# Patient Record
Sex: MALE | Race: ASIAN | NOT HISPANIC OR LATINO | Employment: PART TIME | URBAN - METROPOLITAN AREA
[De-identification: names, ages, dates, MRNs, and addresses within clinical notes are randomized per-mention and may not be internally consistent; named-entity substitution may affect disease eponyms.]

---

## 2017-02-08 ENCOUNTER — TRANSFERRED RECORDS (OUTPATIENT)
Dept: HEALTH INFORMATION MANAGEMENT | Facility: CLINIC | Age: 22
End: 2017-02-08

## 2017-08-16 ENCOUNTER — THERAPY VISIT (OUTPATIENT)
Dept: OCCUPATIONAL THERAPY | Facility: CLINIC | Age: 22
End: 2017-08-16
Payer: COMMERCIAL

## 2017-08-16 DIAGNOSIS — M79.645 BILATERAL THUMB PAIN: ICD-10-CM

## 2017-08-16 DIAGNOSIS — M79.644 BILATERAL THUMB PAIN: ICD-10-CM

## 2017-08-16 DIAGNOSIS — M25.531 PAIN IN BOTH WRISTS: Primary | ICD-10-CM

## 2017-08-16 DIAGNOSIS — M25.532 PAIN IN BOTH WRISTS: Primary | ICD-10-CM

## 2017-08-16 PROCEDURE — 97165 OT EVAL LOW COMPLEX 30 MIN: CPT | Mod: GO | Performed by: OCCUPATIONAL THERAPIST

## 2017-08-16 PROCEDURE — 97535 SELF CARE MNGMENT TRAINING: CPT | Mod: GO | Performed by: OCCUPATIONAL THERAPIST

## 2017-08-16 PROCEDURE — 97110 THERAPEUTIC EXERCISES: CPT | Mod: GO | Performed by: OCCUPATIONAL THERAPIST

## 2017-08-16 NOTE — PROGRESS NOTES
Hand Therapy Initial Evaluation    Current Date:  8/16/2017     Diagnosis:  Bilateral  Wrist and thumb pain, mainly right    Date of onset:  Spring 2017, more seriously in July 2017     Preferred Pronouns: they and them, their    Subjective:    Manoj Castellanos is L handed. Their main L hand tasks are cooking and writing, but otherwise ambidextrous.    Patient reports symptoms of pain, and loss of mobility mainly due to pain of the bilateral wrists, mainly the R (starting on the L with phone use) which occurred due to phone use, piano. They noted that pain came suddenly where it used to be just soreness. Since onset symptoms are Gradually getting worse.  Special tests:  none.  Previous treatment: wrist brace.    General health as reported by patient is good.  Pertinent medical history includes:asthma  Medical allergies:none.  Surgical history: none.  Medication history: inhaler (steroids).    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 Has a job lined up for September which will involve some computer use.  Currently working in normal job without restrictions  Job Tasks: prolonged sitting, computer work  Prior functional level:  no limitations  Barriers include:none  Mobility: No difficulty  Transportation: drives and public transportation  Leisure activities/hobbies: piano, reads a lot (voraciously) on e-reader, thinking about getting into sewing  Other: Pt would like to fix their handwriting. They do also have a computer with keyboard available. They have a dog, and a sister who will be leaving for college.    Functional Outcome Measure:   Please refer to flow sheet.      Objective:  Pain Level Report  VAS(0-10) 8/16/2017   At Rest: 0-3/10   With Use: 7/10     Report of Pain:  Location:  wrist and thumb  Pain Quality:  Aching, Dull and Sharp  Frequency: intermittent    Pain is worst:  daytime  Exacerbated by:  repetitive use, phone, didi use  Relieved by:  rest and brace  use  Progression:  Better somewhat lately  Edema:  NONE  Tenderness:   Pain Report:  - none    + mild    ++ moderate    +++ severe     Date 8/16/2017 8/16/2017   Side Right  Left   Ulna Styloid - -   TFCC - -   DRUJ - -   Radial Styloid - -   Distal Radius - -   Volar Lunate - -   Dorsal Lunate - -   Volar Scaphoid - -   Dorsal Scaphoid - -   Scaphoid in snuffbox - -   Dorsal wrist - -   ECU - -   ECRB - -   ECRL - -        Wrist and thumb ROM Special Test: + indicates mild pain, ++ indicates moderate pain, +++ indicates severe pain    DATE: 8/16/2017 Active Passive Resisted  MMT x/5 Comments:   Wrist ext with RD - - 5/5    Wrist ext with UD - - 5/5    Wrist flexion with RD - - 5/5    Wrist flexion with RD - - 5/5    Radial deviation - - 5/5    Ulnar deviation - - 5/5    Pronation - -- 5/5    Supination - - 5/5    Abductor pollicis longus  - - 5/5    Abductor Pollicis brevis  -  5/5    Extensor pollicis longus  - - 5/5    Flexor pollicis longus  - - 5/5    Flexor pollicis brevis  - - 5/5      Pt notes pain is localized to (points to): dorsal wrists and radial wrist/thumbs    Sensation: WNL throughout all nerve distributions; per patient report    Strength: WFL to B wrists    Assessment:  Patient presents with symptoms consistent with diagnosis of B wrist pain,  with conservative intervention. Pain appears related to repetitive strain with technology use. Pt very receptive to ergonomic and adaptive modifications. Pt requires 1 visit only at this time but would certainly be appropriate for further tx if symptoms worsen.    Patient's limitations or Problem List includes:  Pain of the bilateral wrist which interferes with the patient's ability to perform  Work Tasks and Recreational Activities as compared to previous level of function.    Rehab Potential:  Excellent - Return to full activity, no limitations    Patient will benefit from skilled Occupational Therapy to increase self management of symptoms and decrease  pain to return to previous activity level and resume normal daily tasks and to reach their rehab potential.    Barriers to Learning:  No barrier    Communication Issues:  Patient appears to be able to clearly communicate and understand verbal and written communication and follow directions correctly.    Chart Review: Detailed history review with patient    Identified Performance Deficits: home establishment and management, meal preparation and cleanup, work and leisure activities    Assessment of Occupational Performance:  3-5 Performance Deficits    Clinical Decision Making (Complexity): Low complexity    Treatment Explanation:  The following has been discussed with the patient:  RX ordered/plan of care  Anticipated outcomes  Possible risks and side effects    Plan:  Frequency:  One time visit for evaluation, diagnostic education. Teaching in adaptive aids and modifications for e-device use  Duration:  One time visit    Treatment Plan:   Self Care:  Self Care Tasks, Ergonomic Considerations and Work Tasks  Discharge Plan:  Achieve all LTG.  Independent in home treatment program.  Reach maximal therapeutic benefit.    Home Exercise Program:  Stretching exercises for UQ and FA  Avoid using thumbs for phone use (hold in hand and use IF)  Obtain and hand morales for didi or put on a copy stand  Use dictation on phone  Get a pencil  and practice writing, keep a open space between IF and thumb when writing  Use the whole UE when playing piano

## 2017-08-16 NOTE — MR AVS SNAPSHOT
"              After Visit Summary   8/16/2017    Manoj Castellanos    MRN: [id number]           Patient Information     Date Of Birth          1995        Visit Information        Provider Department      8/16/2017 4:00 PM Sandrine Mrai OT [facility]        Today's Diagnoses     Pain in both wrists    -  1    Bilateral thumb pain           Follow-ups after your visit        Your next 10 appointments already scheduled     Sep 06, 2017 11:00 AM CDT   [facility] ([facility] [place])    [address] [id number] [phone]              Who to contact     If you have questions or need follow up information about today's clinic visit or your schedule please contact [facility] directly at [phone].  Normal or non-critical lab and imaging results will be communicated to you by OfferSavvyhart, letter or phone within 4 business days after the clinic has received the results. If you do not hear from us within 7 days, please contact the clinic through OfferSavvyhart or phone. If you have a critical or abnormal lab result, we will notify you by phone as soon as possible.  Submit refill requests through Wikets or call your pharmacy and they will forward the refill request to us. Please allow 3 business days for your refill to be completed.          Additional Information About Your Visit        MyChart Information     Wikets lets you send messages to your doctor, view your test results, renew your prescriptions, schedule appointments and more. To sign up, go to [email or web address] . Click on \"Log in\" on the left side of the screen, which will take you to the Welcome page. Then click on \"Sign up Now\" on the right side of the page.     You will be asked to enter the access code listed below, as well as some personal information. Please follow the directions to create your username and password.     Your access code is: " [email or web address]  Expires: 10/31/2017  6:31 AM     Your access code will  in 90 days. If you need help or a new code, please call your [facility] clinic or [phone].        Care EveryWhere ID     This is your Care EveryWhere ID. This could be used by other organizations to access your [facility] medical records  [id number]         Blood Pressure from Last 3 Encounters:   No data found for BP    Weight from Last 3 Encounters:   12 52.6 kg (116 lb) (29 %)*     * Growth percentiles are based on [facility] 2-20 Years data.              We Performed the Following     HC OT EVAL, LOW COMPLEXITY     SELF CARE MNGMENT TRAINING     THERAPEUTIC EXERCISES        Primary Care Provider Fax #    [facility] [phone]       No address on file        Equal Access to Services     DAV ARIAS : Gwendolyn waddell Socornelius, waaxda luqadaha, qaybta kaalmada adedarryl, johana ramos . So [facility] [phone].    ATENCIÓN: Si habla español, tiene a carmona disposición servicios gratuitos de asistencia lingüística. Llame al [phone].    We comply with applicable federal civil rights laws and [place] laws. We do not discriminate on the basis of race, color, national origin, age, disability sex, sexual orientation or gender identity.            Thank you!     Thank you for choosing [facility]  for your care. Our goal is always to provide you with excellent care. Hearing back from our patients is one way we can continue to improve our services. Please take a few minutes to complete the written survey that you may receive in the mail after your visit with us. Thank you!             Your Updated Medication List - Protect others around you: Learn how to safely use, store and throw away your medicines at www.disposemymeds.org.          This list is accurate as of: 17 11:59 PM.  Always use your most recent med list.                   Brand Name Dispense Instructions for use Diagnosis     IBUPROFEN

## 2017-08-17 PROBLEM — M25.531 PAIN IN BOTH WRISTS: Status: ACTIVE | Noted: 2017-08-17

## 2017-08-17 PROBLEM — M25.532 PAIN IN BOTH WRISTS: Status: RESOLVED | Noted: 2017-08-17 | Resolved: 2017-08-17

## 2017-08-17 PROBLEM — M79.644 BILATERAL THUMB PAIN: Status: RESOLVED | Noted: 2017-08-17 | Resolved: 2017-08-17

## 2017-08-17 PROBLEM — M79.644 BILATERAL THUMB PAIN: Status: ACTIVE | Noted: 2017-08-17

## 2017-08-17 PROBLEM — M79.645 BILATERAL THUMB PAIN: Status: ACTIVE | Noted: 2017-08-17

## 2017-08-17 PROBLEM — M79.645 BILATERAL THUMB PAIN: Status: RESOLVED | Noted: 2017-08-17 | Resolved: 2017-08-17

## 2017-08-17 PROBLEM — M25.531 PAIN IN BOTH WRISTS: Status: RESOLVED | Noted: 2017-08-17 | Resolved: 2017-08-17

## 2017-08-17 PROBLEM — M25.532 PAIN IN BOTH WRISTS: Status: ACTIVE | Noted: 2017-08-17

## 2017-09-11 ENCOUNTER — OFFICE VISIT (OUTPATIENT)
Dept: FAMILY MEDICINE | Facility: CLINIC | Age: 22
End: 2017-09-11
Payer: COMMERCIAL

## 2017-09-11 VITALS
SYSTOLIC BLOOD PRESSURE: 132 MMHG | DIASTOLIC BLOOD PRESSURE: 86 MMHG | RESPIRATION RATE: 16 BRPM | HEIGHT: 69 IN | OXYGEN SATURATION: 98 % | HEART RATE: 98 BPM | TEMPERATURE: 98.6 F | BODY MASS INDEX: 17.77 KG/M2 | WEIGHT: 120 LBS

## 2017-09-11 DIAGNOSIS — Z23 NEED FOR VACCINATION: ICD-10-CM

## 2017-09-11 DIAGNOSIS — Z00.00 ROUTINE GENERAL MEDICAL EXAMINATION AT A HEALTH CARE FACILITY: Primary | ICD-10-CM

## 2017-09-11 DIAGNOSIS — L70.0 ACNE VULGARIS: ICD-10-CM

## 2017-09-11 DIAGNOSIS — L64.9 MALE PATTERN BALDNESS: ICD-10-CM

## 2017-09-11 PROCEDURE — 99213 OFFICE O/P EST LOW 20 MIN: CPT | Mod: 25 | Performed by: NURSE PRACTITIONER

## 2017-09-11 PROCEDURE — 99385 PREV VISIT NEW AGE 18-39: CPT | Mod: 25 | Performed by: NURSE PRACTITIONER

## 2017-09-11 PROCEDURE — 90471 IMMUNIZATION ADMIN: CPT | Performed by: NURSE PRACTITIONER

## 2017-09-11 PROCEDURE — 90686 IIV4 VACC NO PRSV 0.5 ML IM: CPT | Performed by: NURSE PRACTITIONER

## 2017-09-11 RX ORDER — ALBUTEROL SULFATE 90 UG/1
2 AEROSOL, METERED RESPIRATORY (INHALATION) EVERY 6 HOURS
COMMUNITY
End: 2018-03-06

## 2017-09-11 RX ORDER — CLINDAMYCIN PHOSPHATE 10 MG/G
GEL TOPICAL
Qty: 60 G | Refills: 11 | Status: SHIPPED | OUTPATIENT
Start: 2017-09-11 | End: 2018-01-10

## 2017-09-11 NOTE — MR AVS SNAPSHOT
After Visit Summary   9/11/2017    Manoj Castellanos    MRN: [id number]           Patient Information     Date Of Birth          1995        Visit Information        Provider Department      9/11/2017 2:20 PM Kina Rivera APRN CNP [facility]        Today's Diagnoses     Routine general medical examination at a health care facility    -  1    Acne vulgaris        Male pattern baldness          Care Instructions      Preventive Health Recommendations  Male Ages 18 - 25     Yearly exam:             See your health care provider every year in order to  o   Review health changes.   o   Discuss preventive care.    o   Review your medicines if your doctor has prescribed any.    You should be tested each year for STDs (sexually transmitted diseases).     Talk to your provider about cholesterol testing.      If you are at risk for diabetes, you should have a diabetes test (fasting glucose).    Shots: Get a flu shot each year. Get a tetanus shot every 10 years.     Nutrition:    Eat at least 5 servings of fruits and vegetables daily.     Eat whole-grain bread, whole-wheat pasta and brown rice instead of white grains and rice.     Talk to your provider about calcium and Vitamin D.     Lifestyle    Exercise for at least 150 minutes a week (30 minutes a day, 5 days a week). This will help you control your weight and prevent disease.     Limit alcohol to one drink per day.     No smoking.     Wear sunscreen to prevent skin cancer.     See your dentist every six months for an exam and cleaning.             Follow-ups after your visit        Who to contact     If you have questions or need follow up information about today's clinic visit or your schedule please contact [facility] directly at [phone].  Normal or non-critical lab and imaging results will be communicated to you by MyChart, letter or phone within 4 business days after the clinic has received the results.  "If you do not hear from us within 7 days, please contact the clinic through Barracuda Networks or phone. If you have a critical or abnormal lab result, we will notify you by phone as soon as possible.  Submit refill requests through [employer] or call your pharmacy and they will forward the refill request to us. Please allow 3 business days for your refill to be completed.          Additional Information About Your Visit        KastharInfer Information     [employer] Networks lets you send messages to your doctor, view your test results, renew your prescriptions, schedule appointments and more. To sign up, go to [email or web address] . Click on \"Log in\" on the left side of the screen, which will take you to the Welcome page. Then click on \"Sign up Now\" on the right side of the page.     You will be asked to enter the access code listed below, as well as some personal information. Please follow the directions to create your username and password.     Your access code is: [email or web address]  Expires: 10/31/2017  6:31 AM     Your access code will  in 90 days. If you need help or a new code, please call your [facility] clinic or [phone].        Care EveryWhere ID     This is your Care EveryWhere ID. This could be used by other organizations to access your [facility] medical records  [id number]        Your Vitals Were     Pulse Temperature Respirations Height Pulse Oximetry BMI (Body Mass Index)    98 98.6  F (37  C) (Oral) 16 5' 9.25\" (1.759 m) 98% 17.59 kg/m2       Blood Pressure from Last 3 Encounters:   17 132/86    Weight from Last 3 Encounters:   17 120 lb (54.4 kg)   12 116 lb (52.6 kg) (29 %)*     * Growth percentiles are based on CDC 2-20 Years data.              Today, you had the following     No orders found for display         Today's Medication Changes          These changes are accurate as of: 17  2:58 PM.  If you have any questions, ask your nurse or doctor.               Start taking these medicines.        " Dose/Directions    clindamycin 1 % topical gel   Commonly known as:  CLINDAGEL   Used for:  Acne vulgaris   Started by:  Kina Rivera APRN CNP        Apply a small amount to affected area daily.   Quantity:  60 g   Refills:  11            Where to get your medicines      These medications were sent to [facility] [address] [place] [address] & [address] [address]    Hours:  24-hours Phone:  [phone]     clindamycin 1 % topical gel                Primary Care Provider    None Specified       No primary provider on file.        Equal Access to Services     [facility]: Hadii jas kamara haddemetriso Socornelius, waaxda luqadaha, qaybta kaalmada adeheidyyada, johana ramos . So [facility] [phone].    ATENCIÓN: Si habla español, tiene a carmona disposición servicios gratuitos de asistencia lingüística. [facility] [phone].    We comply with applicable federal civil rights laws and [place] laws. We do not discriminate on the basis of race, color, national origin, age, disability sex, sexual orientation or gender identity.            Thank you!     Thank you for choosing [facility]  for your care. Our goal is always to provide you with excellent care. Hearing back from our patients is one way we can continue to improve our services. Please take a few minutes to complete the written survey that you may receive in the mail after your visit with us. Thank you!             Your Updated Medication List - Protect others around you: Learn how to safely use, store and throw away your medicines at www.disposemymeds.org.          This list is accurate as of: 9/11/17  2:58 PM.  Always use your most recent med list.                   Brand Name Dispense Instructions for use Diagnosis    AEROSPAN IN           albuterol 108 (90 BASE) MCG/ACT Inhaler    PROAIR HFA/PROVENTIL HFA/VENTOLIN HFA     Inhale 2 puffs  into the lungs every 6 hours        clindamycin 1 % topical gel    CLINDAGEL    60 g    Apply a small amount to affected area daily.    Acne vulgaris       IBUPROFEN           TRUVADA PO           ZYRTEC ALLERGY PO

## 2017-09-11 NOTE — PROGRESS NOTES
SUBJECTIVE:   CC: Manoj Castellanos is an 22 year old male who presents for preventative health visit.    HPI:  Patient is a pleasant 22 year old male who is currently transitioning to female presenting to the clinic for a preventative health visit and to establish care. Manoj prefers to go by addressed as she.  Her last preventative health visit was last year at an outside clinic and he is currently working on transferring his health records.  She reports that overall she is health and denies any major family history.  She states that she is currently not considering hormone therapy for her transition to female.  She is starting truvada and is followed by outside STD clinic.  The patient reports that she has 2 current partners and uses condoms for protection with each encounter.  She has no concerns for STDs at this visit and reports his recent testing was negative.  Her main concerns today are acne and male pattern baldness.  She reports that she has been seen in the past by dermatology who recommended a benzyl peroxide wash which was ineffective.  She admits that she did not use moisturizers which may have contributed to acne symptoms. She reports her acne is localized to his cheeks and neck and worsens with make-up use.  She reports that he cleanses his skin nightly and uses moisturizers with SPF daily.  She also reports concern for male pattern baldness and that he is beginning to have bilateral receeding hairlines.  She reports the males on his mothers side have a history of male pattern baldness.  She denies any other concerns at this visit.    Physical   Annual:     Getting at least 3 servings of Calcium per day::  NO    Bi-annual eye exam::  Yes    Dental care twice a year::  Yes    Sleep apnea or symptoms of sleep apnea::  None    Frequency of exercise::  2-3 days/week    Duration of exercise::  15-30 minutes    Taking medications regularly::  Yes    Medication side effects::  None    Additional concerns today::   "YES            Today's PHQ-2 Score:   PHQ-2 ( 1999 Pfizer) 9/11/2017   Q1: Little interest or pleasure in doing things 0   Q2: Feeling down, depressed or hopeless 0   PHQ-2 Score 0   Q1: Little interest or pleasure in doing things Not at all   Q2: Feeling down, depressed or hopeless Not at all   PHQ-2 Score 0       Abuse: Current or Past(Physical, Sexual or Emotional)- No  Do you feel safe in your environment - Yes    Social History   Substance Use Topics     Smoking status: Never Smoker     Smokeless tobacco: Never Used     Alcohol use Yes      Comment: occasional      The patient does not drink >3 drinks per day nor >7 drinks per week.    Last PSA: No results found for: PSA    Reviewed orders with patient. Reviewed health maintenance and updated orders accordingly - Yes      Reviewed and updated as needed this visit by clinical staff  Tobacco  Allergies  Med Hx  Surg Hx  Fam Hx  Soc Hx        Reviewed and updated as needed this visit by Provider        History reviewed. No pertinent past medical history.     ROS:  C: NEGATIVE for fever, chills, change in weight  I: NEGATIVE for worrisome rashes, moles or lesions.  Patient reports facial acne localized over his cheeks and neck.  Patient is also concerned about receeding hairline.  E: NEGATIVE for vision changes or irritation  ENT: NEGATIVE for ear, mouth and throat problems  R: NEGATIVE for significant cough or SOB  CV: NEGATIVE for chest pain, palpitations or peripheral edema  GI: NEGATIVE for nausea, abdominal pain, heartburn, or change in bowel habits   male: negative for dysuria, hematuria, decreased urinary stream, erectile dysfunction, urethral discharge  M: NEGATIVE for significant arthralgias or myalgia  N: NEGATIVE for weakness, dizziness or paresthesias  P: NEGATIVE for changes in mood or affect    OBJECTIVE:   /86  Pulse 98  Temp 98.6  F (37  C) (Oral)  Resp 16  Ht 5' 9.25\" (1.759 m)  Wt 120 lb (54.4 kg)  SpO2 98%  BMI 17.59 " "kg/m2    EXAM:  GENERAL: healthy, alert and no distress  NECK: no adenopathy, no asymmetry, masses, or scars and thyroid normal to palpation  RESP: lungs clear to auscultation - no rales, rhonchi or wheezes  CV: regular rate and rhythm, normal S1 S2, no S3 or S4, no murmur, click or rub, no peripheral edema and peripheral pulses strong  ABDOMEN: soft, nontender, no hepatosplenomegaly, no masses and bowel sounds normal  MS: no gross musculoskeletal defects noted, no edema  PSYCH: mentation appears normal, affect normal/bright    ASSESSMENT/PLAN:   1. Routine general medical examination at a health care facility  Patient is healthy and up-to-date on his vaccinations.  He will continue to follow with his outside clinic for Truvada.  No concerning findings on ROS or PE.    2. Acne vulgaris  Patient has mild acne and agreeable to starting clindagel in addition to benzyl peroxide.  Patient instructed to continue continue using moisturizer with SPF.  - clindamycin (CLINDAGEL) 1 % topical gel; Apply a small amount to affected area daily.  Dispense: 60 g; Refill: 11    3. Male pattern baldness  Recommended OTC Rogaine or generic equivalent.  If ineffective, will consider referral to specialist/dermatologist for other treatment options.    4. Need for vaccination  Flu shot administered at this visit.  - HC FLU VAC PRESRV FREE QUAD SPLIT VIR 3+YRS IM  - VACCINE ADMINISTRATION, INITIAL    COUNSELING:   Reviewed preventive health counseling, as reflected in patient instructions       Safe sex practices/STD prevention           reports that he has never smoked. He has never used smokeless tobacco.      Estimated body mass index is 17.59 kg/(m^2) as calculated from the following:    Height as of this encounter: 5' 9.25\" (1.759 m).    Weight as of this encounter: 120 lb (54.4 kg).     Follow-up in 1 year or sooner if needed.        Counseling Resources:  ATP IV Guidelines  Pooled Cohorts Equation Calculator  FRAX Risk " Assessment  ICSI Preventive Guidelines  Dietary Guidelines for Americans, 2010  USDA's MyPlate  ASA Prophylaxis  Lung CA Screening      Philly Ramirez RN, SNP    In supervising the nurse practitioner student, I have reviewed the ROS and PSFH documented by the student.  I performed the pertinent history, exam and assessment and plan components as documented above.   KEYANNA Nix CNP   [facility] for HPI/ROS submitted by the patient on 9/11/2017   PHQ-2 Score: 0

## 2017-09-11 NOTE — NURSING NOTE
"Chief Complaint   Patient presents with     Physical       Initial /86  Pulse 98  Temp 98.6  F (37  C) (Oral)  Resp 16  Ht 5' 9.25\" (1.759 m)  Wt 120 lb (54.4 kg)  SpO2 98%  BMI 17.59 kg/m2 Estimated body mass index is 17.59 kg/(m^2) as calculated from the following:    Height as of this encounter: 5' 9.25\" (1.759 m).    Weight as of this encounter: 120 lb (54.4 kg).  Medication Reconciliation: complete       Renard Hodges MA     Injectable Influenza Immunization Documentation      1.  Has the patient received the information for the injectable influenza vaccine? YES    2. Is the patient 6 months of age or older? YES    3. Does the patient have any of the following contraindications?          Severe allergy to eggs? No     Severe allergic reaction to previous influenza vaccines? No     Allergy to contact lens solution/thimerosol? No     History of Guillain-[place] syndrome? No     Undergoing chemotherapy or radiation therapy?       (vaccine should be given at least 2 weeks prior or 3 weeks after)  No     Currently have moderate or severe illness? No         4.  The vaccine has been administered and the patient was instructed to wait 15 minutes before leaving the building in the event of an allergic reaction: YES    Vaccination given by     Renard Hodges MA           "

## 2017-10-01 ENCOUNTER — HEALTH MAINTENANCE LETTER (OUTPATIENT)
Age: 22
End: 2017-10-01

## 2017-10-02 ENCOUNTER — OFFICE VISIT (OUTPATIENT)
Dept: FAMILY MEDICINE | Facility: CLINIC | Age: 22
End: 2017-10-02
Payer: COMMERCIAL

## 2017-10-02 VITALS
BODY MASS INDEX: 17.74 KG/M2 | HEART RATE: 97 BPM | WEIGHT: 121 LBS | TEMPERATURE: 97.4 F | RESPIRATION RATE: 24 BRPM | SYSTOLIC BLOOD PRESSURE: 127 MMHG | OXYGEN SATURATION: 100 % | DIASTOLIC BLOOD PRESSURE: 81 MMHG

## 2017-10-02 DIAGNOSIS — L64.9 MALE PATTERN BALDNESS: Primary | ICD-10-CM

## 2017-10-02 DIAGNOSIS — R20.9 DISTURBANCE OF SKIN SENSATION: ICD-10-CM

## 2017-10-02 DIAGNOSIS — R01.1 SYSTOLIC CLICK: ICD-10-CM

## 2017-10-02 DIAGNOSIS — R07.89 CHEST WALL PAIN: ICD-10-CM

## 2017-10-02 LAB
HGB BLD-MCNC: 16.4 G/DL (ref 13.3–17.7)
VIT B12 SERPL-MCNC: 447 PG/ML (ref 193–986)

## 2017-10-02 PROCEDURE — 84443 ASSAY THYROID STIM HORMONE: CPT | Performed by: NURSE PRACTITIONER

## 2017-10-02 PROCEDURE — 84207 ASSAY OF VITAMIN B-6: CPT | Mod: 90 | Performed by: NURSE PRACTITIONER

## 2017-10-02 PROCEDURE — 99000 SPECIMEN HANDLING OFFICE-LAB: CPT | Performed by: NURSE PRACTITIONER

## 2017-10-02 PROCEDURE — 85018 HEMOGLOBIN: CPT | Performed by: NURSE PRACTITIONER

## 2017-10-02 PROCEDURE — 36415 COLL VENOUS BLD VENIPUNCTURE: CPT | Performed by: NURSE PRACTITIONER

## 2017-10-02 PROCEDURE — 99214 OFFICE O/P EST MOD 30 MIN: CPT | Performed by: NURSE PRACTITIONER

## 2017-10-02 PROCEDURE — 82607 VITAMIN B-12: CPT | Performed by: NURSE PRACTITIONER

## 2017-10-02 PROCEDURE — 80048 BASIC METABOLIC PNL TOTAL CA: CPT | Performed by: NURSE PRACTITIONER

## 2017-10-02 NOTE — PROGRESS NOTES
"  SUBJECTIVE:   Manoj Castellanos is a 22 year old male who presents to clinic today for the following health issues:      Follow up on my male pattern baldness:   Specifically pertaining to the negative side effects   minoxidil treatment is having and discussing potential   alternatives such as finasteride.    ED/UC Followup:    Facility:  [facility]   Date of visit: 10/1/2017  Reason for visit: chest pain and clicking  Current Status: Better   Manoj had a sudden onset of \"muscle spasms\" in his left chest.  Manoj took a vicodin (left over from a wisdom tooth surgery) and the vicodin did not help.  Manoj went to [facility] and they told him it was \"chest wall pain.\"  Chest xray was normal.  Manoj still has some chest soreness.  At the end of a deep breath Ty will have some left sided lung pain.  The pain is in Manoj's left lower lung and radiates up to Ty's left shoulder.    Heart click:   Manoj woke up and Ty noticed a \"click\" coming with heart beat.  This morning for approximately 1 minute.  The clicking then was noticed intermittent clicking for 5 minutes after the minute of consistent clicking.  Manoj has not noticed any heart clicking since.     Facial Numbness:  Has had some numbness but on Friday entire lower half of face was like pins and needles and some lower lip paralysis. Previously happened on 9/12/2017.  Longstanding history of numbness on right jaw from mid anterior chin radiating to right mandible.  On September 29th Manoj had an episode of numbness that affected Manoj's face from bilateral cheek bones, under nose, to jaw line.  Uncertain if slurring, but the sensation that lower lip was puckering and not working properly.      [place]:  Managed by [facility] in [facility].    Problem list and histories reviewed & adjusted, as indicated.  Additional history: as documented    Patient Active Problem List   Diagnosis     Patellofemoral pain syndrome     Right hip pain     History reviewed. No pertinent surgical history.    Social History "   Substance Use Topics     Smoking status: Never Smoker     Smokeless tobacco: Never Used     Alcohol use Yes      Comment: occasional      History reviewed. No pertinent family history.      Current Outpatient Prescriptions   Medication Sig Dispense Refill     Flunisolide HFA (AEROSPAN IN)        Cetirizine HCl (ZYRTEC ALLERGY PO)        Emtricitabine-Tenofovir DF (TRUVADA PO)        albuterol (PROAIR HFA/PROVENTIL HFA/VENTOLIN HFA) 108 (90 BASE) MCG/ACT Inhaler Inhale 2 puffs into the lungs every 6 hours       clindamycin (CLINDAGEL) 1 % topical gel Apply a small amount to affected area daily. 60 g 11     IBUPROFEN        No Known Allergies  No lab results found.   BP Readings from Last 3 Encounters:   10/02/17 127/81   09/11/17 132/86    Wt Readings from Last 3 Encounters:   10/02/17 121 lb (54.9 kg)   09/11/17 120 lb (54.4 kg)   12/05/12 116 lb (52.6 kg) (29 %)*     * Growth percentiles are based on CDC 2-20 Years data.                  Labs reviewed in EPIC          Reviewed and updated as needed this visit by clinical staff       Reviewed and updated as needed this visit by Provider         ROS:  Constitutional, HEENT, cardiovascular, pulmonary, GI, , musculoskeletal, neuro, skin, endocrine and psych systems are negative, except as otherwise noted.      OBJECTIVE:   /81  Pulse 97  Temp 97.4  F (36.3  C) (Oral)  Resp 24  Wt 121 lb (54.9 kg)  SpO2 100%  BMI 17.74 kg/m2  Body mass index is 17.74 kg/(m^2).  GENERAL: healthy, alert and no distress  NECK: no adenopathy, no asymmetry, masses, or scars and thyroid normal to palpation  RESP: lungs clear to auscultation - no rales, rhonchi or wheezes  CV: regular rate and rhythm, normal S1 S2, no S3 or S4, no murmur, click or rub, no peripheral edema and peripheral pulses strong  SKIN: warm and dry. Early male patterned baldness noted to anterior scalp. He also has bi-temporal area dry skin patches/flaking just inside the scalp line.   NEURO: Normal  strength and tone, mentation intact and speech normal  PSYCH: mentation appears normal, affect normal/bright      ASSESSMENT/PLAN:     (L64.9) Male pattern baldness  (primary encounter diagnosis)  Comment: uncertain  Plan: see below    (R01.1) heart click, now resolved  Comment: uncertain  Plan: see below    (R20.9) Disturbance of skin sensation  Comment: uncertain  Plan: Vitamin B12, Vitamin B6, TSH with free T4         reflex, Hemoglobin, Basic metabolic panel,         NEUROLOGY ADULT REFERRAL        See below    (R07.89) Chest wall pain  Comment: improved  Plan: see below.    Patient Instructions   Baldness:  Stop the minoxodil.  You can use your over the counter product for dandruff.  Follow up with dermatology for other treatment options.    Heart click:  I did not hear a heart click today  I recommend that you continue your fluid intake, a healthy diet, etc.  If you notice this again, please mychart message me to let me know and I would order a cardiac echocardiogram.  If you develop the heart click and feel dizzy, lightheaded, don't feel well, etc, please go to the ER.    Chest wall pain:  Continue your ibuprofen 400mg every 6 hours for 2-3 days with good.  Follow up if your symptoms return or worsen in any way.    Facial numbness:  I have done some labs today to try to figure out an underlying etiology/reason.  Take care of yourself with a healthy diet, fluids, rest, etc.  If this returns, I would you to follow up with our clinic neurologist, Dr. Shankar.  You can call our clinic to schedule an appointment with him.                KEYANNA Nix [facility]

## 2017-10-02 NOTE — MR AVS SNAPSHOT
After Visit Summary   10/2/2017    Manoj Castellanos    MRN: [id number]           Patient Information     Date Of Birth          1995        Visit Information        Provider Department      10/2/2017 1:20 PM Kina Rivera APRN CNP [facility]        Today's Diagnoses     Male pattern baldness    -  1    heart click, now resolved        Disturbance of skin sensation        Chest wall pain          Care Instructions    Baldness:  Stop the minoxodil.  You can use your over the counter product for dandruff.  Follow up with dermatology for other treatment options.    Heart click:  I did not hear a heart click today  I recommend that you continue your fluid intake, a healthy diet, etc.  If you notice this again, please mychart message me to let me know and I would order a cardiac echocardiogram.  If you develop the heart click and feel dizzy, lightheaded, don't feel well, etc, please go to the ER.    Chest wall pain:  Continue your ibuprofen 400mg every 6 hours for 2-3 days with good.  Follow up if your symptoms return or worsen in any way.    Facial numbness:  I have done some labs today to try to figure out an underlying etiology/reason.  Take care of yourself with a healthy diet, fluids, rest, etc.  If this returns, I would you to follow up with our clinic neurologist, Dr. Shankar.  You can call our clinic to schedule an appointment with him.              Follow-ups after your visit        Additional Services     NEUROLOGY ADULT REFERRAL       Your provider has referred you to: FMG: [facility] [phone]   [email or web address]  FMG: [facility] [phone]   [email or web address]  UMP: Neurology [facility] [phone]   [email or web address]     Reason for Referral: Consult    Please be aware that coverage  of these services is subject to the terms and limitations of your health insurance plan.  Call member services at your health plan with any benefit or coverage questions.      Please bring the following with you to your appointment:    (1) Any X-Rays, CTs or MRIs which have been performed.  Contact the facility where they were done to arrange for  prior to your scheduled appointment.    (2) List of current medications  (3) This referral request   (4) Any documents/labs given to you for this referral                  Who to contact     If you have questions or need follow up information about today's clinic visit or your schedule please contact [facility] directly at [phone].  Normal or non-critical lab and imaging results will be communicated to you by MyChart, letter or phone within 4 business days after the clinic has received the results. If you do not hear from us within 7 days, please contact the clinic through [employer] or phone. If you have a critical or abnormal lab result, we will notify you by phone as soon as possible.  Submit refill requests through [employer] or call your pharmacy and they will forward the refill request to us. Please allow 3 business days for your refill to be completed.          Additional Information About Your Visit        [employer] Information     [employer] gives you secure access to your electronic health record. If you see a primary care provider, you can also send messages to your care team and make appointments. If you have questions, please call your primary care clinic.  If you do not have a primary care provider, please call [phone] and they will assist you.        Care EveryWhere ID     This is your Care EveryWhere ID. This could be used by other organizations to access your [place] medical records  [id number]        Your Vitals Were     Pulse Temperature Respirations Pulse Oximetry BMI (Body Mass Index)       97 97.4  F (36.3  C) (Oral) 24  100% 17.74 kg/m2        Blood Pressure from Last 3 Encounters:   10/02/17 127/81   09/11/17 132/86    Weight from Last 3 Encounters:   10/02/17 121 lb (54.9 kg)   09/11/17 120 lb (54.4 kg)   12/05/12 116 lb (52.6 kg) (29 %)*     * Growth percentiles are based on [facility] 2-20 Years data.              We Performed the Following     Basic metabolic panel     Hemoglobin     NEUROLOGY ADULT REFERRAL     TSH with free T4 reflex     Vitamin B12     Vitamin B6        Primary Care Provider    None Specified       No primary provider on file.        Equal Access to Services     [facility]: Hadii jas Winkler, wapreeti alvarado, elen kaalmarodríguez brown, johana ramos . So [facility] [phone].    ATENCIÓN: Si habla español, tiene a carmona disposición servicios gratuitos de asistencia lingüística. Ll[facility] [phone].    We comply with applicable federal civil rights laws and [place] laws. We do not discriminate on the basis of race, color, national origin, age, disability, sex, sexual orientation, or gender identity.            Thank you!     Thank you for choosing [facility]  for your care. Our goal is always to provide you with excellent care. Hearing back from our patients is one way we can continue to improve our services. Please take a few minutes to complete the written survey that you may receive in the mail after your visit with us. Thank you!             Your Updated Medication List - Protect others around you: Learn how to safely use, store and throw away your medicines at www.disposemymeds.org.          This list is accurate as of: 10/2/17  2:23 PM.  Always use your most recent med list.                   Brand Name Dispense Instructions for use Diagnosis    AEROSPAN IN           albuterol 108 (90 BASE) MCG/ACT Inhaler    PROAIR HFA/PROVENTIL HFA/VENTOLIN HFA     Inhale 2 puffs into the lungs every 6 hours        clindamycin 1 % topical gel    CLINDAGEL    60 g     Apply a small amount to affected area daily.    Acne vulgaris       IBUPROFEN           TRUVADA PO           ZYRTEC ALLERGY PO

## 2017-10-02 NOTE — NURSING NOTE
"Chief Complaint   Patient presents with     Hair/Scalp Problem       Initial /81  Pulse 97  Temp 97.4  F (36.3  C) (Oral)  Resp 24  Wt 121 lb (54.9 kg)  SpO2 100%  BMI 17.74 kg/m2 Estimated body mass index is 17.74 kg/(m^2) as calculated from the following:    Height as of 9/11/17: 5' 9.25\" (1.759 m).    Weight as of this encounter: 121 lb (54.9 kg).  Medication Reconciliation: complete  Jan Casanova CMA   "

## 2017-10-02 NOTE — PATIENT INSTRUCTIONS
Baldness:  Stop the minoxodil.  You can use your over the counter product for dandruff.  Follow up with dermatology for other treatment options.    Heart click:  I did not hear a heart click today  I recommend that you continue your fluid intake, a healthy diet, etc.  If you notice this again, please mychart message me to let me know and I would order a cardiac echocardiogram.  If you develop the heart click and feel dizzy, lightheaded, don't feel well, etc, please go to the ER.    Chest wall pain:  Continue your ibuprofen 400mg every 6 hours for 2-3 days with good.  Follow up if your symptoms return or worsen in any way.    Facial numbness:  I have done some labs today to try to figure out an underlying etiology/reason.  Take care of yourself with a healthy diet, fluids, rest, etc.  If this returns, I would you to follow up with our clinic neurologist, Dr. Shankar.  You can call our clinic to schedule an appointment with him.

## 2017-10-03 LAB
ANION GAP SERPL CALCULATED.3IONS-SCNC: 6 MMOL/L (ref 3–14)
BUN SERPL-MCNC: 11 MG/DL (ref 7–30)
CALCIUM SERPL-MCNC: 8.7 MG/DL (ref 8.5–10.1)
CHLORIDE SERPL-SCNC: 102 MMOL/L (ref 94–109)
CO2 SERPL-SCNC: 29 MMOL/L (ref 20–32)
CREAT SERPL-MCNC: 0.95 MG/DL (ref 0.66–1.25)
GFR SERPL CREATININE-BSD FRML MDRD: >90 ML/MIN/1.7M2
GLUCOSE SERPL-MCNC: 88 MG/DL (ref 70–99)
POTASSIUM SERPL-SCNC: 3.9 MMOL/L (ref 3.4–5.3)
SODIUM SERPL-SCNC: 137 MMOL/L (ref 133–144)
TSH SERPL DL<=0.005 MIU/L-ACNC: 2.98 MU/L (ref 0.4–4)

## 2017-10-05 LAB — VIT B6 SERPL-MCNC: 25.4 NMOL/L (ref 20–125)

## 2017-10-06 ENCOUNTER — RADIANT APPOINTMENT (OUTPATIENT)
Dept: CARDIOLOGY | Facility: CLINIC | Age: 22
End: 2017-10-06

## 2017-10-06 DIAGNOSIS — R01.1 UNDIAGNOSED CARDIAC MURMURS: ICD-10-CM

## 2017-10-12 ENCOUNTER — OFFICE VISIT (OUTPATIENT)
Dept: NEUROLOGY | Facility: CLINIC | Age: 22
End: 2017-10-12
Payer: COMMERCIAL

## 2017-10-12 VITALS
HEART RATE: 93 BPM | TEMPERATURE: 97.1 F | WEIGHT: 120 LBS | RESPIRATION RATE: 16 BRPM | BODY MASS INDEX: 17.59 KG/M2 | OXYGEN SATURATION: 96 % | DIASTOLIC BLOOD PRESSURE: 80 MMHG | SYSTOLIC BLOOD PRESSURE: 124 MMHG

## 2017-10-12 DIAGNOSIS — R20.0 RIGHT FACIAL NUMBNESS: Primary | ICD-10-CM

## 2017-10-12 PROCEDURE — 99205 OFFICE O/P NEW HI 60 MIN: CPT | Performed by: PSYCHIATRY & NEUROLOGY

## 2017-10-12 NOTE — MR AVS SNAPSHOT
After Visit Summary   10/12/2017    Manoj Castellanos    MRN: [id number]           Patient Information     Date Of Birth          1995        Visit Information        Provider Department      10/12/2017 3:00 PM Marcello Sandoval MD [facility]        Today's Diagnoses     Right facial numbness    -  1      Care Instructions    AFTER VISIT SUMMARY (AVS):    At today's visit we discussed various diagnostic possibilities for your symptoms and the reasons for work-up, which includes:  Orders Placed This Encounter   Procedures     MR Brain w/o Contrast     No new medications were ordered.    Additional recommendations after the work-up.    Next follow-up appointment is on as needed basis based on the results.    Please do not hesitate to call me with any questions or concerns.    Thanks.            Follow-ups after your visit        Your next 10 appointments already scheduled     Oct 17, 2017  7:45 PM CDT   (Arrive by 7:30 PM)   MR BRAIN W/O CONTRAST with [id number]   [facility] ([facility])    [address] [place] [id number] [phone]           Take your medicines as usual, unless your doctor tells you not to. Bring a list of your current medicines to your exam (including vitamins, minerals and over-the-counter drugs). Also bring the results of similar scans you may have had.  Please remove any body piercings and hair extensions before you arrive.  Follow your doctor s orders. If you do not, we may have to postpone your exam.  You will not have contrast for this exam. You do not need to do anything special to prepare.  The MRI machine uses a strong magnet. Please wear clothes without metal (snaps, zippers). A sweatsuit works well, or we may give you a hospital gown.   **IMPORTANT** THE INSTRUCTIONS BELOW ARE ONLY FOR THOSE PATIENTS WHO HAVE BEEN TOLD THEY WILL RECEIVE SEDATION OR GENERAL ANESTHESIA DURING  THEIR MRI PROCEDURE:  IF YOU WILL RECEIVE SEDATION (take medicine to help you relax during your exam):   You must get the medicine from your doctor before you arrive. Bring the medicine to the exam. Do not take it at home.   Arrive one hour early. Bring someone who can take you home after the test. Your medicine will make you sleepy. After the exam, you may not drive, take a bus or take a taxi by yourself.   No eating 8 hours before your exam. You may have clear liquids up until 4 hours before your exam. (Clear liquids include water, clear tea, black coffee and fruit juice without pulp.)  IF YOU WILL RECEIVE ANESTHESIA (be asleep for your exam):   Arrive 1 1/2 hours early. Bring someone who can take you home after the test. You may not drive, take a bus or take a taxi by yourself.   No eating 8 hours before your exam. You may have clear liquids up until 4 hours before your exam. (Clear liquids include water, clear tea, black coffee and fruit juice without pulp.)   You will spend four to five hours in the recovery room.  Please call the Imaging Department at your exam site with any questions.              Future tests that were ordered for you today     Open Future Orders        Priority Expected Expires Ordered    MR Brain w/o Contrast Routine  10/12/2018 10/12/2017            Who to contact     If you have questions or need follow up information about today's clinic visit or your schedule please contact [facility] directly at [phone].  Normal or non-critical lab and imaging results will be communicated to you by MyChart, letter or phone within 4 business days after the clinic has received the results. If you do not hear from us within 7 days, please contact the clinic through [email or web address] or phone. If you have a critical or abnormal lab result, we will notify you by phone as soon as possible.  Submit refill requests through [employer] or call your pharmacy and they will forward the refill request  to us. Please allow 3 business days for your refill to be completed.          Additional Information About Your Visit        MyChart Information     [employer] gives you secure access to your electronic health record. If you see a primary care provider, you can also send messages to your care team and make appointments. If you have questions, please call your primary care clinic.  If you do not have a primary care provider, please call [phone] and they will assist you.        Care EveryWhere ID     This is your Care EveryWhere ID. This could be used by other organizations to access your [place] medical records  [id number]        Your Vitals Were     Pulse Temperature Respirations Pulse Oximetry BMI (Body Mass Index)       93 97.1  F (36.2  C) (Oral) 16 96% 17.59 kg/m2        Blood Pressure from Last 3 Encounters:   10/12/17 124/80   10/02/17 127/81   09/11/17 132/86    Weight from Last 3 Encounters:   10/12/17 54.4 kg (120 lb)   10/02/17 54.9 kg (121 lb)   09/11/17 54.4 kg (120 lb)               Primary Care Provider    None Specified       No primary provider on file.        Equal Access to Services     [facility] : Hadii jas Winkler, cm alvarado, elen brown, johana ramos . So [facility] [phone].    ATENCIÓN: Si habla español, tiene a carmona disposición servicios gratuitos de asistencia lingüística. Ll[facility] Medical Center [phone].    We comply with applicable federal civil rights laws and [place] laws. We do not discriminate on the basis of race, color, national origin, age, disability, sex, sexual orientation, or gender identity.            Thank you!     Thank you for choosing [facility]  for your care. Our goal is always to provide you with excellent care. Hearing back from our patients is one way we can continue to improve our services. Please take a few minutes to complete the written survey that you may receive in the mail after your  visit with us. Thank you!             Your Updated Medication List - Protect others around you: Learn how to safely use, store and throw away your medicines at www.disposemymeds.org.          This list is accurate as of: 10/12/17  3:48 PM.  Always use your most recent med list.                   Brand Name Dispense Instructions for use Diagnosis    AEROSPAN IN           albuterol 108 (90 BASE) MCG/ACT Inhaler    PROAIR HFA/PROVENTIL HFA/VENTOLIN HFA     Inhale 2 puffs into the lungs every 6 hours        clindamycin 1 % topical gel    CLINDAGEL    60 g    Apply a small amount to affected area daily.    Acne vulgaris       IBUPROFEN           TRUVADA PO           ZYRTEC ALLERGY PO

## 2017-10-12 NOTE — PROGRESS NOTES
"INITIAL NEUROLOGY CONSULTATION    DATE OF VISIT: 10/12/2017  CLINIC LOCATION: [facility]  MRN: [id number]  PATIENT NAME: Manoj Castellanos  YOB: 1995    PRIMARY CARE PROVIDER: Kina Rivera APRN CNP.     REASON FOR VISIT:   Chief Complaint   Patient presents with     Consult     facial numbness      HISTORY OF PRESENT ILLNESS:                                                    Mr. 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    Per patient's report, Manoj developed permanent \"low-level\" numbness of the lower part of the right face over 3 years ago. The patient is not sure if it was related to any of the dental work that was performed, though thinks that major procedures were done later. Infrequently (6 times in the last year), Manoj experienced \"higher-level\" numbness and tingling episodes that involved the entire lower part of the face and felt similar to the sensation that the patient previously experienced with hyperventilation and anxiety. Then, on 09/12/2017 an episode of severe numbness and tingling located over the entire lower face occurred during sex and lasted for 15-20 minutes. Additional symptoms included inability to move the lower lip. It felt like it was \"held by magnets\" interfering with the patient's ability to speak, though language expression/reception and consciousness were preserved. The second attack of severe symptoms occurred on 09/29/2017 during the road trip when the patient was laughing hard with friends. The symptoms and duration were pretty similar to the episode on 09/12/2017. One additional attack of \"moderate\" numbness and tingling that spread this time to the right side of the neck occurred on 10/10/2017 and lasted for 20 minutes. There were " no other associated symptoms with any other recent episodes. So far, the patient did not notice any aggravating or alleviating factors. No treatments were tried.    Otherwise, the patient denies any previous transient episodes of painful unilateral vision loss, diplopia, facial weakness, focal limb numbness, tingling, or weakness, sensory level, and extreme dizziness. Ty also denies any other focal neurological symptoms.    Recent laboratory evaluation included normal hemoglobin, BMP, TSH, B6 and B12. Ty did not have prior brain imaging.    The patient denies a history of recent head injury. Prior neurological history: negative for migraine headaches, stroke, brain neoplasms, seizure disorders, multiple sclerosis, meningitis, encephalitis, and major head injuries. Reports a positive family history for stroke, migraine, headache, normal pressure hydrocephalus, dementia, Alzheimer's disease, Parkinson's disease, and multiple sclerosis.    Neurologic Review of Systems - no amaurosis, diplopia, abnormal speech, unilateral numbness or weakness. The patient endorses insomnia, fatigue, musculoskeletal chest pain, history of asthma, crossed eyes (evaluated by an ophthalmologist, felt congenital, wears glasses with prisms), anxiety, wrist issues, and shortness of breath. These complaints have been already discussed with other medical providers. No any other complaints on 14-point comprehensive review of systems were reported.    PAST MEDICAL/SURGICAL HISTORY:                                                    I personally reviewed patient's past medical and surgical history with the patient at today's visit.  Patient Active Problem List   Diagnosis     Patellofemoral pain syndrome     Right hip pain   Anxiety.    No history of prior surgeries, except dental procedures:  [place] tooth removal (2015)  Gum reduction repair (2014)  Various other dental procedures.  MEDICATIONS:                                                    I  personally reviewed patient's medications and allergies with the patient at today's visit.  Current Outpatient Prescriptions on File Prior to Visit:  Flunisolide HFA (AEROSPAN IN)    Cetirizine HCl (ZYRTEC ALLERGY PO)    Emtricitabine-Tenofovir DF (TRUVADA PO)    albuterol (PROAIR HFA/PROVENTIL HFA/VENTOLIN HFA) 108 (90 BASE) MCG/ACT Inhaler Inhale 2 puffs into the lungs every 6 hours   clindamycin (CLINDAGEL) 1 % topical gel Apply a small amount to affected area daily.   IBUPROFEN      ALLERGIES:                                                    No Known Allergies  FAMILY/SOCIAL HISTORY:                                                    Family and social history was reviewed with the patient at today's visit. Reports a positive family history for stroke, migraine, headache, normal pressure hydrocephalus, dementia, Alzheimer's disease, Parkinson's disease, and multiple sclerosis.    Single, lives with his parents. Never smoker. Consumes 1-2 alcoholic drinks per month. Denies use of recreational drugs. Currently unemployed.  Social History   Substance Use Topics     Smoking status: Never Smoker     Smokeless tobacco: Never Used     Alcohol use Yes      Comment: occasional      REVIEW OF SYSTEMS:                                                    Patient has completed a [facility] Patient Health History, including a 14-system review, which was personally reviewed, and pertinent positives are listed in HPI. He denies any additional problems on the further questioning.    EXAM:                                                    VITAL SIGNS:   /80 (BP Location: Right arm, Patient Position: Chair, Cuff Size: Adult Regular)  Pulse 93  Temp 97.1  F (36.2  C) (Oral)  Resp 16  Wt 54.4 kg (120 lb)  SpO2 96%  BMI 17.59 kg/m2    General: pt is in NAD, cooperative.  Skin: normal turgor, moist mucous membranes, no lesions/rashes noticed.  HEENT: ATNC, EOMI, PERRL, white sclera, normal conjunctiva, no  nystagmus or ptosis. No carotid bruits bilaterally.  Respiratory: lung sounds clear to auscultation bilaterally, no crackles, wheezes, rhonchi. Symmetric lung excursion, no accessory respiratory muscle use.  Cardiovascular: normal S1/S2, no murmurs/rubs/gallops.   Abdomen: Not distended.  : deferred.    Neurological:  Mental: alert, follows commands, mini-cog is 5/5 with 3/3 on memory recall, no aphasia or dysarthria. Fund of knowledge is appropriate for age.  Cranial Nerves:  CN II: visual acuity - able to accurately count fingers with each eye. Visual fields intact, fundi: discs sharp, no papilledema and normal vessels bilaterally.  CN III, IV, VI: EOM intact, pupils equal and reactive. Mild esotropia.   CN V: facial sensation to pinprick and light touch is reduced in the right V3 distribution  CN VII: face symmetric, no facial droop  CN VIII: hearing normal  CN IX: palate elevation symmetric, uvula at midline  CN XI SCM normal, shoulder shrug nl  CN XII: tongue midline  Motor: Strength: 5/5 in all major groups of all extremities. Normal tone. No abnormal movements. No pronator drift b/l.  Reflexes: Triceps, biceps, brachioradialis, patellar, and achilles reflexes normal and symmetric. No clonus noted. Toes are down-going b/l.   Sensory: temperature, light touch, pinprick, and vibration intact. Romberg: negative.  Coordination: FNF and heel-shin tests intact b/l. No dysdiadochokinesia with rapid alternating movements.  Gait:  Normal, able to tandem, toe, and heel walk.    DATA:     LABS: I personally reviewed the following labs:  Office Visit on 10/02/2017   Component Date Value Ref Range Status     Vitamin B12 10/02/2017 447  193 - 986 pg/mL Final     Vitamin B6 10/02/2017 25.4  20.0 - 125.0 nmol/L Final    Comment: (Note)  INTERPRETIVE INFORMATION: Vitamin B6 (Pyridoxal 5-Phosphate)  Pyridoxal 5'-phosphate measured in a specimen collected   following an 8-hour or overnight fast accurately indicates   vitamin  B6 nutritional status. Non-fasting specimen   concentration reflects recent vitamin intake.  Test developed and characteristics determined by [facility]. See Compliance Statement B: [employer]/CS  Performed by [facility],  [address] [place] [address] [phone]  [email or web address], Herman Robb MD, Lab. Director       TSH 10/02/2017 2.98  0.40 - 4.00 mU/L Final     Hemoglobin 10/02/2017 16.4  13.3 - 17.7 g/dL Final     Sodium 10/02/2017 137  133 - 144 mmol/L Final     Potassium 10/02/2017 3.9  3.4 - 5.3 mmol/L Final     Chloride 10/02/2017 102  94 - 109 mmol/L Final     Carbon Dioxide 10/02/2017 29  20 - 32 mmol/L Final     Anion Gap 10/02/2017 6  3 - 14 mmol/L Final     Glucose 10/02/2017 88  70 - 99 mg/dL Final     Urea Nitrogen 10/02/2017 11  7 - 30 mg/dL Final     Creatinine 10/02/2017 0.95  0.66 - 1.25 mg/dL Final     GFR Estimate 10/02/2017 >90  >60 mL/min/1.7m2 Final    Non  GFR Calc     GFR Estimate If Black 10/02/2017 >90  >60 mL/min/1.7m2 Final    African American GFR Calc     Calcium 10/02/2017 8.7  8.5 - 10.1 mg/dL Final     IMAGING/OTHER STUDIES: I reviewed prior medical records as detailed in the history of present illness.    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 Also reports 2 episodes of severe numbness and tingling of the entire lower part of the face associated with inability to move lower lip, that felt similar to the sensation that the patient experienced during hyperventilation and anxiety, but much more severe.    Regarding permanent right V3 numbness, it is possible that the patient has right mandibular nerve injury secondary to multiple dental procedures performed in the past. However, lesions to the right trigeminal nerve as well as brainstem lesions  might present in a similar fashion. I would like to evaluate it with brain MRI to exclude such possibility.    The other described episodes are likely consistent with episodes of anxiety, which needs to be optimally treated. If the spells persist despite treatment, sleep deprived EEG might be considered to evaluate for seizures. The plan is outlined below.    DIAGNOSES:    ICD-10-CM    1. Right facial numbness R20.0 MR Brain w/o Contrast     PLAN: At today's visit we thoroughly discussed various diagnostic possibilities for patient's symptoms and the reasons for work-up, which includes:  Orders Placed This Encounter   Procedures     MR Brain w/o Contrast     No new medications were ordered.    Additional recommendations after the work-up.    Next follow-up appointment is on as needed basis based on the results.    I advised the patient to call me with any questions or concerns.    Total Time:  60 minutes with > 50% spent counseling the patient on stated above assessment and recommendations, including nature of the diagnosis, needed w/u, proposed plan of treatment, and prognosis.    Marcello Sandoval MD  / Neurology  [place]  (Chart documentation was completed in part with Dragon voice-recognition software. Even though reviewed, some grammatical, spelling, and word errors may remain.)

## 2017-10-12 NOTE — PATIENT INSTRUCTIONS
AFTER VISIT SUMMARY (AVS):    At today's visit we discussed various diagnostic possibilities for your symptoms and the reasons for work-up, which includes:  Orders Placed This Encounter   Procedures     MR Brain w/o Contrast     No new medications were ordered.    Additional recommendations after the work-up.    Next follow-up appointment is on as needed basis based on the results.    Please do not hesitate to call me with any questions or concerns.    Thanks.

## 2017-10-12 NOTE — NURSING NOTE
COGNITIVE SCREEN  1) Repeat 3 items (Banana, Sunrise, Chair)    2) Clock draw: NORMAL  3) 3 item recall: Recalls 3 objects  Results: 3 items recalled: COGNITIVE IMPAIRMENT LESS LIKELY    Mini-CogTM Copyright S Keily. Licensed by the author for use in [facility]; reprinted with permission ([email or web address]). All rights reserved.          Teja Yeh MA

## 2017-10-12 NOTE — NURSING NOTE
"Chief Complaint   Patient presents with     Consult     facial numbness        Initial /80 (BP Location: Right arm, Patient Position: Chair, Cuff Size: Adult Regular)  Pulse 93  Temp 97.1  F (36.2  C) (Oral)  Resp 16  Wt 54.4 kg (120 lb)  SpO2 96%  BMI 17.59 kg/m2 Estimated body mass index is 17.59 kg/(m^2) as calculated from the following:    Height as of 9/11/17: 1.759 m (5' 9.25\").    Weight as of this encounter: 54.4 kg (120 lb).  Medication Reconciliation: complete     Teja Yeh MA      "

## 2017-10-19 PROBLEM — R93.0 ABNORMAL CT SCAN, SINUS: Status: ACTIVE | Noted: 2017-10-19

## 2017-11-09 ENCOUNTER — TRANSFERRED RECORDS (OUTPATIENT)
Dept: HEALTH INFORMATION MANAGEMENT | Facility: CLINIC | Age: 22
End: 2017-11-09

## 2017-12-05 ENCOUNTER — ALLIED HEALTH/NURSE VISIT (OUTPATIENT)
Dept: NURSING | Facility: CLINIC | Age: 22
End: 2017-12-05
Payer: COMMERCIAL

## 2017-12-05 DIAGNOSIS — Z11.1 SCREENING EXAMINATION FOR PULMONARY TUBERCULOSIS: Primary | ICD-10-CM

## 2017-12-05 PROCEDURE — 99207 ZZC NO CHARGE NURSE ONLY: CPT

## 2017-12-05 PROCEDURE — 86580 TB INTRADERMAL TEST: CPT

## 2017-12-05 NOTE — NURSING NOTE
The patient is asked the following questions today and these are his answers:    -Have you had a mantoux administered in the past 30 days?    No  -Have you had a previous positive Mantoux.  No  -Have you received BCG in the past.  No  -Have you had a live vaccine  (MMR, Varicella, OPV, Yellow Fever) in the last 6 weeks.  No  -Have you had and active  viral or bacterial infection in the past 6 weeks.  No  -Have you received corticosteroids or immunosuppressive agents in the past 6 weeks.  Yes-inhaler  -Have you been diagnosed with HIV?  No  -Do you have a maglinancy?  No     Teja Yeh MA

## 2017-12-05 NOTE — MR AVS SNAPSHOT
After Visit Summary   12/5/2017    Manoj Castellanos    MRN: [id number]           Patient Information     Date Of Birth          1995        Visit Information        Provider Department      12/5/2017 2:00 PM HP MEDICAL ASSISTANT [facility]        Today's Diagnoses     Screening examination for pulmonary tuberculosis    -  1       Follow-ups after your visit        Your next 10 appointments already scheduled     Dec 08, 2017 12:30 PM CST   Nurse Only with HP RN/TRIAGE NURSE ONLY   [facility]    [address] [place] [address] [id number] [phone]            Jan 26, 2018 10:00 AM CST   (Arrive by 9:45 AM)   [facility] with Kae Peña MD   [facility] ([facility] [place])    [address] [place] [id number] [phone]              Who to contact     If you have questions or need follow up information about today's clinic visit or your schedule please contact [facility] directly at [phone].  Normal or non-critical lab and imaging results will be communicated to you by [employer], letter or phone within 4 business days after the clinic has received the results. If you do not hear from us within 7 days, please contact the clinic through [employer] or phone. If you have a critical or abnormal lab result, we will notify you by phone as soon as possible.  Submit refill requests through [employer] or call your pharmacy and they will forward the refill request to us. Please allow 3 business days for your refill to be completed.          Additional Information About Your Visit        [employer] Information     [employer] gives you secure access to your electronic health record. If you see a primary care provider, you can also send messages to your care team and make appointments. If you have questions, please call your primary care clinic.  If you do not have a primary  care provider, please call [phone] and they will assist you.        Care EveryWhere ID     This is your Care EveryWhere ID. This could be used by other organizations to access your [facility] medical records  [id number]         Blood Pressure from Last 3 Encounters:   10/12/17 124/80   10/02/17 127/81   09/11/17 132/86    Weight from Last 3 Encounters:   10/12/17 120 lb (54.4 kg)   10/02/17 121 lb (54.9 kg)   09/11/17 120 lb (54.4 kg)              We Performed the Following     TB INTRADERMAL TEST        Primary Care Provider Office Phone # Fax #    Kina CURTIS ShayeangeliqueKEYANNA [phone] [address] [place] [address]        Equal Access to Services     DAV ARIAS : Hadii jas ku hadasho Soomaali, waaxda luqadaha, qaybta kaalmada adeegyada, johana ramos . So [facility] [phone].    ATENCIÓN: Si habla español, tiene a carmona disposición servicios gratuitos de asistencia lingüística. Suzette al [phone].    We comply with applicable federal civil rights laws and [place] laws. We do not discriminate on the basis of race, color, national origin, age, disability, sex, sexual orientation, or gender identity.            Thank you!     Thank you for choosing [facility]  for your care. Our goal is always to provide you with excellent care. Hearing back from our patients is one way we can continue to improve our services. Please take a few minutes to complete the written survey that you may receive in the mail after your visit with us. Thank you!             Your Updated Medication List - Protect others around you: Learn how to safely use, store and throw away your medicines at www.disposemymeds.org.          This list is accurate as of: 12/5/17  2:13 PM.  Always use your most recent med list.                   Brand Name Dispense Instructions for use Diagnosis    AEROSPAN IN           albuterol 108 (90 BASE) MCG/ACT Inhaler    PROAIR  HFA/PROVENTIL HFA/VENTOLIN HFA     Inhale 2 puffs into the lungs every 6 hours        clindamycin 1 % topical gel    CLINDAGEL    60 g    Apply a small amount to affected area daily.    Acne vulgaris       IBUPROFEN           TRUVADA PO           ZYRTEC ALLERGY PO

## 2017-12-08 ENCOUNTER — ALLIED HEALTH/NURSE VISIT (OUTPATIENT)
Dept: NURSING | Facility: CLINIC | Age: 22
End: 2017-12-08
Payer: COMMERCIAL

## 2017-12-08 DIAGNOSIS — Z11.1 SCREENING EXAMINATION FOR PULMONARY TUBERCULOSIS: Primary | ICD-10-CM

## 2017-12-08 LAB
PPDINDURATION: 0 MM (ref 0–5)
PPDREDNESS: 0 MM

## 2017-12-08 PROCEDURE — 99207 ZZC NO CHARGE NURSE ONLY: CPT

## 2017-12-08 NOTE — NURSING NOTE
Triage nurse read mantoux test to right forearm as negative.  iT WAS READ WITHIN THE 48-72 HOUR TIME FRAME.    Mantoux results: No induration.  No swelling.  No redness.  Ashley Narayanan RN

## 2017-12-08 NOTE — MR AVS SNAPSHOT
After Visit Summary   12/8/2017    Manoj Castellanos    MRN: [id number]           Patient Information     Date Of Birth          1995        Visit Information        Provider Department      12/8/2017 12:30 PM HP RN/TRIAGE NURSE ONLY [facility]        Today's Diagnoses     Screening examination for pulmonary tuberculosis    -  1       Follow-ups after your visit        Your next 10 appointments already scheduled     Jan 26, 2018 10:00 AM CST   (Arrive by 9:45 AM)   SANDRA MCCALLUM with Kae Peña MD   [facility] D[facility] ([facility] [place])    [address] [place] [address]  3rd [facility] [address] [phone]              Who to contact     If you have questions or need follow up information about today's clinic visit or your schedule please contact [facility] directly at [phone].  Normal or non-critical lab and imaging results will be communicated to you by MyChart, letter or phone within 4 business days after the clinic has received the results. If you do not hear from us within 7 days, please contact the clinic through MyChart or phone. If you have a critical or abnormal lab result, we will notify you by phone as soon as possible.  Submit refill requests through [employer] or call your pharmacy and they will forward the refill request to us. Please allow 3 business days for your refill to be completed.          Additional Information About Your Visit        MyChart Information     [employer] gives you secure access to your electronic health record. If you see a primary care provider, you can also send messages to your care team and make appointments. If you have questions, please call your primary care clinic.  If you do not have a primary care provider, please call [phone] and they will assist you.        Care EveryWhere ID     This is your Care EveryWhere ID. This could be used by other organizations to access your [facility]  records  [id number]         Blood Pressure from Last 3 Encounters:   10/12/17 124/80   10/02/17 127/81   09/11/17 132/86    Weight from Last 3 Encounters:   10/12/17 120 lb (54.4 kg)   10/02/17 121 lb (54.9 kg)   09/11/17 120 lb (54.4 kg)              Today, you had the following     No orders found for display       Primary Care Provider Office Phone # Fax #    Kina HickeysheanbangeliqueKEYANNA [phone] [address] [place] [address]        Equal Access to Services     [facility] HUGO : Hadii aad ku hadasho Socornelius, waaxda luqadaha, qaybta kaalmada adeegyada, johana ramos . So [facility] [phone].    ATENCIÓN: Si habla español, tiene a carmona disposición servicios gratuitos de asistencia lingüística. [facility] [phone].    We comply with applicable federal civil rights laws and [place] laws. We do not discriminate on the basis of race, color, national origin, age, disability, sex, sexual orientation, or gender identity.            Thank you!     Thank you for choosing [facility]  for your care. Our goal is always to provide you with excellent care. Hearing back from our patients is one way we can continue to improve our services. Please take a few minutes to complete the written survey that you may receive in the mail after your visit with us. Thank you!             Your Updated Medication List - Protect others around you: Learn how to safely use, store and throw away your medicines at www.disposemymeds.org.          This list is accurate as of: 12/8/17  1:25 PM.  Always use your most recent med list.                   Brand Name Dispense Instructions for use Diagnosis    AEROSPAN IN           albuterol 108 (90 BASE) MCG/ACT Inhaler    PROAIR HFA/PROVENTIL HFA/VENTOLIN HFA     Inhale 2 puffs into the lungs every 6 hours        clindamycin 1 % topical gel    CLINDAGEL    60 g    Apply a small amount to affected area daily.    Acne  vulgaris       IBUPROFEN           TRUVADA PO           ZYRTEC ALLERGY PO

## 2018-01-08 ENCOUNTER — MYC MEDICAL ADVICE (OUTPATIENT)
Dept: FAMILY MEDICINE | Facility: CLINIC | Age: 23
End: 2018-01-08

## 2018-01-10 ENCOUNTER — OFFICE VISIT (OUTPATIENT)
Dept: FAMILY MEDICINE | Facility: CLINIC | Age: 23
End: 2018-01-10
Payer: COMMERCIAL

## 2018-01-10 VITALS
WEIGHT: 125 LBS | SYSTOLIC BLOOD PRESSURE: 111 MMHG | OXYGEN SATURATION: 96 % | BODY MASS INDEX: 18.33 KG/M2 | TEMPERATURE: 97.1 F | DIASTOLIC BLOOD PRESSURE: 78 MMHG | RESPIRATION RATE: 16 BRPM | HEART RATE: 99 BPM

## 2018-01-10 DIAGNOSIS — H61.23 BILATERAL IMPACTED CERUMEN: Primary | ICD-10-CM

## 2018-01-10 PROCEDURE — 99213 OFFICE O/P EST LOW 20 MIN: CPT | Performed by: NURSE PRACTITIONER

## 2018-01-10 NOTE — MR AVS SNAPSHOT
After Visit Summary   1/10/2018    Manoj Castellanos    MRN: [id number]           Patient Information     Date Of Birth          1995        Visit Information        Provider Department      1/10/2018 11:00 AM Ana Rivera APRN CNP [facility]        Today's Diagnoses     Bilateral impacted cerumen    -  1       Follow-ups after your visit        Your next 10 appointments already scheduled     Jan 17, 2018  1:40 PM CST   SHORT with KEYANNA Lewis CNP   [facility] ([facility])    [address] [place] [id number]   [phone]            Jan 26, 2018 10:00 AM CST   (Arrive by 9:45 AM)   [place] with Kae Peña MD   [facility] ([facility])    [address] [place] [id number] [phone]              Who to contact     If you have questions or need follow up information about today's clinic visit or your schedule please contact [facility] directly at [phone].  Normal or non-critical lab and imaging results will be communicated to you by Sentienthart, letter or phone within 4 business days after the clinic has received the results. If you do not hear from us within 7 days, please contact the clinic through Sentienthart or phone. If you have a critical or abnormal lab result, we will notify you by phone as soon as possible.  Submit refill requests through [employer] or call your pharmacy and they will forward the refill request to us. Please allow 3 business days for your refill to be completed.          Additional Information About Your Visit        Sentienthart Information     [employer] gives you secure access to your electronic health record. If you see a primary care provider, you can also send messages to your care team and make appointments. If you have questions, please call your primary care clinic.  If you do not have a primary care provider,  please call [phone] and they will assist you.        Care EveryWhere ID     This is your Care EveryWhere ID. This could be used by other organizations to access your [facility] medical records  [id number]        Your Vitals Were     Pulse Temperature Respirations Pulse Oximetry BMI (Body Mass Index)       99 97.1  F (36.2  C) (Tympanic) 16 96% 18.33 kg/m2        Blood Pressure from Last 3 Encounters:   01/10/18 111/78   10/12/17 124/80   10/02/17 127/81    Weight from Last 3 Encounters:   01/10/18 125 lb (56.7 kg)   10/12/17 120 lb (54.4 kg)   10/02/17 121 lb (54.9 kg)              Today, you had the following     No orders found for display       Primary Care Provider Office Phone # Fax #    Kina CURTIS CharlinensKEYANNA [facility] [phone] [address] [place] [address]        Equal Access to Services     [facility] HUGO : Hadii aad ku hadasho Soomaali, waaxda luqadaha, qaybta kaalmada adeegyada, waxay idiin hayaan adeeg gardenia ramos . So [facility] [phone].    ATENCIÓN: Si habla español, tiene a carmona disposición servicios gratuitos de asistencia lingüística. Llame al [phone].    We comply with applicable federal civil rights laws and [place] laws. We do not discriminate on the basis of race, color, national origin, age, disability, sex, sexual orientation, or gender identity.            Thank you!     Thank you for choosing [facility]  for your care. Our goal is always to provide you with excellent care. Hearing back from our patients is one way we can continue to improve our services. Please take a few minutes to complete the written survey that you may receive in the mail after your visit with us. Thank you!             Your Updated Medication List - Protect others around you: Learn how to safely use, store and throw away your medicines at www.disposemymeds.org.          This list is accurate as of: 1/10/18 12:40 PM.  Always use your most recent med  list.                   Brand Name Dispense Instructions for use Diagnosis    AEROSPAN IN           albuterol 108 (90 BASE) MCG/ACT Inhaler    PROAIR HFA/PROVENTIL HFA/VENTOLIN HFA     Inhale 2 puffs into the lungs every 6 hours        IBUPROFEN           TRUVADA PO           ZYRTEC ALLERGY PO

## 2018-01-10 NOTE — PROGRESS NOTES
SUBJECTIVE:   Manoj Castellanos is a 22 year old male who presents to clinic today for the following health issues:    EAR PROBLEM      Duration: x 2 weeks     Description (location/character/radiation): left ear problem- diminished hearing, feels clogged    Intensity:  mild    Accompanying signs and symptoms: none    History (similar episodes/previous evaluation): None    Precipitating or alleviating factors: None    Therapies tried and outcome: Did use warm water in ear       Manoj Castellanos is a 22 year old male  who presents for evaluation of cerumen impaction symptoms.  Has a plugged sensation in on both sides ear(s) for 2 weeks Mild to moderate pressue without acute pain present and no discharge from canal.  Slight decrease in hearing present.  No recent symptoms of URI or other infection.   Past history of similar problems present.   Risk factors for cerumen impaction include: none.  Prevention and treatment options reviewed.      Past medical histories reviewed and updated in Epic.     O:  /78 (BP Location: Left arm, Patient Position: Chair, Cuff Size: Adult Regular)  Pulse 99  Temp 97.1  F (36.2  C) (Tympanic)  Resp 16  Wt 125 lb (56.7 kg)  SpO2 96%  BMI 18.33 kg/m2  No discharge from canals present.  Cerumen impaction present on both sides.  Wax removed with ear wash per nursing staff.  Bilateral canals patent, TM's pearly grey and intact.       ICD-10-CM    1. Bilateral impacted cerumen H61.23      Recommend debrox daily when he feels the ear pressure increasing.      Ana Rivera RN, CNP

## 2018-01-17 ENCOUNTER — OFFICE VISIT (OUTPATIENT)
Dept: FAMILY MEDICINE | Facility: CLINIC | Age: 23
End: 2018-01-17
Payer: COMMERCIAL

## 2018-01-17 VITALS
SYSTOLIC BLOOD PRESSURE: 117 MMHG | HEART RATE: 94 BPM | TEMPERATURE: 96.7 F | DIASTOLIC BLOOD PRESSURE: 74 MMHG | OXYGEN SATURATION: 99 % | WEIGHT: 125 LBS | RESPIRATION RATE: 16 BRPM | BODY MASS INDEX: 18.33 KG/M2

## 2018-01-17 DIAGNOSIS — H61.23 BILATERAL IMPACTED CERUMEN: Primary | ICD-10-CM

## 2018-01-17 PROCEDURE — 69209 REMOVE IMPACTED EAR WAX UNI: CPT | Mod: 50 | Performed by: NURSE PRACTITIONER

## 2018-01-17 NOTE — MR AVS SNAPSHOT
After Visit Summary   1/17/2018    Manoj Castellanos    MRN: [id number]           Patient Information     Date Of Birth          1995        Visit Information        Provider Department      1/17/2018 1:40 PM Ana Rivera APRN CNP [facility]        Today's Diagnoses     Bilateral impacted cerumen    -  1       Follow-ups after your visit        Your next 10 appointments already scheduled     Jan 26, 2018 10:00 AM CST   (Arrive by 9:45 AM)   SANDRA MCCALLUM with Kae Peña MD   [facility] ([facility])    [address] [place] [id number] [phone]              Who to contact     If you have questions or need follow up information about today's clinic visit or your schedule please contact [facility] directly at [phone].  Normal or non-critical lab and imaging results will be communicated to you by MyChart, letter or phone within 4 business days after the clinic has received the results. If you do not hear from us within 7 days, please contact the clinic through MyChart or phone. If you have a critical or abnormal lab result, we will notify you by phone as soon as possible.  Submit refill requests through [employer] or call your pharmacy and they will forward the refill request to us. Please allow 3 business days for your refill to be completed.          Additional Information About Your Visit        MyChart Information     [employer] gives you secure access to your electronic health record. If you see a primary care provider, you can also send messages to your care team and make appointments. If you have questions, please call your primary care clinic.  If you do not have a primary care provider, please call [phone] and they will assist you.        Care EveryWhere ID     This is your Care EveryWhere ID. This could be used by other organizations to access your [facility]  records  [id number]        Your Vitals Were     Pulse Temperature Respirations Pulse Oximetry BMI (Body Mass Index)       94 96.7  F (35.9  C) (Tympanic) 16 99% 18.33 kg/m2        Blood Pressure from Last 3 Encounters:   01/17/18 117/74   01/10/18 111/78   10/12/17 124/80    Weight from Last 3 Encounters:   01/17/18 125 lb (56.7 kg)   01/10/18 125 lb (56.7 kg)   10/12/17 120 lb (54.4 kg)              We Performed the Following     REMOVE IMPACTED CERUMEN        Primary Care Provider Office Phone # Fax #    Kina CURTIS Charlinens, KEYANNA [facility] [phone] [address] [place] [address]        Equal Access to Services     DAV ARIAS : Hadii aad ku hadasho Socornelius, waaxda luqadaha, qaybta kaalmada adeegyada, johana ramos . So [facility] [phone].    ATENCIÓN: Si habla español, tiene a carmona disposición servicios gratuitos de asistencia lingüística. Suzette al [phone].    We comply with applicable federal civil rights laws and [place] laws. We do not discriminate on the basis of race, color, national origin, age, disability, sex, sexual orientation, or gender identity.            Thank you!     Thank you for choosing [facility]  for your care. Our goal is always to provide you with excellent care. Hearing back from our patients is one way we can continue to improve our services. Please take a few minutes to complete the written survey that you may receive in the mail after your visit with us. Thank you!             Your Updated Medication List - Protect others around you: Learn how to safely use, store and throw away your medicines at www.disposemymeds.org.          This list is accurate as of: 1/17/18  3:09 PM.  Always use your most recent med list.                   Brand Name Dispense Instructions for use Diagnosis    AEROSPAN IN           albuterol 108 (90 BASE) MCG/ACT Inhaler    PROAIR HFA/PROVENTIL HFA/VENTOLIN HFA     Inhale 2  puffs into the lungs every 6 hours        IBUPROFEN           TRUVADA PO           ZYRTEC ALLERGY PO

## 2018-01-17 NOTE — PROGRESS NOTES
SUBJECTIVE:   Manoj Castellanos is a 22 year old male who presents to clinic today for the following health issues:    Patient is here today to follow up on ear irrigation.   Reviewed and updated as needed this visit by clinical staff  Tobacco  Allergies  Meds  Problems  Med Hx  Surg Hx  Fam Hx  Soc Hx        Reviewed and updated as needed this visit by Provider  Tobacco  Allergies  Meds  Problems  Med Hx  Surg Hx  Fam Hx  Soc Hx          Manoj Castellanos is a 22 year old male  who presents for evaluation of cerumen impaction symptoms.  Has a plugged sensation in on both sides ear(s) for several  weeks Mild to moderate pressue without acute pain present and no discharge from canal.  Slight decrease in hearing present.  No recent symptoms of URI or other infection.   Past history of similar problems present.   Risk factors for cerumen impaction include: qtip use  Prevention and treatment options reviewed.      Past medical histories reviewed and updated in Epic.     O:  /74 (BP Location: Right arm, Patient Position: Chair, Cuff Size: Adult Regular)  Pulse 94  Temp 96.7  F (35.9  C) (Tympanic)  Resp 16  Wt 125 lb (56.7 kg)  SpO2 99%  BMI 18.33 kg/m2  No discharge from canals present.  Cerumen impaction present on both sides.  Wax removed with ear wash per nursing staff.  Bilateral canals patent, TM's pearly grey and intact.       ICD-10-CM    1. Bilateral impacted cerumen H61.23      Recommend debrox daily. As needed.    Ana Rivera RN, CNP

## 2018-01-26 ENCOUNTER — OFFICE VISIT (OUTPATIENT)
Dept: DERMATOLOGY | Facility: CLINIC | Age: 23
End: 2018-01-26
Payer: COMMERCIAL

## 2018-01-26 DIAGNOSIS — L65.9 LOSS OF HAIR: Primary | ICD-10-CM

## 2018-01-26 RX ORDER — KETOCONAZOLE 20 MG/ML
SHAMPOO TOPICAL DAILY PRN
Qty: 120 ML | Refills: 10 | Status: SHIPPED | OUTPATIENT
Start: 2018-01-26 | End: 2019-07-22

## 2018-01-26 ASSESSMENT — PAIN SCALES - GENERAL: PAINLEVEL: NO PAIN (0)

## 2018-01-26 NOTE — MR AVS SNAPSHOT
After Visit Summary   1/26/2018    Manoj Castellanos    MRN: [id number]           Patient Information     Date Of Birth          1995        Visit Information        Provider Department      1/26/2018 10:00 AM Kae Peña MD [facility]        Today's Diagnoses     Loss of hair    -  1       Follow-ups after your visit        Follow-up notes from your care team     Return if symptoms worsen or fail to improve.      Who to contact     Please call your clinic at [phone] to:    Ask questions about your health    Make or cancel appointments    Discuss your medicines    Learn about your test results    Speak to your doctor   If you have compliments or concerns about an experience at your clinic, or if you wish to file a complaint, please contact [facility] Patient Relations at [phone] or email us at [email or web address]         Additional Information About Your Visit        MyChart Information     [employer] gives you secure access to your electronic health record. If you see a primary care provider, you can also send messages to your care team and make appointments. If you have questions, please call your primary care clinic.  If you do not have a primary care provider, please call [phone] and they will assist you.      [employer] is an electronic gateway that provides easy, online access to your medical records. With [employer], you can request a clinic appointment, read your test results, renew a prescription or communicate with your care team.     To access your existing account, please contact your [facility] or call [phone] for assistance.        Care EveryWhere ID     This is your Care EveryWhere ID. This could be used by other organizations to access your [facility] medical records  [id number]         Blood Pressure from Last 3 Encounters:   01/17/18 117/74   01/10/18 111/78   10/12/17 124/80    Weight from Last  3 Encounters:   01/17/18 56.7 kg (125 lb)   01/10/18 56.7 kg (125 lb)   10/12/17 54.4 kg (120 lb)              Today, you had the following     No orders found for display         Today's Medication Changes          These changes are accurate as of 1/26/18  1:02 PM.  If you have any questions, ask your nurse or doctor.               Start taking these medicines.        Dose/Directions    ketoconazole 2 % shampoo   Commonly known as:  NIZORAL   Used for:  Loss of hair   Started by:  Kae Peña MD        Apply topically daily as needed for itching or irritation Apply to scalp and leave on for 5-10 minutes. Use this 3 times per week.   Quantity:  120 mL   Refills:  10            Where to get your medicines      [facility] [address] [place] [address] & [address] [address]    Hours:  24-hours Phone:  [phone]     ketoconazole 2 % shampoo                Primary Care Provider Office Phone # Fax #    Kina KEYANNA Sesay [phone]       [address] [place] [address]        Equal Access to Services     DAV ARIAS AH: Hadii jas ku hadasho Soomaali, waaxda luqadaha, qaybta kaalmada adeegyada, waxay charlottein hayshahidn kaya millan. So [facility] [phone].    ATENCIÓN: Si habla español, tiene a carmona disposición servicios gratuitos de asistencia lingüística. Llame al [phone].    We comply with applicable federal civil rights laws and [place] laws. We do not discriminate on the basis of race, color, national origin, age, disability, sex, sexual orientation, or gender identity.            Thank you!     Thank you for choosing [facility] DERMAT[facility]  for your care. Our goal is always to provide you with excellent care. Hearing back from our patients is one way we can continue to improve our services. Please take a few minutes to complete the written survey that you  may receive in the mail after your visit with us. Thank you!             Your Updated Medication List - Protect others around you: Learn how to safely use, store and throw away your medicines at www.disposemymeds.org.          This list is accurate as of 1/26/18  1:02 PM.  Always use your most recent med list.                   Brand Name Dispense Instructions for use Diagnosis    AEROSPAN IN           albuterol 108 (90 BASE) MCG/ACT Inhaler    PROAIR HFA/PROVENTIL HFA/VENTOLIN HFA     Inhale 2 puffs into the lungs every 6 hours        IBUPROFEN           ketoconazole 2 % shampoo    NIZORAL    120 mL    Apply topically daily as needed for itching or irritation Apply to scalp and leave on for 5-10 minutes. Use this 3 times per week.    Loss of hair       minoxidil 5 % Soln      Externally apply topically 2 times daily        TRUVADA PO           ZYRTEC ALLERGY PO

## 2018-01-26 NOTE — NURSING NOTE
Dermatology Rooming Note    Manoj Castellanos's goals for this visit include:   Chief Complaint   Patient presents with     Derm Problem     Ty is here today for concerns of hairloss, states he noticed it about 6 months to a year ago.         Maia Barriga LPN

## 2018-01-26 NOTE — LETTER
1/26/2018       RE: Manoj Castellanos  [address]  [facility] [address]     Dear Colleague,    Thank you for referring your patient, Manoj Castellanos, to the [facility] DERMATOLOGY at [facility]. Please see a copy of my visit note below.    [facility] Dermatology Note      Dermatology Problem List:  1. Seborrheic dermatitis, scalp  2. Subjective hair loss    Encounter Date: Jan 26, 2018    CC:  Chief Complaint   Patient presents with     Derm Problem     Manoj is here today for concerns of hairloss, states he noticed it about 6 months to a year ago.         History of Present Illness:  Mr. Manoj Castellanos is a 22 year old male who presents in self referral for the evaluation of hair loss. He feels that starting 6 months ago, his hair started to recede and thin exclusively along the frontal hairline. Around this time, he began using Rogaine BID and T-sal shampoo; he cannot tolerate the odor of T-gel shampoo. He washes his hair 2-3x/week. He feels that ever since beginning this regimen, his hairloss has been stable, but he would like to know if there is anything further he can do. Other symptoms include scalp itching and flaking. He does endorse a family history of male-pattern baldness in his maternal uncle and maternal grandfather. He has no personal history of thyroid disease, connective tissue disease, or anemia. He denies s/s of thyroid dysfunction including temperature sensitivity, fatigue or abundance of energy, brittle hair, loss of skin color. He is not vegetarian. He denies any other skin or hair concerns today and has been overall healthy minus a recent trip to the ED for an abdominal wall spasm which was treated with ibuprofen.    Past Medical History:   Patient Active Problem List   Diagnosis     Patellofemoral pain syndrome     Right hip pain     Abnormal sinus finding on MRI   Asthma    No past medical history on file.  No past surgical history on file.    Social  History:  The patient works as a PCA. The patient denies use of tanning beds.    Family History:  There is no family history of connective tissue disease. Maternal grandma had thyroid cancer.    Medications:  Current Outpatient Prescriptions   Medication Sig Dispense Refill     minoxidil 5 % SOLN Externally apply topically 2 times daily       Flunisolide HFA (AEROSPAN IN)        Cetirizine HCl (ZYRTEC ALLERGY PO)        albuterol (PROAIR HFA/PROVENTIL HFA/VENTOLIN HFA) 108 (90 BASE) MCG/ACT Inhaler Inhale 2 puffs into the lungs every 6 hours       IBUPROFEN        Emtricitabine-Tenofovir DF (TRUVADA PO)        No Known Allergies      Review of Systems:  -As per HPI  -Constitutional: The patient feels well at her baseline level of health.  -Skin: As above in HPI. No additional skin concerns.    Physical exam:  Vitals: There were no vitals taken for this visit.  GEN: This is a well developed, well-nourished male in no acute distress, in a pleasant mood.    SKIN: Focused examination of the scalp was performed.  - Frontal hairline is WNL. Mild thinning across frontal and vertex scalp as compared to the occiput, but still WNL. Part width WNL. Scalp with erythema, greasy-yellow scale, and rare pustules consistent with folliculitis.  -No other lesions of concern on areas examined.     Impression/Plan:  1. Subjective hair loss: Hair could be thinner as compared to childhood, and pt could be in the early stages of male-pattern baldness (especially given family history) but exam is unremarkable in terms of hair thickness and frontal hairline. Pt is currently utilizing many of the tools for hair loss prevention (minoxidil, T-sal). Discussed the risks and benefits of the following options that would address hair loss: minoxidil, T-gel/sal shampoos, scalp health control with ketoconazole, low-level laser light therapy, finasteride.    Continue minoxidil 5% BID    Begin ketoconazole shampoo as below. Continue T-sal shampoo.    Pt  not interested in finasteride today, will consider in the future if he notices more prominent changes.  Given his disinterest we did not pursue extensive discussion of medication side-effects.    Handout on low-level laser light therapy provided.    2. Seborrheic dermatitis, scalp    Begin ketoconazole 2% shampoo 3x/week alternating with T-sal shampoo    Follow-up prn for new or changing lesions.    Staff Involved:  Scribed by Zenia River, MS3 for Dr. Debo Peña.      I saw and evaluated this patient with MS River.. The above note has been read and reviewed and where appropriate amended and corrected. It accurately reflects my clinical findings, observations, diagnoses, treatment recommendations and follow-up plans.    Kae Peña MD  Clinical Professor   Department of Dermatology  [facility]

## 2018-01-26 NOTE — PROGRESS NOTES
[facility] Dermatology Note      Dermatology Problem List:  1. Seborrheic dermatitis, scalp  2. Subjective hair loss    Encounter Date: Jan 26, 2018    CC:  Chief Complaint   Patient presents with     Derm Problem     Manoj is here today for concerns of hairloss, states he noticed it about 6 months to a year ago.         History of Present Illness:  Mr. Manoj Castellanos is a 22 year old male who presents in self referral for the evaluation of hair loss. He feels that starting 6 months ago, his hair started to recede and thin exclusively along the frontal hairline. Around this time, he began using Rogaine BID and T-sal shampoo; he cannot tolerate the odor of T-gel shampoo. He washes his hair 2-3x/week. He feels that ever since beginning this regimen, his hairloss has been stable, but he would like to know if there is anything further he can do. Other symptoms include scalp itching and flaking. He does endorse a family history of male-pattern baldness in his maternal uncle and maternal grandfather. He has no personal history of thyroid disease, connective tissue disease, or anemia. He denies s/s of thyroid dysfunction including temperature sensitivity, fatigue or abundance of energy, brittle hair, loss of skin color. He is not vegetarian. He denies any other skin or hair concerns today and has been overall healthy minus a recent trip to the ED for an abdominal wall spasm which was treated with ibuprofen.    Past Medical History:   Patient Active Problem List   Diagnosis     Patellofemoral pain syndrome     Right hip pain     Abnormal sinus finding on MRI   Asthma    No past medical history on file.  No past surgical history on file.    Social History:  The patient works as a PCA. The patient denies use of tanning beds.    Family History:  There is no family history of connective tissue disease. Maternal grandma had thyroid cancer.    Medications:  Current Outpatient Prescriptions   Medication Sig Dispense  Refill     minoxidil 5 % SOLN Externally apply topically 2 times daily       Flunisolide HFA (AEROSPAN IN)        Cetirizine HCl (ZYRTEC ALLERGY PO)        albuterol (PROAIR HFA/PROVENTIL HFA/VENTOLIN HFA) 108 (90 BASE) MCG/ACT Inhaler Inhale 2 puffs into the lungs every 6 hours       IBUPROFEN        Emtricitabine-Tenofovir DF (TRUVADA PO)        No Known Allergies      Review of Systems:  -As per HPI  -Constitutional: The patient feels well at her baseline level of health.  -Skin: As above in HPI. No additional skin concerns.    Physical exam:  Vitals: There were no vitals taken for this visit.  GEN: This is a well developed, well-nourished male in no acute distress, in a pleasant mood.    SKIN: Focused examination of the scalp was performed.  - Frontal hairline is WNL. Mild thinning across frontal and vertex scalp as compared to the occiput, but still WNL. Part width WNL. Scalp with erythema, greasy-yellow scale, and rare pustules consistent with folliculitis.  -No other lesions of concern on areas examined.     Impression/Plan:  1. Subjective hair loss: Hair could be thinner as compared to childhood, and pt could be in the early stages of male-pattern baldness (especially given family history) but exam is unremarkable in terms of hair thickness and frontal hairline. Pt is currently utilizing many of the tools for hair loss prevention (minoxidil, T-sal). Discussed the risks and benefits of the following options that would address hair loss: minoxidil, T-gel/sal shampoos, scalp health control with ketoconazole, low-level laser light therapy, finasteride.    Continue minoxidil 5% BID    Begin ketoconazole shampoo as below. Continue T-sal shampoo.    Pt not interested in finasteride today, will consider in the future if he notices more prominent changes.  Given his disinterest we did not pursue extensive discussion of medication side-effects.    Handout on low-level laser light therapy provided.    2. Seborrheic  dermatitis, scalp    Begin ketoconazole 2% shampoo 3x/week alternating with T-sal shampoo    Follow-up prn for new or changing lesions.    Staff Involved:  Scribed by Zenia River, MS3 for Dr. Debo Peña.      I saw and evaluated this patient with MS River.. The above note has been read and reviewed and where appropriate amended and corrected. It accurately reflects my clinical findings, observations, diagnoses, treatment recommendations and follow-up plans.    Kae Peña MD  Clinical Professor   Department of Dermatology  [facility]

## 2018-03-06 ENCOUNTER — OFFICE VISIT (OUTPATIENT)
Dept: ALLERGY | Facility: CLINIC | Age: 23
End: 2018-03-06
Payer: COMMERCIAL

## 2018-03-06 VITALS
RESPIRATION RATE: 16 BRPM | SYSTOLIC BLOOD PRESSURE: 142 MMHG | HEART RATE: 125 BPM | WEIGHT: 127 LBS | TEMPERATURE: 96.6 F | OXYGEN SATURATION: 98 % | HEIGHT: 69 IN | DIASTOLIC BLOOD PRESSURE: 83 MMHG | BODY MASS INDEX: 18.81 KG/M2

## 2018-03-06 DIAGNOSIS — J30.1 CHRONIC SEASONAL ALLERGIC RHINITIS DUE TO POLLEN: ICD-10-CM

## 2018-03-06 DIAGNOSIS — J45.30 MILD PERSISTENT ASTHMA WITHOUT COMPLICATION: Primary | ICD-10-CM

## 2018-03-06 DIAGNOSIS — J30.89 ALLERGIC RHINITIS DUE TO DUST MITE: ICD-10-CM

## 2018-03-06 DIAGNOSIS — J30.81 CHRONIC ALLERGIC RHINITIS DUE TO ANIMAL HAIR AND DANDER: ICD-10-CM

## 2018-03-06 DIAGNOSIS — H10.13 ALLERGIC CONJUNCTIVITIS OF BOTH EYES: ICD-10-CM

## 2018-03-06 LAB
FEF 25/75: NORMAL
FEV-1: NORMAL
FEV1/FVC: NORMAL
FVC: NORMAL

## 2018-03-06 PROCEDURE — 99204 OFFICE O/P NEW MOD 45 MIN: CPT | Mod: 25 | Performed by: ALLERGY & IMMUNOLOGY

## 2018-03-06 PROCEDURE — 95004 PERQ TESTS W/ALRGNC XTRCS: CPT | Performed by: ALLERGY & IMMUNOLOGY

## 2018-03-06 PROCEDURE — 94010 BREATHING CAPACITY TEST: CPT | Performed by: ALLERGY & IMMUNOLOGY

## 2018-03-06 RX ORDER — ALBUTEROL SULFATE 90 UG/1
2 AEROSOL, METERED RESPIRATORY (INHALATION) EVERY 4 HOURS PRN
Qty: 1 INHALER | Refills: 2 | Status: SHIPPED | OUTPATIENT
Start: 2018-03-06 | End: 2019-07-22

## 2018-03-06 RX ORDER — FLUTICASONE PROPIONATE 110 UG/1
2 AEROSOL, METERED RESPIRATORY (INHALATION) 2 TIMES DAILY
Qty: 1 INHALER | Refills: 1 | Status: SHIPPED | OUTPATIENT
Start: 2018-03-06 | End: 2018-04-03

## 2018-03-06 NOTE — PROGRESS NOTES
Manoj Castellanos was seen in the Allergy Clinic at [facility]. The following are my recommendations regarding his Mild Persistent Asthma, Allergic Rhinitis Due to Animals, Allergic Rhinitis Due to Pollen, Allergic Rhinitis Due to Dust Mites and Allergic Conjunctivitis    1. Begin flovent 110mcg, 2 puffs twice daily  2. Continue albuterol HFA, 2-4 puffs every 4 hours as needed  3. Optichamber given in clinic and appropriate inhaler and spacer technique reviewed  4. Asthma action plan reviewed and provided to patient  5. Will obtain in vitro IgE testing to seasonal and perennial aeroallergens  6. Continue cetirizine 10mg daily  7. Consider initiation of allergen immunotherapy treatment  8. Follow-up in 1 month      Manoj Castellanos is a 22 year old White male being seen today in consultation for asthma and allergies. He was diagnosed with asthma as a child and in the past has been on treatment with Qvar, Advair, and most recently Aerospan. He discontinued the Aerospan in the last few months and his asthma symptoms have returned. Manoj has not had any nocturnal asthma symptoms but reports having symptoms throughout the day and is using his albuterol 3 times per day. He is here today to discuss preventative treatment options. Manoj has never been hospitalized for asthma but has had several ED visits in the past but only one in the last 3 years and none in the past year. He has been treated with prednisone in the past but not in the last 12 months.    Manoj was previously followed by Dr. Calvillo at [facility] and was on allergy shots for about 3 years. He stopped 3 months ago as he wanted to transfer his care and see what other options he may have for managing his allergies. He is not sure if immunotherapy was particularly helpful but he has noticed decreased reactivity when around cats. Manoj continues to have chronic nasal congestion which worsens in the spring. He also has symptoms of itchy and watery eyes,  sneezing, and rhinorrhea. He has not had sinus surgery in the past. Manoj used to take zyrtec but wasn't consistent with this m edication. He occasionally uses OTC nasal sprays and nasal saline but has never used a sinus irrigation rinse or neti pot.    Manoj was prescribed amoxicillin a few years ago prior to having his wisdom teeth removed. He took the amoxicillin for 3 days and developed swelling of the upper lip on the left side. He called his doctor and was advised to stop the medication and his symptoms resolved overnight. Manoj denies having other associated symptoms including rash, difficulty swallowing, cough, shortness of breath, nausea, vomiting, dizziness, or lightheadedness.      PAST MEDICAL HISTORY:  Asthma    Family History   Problem Relation Age of Onset     Pancreatic Cancer Maternal Grandmother      History reviewed. No pertinent surgical history.    ENVIRONMENTAL HISTORY: [place] The home is heated with a radiant heat. They does not have central air conditioning. The patient's bedroom is furnished with carpeting in bedroom.  Pets inside the house include 3 dog(s). There is history of cockroach or mice infestation. There is/are 0 smokers in the house.  The house does not have a damp basement.     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 He lives with his mother, father and sister.     REVIEW OF SYSTEMS:  General: negative for weight gain. negative for weight loss. negative for changes in sleep.   Eyes: negative for itching. negative for redness. negative for tearing/watering.  Ears: positive  for fullness. negative for hearing loss. negative for dizziness.   Nose: negative for snoring.negative for changes in smell. negative for drainage.   Throat: negative for hoarseness. positive  for sore throat. negative for trouble swallowing.   Lungs: positive  for shortness of breath.positive  for wheezing. negative for sputum  "production.   Cardiovascular: negative for chest pain. negative for swelling of ankles. negative for fast or irregular heartbeat.   Gastrointestinal: positive  for nausea. positive  for heartburn. positive  for acid reflux.   Musculoskeletal: negative for joint pain. negative for joint stiffness. negative for joint swelling.   Neurologic: negative for seizures. negative for fainting. negative for weakness.   Psychiatric: negative for changes in mood. negative for anxiety.   Endocrine: negative for cold intolerance. negative for heat intolerance. negative for tremors.   Hematologic: negative for easy bruising. negative for easy bleeding.  Integumentary: negative for rash. negative for scaling. negative for nail changes.       Current Outpatient Prescriptions:      minoxidil 5 % SOLN, Externally apply topically 2 times daily, Disp: , Rfl:      ketoconazole (NIZORAL) 2 % shampoo, Apply topically daily as needed for itching or irritation Apply to scalp and leave on for 5-10 minutes. Use this 3 times per week., Disp: 120 mL, Rfl: 10     Flunisolide HFA (AEROSPAN IN), , Disp: , Rfl:      albuterol (PROAIR HFA/PROVENTIL HFA/VENTOLIN HFA) 108 (90 BASE) MCG/ACT Inhaler, Inhale 2 puffs into the lungs every 6 hours, Disp: , Rfl:      IBUPROFEN, , Disp: , Rfl:      Cetirizine HCl (ZYRTEC ALLERGY PO), , Disp: , Rfl:      Emtricitabine-Tenofovir DF (TRUVADA PO), , Disp: , Rfl:   Immunization History   Administered Date(s) Administered     Influenza Vaccine IM 3yrs+ 4 Valent IIV4 09/11/2017     Mantoux Tuberculin Skin Test 12/05/2017     No Known Allergies      EXAM:   /83  Pulse 125  Temp 96.6  F (35.9  C) (Oral)  Resp 16  Ht 1.76 m (5' 9.29\")  Wt 57.6 kg (127 lb)  SpO2 98%  BMI 18.6 kg/m2  GENERAL APPEARANCE: alert, cooperative and not in distress  SKIN: no rashes, no lesions  HEAD: atraumatic, normocephalic  EYES: lids and lashes normal, conjunctivae and sclerae clear, pupils equal, round, reactive to light, EOM " full and intact  ENT: no scars or lesions, nasal exam showed no discharge, swelling or lesions noted, otoscopy showed positive findings: cerumen bilaterally, tongue midline and normal, soft palate, uvula, and tonsils normal  NECK: no asymmetry, masses, or scars, supple without significant adenopathy  LUNGS: unlabored respirations, no intercostal retractions or accessory muscle use, clear to auscultation without rales or wheezes  HEART: regular rate and rhythm without murmurs and normal S1 and S2  MUSCULOSKELETAL: no musculoskeletal defects are noted  NEURO: no focal deficits noted  PSYCH: does not appear depressed or anxious    WORKUP: Skin testing, Spirometry  SPIROMETRY       FVC 4.40L (80% of predicted).     FEV1 3.80L (83% of predicted).     FEV1/FVC 86%     FEF 25%-75%  4.83L/s (98% of predicted).    These values are consistent with normal lung function without evidence of airflow obstruction.    Asthma Control Test (ACT) total score: 14     Skin prick testing was performed to our adult aeroallergen panel. He had positive reactions to cat, dust mite (dp and df), walnut tree, birch, oak, willow, demetris, cocklebur, sagebrush/mugwort, and sorrel. He had negative reactions to all other antigens tested as well as histamine control.    ASSESSMENT/PLAN:  Manoj Castellanos is a 22 year old male here for evaluation of asthma and allergies. He reports that his asthma has not been well controlled in the last few months since discontinuing aerospan. He has been using albuterol several times per day and would like to discuss restarting maintenance therapy. We discussed risks, benefits, and potential side effects of inhaled corticosteroids and Manoj would like to begin these medications today. He is also considering restarting allergen immunotherapy treatment. Skin prick testing revealed sensitization to several seasonal and perennial aeroallergens however testing could not be fully interpreted due to blunted histamine control.    1. Begin  flovent 110mcg, 2 puffs twice daily  2. Continue albuterol HFA, 2-4 puffs every 4 hours as needed  3. Optichamber given in clinic and appropriate inhaler and spacer technique reviewed  4. Asthma action plan reviewed and provided to patient  5. Will obtain in vitro IgE testing to seasonal and perennial aeroallergens  6. Continue cetirizine 10mg daily  7. Consider initiation of allergen immunotherapy treatment  8. Follow-up in 1 month      Adriana Fraser MD  Allergy/Immunology  [place], [place]      Chart documentation done in part with Dragon Voice Recognition Software. Although reviewed after completion, some word and grammatical errors may remain.

## 2018-03-06 NOTE — LETTER
3/6/2018         RE: Manoj Castellanos  [address]  [place] [id number]        Dear Colleague,    Thank you for referring your patient, Manoj Castellanos, to the [facility]. Please see a copy of my visit note below.    Manoj Castellanos was seen in the Allergy Clinic at [facility]. The following are my recommendations regarding his Mild Persistent Asthma, Allergic Rhinitis Due to Animals, Allergic Rhinitis Due to Pollen, Allergic Rhinitis Due to Dust Mites and Allergic Conjunctivitis    1. Begin flovent 110mcg, 2 puffs twice daily  2. Continue albuterol HFA, 2-4 puffs every 4 hours as needed  3. Optichamber given in clinic and appropriate inhaler and spacer technique reviewed  4. Asthma action plan reviewed and provided to patient  5. Will obtain in vitro IgE testing to seasonal and perennial aeroallergens  6. Continue cetirizine 10mg daily  7. Consider initiation of allergen immunotherapy treatment  8. Follow-up in 1 month      Manoj Castellanos is a 22 year old White male being seen today in consultation for asthma and allergies. He was diagnosed with asthma as a child and in the past has been on treatment with Qvar, Advair, and most recently Aerospan. He discontinued the Aerospan in the last few months and his asthma symptoms have returned. Manoj has not had any nocturnal asthma symptoms but reports having symptoms throughout the day and is using his albuterol 3 times per day. He is here today to discuss preventative treatment options. Manoj has never been hospitalized for asthma but has had several ED visits in the past but only one in the last 3 years and none in the past year. He has been treated with prednisone in the past but not in the last 12 months.    Manoj was previously followed by Dr. Calvillo at [facility] and was on allergy shots for about 3 years. He stopped 3 months ago as he wanted to transfer his care and see what other options he may have for managing his allergies. He is not sure if  immunotherapy was particularly helpful but he has noticed decreased reactivity when around cats. Manoj continues to have chronic nasal congestion which worsens in the spring. He also has symptoms of itchy and watery eyes, sneezing, and rhinorrhea. He has not had sinus surgery in the past. Manoj used to take zyrtec but wasn't consistent with this m edication. He occasionally uses OTC nasal sprays and nasal saline but has never used a sinus irrigation rinse or neti pot.    Manoj was prescribed amoxicillin a few years ago prior to having his wisdom teeth removed. He took the amoxicillin for 3 days and developed swelling of the upper lip on the left side. He called his doctor and was advised to stop the medication and his symptoms resolved overnight. Manoj denies having other associated symptoms including rash, difficulty swallowing, cough, shortness of breath, nausea, vomiting, dizziness, or lightheadedness.      PAST MEDICAL HISTORY:  Asthma    Family History   Problem Relation Age of Onset     Pancreatic Cancer Maternal Grandmother      History reviewed. No pertinent surgical history.    ENVIRONMENTAL HISTORY: [place] The home is heated with a radiant heat. They does not have central air conditioning. The patient's bedroom is furnished with carpeting in bedroom.  Pets inside the house include 3 dog(s). There is history of cockroach or mice infestation. There is/are 0 smokers in the house.  The house does not have a damp basement.     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 He lives with his mother, father and sister.     REVIEW OF SYSTEMS:  General: negative for weight gain. negative for weight loss. negative for changes in sleep.   Eyes: negative for itching. negative for redness. negative for tearing/watering.  Ears: positive  for fullness. negative for hearing loss. negative for dizziness.   Nose: negative for snoring.negative for  "changes in smell. negative for drainage.   Throat: negative for hoarseness. positive  for sore throat. negative for trouble swallowing.   Lungs: positive  for shortness of breath.positive  for wheezing. negative for sputum production.   Cardiovascular: negative for chest pain. negative for swelling of ankles. negative for fast or irregular heartbeat.   Gastrointestinal: positive  for nausea. positive  for heartburn. positive  for acid reflux.   Musculoskeletal: negative for joint pain. negative for joint stiffness. negative for joint swelling.   Neurologic: negative for seizures. negative for fainting. negative for weakness.   Psychiatric: negative for changes in mood. negative for anxiety.   Endocrine: negative for cold intolerance. negative for heat intolerance. negative for tremors.   Hematologic: negative for easy bruising. negative for easy bleeding.  Integumentary: negative for rash. negative for scaling. negative for nail changes.       Current Outpatient Prescriptions:      minoxidil 5 % SOLN, Externally apply topically 2 times daily, Disp: , Rfl:      ketoconazole (NIZORAL) 2 % shampoo, Apply topically daily as needed for itching or irritation Apply to scalp and leave on for 5-10 minutes. Use this 3 times per week., Disp: 120 mL, Rfl: 10     Flunisolide HFA (AEROSPAN IN), , Disp: , Rfl:      albuterol (PROAIR HFA/PROVENTIL HFA/VENTOLIN HFA) 108 (90 BASE) MCG/ACT Inhaler, Inhale 2 puffs into the lungs every 6 hours, Disp: , Rfl:      IBUPROFEN, , Disp: , Rfl:      Cetirizine HCl (ZYRTEC ALLERGY PO), , Disp: , Rfl:      Emtricitabine-Tenofovir DF (TRUVADA PO), , Disp: , Rfl:   Immunization History   Administered Date(s) Administered     Influenza Vaccine IM 3yrs+ 4 Valent IIV4 09/11/2017     Mantoux Tuberculin Skin Test 12/05/2017     No Known Allergies      EXAM:   /83  Pulse 125  Temp 96.6  F (35.9  C) (Oral)  Resp 16  Ht 1.76 m (5' 9.29\")  Wt 57.6 kg (127 lb)  SpO2 98%  BMI 18.6 kg/m2  GENERAL " APPEARANCE: alert, cooperative and not in distress  SKIN: no rashes, no lesions  HEAD: atraumatic, normocephalic  EYES: lids and lashes normal, conjunctivae and sclerae clear, pupils equal, round, reactive to light, EOM full and intact  ENT: no scars or lesions, nasal exam showed no discharge, swelling or lesions noted, otoscopy showed positive findings: cerumen bilaterally, tongue midline and normal, soft palate, uvula, and tonsils normal  NECK: no asymmetry, masses, or scars, supple without significant adenopathy  LUNGS: unlabored respirations, no intercostal retractions or accessory muscle use, clear to auscultation without rales or wheezes  HEART: regular rate and rhythm without murmurs and normal S1 and S2  MUSCULOSKELETAL: no musculoskeletal defects are noted  NEURO: no focal deficits noted  PSYCH: does not appear depressed or anxious    WORKUP: Skin testing, Spirometry  SPIROMETRY       FVC 4.40L (80% of predicted).     FEV1 3.80L (83% of predicted).     FEV1/FVC 86%     FEF 25%-75%  4.83L/s (98% of predicted).    These values are consistent with normal lung function without evidence of airflow obstruction.    Asthma Control Test (ACT) total score: 14     Skin prick testing was performed to our adult aeroallergen panel. He had positive reactions to cat, dust mite (dp and df), walnut tree, birch, oak, willow, demetris, cocklebur, sagebrush/mugwort, and sorrel. He had negative reactions to all other antigens tested as well as histamine control.    ASSESSMENT/PLAN:  Manoj Castellanos is a 22 year old male here for evaluation of asthma and allergies. He reports that his asthma has not been well controlled in the last few months since discontinuing aerospan. He has been using albuterol several times per day and would like to discuss restarting maintenance therapy. We discussed risks, benefits, and potential side effects of inhaled corticosteroids and Manoj would like to begin these medications today. He is also considering  restarting allergen immunotherapy treatment. Skin prick testing revealed sensitization to several seasonal and perennial aeroallergens however testing could not be fully interpreted due to blunted histamine control.    1. Begin flovent 110mcg, 2 puffs twice daily  2. Continue albuterol HFA, 2-4 puffs every 4 hours as needed  3. Optichamber given in clinic and appropriate inhaler and spacer technique reviewed  4. Asthma action plan reviewed and provided to patient  5. Will obtain in vitro IgE testing to seasonal and perennial aeroallergens  6. Continue cetirizine 10mg daily  7. Consider initiation of allergen immunotherapy treatment  8. Follow-up in 1 month      Adriana Fraser MD  Allergy/Immunology  [facility] and [place], [place]      Chart documentation done in part with Dragon Voice Recognition Software. Although reviewed after completion, some word and grammatical errors may remain.           Again, thank you for allowing me to participate in the care of your patient.        Sincerely,        Adriana Fraser MD

## 2018-03-06 NOTE — PATIENT INSTRUCTIONS
If you have any questions regarding your allergies, asthma, or what we discussed during your visit today please call the allergy clinic or contact us via [employer].    [facility]: [phone]      Follow-up in 1 month      Using an Inhaler with a Spacer  To control asthma, you need to use your medicines the right way. Some medicines are inhaled using a device called a metered-dose inhaler (MDI). Metered-dose inhalers deliver medicine with a fine spray. You may be asked to use a spacer (holding tube) with your inhaler. The spacer helps make sure all the medicine you need goes into your lungs.   Steps for using an inhaler with a spacer  Step 1:    Remove the caps from the inhaler and spacer.    Shake the inhaler well and attach the spacer. If the inhaler is being used for the first time or has not been used for a while, prime it as directed by the product maker.  Step 2:    Breathe out normally.    Put the spacer between your teeth. Close your lips tightly around it.    Keep your chin up.  Step 3:    Spray 1 puff into the spacer by pressing down on the inhaler.    Then breathe in through your mouth as slowly and deeply as you can. This should take about 5-10 seconds. If you breathe too quickly, you may hear a whistling sound in certain spacers.  Step 4:    Take the spacer out of your mouth.    Hold your breath for a count of 10.    Then hold your lips together and slowly breathe out through your mouth.          If you re prescribed more than 1 puff of medicine at a time, wait at least 30 seconds between puffs. This number may be different for different medicines. Shake the inhaler again. Then repeat steps 2 to 4.   Date Last Reviewed: 10/1/2016    [id number] [employer]. [address] [place] [address], [place] [address]. All rights reserved. This information is not intended as a substitute for professional medical care. Always follow your healthcare professional's instructions.

## 2018-03-06 NOTE — NURSING NOTE
Per provider verbal order, placed Adult Environmental Panel scratch test.  Consent was obtained prior to procedure.  Once panels were placed, patient was monitored for 15 minutes in clinic.  Provider read test after 15 minutes..  Pt tolerated procedure well.  All questions and concerns were addressed at office visit.     Roxanne Garibay RN

## 2018-03-06 NOTE — MR AVS SNAPSHOT
After Visit Summary   3/6/2018    Manoj Castellanos    MRN: [id number]           Patient Information     Date Of Birth          1995        Visit Information        Provider Department      3/6/2018 2:00 PM Adriana Fraser MD [facility]        Today's Diagnoses     Mild persistent asthma without complication    -  1      Care Instructions    If you have any questions regarding your allergies, asthma, or what we discussed during your visit today please call the allergy clinic or contact us via dentaZOOM.    [facility] Allergy: [phone]      Follow-up in 1 month      Using an Inhaler with a Spacer  To control asthma, you need to use your medicines the right way. Some medicines are inhaled using a device called a metered-dose inhaler (MDI). Metered-dose inhalers deliver medicine with a fine spray. You may be asked to use a spacer (holding tube) with your inhaler. The spacer helps make sure all the medicine you need goes into your lungs.   Steps for using an inhaler with a spacer  Step 1:    Remove the caps from the inhaler and spacer.    Shake the inhaler well and attach the spacer. If the inhaler is being used for the first time or has not been used for a while, prime it as directed by the product maker.  Step 2:    Breathe out normally.    Put the spacer between your teeth. Close your lips tightly around it.    Keep your chin up.  Step 3:    Spray 1 puff into the spacer by pressing down on the inhaler.    Then breathe in through your mouth as slowly and deeply as you can. This should take about 5-10 seconds. If you breathe too quickly, you may hear a whistling sound in certain spacers.  Step 4:    Take the spacer out of your mouth.    Hold your breath for a count of 10.    Then hold your lips together and slowly breathe out through your mouth.          If you re prescribed more than 1 puff of medicine at a time, wait at least 30 seconds between puffs. This number may be different for  "different medicines. Shake the inhaler again. Then repeat steps 2 to 4.   Date Last Reviewed: 10/1/2016    4341-9583 [employer]. [address] [place] [address], [place] [address]. All rights reserved. This information is not intended as a substitute for professional medical care. Always follow your healthcare professional's instructions.                Follow-ups after your visit        Follow-up notes from your care team     Return in about 4 weeks (around 4/3/2018).      Who to contact     If you have questions or need follow up information about today's clinic visit or your schedule please contact [facility] directly at [phone].  Normal or non-critical lab and imaging results will be communicated to you by QuantHousehart, letter or phone within 4 business days after the clinic has received the results. If you do not hear from us within 7 days, please contact the clinic through QuantHousehart or phone. If you have a critical or abnormal lab result, we will notify you by phone as soon as possible.  Submit refill requests through Odojo or call your pharmacy and they will forward the refill request to us. Please allow 3 business days for your refill to be completed.          Additional Information About Your Visit        MyChart Information     Odojo gives you secure access to your electronic health record. If you see a primary care provider, you can also send messages to your care team and make appointments. If you have questions, please call your primary care clinic.  If you do not have a primary care provider, please call [phone] and they will assist you.        Care EveryWhere ID     This is your Care EveryWhere ID. This could be used by other organizations to access your [facility] medical records  [id number]        Your Vitals Were     Pulse Temperature Respirations Height Pulse Oximetry BMI (Body Mass Index)    125 96.6  F (35.9  C) (Oral) 16 1.76 m (5' 9.29\") 98% 18.6 kg/m2       Blood " Pressure from Last 3 Encounters:   03/06/18 142/83   01/17/18 117/74   01/10/18 111/78    Weight from Last 3 Encounters:   03/06/18 57.6 kg (127 lb)   01/17/18 56.7 kg (125 lb)   01/10/18 56.7 kg (125 lb)              We Performed the Following     ALLERGY SKIN TESTS,ALLERGENS     OPTICHAMBER     Spirometry, Breathing Capacity          Today's Medication Changes          These changes are accurate as of 3/6/18  2:50 PM.  If you have any questions, ask your nurse or doctor.               Start taking these medicines.        Dose/Directions    fluticasone 110 MCG/ACT Inhaler   Commonly known as:  FLOVENT HFA   Used for:  Mild persistent asthma without complication   Started by:  Adriana Fraser MD        Dose:  2 puff   Inhale 2 puffs into the lungs 2 times daily   Quantity:  1 Inhaler   Refills:  1         These medicines have changed or have updated prescriptions.        Dose/Directions    albuterol 108 (90 BASE) MCG/ACT Inhaler   Commonly known as:  PROAIR HFA/PROVENTIL HFA/VENTOLIN HFA   This may have changed:    - when to take this  - reasons to take this   Used for:  Mild persistent asthma without complication   Changed by:  Adriana Fraser MD        Dose:  2 puff   Inhale 2 puffs into the lungs every 4 hours as needed   Quantity:  1 Inhaler   Refills:  2         Stop taking these medicines if you haven't already. Please contact your care team if you have questions.     AEROSPAN IN   Stopped by:  Adriana Fraser MD           TRUVADA PO   Stopped by:  Adriana Fraser MD                Where to get your medicines      [facility] [address] [place] [id number]    Hours:  24-hours Phone:  [phone]     albuterol 108 (90 BASE) MCG/ACT Inhaler    fluticasone 110 MCG/ACT Inhaler                Primary Care Provider Office Phone # Fax #    KEYANNA Jenkins [phone]  [phone]       [address] [place] [address]        Equal Access to Services     DAV ARIAS : Hadii aad ku haddemetriso Soshelbieali, waaxda luqadaha, qaybta kaalmada adedarryl, johana tia romainecarrie dicksonheidy nikkigricelda richard millan. So [facility] [phone].    ATENCIÓN: Si habla español, tiene a carmona disposición servicios gratuitos de asistencia lingüística. Llame al [phone].    We comply with applicable federal civil rights laws and [place] laws. We do not discriminate on the basis of race, color, national origin, age, disability, sex, sexual orientation, or gender identity.            Thank you!     [facility] you for [facility]  [facility]. Our goal is always to provide you with excellent care. Hearing back from our patients is one way we can continue to improve our services. Please take a few minutes to complete the written survey that you may receive in the mail after your visit with us. Thank you!             Your Updated Medication List - Protect others around you: Learn how to safely use, store and throw away your medicines at www.disposemymeds.org.          This list is accurate as of 3/6/18  2:50 PM.  Always use your most recent med list.                   Brand Name Dispense Instructions for use Diagnosis    albuterol 108 (90 BASE) MCG/ACT Inhaler    PROAIR HFA/PROVENTIL HFA/VENTOLIN HFA    1 Inhaler    Inhale 2 puffs into the lungs every 4 hours as needed    Mild persistent asthma without complication       fluticasone 110 MCG/ACT Inhaler    FLOVENT HFA    1 Inhaler    Inhale 2 puffs into the lungs 2 times daily    Mild persistent asthma without complication       IBUPROFEN           ketoconazole 2 % shampoo    NIZORAL    120 mL    Apply topically daily as needed for itching or irritation Apply to scalp and leave on for 5-10 minutes. Use this 3 times per week.    Loss of hair       minoxidil 5 % Soln      Externally apply topically 2 times daily        ZYRTEC ALLERGY PO

## 2018-03-06 NOTE — LETTER
My Asthma Action Plan  Name: Manoj Castellanos   YOB: 1995  Date: 3/6/2018   My doctor: Adriana Fraser MD   My clinic: [facility]        My Control Medicine: Fluticasone propionate (Flovent) -   mcg Take 2 puffs twice daily. Use with spacer. Rinse mouth and brusth teeth afterwards.  My Rescue Medicine: Albuterol (Proair/Ventolin/Proventil) inhaler Take 2 to 4 puffs every 4 hours as needed   My Asthma Severity: mild persistent  Avoid your asthma triggers: smoke, upper respiratory infections, animal dander, strong odors and fumes, exercise or sports and cold air               GREEN ZONE   Good Control    I feel good    No cough or wheeze    Can work, sleep and play without asthma symptoms       Take your asthma control medicine every day.     1. If exercise triggers your asthma, take your rescue medication    15 minutes before exercise or sports, and    During exercise if you have asthma symptoms  2. Spacer to use with inhaler: If you have a spacer, make sure to use it with your inhaler             YELLOW ZONE Getting Worse  I have ANY of these:    I do not feel good    Cough or wheeze    Chest feels tight    Wake up at night   1. Keep taking your Green Zone medications  2. Start taking your rescue medicine:    every 20 minutes for up to 1 hour. Then every 4 hours for 24-48 hours.  3. If you stay in the Yellow Zone for more than 12-24 hours, contact your doctor.           RED ZONE Medical Alert - Get Help  I have ANY of these:    I feel awful    Medicine is not helping    Breathing getting harder    Trouble walking or talking    Nose opens wide to breathe       1. Take your rescue medicine NOW  2. If your provider has prescribed an oral steroid medicine, start taking it NOW  3. Call your doctor NOW  4. If you are still in the Red Zone after 20 minutes and you have not reached your doctor:    Take your rescue medicine again and    Call 911 or go to the emergency room right away    See your  regular doctor within 2 weeks of an Emergency Room or Urgent Care visit for follow-up treatment.        Electronically signed by: Adriana Fraser, March 6, 2018    Annual Reminders:  Meet with Asthma Educator,  Flu Shot in the Fall, consider Pneumonia Vaccination for patients with asthma (aged 19 and older).    [facility] [address] [place] [address] AT [facility]                    Asthma Triggers  How To Control Things That Make Your Asthma Worse    Triggers are things that make your asthma worse.  Look at the list below to help you find your triggers and what you can do about them.  You can help prevent asthma flare-ups by staying away from your triggers.      Trigger                                                          What you can do   Cigarette Smoke  Tobacco smoke can make asthma worse. Do not allow smoking in your home, car or around you.  Be sure no one smokes at a child s day care or school.  If you smoke, ask your health care provider for ways to help you quit.  Ask family members to quit too.  Ask your health care provider for a referral to Quit Plan to help you quit smoking, or call 7-649-019-PLAN.     Colds, Flu, Bronchitis  These are common triggers of asthma. Wash your hands often.  Don t touch your eyes, nose or mouth.  Get a flu shot every year.     Dust Mites  These are tiny bugs that live in cloth or carpet. They are too small to see. Wash sheets and blankets in hot water every week.   Encase pillows and mattress in dust mite proof covers.  Avoid having carpet if you can. If you have carpet, vacuum weekly.   Use a dust mask and HEPA vacuum.   Pollen and Outdoor Mold  Some people are allergic to trees, grass, or weed pollen, or molds. Try to keep your windows closed.  Limit time out doors when pollen count is high.   Ask you health care provider about taking medicine during allergy season.     Animal Dander  Some people are allergic to skin  flakes, urine or saliva from pets with fur or feathers. Keep pets with fur or feathers out of your home.    If you can t keep the pet outdoors, then keep the pet out of your bedroom.  Keep the bedroom door closed.  Keep pets off cloth furniture and away from stuffed toys.     Mice, Rats, and Cockroaches  Some people are allergic to the waste from these pests.   Cover food and garbage.  Clean up spills and food crumbs.  Store grease in the refrigerator.   Keep food out of the bedroom.   Indoor Mold  This can be a trigger if your home has high moisture. Fix leaking faucets, pipes, or other sources of water.   Clean moldy surfaces.  Dehumidify basement if it is damp and smelly.   Smoke, Strong Odors, and Sprays  These can reduce air quality. Stay away from strong odors and sprays, such as perfume, powder, hair spray, paints, smoke incense, paint, cleaning products, candles and new carpet.   Exercise or Sports  Some people with asthma have this trigger. Be active!  Ask your doctor about taking medicine before sports or exercise to prevent symptoms.    Warm up for 5-10 minutes before and after sports or exercise.     Other Triggers of Asthma  Cold air:  Cover your nose and mouth with a scarf.  Sometimes laughing or crying can be a trigger.  Some medicines and food can trigger asthma.

## 2018-03-07 ASSESSMENT — ASTHMA QUESTIONNAIRES: ACT_TOTALSCORE: 14

## 2018-03-23 ENCOUNTER — MYC MEDICAL ADVICE (OUTPATIENT)
Dept: FAMILY MEDICINE | Facility: CLINIC | Age: 23
End: 2018-03-23

## 2018-03-26 PROBLEM — J30.81 CHRONIC ALLERGIC RHINITIS DUE TO ANIMAL HAIR AND DANDER: Status: ACTIVE | Noted: 2018-03-06

## 2018-03-26 PROBLEM — J30.1 CHRONIC SEASONAL ALLERGIC RHINITIS DUE TO POLLEN: Status: ACTIVE | Noted: 2018-03-06

## 2018-03-26 PROBLEM — H10.13 ALLERGIC CONJUNCTIVITIS OF BOTH EYES: Status: ACTIVE | Noted: 2018-03-06

## 2018-03-26 PROBLEM — J30.89 ALLERGIC RHINITIS DUE TO DUST MITE: Status: ACTIVE | Noted: 2018-03-06

## 2018-03-26 PROBLEM — J30.89 ALLERGIC RHINITIS DUE TO DUST MITE: Status: ACTIVE | Noted: 2018-03-26

## 2018-03-26 PROBLEM — J30.1 CHRONIC SEASONAL ALLERGIC RHINITIS DUE TO POLLEN: Status: ACTIVE | Noted: 2018-03-26

## 2018-03-26 PROBLEM — H10.13 ALLERGIC CONJUNCTIVITIS OF BOTH EYES: Status: ACTIVE | Noted: 2018-03-26

## 2018-03-26 PROBLEM — J45.30 MILD PERSISTENT ASTHMA WITHOUT COMPLICATION: Status: ACTIVE | Noted: 2018-03-06

## 2018-03-26 PROBLEM — J30.81 CHRONIC ALLERGIC RHINITIS DUE TO ANIMAL HAIR AND DANDER: Status: ACTIVE | Noted: 2018-03-26

## 2018-03-26 PROBLEM — J45.30 MILD PERSISTENT ASTHMA WITHOUT COMPLICATION: Status: ACTIVE | Noted: 2018-03-26

## 2018-04-03 ENCOUNTER — OFFICE VISIT (OUTPATIENT)
Dept: ALLERGY | Facility: CLINIC | Age: 23
End: 2018-04-03
Payer: COMMERCIAL

## 2018-04-03 VITALS
BODY MASS INDEX: 19.11 KG/M2 | WEIGHT: 129 LBS | RESPIRATION RATE: 16 BRPM | HEART RATE: 97 BPM | HEIGHT: 69 IN | OXYGEN SATURATION: 96 % | SYSTOLIC BLOOD PRESSURE: 144 MMHG | DIASTOLIC BLOOD PRESSURE: 72 MMHG | TEMPERATURE: 96.8 F

## 2018-04-03 DIAGNOSIS — J30.1 CHRONIC SEASONAL ALLERGIC RHINITIS DUE TO POLLEN: ICD-10-CM

## 2018-04-03 DIAGNOSIS — J30.89 ALLERGIC RHINITIS DUE TO DUST MITE: ICD-10-CM

## 2018-04-03 DIAGNOSIS — J30.81 CHRONIC ALLERGIC RHINITIS DUE TO ANIMAL HAIR AND DANDER: ICD-10-CM

## 2018-04-03 DIAGNOSIS — H10.13 ALLERGIC CONJUNCTIVITIS OF BOTH EYES: ICD-10-CM

## 2018-04-03 DIAGNOSIS — J45.30 MILD PERSISTENT ASTHMA WITHOUT COMPLICATION: Primary | ICD-10-CM

## 2018-04-03 LAB
FEF 25/75: NORMAL
FEV-1: NORMAL
FEV1/FVC: NORMAL
FVC: NORMAL

## 2018-04-03 PROCEDURE — 86003 ALLG SPEC IGE CRUDE XTRC EA: CPT | Performed by: ALLERGY & IMMUNOLOGY

## 2018-04-03 PROCEDURE — 36415 COLL VENOUS BLD VENIPUNCTURE: CPT | Performed by: ALLERGY & IMMUNOLOGY

## 2018-04-03 PROCEDURE — 99213 OFFICE O/P EST LOW 20 MIN: CPT | Mod: 25 | Performed by: ALLERGY & IMMUNOLOGY

## 2018-04-03 PROCEDURE — 94010 BREATHING CAPACITY TEST: CPT | Performed by: ALLERGY & IMMUNOLOGY

## 2018-04-03 RX ORDER — CETIRIZINE HYDROCHLORIDE 10 MG/1
10 TABLET ORAL DAILY
Qty: 30 TABLET | Refills: 11 | Status: SHIPPED | OUTPATIENT
Start: 2018-04-03 | End: 2018-08-20

## 2018-04-03 RX ORDER — FLUTICASONE PROPIONATE 50 MCG
2 SPRAY, SUSPENSION (ML) NASAL DAILY
Qty: 1 BOTTLE | Refills: 11 | Status: SHIPPED | OUTPATIENT
Start: 2018-04-03 | End: 2018-08-20

## 2018-04-03 RX ORDER — FLUTICASONE PROPIONATE 110 UG/1
2 AEROSOL, METERED RESPIRATORY (INHALATION) 2 TIMES DAILY
Qty: 1 INHALER | Refills: 3 | Status: SHIPPED | OUTPATIENT
Start: 2018-04-03 | End: 2018-06-07

## 2018-04-03 NOTE — PATIENT INSTRUCTIONS
If you have any questions regarding your allergies, asthma, or what we discussed during your visit today please call the allergy clinic or contact us via Orthera.    [facility]: [phone]      Start the flonase - 2 sprays in each nostril once daily    Start the zyrtec once daily    Continue the flovent inhaler - 2 puffs twice daily      These are the billing and diagnosis codes that your insurance company may need to help you determine if allergy shots are covered and what potential out of pocked costs you may have.    Regular Allergy Shots: 65473    Cluster or Rush Accelerated Build Codes: 54559 - rapid desensitization. This could be a single unit or multiple units billed per day depending on the length of your visit.    Allergy Shot Serum: 95448 - the amount of serum in total varies depending on how many allergy shots you will need (20mL for 1 injection or up to 80mL for 4 injections)    Diagnosis Codes:    Allergic Rhinitis Due to Dust Mite or Mold - J30.89  Chronic Allergic Rhinitis Due to Animals - J30.81  Chronic Seasonal Allergic Rhinitis Due to Pollens - J30.1  Allergic Conjunctivitis - H10.13    ACCELERATED IMMUNOTHERAPY PATIENT INFORMATION    Immunotherapy is a treatment that alters the patient s immune system so they have less allergy symptoms, use less medications to control symptoms, improved quality of life, and less health care utilization    Accelerated immunotherapy schedules are designed to allow patients to reach their maintenance immunotherapy dose in a shorter time frame than conventional immunotherapy.    Clinical benefit can be reached more rapidly using accelerated immunotherapy.     A lot of patients do not want to start allergen immunotherapy secondary to the upfront time commitment associated with conventional allergy shots. Rush immunotherapy would allow a patient to be on monthly allergy shots within 6-10 weeks. Cluster immunotherapy would allow the patient to  be on monthly injections around 8-9 weeks.     Rush immunotherapy has historically been associated with an increased risk of reactions, however the risk is substantially lowered by only advancing on RUSH immunotherapy day to the mid-yellow vial, using a pre-medication regimen consisting of steroids and antihistamines, and making sure asthma is well controlled the RUSH immunotherapy day. This puts the risk of a systemic reaction similar to conventional immunotherapy. Cluster immunotherapy is similar in the risk of systemic reaction to conventional immunotherapy. The patient would still need to take oral antihistamine on cluster immunotherapy days.    CLUSTER IMMUNOTHERAPY PATIENT INSTRUCTIONS    Asthma medications must be continued and asthma must be well controlled prior to receiving CLUSTER immunotherapy. Immunotherapy should not be given if you are feeling ill.    Other allergy medications may be continued    You should plan to spend approximately 2 hours at the clinic. Please feel free to bring things to occupy your time such as books, work, or computers. You may also wish to bring something to eat as you will not be able to leave the clinic once the procedure has begun.    You will be required to bring an epinephrine auto-injector with you to your CLUSTER immunotherapy appointments and all subsequent allergy shot appointments    You will be required to take the following pre-medication regimen prior  to your CLUSTER immunotherapy appointments  o Medications to be taken 1 day prior to CLUSTER:  - Zyrtec 10mg or Allegra 180mg twice daily  o Medications to be taken the morning of CLUSTER:  - Zyrtec 10mg or Allegra 180mg  RUSH IMMUNOTHERAPY PATIENT INSTRUCTIONS    Asthma medications must be continued and asthma must be well controlled prior to starting RUSH immunotherapy. Immunotherapy should not be given if you are feeling ill.     Other allergy medications may be continued.     You should plan to spend  approximately 7-8 hours at the clinic. Please feel free to bring things to occupy your time such as books, work, or computers. You may also wish to bring something to eat as you will not be able to leave the clinic once the procedure has begun.  You will be observed for 2 hours after receiving last injection on the RUSH immunotherapy day.    You will be required to bring an epinephrine auto-injector with you to your RUSH appointment and all subsequent allergy shot appointments.     You will be required to take the following pre-medication regimen prior to your RUSH immunotherapy appointment  o Medications to be taken 2 days prior to RUSH:  - Prednisone 20mg by mouth twice daily  - Zyrtec 10mg or Allegra 180mg twice daily.   - Singulair 10mg by mouth daily.   - Zantac 150mg by mouth twice daily.  o Medications to be taken 1 day prior to RUSH:  - Prednisone 20mg by mouth twice daily  - Zyrtec 10mg or Allegra 180mg twice daily.   - Singulair 10mg by mouth daily.   - Zantac 150mg by mouth twice daily.  o Medications to be taken the morning of RUSH:  - Prednisone 40mg by mouth once.   - Zyrtec 10mg or Allegra 180mg by mouth once.  - Singulair 10mg by mouth once.   - Zantac 300mg by mouth once.         Allergy Shots (Immunotherapy)  What You Need to Know  What are allergy shots?  Allergy shots are used to treat allergic reactions. They are given in the upper arm.  These shots will slowly build your tolerance to the things you are allergic to (such as pollen, mold and animal dander). They reduce the body's allergic response.  What are the benefits of getting allergy shots?  Allergy shots may:    Relieve symptoms long after treatment has ended    Allow you to take less allergy medicine, or stop taking it altogether    Prevent new allergies in the future    Keep children's allergies from getting worse and turning into asthma    Improve eczema caused by allergies.  The average patient sees a large improvement in his or her  symptoms (up to 80%).  Who should have allergy shots?  Allergy shots are helpful when allergies cause:    Asthma    Itchy, watery eyes (allergic conjunctivitis)    Runny nose, sneezing, sore throat and other symptoms (allergic rhinitis)    Severe reaction to insect stings.  For children, it is best to wait until age 5 before starting allergy shots.  How will I feel during and after treatment?  You will have shots every 3 to 14 days for 4 to 8 months. We will increase the dose each time until we reach a level that works for you. After that, you will have the shots less often.     It may take another year for symptoms to improve. Many people improve much sooner.    You will likely have shots for another 3 to 5 years.  Allergy shots can reduce your symptoms a little or lot. Some people find that their allergies go away entirely.   Most people have long-lasting relief after treatment ends. You should see your doctor every year to find out if you need more shots.  What are the risks?  With each allergy shot, there is the risk of allergic reaction. Some reactions are mild. Others can be life threatening and must be treated at once.   Common reactions  It is common to have a mild reaction, such as redness, swelling, pain and itching in the area of the shot. This can occur right away or up to several hours after the shot. Sometimes the reaction moves into the upper arm. Call your doctor if:    The reaction area is more than 2 inches wide.    You still have the reaction the day after the shot.  Severe reactions  The reactions below are rare, but serious. If not treated, they can lead to very low blood pressure and even death. If you have any of these, get help at once.     Hives include a rash, swelling and itching on more than one part of the body. They may occur within minutes to hours after a shot.    Swelling of any part of the body. For example, you may have swelling of the ears, tongue, lips, throat, intestines, hands  or feet. Swelling may affect more than one body part. These reactions could occur within minutes to hours after the shot.    Trouble breathing. You may develop sneezing, runny nose, stuffy nose, cough or asthma symptoms. These reactions can occur within minutes to hours after the shot.    Anaphylactic shock is sudden, severe and may include symptoms such as hives, trouble breathing, stomach pain, feeling faint or dizzy and low blood pressure. It may cause a loss of consciousness and could lead to death. This reaction usually occurs within minutes of the shot but can happen a few hours later. It is very rare.  You might have a severe reaction after the first shot, or it may occur after a series of shots. If you have a reaction, we will treat you right away. In the future, we will change the dose of your allergy shots.  What happens after each shot?  You should wait in the doctor's office for 30 minutes after each shot. If you have a reaction, we may ask you to stay longer. If you cannot wait the 30 minutes, you should not have your allergy shot.  If you have a severe reaction after leaving the doctor's office, use your epinephrine auto-injector (Epi-pen) and go to the nearest emergency room. If treated promptly, severe reactions are rarely life threatening. We will never give an allergy shot unless medical help is nearby in case of emergency.   What else do I need to know?  If you start taking any new medicines  It is not safe to have these shots while taking certain medicines. If any doctor prescribes new medicine for you, please let us know. This includes:    Beta blockers, often used for heart disease    Blood pressure medicine    Medicine for migraine headaches    Glaucoma medicine.  A note for women  If you get pregnant, tell the office staff at once.   Your doctor will decide how often you should receive shots during pregnancy. We will not increase your dose while you are pregnant.  If you will have shots at  another clinic  If you will have your allergy shots at another clinic, you must provide the name and address of the doctor who will give the shots. We will ask you to fill out a form.  If you have any questions or concerns, please speak to your doctor or a member of our staff.   For informational purposes only. Not to replace the advice of your health care provider.   Copyright   2009 [facility]. All rights reserved. [employer] 235387 - REV 07/17.

## 2018-04-03 NOTE — LETTER
4/3/2018         RE: Manoj Castellanos  [address]  [facility] [id number]        Dear Colleague,    Thank you for referring your patient, Manoj Castellanos, to the [facility]. Please see a copy of my visit note below.    Manoj Castellanos was seen in the Allergy Clinic at [facility]. The following are my recommendations regarding his Mild Persistent Asthma, Allergic Rhinitis Due to Animals, Allergic Rhinitis Due to Pollen, Allergic Rhinitis Due to Dust Mites and Allergic Conjunctivitis    1. Continue flovent 110mcg, 2 puffs twice daily  2. Continue albuterol HFA, 2-4 puffs every 4 hours as needed. May also take 2 puffs 15 minutes prior to exercise or activity.  3. Will obtain in vitro IgE testing to seasonal and perennial aeroallergens  4. Begin fluticasone nasal spray, 2 sprays in each nostril daily - appropriate nasal spray technique reviewed  5. Begin cetirizine 10mg daily  6. Plan to initiate allergen immunotherapy treatment pending lab results - patient to return to sign consent after contacting insurance company  7. Follow-up in 3 months      Manoj Castellanos is a 22 year old American male who is seen today for follow-up of asthma and allergic rhinitis. He reports significant improvement in his asthma symptoms since beginning treatment with Flovent. He is no longer having persistent shortness of breath and has been able to exercise and engage in activity without symptoms. Manoj has used his albuterol 4 times for acute symptoms in the past month. He also uses it to pre-medicate prior to exercise. He has not had nocturnal symptoms of cough, shortness of breath, chest tightness, or wheezing.    Manoj would also like to discuss treatment options for his allergies. He is interested in resuming allergen immunotherapy treatment but would also like to discuss medications for use in the interim period.       REVIEW OF SYSTEMS:  General: negative for weight gain. negative for weight loss. negative for changes in sleep.   Eyes:  negative for itching. negative for redness. negative for tearing/watering.  Ears: negative for fullness. negative for hearing loss. negative for dizziness.   Nose: negative for snoring.negative for changes in smell. positive  for drainage.   Throat: negative for hoarseness. positive  for sore throat. negative for trouble swallowing.   Lungs: negative for shortness of breath.negative for wheezing. negative for sputum production.   Cardiovascular: negative for chest pain. negative for swelling of ankles. negative for fast or irregular heartbeat.   Gastrointestinal: negative for nausea. negative for heartburn. negative for acid reflux.   Musculoskeletal: negative for joint pain. negative for joint stiffness. negative for joint swelling.   Neurologic: negative for seizures. negative for fainting. negative for weakness.   Psychiatric: negative for changes in mood. negative for anxiety.   Endocrine: negative for cold intolerance. negative for heat intolerance. negative for tremors.   Hematologic: negative for easy bruising. negative for easy bleeding.  Integumentary: negative for rash. negative for scaling. negative for nail changes.       Current Outpatient Prescriptions:      fluticasone (FLOVENT HFA) 110 MCG/ACT Inhaler, Inhale 2 puffs into the lungs 2 times daily, Disp: 1 Inhaler, Rfl: 1     albuterol (PROAIR HFA/PROVENTIL HFA/VENTOLIN HFA) 108 (90 BASE) MCG/ACT Inhaler, Inhale 2 puffs into the lungs every 4 hours as needed, Disp: 1 Inhaler, Rfl: 2     minoxidil 5 % SOLN, Externally apply topically 2 times daily, Disp: , Rfl:      ketoconazole (NIZORAL) 2 % shampoo, Apply topically daily as needed for itching or irritation Apply to scalp and leave on for 5-10 minutes. Use this 3 times per week., Disp: 120 mL, Rfl: 10     IBUPROFEN, , Disp: , Rfl:      Cetirizine HCl (ZYRTEC ALLERGY PO), , Disp: , Rfl:     EXAM:   /72 (BP Location: Left arm, Patient Position: Sitting, Cuff Size: Adult Regular)  Pulse 97  Temp  "96.8  F (36  C) (Oral)  Resp 16  Ht 1.753 m (5' 9\")  Wt 58.5 kg (129 lb)  SpO2 96%  BMI 19.05 kg/m2  GENERAL APPEARANCE: alert, cooperative and not in distress  SKIN: no rashes, no lesions  HEAD: atraumatic, normocephalic  ENT: no scars or lesions, nasal exam showed no discharge, swelling or lesions noted, tongue midline and normal, soft palate, uvula, and tonsils normal  NECK: no asymmetry, masses, or scars, supple without significant adenopathy  LUNGS: unlabored respirations, no intercostal retractions or accessory muscle use, clear to auscultation without rales or wheezes  HEART: regular rate and rhythm without murmurs and normal S1 and S2  MUSCULOSKELETAL: no musculoskeletal defects are noted  NEURO: no focal deficits noted  PSYCH: does not appear depressed or anxious      WORKUP:  Spirometry  SPIROMETRY       FVC 4.10L (75% of predicted).     FEV1 3.41L (75% of predicted).     FEV1/FVC 83%     FEF 25%-75%  4.00L/s (82% of predicted).    These values are consistent with mixed mild airflow obstruction with restrictive component.    Asthma Control Test (ACT) total score: 21     ASSESSMENT/PLAN:  Manoj Castellanos is a 22 year old male here for follow-up of asthma and allergic rhinoconjunctivitis. He reports significant improvement in his asthma symptoms since beginning treatment with flovent. He has been tolerating the medication well and is pleased with his improved symptom control. Manoj was counseled regarding management of allergy symptoms with avoidance measures, medications, and immunotherapy treatment. We discussed potential side effects of the medications and reviewed the risks, benefits, and anticipated duration of immunotherapy treatment.    1. Continue flovent 110mcg, 2 puffs twice daily  2. Continue albuterol HFA, 2-4 puffs every 4 hours as needed. May also take 2 puffs 15 minutes prior to exercise or activity.  3. Will obtain in vitro IgE testing to seasonal and perennial aeroallergens  4. Begin fluticasone " nasal spray, 2 sprays in each nostril daily - appropriate nasal spray technique reviewed  5. Begin cetirizine 10mg daily  6. Plan to initiate allergen immunotherapy treatment pending lab results - patient to return to sign consent after contacting insurance company  7. Follow-up in 3 months      Adriana Fraser MD  Allergy/Immunology  [place], [place]      Chart documentation done in part with Dragon Voice Recognition Software. Although reviewed after completion, some word and grammatical errors may remain.      Again, thank you for allowing me to participate in the care of your patient.        Sincerely,        Adriana Fraser MD

## 2018-04-03 NOTE — MR AVS SNAPSHOT
After Visit Summary   4/3/2018    Manoj Castellanos    MRN: [id number]           Patient Information     Date Of Birth          1995        Visit Information        Provider Department      4/3/2018 2:00 PM Adriana Fraser MD [facility]        Today's Diagnoses     Mild persistent asthma without complication    -  1    Chronic allergic rhinitis due to animal hair and dander        Allergic rhinitis due to dust mite        Chronic seasonal allergic rhinitis due to pollen        Allergic conjunctivitis of both eyes          Care Instructions    If you have any questions regarding your allergies, asthma, or what we discussed during your visit today please call the allergy clinic or contact us via [employer].    [facility]: [phone]      Start the flonase - 2 sprays in each nostril once daily    Start the zyrtec once daily    Continue the flovent inhaler - 2 puffs twice daily      These are the billing and diagnosis codes that your insurance company may need to help you determine if allergy shots are covered and what potential out of pocked costs you may have.    Regular Allergy Shots: 18622    Cluster or Rush Accelerated Build Codes: 95953 - rapid desensitization. This could be a single unit or multiple units billed per day depending on the length of your visit.    Allergy Shot Serum: 78628 - the amount of serum in total varies depending on how many allergy shots you will need (20mL for 1 injection or up to 80mL for 4 injections)    Diagnosis Codes:    Allergic Rhinitis Due to Dust Mite or Mold - J30.89  Chronic Allergic Rhinitis Due to Animals - J30.81  Chronic Seasonal Allergic Rhinitis Due to Pollens - J30.1  Allergic Conjunctivitis - H10.13    ACCELERATED IMMUNOTHERAPY PATIENT INFORMATION    Immunotherapy is a treatment that alters the patient s immune system so they have less allergy symptoms, use less medications to control symptoms, improved quality of life, and  less health care utilization    Accelerated immunotherapy schedules are designed to allow patients to reach their maintenance immunotherapy dose in a shorter time frame than conventional immunotherapy.    Clinical benefit can be reached more rapidly using accelerated immunotherapy.     A lot of patients do not want to start allergen immunotherapy secondary to the upfront time commitment associated with conventional allergy shots. Rush immunotherapy would allow a patient to be on monthly allergy shots within 6-10 weeks. Cluster immunotherapy would allow the patient to be on monthly injections around 8-9 weeks.     Rush immunotherapy has historically been associated with an increased risk of reactions, however the risk is substantially lowered by only advancing on RUSH immunotherapy day to the mid-yellow vial, using a pre-medication regimen consisting of steroids and antihistamines, and making sure asthma is well controlled the RUSH immunotherapy day. This puts the risk of a systemic reaction similar to conventional immunotherapy. Cluster immunotherapy is similar in the risk of systemic reaction to conventional immunotherapy. The patient would still need to take oral antihistamine on cluster immunotherapy days.    CLUSTER IMMUNOTHERAPY PATIENT INSTRUCTIONS    Asthma medications must be continued and asthma must be well controlled prior to receiving CLUSTER immunotherapy. Immunotherapy should not be given if you are feeling ill.    Other allergy medications may be continued    You should plan to spend approximately 2 hours at the clinic. Please feel free to bring things to occupy your time such as books, work, or computers. You may also wish to bring something to eat as you will not be able to leave the clinic once the procedure has begun.    You will be required to bring an epinephrine auto-injector with you to your CLUSTER immunotherapy appointments and all subsequent allergy shot appointments    You will be required  to take the following pre-medication regimen prior  to your CLUSTER immunotherapy appointments  o Medications to be taken 1 day prior to CLUSTER:  - Zyrtec 10mg or Allegra 180mg twice daily  o Medications to be taken the morning of CLUSTER:  - Zyrtec 10mg or Allegra 180mg  RUS IMMUNOTHERAPY PATIENT INSTRUCTIONS    Asthma medications must be continued and asthma must be well controlled prior to starting RUSH immunotherapy. Immunotherapy should not be given if you are feeling ill.     Other allergy medications may be continued.     You should plan to spend approximately 7-8 hours at the clinic. Please feel free to bring things to occupy your time such as books, work, or computers. You may also wish to bring something to eat as you will not be able to leave the clinic once the procedure has begun.  You will be observed for 2 hours after receiving last injection on the RUS immunotherapy day.    You will be required to bring an epinephrine auto-injector with you to your RUS appointment and all subsequent allergy shot appointments.     You will be required to take the following pre-medication regimen prior to your RUS immunotherapy appointment  o Medications to be taken 2 days prior to RUSH:  - Prednisone 20mg by mouth twice daily  - Zyrtec 10mg or Allegra 180mg twice daily.   - Singulair 10mg by mouth daily.   - Zantac 150mg by mouth twice daily.  o Medications to be taken 1 day prior to RUSH:  - Prednisone 20mg by mouth twice daily  - Zyrtec 10mg or Allegra 180mg twice daily.   - Singulair 10mg by mouth daily.   - Zantac 150mg by mouth twice daily.  o Medications to be taken the morning of RUSH:  - Prednisone 40mg by mouth once.   - Zyrtec 10mg or Allegra 180mg by mouth once.  - Singulair 10mg by mouth once.   - Zantac 300mg by mouth once.         Allergy Shots (Immunotherapy)  What You Need to Know  What are allergy shots?  Allergy shots are used to treat allergic reactions. They are given in the upper arm.  These  shots will slowly build your tolerance to the things you are allergic to (such as pollen, mold and animal dander). They reduce the body's allergic response.  What are the benefits of getting allergy shots?  Allergy shots may:    Relieve symptoms long after treatment has ended    Allow you to take less allergy medicine, or stop taking it altogether    Prevent new allergies in the future    Keep children's allergies from getting worse and turning into asthma    Improve eczema caused by allergies.  The average patient sees a large improvement in his or her symptoms (up to 80%).  Who should have allergy shots?  Allergy shots are helpful when allergies cause:    Asthma    Itchy, watery eyes (allergic conjunctivitis)    Runny nose, sneezing, sore throat and other symptoms (allergic rhinitis)    Severe reaction to insect stings.  For children, it is best to wait until age 5 before starting allergy shots.  How will I feel during and after treatment?  You will have shots every 3 to 14 days for 4 to 8 months. We will increase the dose each time until we reach a level that works for you. After that, you will have the shots less often.     It may take another year for symptoms to improve. Many people improve much sooner.    You will likely have shots for another 3 to 5 years.  Allergy shots can reduce your symptoms a little or lot. Some people find that their allergies go away entirely.   Most people have long-lasting relief after treatment ends. You should see your doctor every year to find out if you need more shots.  What are the risks?  With each allergy shot, there is the risk of allergic reaction. Some reactions are mild. Others can be life threatening and must be treated at once.   Common reactions  It is common to have a mild reaction, such as redness, swelling, pain and itching in the area of the shot. This can occur right away or up to several hours after the shot. Sometimes the reaction moves into the upper arm. Call  your doctor if:    The reaction area is more than 2 inches wide.    You still have the reaction the day after the shot.  Severe reactions  The reactions below are rare, but serious. If not treated, they can lead to very low blood pressure and even death. If you have any of these, get help at once.     Hives include a rash, swelling and itching on more than one part of the body. They may occur within minutes to hours after a shot.    Swelling of any part of the body. For example, you may have swelling of the ears, tongue, lips, throat, intestines, hands or feet. Swelling may affect more than one body part. These reactions could occur within minutes to hours after the shot.    Trouble breathing. You may develop sneezing, runny nose, stuffy nose, cough or asthma symptoms. These reactions can occur within minutes to hours after the shot.    Anaphylactic shock is sudden, severe and may include symptoms such as hives, trouble breathing, stomach pain, feeling faint or dizzy and low blood pressure. It may cause a loss of consciousness and could lead to death. This reaction usually occurs within minutes of the shot but can happen a few hours later. It is very rare.  You might have a severe reaction after the first shot, or it may occur after a series of shots. If you have a reaction, we will treat you right away. In the future, we will change the dose of your allergy shots.  What happens after each shot?  You should wait in the doctor's office for 30 minutes after each shot. If you have a reaction, we may ask you to stay longer. If you cannot wait the 30 minutes, you should not have your allergy shot.  If you have a severe reaction after leaving the doctor's office, use your epinephrine auto-injector (Epi-pen) and go to the nearest emergency room. If treated promptly, severe reactions are rarely life threatening. We will never give an allergy shot unless medical help is nearby in case of emergency.   What else do I need to  know?  If you start taking any new medicines  It is not safe to have these shots while taking certain medicines. If any doctor prescribes new medicine for you, please let us know. This includes:    Beta blockers, often used for heart disease    Blood pressure medicine    Medicine for migraine headaches    Glaucoma medicine.  A note for women  If you get pregnant, tell the office staff at once.   Your doctor will decide how often you should receive shots during pregnancy. We will not increase your dose while you are pregnant.  If you will have shots at another clinic  If you will have your allergy shots at another clinic, you must provide the name and address of the doctor who will give the shots. We will ask you to fill out a form.  If you have any questions or concerns, please speak to your doctor or a member of our staff.   For informational purposes only. Not to replace the advice of your health care provider.   Copyright   2009 [employer]. All rights reserved. [employer] [id number] - REV 07/17.            Follow-ups after your visit        Who to contact     If you have questions or need follow up information about today's clinic visit or your schedule please contact [facility] directly at [phone].  Normal or non-critical lab and imaging results will be communicated to you by MyChart, letter or phone within 4 business days after the clinic has received the results. If you do not hear from us within 7 days, please contact the clinic through MyChart or phone. If you have a critical or abnormal lab result, we will notify you by phone as soon as possible.  Submit refill requests through [employer] or call your pharmacy and they will forward the refill request to us. Please allow 3 business days for your refill to be completed.          Additional Information About Your Visit        [employer] Information     [employer] gives you secure access to your electronic health record. If you see a primary  "care provider, you can also send messages to your care team and make appointments. If you have questions, please call your primary care clinic.  If you do not have a primary care provider, please call [phone] and they will assist you.        Care EveryWhere ID     This is your Care EveryWhere ID. This could be used by other organizations to access your [place] medical records  [id number]        Your Vitals Were     Pulse Temperature Respirations Height Pulse Oximetry BMI (Body Mass Index)    97 96.8  F (36  C) (Oral) 16 1.753 m (5' 9\") 96% 19.05 kg/m2       Blood Pressure from Last 3 Encounters:   04/03/18 144/72   03/06/18 142/83   01/17/18 117/74    Weight from Last 3 Encounters:   04/03/18 58.5 kg (129 lb)   03/06/18 57.6 kg (127 lb)   01/17/18 56.7 kg (125 lb)              We Performed the Following     Allergen alternaria alternata IgE     Allergen aspergillus fumigatus IgE     Allergen Cedar IgE     Allergen cladosporium herbarum IgE     Allergen cottonwood IgE     Allergen dog epithelium IgE     Allergen elm IgE     Allergen English plantain IgE     Allergen giant ragweed IgE     Allergen Villa grass IgE     Allergen lamb's quarter IgE     Allergen maple box elder IgE     Allergen orchard grass IgE     Allergen ragweed short IgE     Allergen Red Goliad IgE     Allergen thistle Russian IgE     Allergen scott IgE     Allergen Tree White Goliad IgE     Allergen Weed Nettle IgE     Allergen white pine IgE     Allergen, Kochia/Firebush     Spirometry, Breathing Capacity          Today's Medication Changes          These changes are accurate as of 4/3/18  2:46 PM.  If you have any questions, ask your nurse or doctor.               Start taking these medicines.        Dose/Directions    fluticasone 50 MCG/ACT spray   Commonly known as:  FLONASE   Used for:  Mild persistent asthma without complication   Started by:  Adriana Fraser MD        Dose:  2 spray   Spray 2 sprays into both nostrils daily "   Quantity:  1 Bottle   Refills:  11         These medicines have changed or have updated prescriptions.        Dose/Directions    * ZYRTEC ALLERGY PO   This may have changed:  Another medication with the same name was added. Make sure you understand how and when to take each.   Changed by:  Adriana Fraser MD        Refills:  0       * cetirizine 10 MG tablet   Commonly known as:  zyrTEC   This may have changed:  You were already taking a medication with the same name, and this prescription was added. Make sure you understand how and when to take each.   Used for:  Chronic allergic rhinitis due to animal hair and dander, Allergic rhinitis due to dust mite, Chronic seasonal allergic rhinitis due to pollen, Allergic conjunctivitis of both eyes   Changed by:  Adriana Fraser MD        Dose:  10 mg   Take 1 tablet (10 mg) by mouth daily   Quantity:  30 tablet   Refills:  11       * Notice:  This list has 2 medication(s) that are the same as other medications prescribed for you. Read the directions carefully, and ask your doctor or other care provider to review them with you.         Where to get your medicines      These medications were sent to [facility] [address] [place] [id number]    Hours:  24-hours Phone:  [phone]     cetirizine 10 MG tablet    fluticasone 110 MCG/ACT Inhaler    fluticasone 50 MCG/ACT spray                Primary Care Provider Office Phone # Fax #    Kina CURTIS KEYANNA Rivera [facility] [phone]       [facility] [address] [place] [address]        Equal Access to Services     [facility] HUGO AH: Hadii jas ku hadasho Soomaali, waaxda luqadaha, qaybta kaalmada adeegyada, johana millan. So [facility] [phone].    ATENCIÓN: Si habla español, tiene a carmona disposición servicios gratuitos de asistencia lingüística. Llame al [phone].    We comply with  applicable federal civil rights laws and [place] laws. We do not discriminate on the basis of race, color, national origin, age, disability, sex, sexual orientation, or gender identity.            Thank you!     Thank you for choosing [facility]  for your care. Our goal is always to provide you with excellent care. Hearing back from our patients is one way we can continue to improve our services. Please take a few minutes to complete the written survey that you may receive in the mail after your visit with us. Thank you!             Your Updated Medication List - Protect others around you: Learn how to safely use, store and throw away your medicines at www.disposemymeds.org.          This list is accurate as of 4/3/18  2:46 PM.  Always use your most recent med list.                   Brand Name Dispense Instructions for use Diagnosis    albuterol 108 (90 BASE) MCG/ACT Inhaler    PROAIR HFA/PROVENTIL HFA/VENTOLIN HFA    1 Inhaler    Inhale 2 puffs into the lungs every 4 hours as needed    Mild persistent asthma without complication       fluticasone 110 MCG/ACT Inhaler    FLOVENT HFA    1 Inhaler    Inhale 2 puffs into the lungs 2 times daily    Mild persistent asthma without complication       fluticasone 50 MCG/ACT spray    FLONASE    1 Bottle    Spray 2 sprays into both nostrils daily    Mild persistent asthma without complication       IBUPROFEN           ketoconazole 2 % shampoo    NIZORAL    120 mL    Apply topically daily as needed for itching or irritation Apply to scalp and leave on for 5-10 minutes. Use this 3 times per week.    Loss of hair       minoxidil 5 % Soln      Externally apply topically 2 times daily        * ZYRTEC ALLERGY PO           * cetirizine 10 MG tablet    zyrTEC    30 tablet    Take 1 tablet (10 mg) by mouth daily    Chronic allergic rhinitis due to animal hair and dander, Allergic rhinitis due to dust mite, Chronic seasonal allergic rhinitis due to pollen, Allergic  conjunctivitis of both eyes       * Notice:  This list has 2 medication(s) that are the same as other medications prescribed for you. Read the directions carefully, and ask your doctor or other care provider to review them with you.

## 2018-04-03 NOTE — PROGRESS NOTES
Manoj Castellanos was seen in the Allergy Clinic at [facility]. The following are my recommendations regarding his Mild Persistent Asthma, Allergic Rhinitis Due to Animals, Allergic Rhinitis Due to Pollen, Allergic Rhinitis Due to Dust Mites and Allergic Conjunctivitis    1. Continue flovent 110mcg, 2 puffs twice daily  2. Continue albuterol HFA, 2-4 puffs every 4 hours as needed. May also take 2 puffs 15 minutes prior to exercise or activity.  3. Will obtain in vitro IgE testing to seasonal and perennial aeroallergens  4. Begin fluticasone nasal spray, 2 sprays in each nostril daily - appropriate nasal spray technique reviewed  5. Begin cetirizine 10mg daily  6. Plan to initiate allergen immunotherapy treatment pending lab results - patient to return to sign consent after contacting insurance company  7. Follow-up in 3 months      Manoj Castellanos is a 22 year old American male who is seen today for follow-up of asthma and allergic rhinitis. He reports significant improvement in his asthma symptoms since beginning treatment with Flovent. He is no longer having persistent shortness of breath and has been able to exercise and engage in activity without symptoms. Manoj has used his albuterol 4 times for acute symptoms in the past month. He also uses it to pre-medicate prior to exercise. He has not had nocturnal symptoms of cough, shortness of breath, chest tightness, or wheezing.    Manoj would also like to discuss treatment options for his allergies. He is interested in resuming allergen immunotherapy treatment but would also like to discuss medications for use in the interim period.       REVIEW OF SYSTEMS:  General: negative for weight gain. negative for weight loss. negative for changes in sleep.   Eyes: negative for itching. negative for redness. negative for tearing/watering.  Ears: negative for fullness. negative for hearing loss. negative for dizziness.   Nose: negative for snoring.negative for changes in smell. positive  " for drainage.   Throat: negative for hoarseness. positive  for sore throat. negative for trouble swallowing.   Lungs: negative for shortness of breath.negative for wheezing. negative for sputum production.   Cardiovascular: negative for chest pain. negative for swelling of ankles. negative for fast or irregular heartbeat.   Gastrointestinal: negative for nausea. negative for heartburn. negative for acid reflux.   Musculoskeletal: negative for joint pain. negative for joint stiffness. negative for joint swelling.   Neurologic: negative for seizures. negative for fainting. negative for weakness.   Psychiatric: negative for changes in mood. negative for anxiety.   Endocrine: negative for cold intolerance. negative for heat intolerance. negative for tremors.   Hematologic: negative for easy bruising. negative for easy bleeding.  Integumentary: negative for rash. negative for scaling. negative for nail changes.       Current Outpatient Prescriptions:      fluticasone (FLOVENT HFA) 110 MCG/ACT Inhaler, Inhale 2 puffs into the lungs 2 times daily, Disp: 1 Inhaler, Rfl: 1     albuterol (PROAIR HFA/PROVENTIL HFA/VENTOLIN HFA) 108 (90 BASE) MCG/ACT Inhaler, Inhale 2 puffs into the lungs every 4 hours as needed, Disp: 1 Inhaler, Rfl: 2     minoxidil 5 % SOLN, Externally apply topically 2 times daily, Disp: , Rfl:      ketoconazole (NIZORAL) 2 % shampoo, Apply topically daily as needed for itching or irritation Apply to scalp and leave on for 5-10 minutes. Use this 3 times per week., Disp: 120 mL, Rfl: 10     IBUPROFEN, , Disp: , Rfl:      Cetirizine HCl (ZYRTEC ALLERGY PO), , Disp: , Rfl:     EXAM:   /72 (BP Location: Left arm, Patient Position: Sitting, Cuff Size: Adult Regular)  Pulse 97  Temp 96.8  F (36  C) (Oral)  Resp 16  Ht 1.753 m (5' 9\")  Wt 58.5 kg (129 lb)  SpO2 96%  BMI 19.05 kg/m2  GENERAL APPEARANCE: alert, cooperative and not in distress  SKIN: no rashes, no lesions  HEAD: atraumatic, " normocephalic  ENT: no scars or lesions, nasal exam showed no discharge, swelling or lesions noted, tongue midline and normal, soft palate, uvula, and tonsils normal  NECK: no asymmetry, masses, or scars, supple without significant adenopathy  LUNGS: unlabored respirations, no intercostal retractions or accessory muscle use, clear to auscultation without rales or wheezes  HEART: regular rate and rhythm without murmurs and normal S1 and S2  MUSCULOSKELETAL: no musculoskeletal defects are noted  NEURO: no focal deficits noted  PSYCH: does not appear depressed or anxious      WORKUP:  Spirometry  SPIROMETRY       FVC 4.10L (75% of predicted).     FEV1 3.41L (75% of predicted).     FEV1/FVC 83%     FEF 25%-75%  4.00L/s (82% of predicted).    These values are consistent with mixed mild airflow obstruction with restrictive component.    Asthma Control Test (ACT) total score: 21     ASSESSMENT/PLAN:  Manoj Castellanos is a 22 year old male here for follow-up of asthma and allergic rhinoconjunctivitis. He reports significant improvement in his asthma symptoms since beginning treatment with flovent. He has been tolerating the medication well and is pleased with his improved symptom control. Manoj was counseled regarding management of allergy symptoms with avoidance measures, medications, and immunotherapy treatment. We discussed potential side effects of the medications and reviewed the risks, benefits, and anticipated duration of immunotherapy treatment.    1. Continue flovent 110mcg, 2 puffs twice daily  2. Continue albuterol HFA, 2-4 puffs every 4 hours as needed. May also take 2 puffs 15 minutes prior to exercise or activity.  3. Will obtain in vitro IgE testing to seasonal and perennial aeroallergens  4. Begin fluticasone nasal spray, 2 sprays in each nostril daily - appropriate nasal spray technique reviewed  5. Begin cetirizine 10mg daily  6. Plan to initiate allergen immunotherapy treatment pending lab results - patient to return  to sign consent after contacting insurance company  7. Follow-up in 3 months      Adriana Fraser MD  Allergy/Immunology  [facility] and [place], [place]      Chart documentation done in part with Dragon Voice Recognition Software. Although reviewed after completion, some word and grammatical errors may remain.

## 2018-04-04 ASSESSMENT — ASTHMA QUESTIONNAIRES: ACT_TOTALSCORE: 21

## 2018-04-05 LAB
A ALTERNATA IGE QN: <0.1 KU(A)/L
A FUMIGATUS IGE QN: <0.1 KU(A)/L
C HERBARUM IGE QN: <0.1 KU(A)/L
CEDAR IGE QN: <0.1 KU(A)/L
COCKSFOOT IGE QN: 0.14 KU(A)/L
COMMON RAGWEED IGE QN: <0.1 KU(A)/L
COTTONWOOD IGE QN: <0.1 KU(A)/L
DOG DANDER+EPITH IGE QN: 14.3 KU(A)/L
EAST WHITE PINE IGE QN: <0.1 KU(A)/L
ENGL PLANTAIN IGE QN: <0.1 KU(A)/L
FIREBUSH IGE QN: <0.1 KU(A)/L
GIANT RAGWEED IGE QN: <0.1 KU(A)/L
GOOSEFOOT IGE QN: <0.1 KU(A)/L
JOHNSON GRASS IGE QN: <0.1 KU(A)/L
MAPLE IGE QN: <0.1 KU(A)/L
NETTLE IGE QN: <0.1 KU(A)/L
RED MULBERRY IGE QN: <0.1 KU(A)/L
SALTWORT IGE QN: <0.1 KU(A)/L
TIMOTHY IGE QN: <0.1 KU(A)/L
WHITE ELM IGE QN: <0.1 KU(A)/L
WHITE MULBERRY IGE QN: <0.1

## 2018-05-11 ENCOUNTER — TRANSFERRED RECORDS (OUTPATIENT)
Dept: HEALTH INFORMATION MANAGEMENT | Facility: CLINIC | Age: 23
End: 2018-05-11

## 2018-05-17 ENCOUNTER — MYC MEDICAL ADVICE (OUTPATIENT)
Dept: FAMILY MEDICINE | Facility: CLINIC | Age: 23
End: 2018-05-17

## 2018-05-17 DIAGNOSIS — K60.2 ANAL FISSURE: Primary | ICD-10-CM

## 2018-05-31 ENCOUNTER — TELEPHONE (OUTPATIENT)
Dept: FAMILY MEDICINE | Facility: CLINIC | Age: 23
End: 2018-05-31

## 2018-05-31 NOTE — TELEPHONE ENCOUNTER
S-(situation): Dermatologist  put pt on cefuroxime axetil  for acne on May 11. Now part of lip is swelling up - this started just today - woke up with it. No speaking or swallowing problems.. No hives or rash. Pt thinks from this antibx.     B-(background): this is identical to the reaction pt had to amoxicillin a couple years ago - just lip swelled then as well.  Zyrtec daily, took last night     A-(assessment): would seem unlikely that this could be from the antibx as it has been 20 days on it    R-(recommendations): Take 2 benadryl (diphenhydramine) now and call the dermatologist. If symptoms worsen or change to /ER. If problems breathing, swallowing, throat closing up or all over hives or faint , weak or dizzy call 911.   If cannot get through to dermatologist may call back to clinic  Pt was cautioned the benadryl will cause sleepiness.   .  Mayra CaputoRN

## 2018-06-07 DIAGNOSIS — J45.30 MILD PERSISTENT ASTHMA WITHOUT COMPLICATION: ICD-10-CM

## 2018-06-07 RX ORDER — FLUTICASONE PROPIONATE 110 UG/1
2 AEROSOL, METERED RESPIRATORY (INHALATION) 2 TIMES DAILY
Qty: 1 INHALER | Refills: 3 | Status: SHIPPED | OUTPATIENT
Start: 2018-06-07 | End: 2018-10-23

## 2018-06-11 ENCOUNTER — MYC MEDICAL ADVICE (OUTPATIENT)
Dept: FAMILY MEDICINE | Facility: CLINIC | Age: 23
End: 2018-06-11

## 2018-06-11 NOTE — TELEPHONE ENCOUNTER
Pt made office visit for 6/13/18 to deal with GERD symptoms and if PCP can not help him he would want a referral to see a Gastrologist.    Natalee Euceda [place] Referral Rep

## 2018-06-13 ENCOUNTER — OFFICE VISIT (OUTPATIENT)
Dept: FAMILY MEDICINE | Facility: CLINIC | Age: 23
End: 2018-06-13
Payer: COMMERCIAL

## 2018-06-13 VITALS
OXYGEN SATURATION: 98 % | DIASTOLIC BLOOD PRESSURE: 79 MMHG | TEMPERATURE: 98.2 F | HEIGHT: 69 IN | WEIGHT: 132 LBS | BODY MASS INDEX: 19.55 KG/M2 | SYSTOLIC BLOOD PRESSURE: 128 MMHG | RESPIRATION RATE: 20 BRPM | HEART RATE: 103 BPM

## 2018-06-13 DIAGNOSIS — K21.9 GASTROESOPHAGEAL REFLUX DISEASE WITHOUT ESOPHAGITIS: Primary | ICD-10-CM

## 2018-06-13 PROCEDURE — 99213 OFFICE O/P EST LOW 20 MIN: CPT | Performed by: NURSE PRACTITIONER

## 2018-06-13 RX ORDER — TRETINOIN 0.25 MG/G
CREAM TOPICAL
Refills: 3 | COMMUNITY
Start: 2018-05-11 | End: 2019-07-22 | Stop reason: ALTCHOICE

## 2018-06-13 RX ORDER — TRIAMCINOLONE ACETONIDE 1 MG/G
CREAM TOPICAL
Refills: 2 | COMMUNITY
Start: 2018-05-11 | End: 2018-08-20

## 2018-06-13 NOTE — MR AVS SNAPSHOT
After Visit Summary   6/13/2018    Manoj Castellanos    MRN: [id number]           Patient Information     Date Of Birth          1995        Visit Information        Provider Department      6/13/2018 4:20 PM Kina Rivera APRN [facility]        Today's Diagnoses     Gastroesophageal reflux disease without esophagitis    -  1      Care Instructions      GERD (Adult)    The esophagus is a tube that carries food from the mouth to the stomach. A valve at the lower end of the esophagus prevents stomach acid from flowing upward. When this valve doesn't work properly, stomach contents may repeatedly flow back up (reflux) into the esophagus. This is called gastroesophageal reflux disease (GERD). GERD can irritate the esophagus. It can cause problems with swallowing or breathing. In severe cases, GERD can cause recurrent pneumonia or other serious problems.  Symptoms of reflux include burning, pressure or sharp pain in the upper abdomen or mid to lower chest. The pain can spread to the neck, back, or shoulder. There may be belching, an acid taste in the back of the throat, chronic cough, or sore throat or hoarseness. GERD symptoms often occur during the day after a big meal. They can also occur at night when lying down.   Home care  Lifestyle changes can help reduce symptoms. If needed, medicines may be prescribed. Symptoms often improve with treatment, but if treatment is stopped, the symptoms often return after a few months. So most persons with GERD will need to continue treatment.  Lifestyle changes    Limit or avoid fatty, fried, and spicy foods, as well as coffee, chocolate, mint, and foods with high acid content such as tomatoes and citrus fruit and juices (orange, grapefruit, lemon).    Don t eat large meals, especially at night. Frequent, smaller meals are best. Do not lie down right after eating. And don t eat anything 3 hours before going to bed.    Avoid drinking  "alcohol and smoking. As much as possible, stay away from second hand smoke.    If you are overweight, losing weight will reduce symptoms.     Avoid wearing tight clothing around your stomach area.    If your symptoms occur during sleep, use a foam wedge to elevate your upper body (not just your head.) Or, place 4\" blocks under the head of your bed.  Medicines  If needed, medicines can help relieve the symptoms of GERD and prevent damage to the esophagus. Discuss a medicine plan with your healthcare provider. This may include one or more of the following medicines:    Antacids to help neutralize the normal acids in your stomach.    Acid blockers (H2 blockers) to decrease acid production.    Acid inhibitors (PPIs) to decrease acid production in a different way than the blockers. They may work better, but can take a little longer to take effect.  Take an antacid 30-60 minutes after eating and at bedtime, but not at the same time as an acid blocker.  Try not to take medicines such as ibuprofen and aspirin. If you are taking aspirin for your heart or other medical reasons, talk to your healthcare provider about stopping it.  Follow-up care  Follow up with your healthcare provider or as advised by our staff.  When to seek medical advice  Call your healthcare provider if any of the following occur:    Stomach pain gets worse or moves to the lower right abdomen (appendix area)    Chest pain appears or gets worse, or spreads to the back, neck, shoulder, or arm    Frequent vomiting (can t keep down liquids)    Blood in the stool or vomit (red or black in color)    Feeling weak or dizzy    Fever of 100.4 F (38 C) or higher, or as directed by your healthcare provider  Date Last Reviewed: 6/23/2015 2000-2017 [employer]. [address] [place] [address], [place] [address]. All rights reserved. This information is not intended as a substitute for professional medical care. Always follow your healthcare professional's " "instructions.                Follow-ups after your visit        Who to contact     If you have questions or need follow up information about today's clinic visit or your schedule please contact [facility] directly at [phone].  Normal or non-critical lab and imaging results will be communicated to you by MyChart, letter or phone within 4 business days after the clinic has received the results. If you do not hear from us within 7 days, please contact the clinic through [employer] or phone. If you have a critical or abnormal lab result, we will notify you by phone as soon as possible.  Submit refill requests through [employer] or call your pharmacy and they will forward the refill request to us. Please allow 3 business days for your refill to be completed.          Additional Information About Your Visit        MyChart Information     [employer] gives you secure access to your electronic health record. If you see a primary care provider, you can also send messages to your care team and make appointments. If you have questions, please call your primary care clinic.  If you do not have a primary care provider, please call [phone] and they will assist you.        Care EveryWhere ID     This is your Care EveryWhere ID. This could be used by other organizations to access your [facility] medical records  [id number]        Your Vitals Were     Pulse Temperature Respirations Height Pulse Oximetry BMI (Body Mass Index)    103 98.2  F (36.8  C) (Oral) 20 5' 9\" (1.753 m) 98% 19.49 kg/m2       Blood Pressure from Last 3 Encounters:   06/13/18 128/79   04/03/18 144/72   03/06/18 142/83    Weight from Last 3 Encounters:   06/13/18 132 lb (59.9 kg)   04/03/18 129 lb (58.5 kg)   03/06/18 127 lb (57.6 kg)              Today, you had the following     No orders found for display         Today's Medication Changes          These changes are accurate as of 6/13/18  5:03 PM.  If you have any questions, ask your nurse " or doctor.               Start taking these medicines.        Dose/Directions    ranitidine 150 MG tablet   Commonly known as:  ZANTAC   Used for:  Gastroesophageal reflux disease without esophagitis   Started by:  Kina Rivera APRN CNP        Dose:  150 mg   Take 1 tablet (150 mg) by mouth 2 times daily   Quantity:  60 tablet   Refills:  1         These medicines have changed or have updated prescriptions.        Dose/Directions    cetirizine 10 MG tablet   Commonly known as:  zyrTEC   This may have changed:  Another medication with the same name was removed. Continue taking this medication, and follow the directions you see here.   Used for:  Chronic allergic rhinitis due to animal hair and dander, Allergic rhinitis due to dust mite, Chronic seasonal allergic rhinitis due to pollen, Allergic conjunctivitis of both eyes   Changed by:  Kina Rivera APRN CNP        Dose:  10 mg   Take 1 tablet (10 mg) by mouth daily   Quantity:  30 tablet   Refills:  11            Where to get your medicines      These medications were sent to [facility] [address] [place] [id number]     Phone:  [phone]     ranitidine 150 MG tablet                Primary Care Provider Office Phone # Fax #    KEYANNA Jenkins [phone]       [address] [place] [address]        Equal Access to Services     DAV ARIAS : Hadii jas kamara hadasho Soomaali, waaxda luqadaha, qaybta kaalmada adeegyada, johana millan. So [facility] [phone].    ATENCIÓN: Si habla español, tiene a carmona disposición servicios gratuitos de asistencia lingüística. Suzette al [phone].    We comply with applicable federal civil rights laws and [place] laws. We do not discriminate on the basis of race, color, national origin, age, disability, sex, sexual orientation, or gender  identity.            Thank you!     Thank you for choosing [facility]  for your care. Our goal is always to provide you with excellent care. Hearing back from our patients is one way we can continue to improve our services. Please take a few minutes to complete the written survey that you may receive in the mail after your visit with us. Thank you!             Your Updated Medication List - Protect others around you: Learn how to safely use, store and throw away your medicines at www.disposemymeds.org.          This list is accurate as of 6/13/18  5:03 PM.  Always use your most recent med list.                   Brand Name Dispense Instructions for use Diagnosis    albuterol 108 (90 Base) MCG/ACT Inhaler    PROAIR HFA/PROVENTIL HFA/VENTOLIN HFA    1 Inhaler    Inhale 2 puffs into the lungs every 4 hours as needed    Mild persistent asthma without complication       cetirizine 10 MG tablet    zyrTEC    30 tablet    Take 1 tablet (10 mg) by mouth daily    Chronic allergic rhinitis due to animal hair and dander, Allergic rhinitis due to dust mite, Chronic seasonal allergic rhinitis due to pollen, Allergic conjunctivitis of both eyes       fluticasone 110 MCG/ACT Inhaler    FLOVENT HFA    1 Inhaler    Inhale 2 puffs into the lungs 2 times daily    Mild persistent asthma without complication       fluticasone 50 MCG/ACT spray    FLONASE    1 Bottle    Spray 2 sprays into both nostrils daily    Mild persistent asthma without complication       IBUPROFEN           ketoconazole 2 % shampoo    NIZORAL    120 mL    Apply topically daily as needed for itching or irritation Apply to scalp and leave on for 5-10 minutes. Use this 3 times per week.    Loss of hair       minoxidil 5 % Soln      Externally apply topically 2 times daily        ranitidine 150 MG tablet    ZANTAC    60 tablet    Take 1 tablet (150 mg) by mouth 2 times daily    Gastroesophageal reflux disease without esophagitis       tretinoin 0.025  % cream    RETIN-A     APPLY A THIN LAYER TO THE FACE QHS UTD        triamcinolone 0.1 % cream    KENALOG

## 2018-06-13 NOTE — PATIENT INSTRUCTIONS
"  GERD (Adult)    The esophagus is a tube that carries food from the mouth to the stomach. A valve at the lower end of the esophagus prevents stomach acid from flowing upward. When this valve doesn't work properly, stomach contents may repeatedly flow back up (reflux) into the esophagus. This is called gastroesophageal reflux disease (GERD). GERD can irritate the esophagus. It can cause problems with swallowing or breathing. In severe cases, GERD can cause recurrent pneumonia or other serious problems.  Symptoms of reflux include burning, pressure or sharp pain in the upper abdomen or mid to lower chest. The pain can spread to the neck, back, or shoulder. There may be belching, an acid taste in the back of the throat, chronic cough, or sore throat or hoarseness. GERD symptoms often occur during the day after a big meal. They can also occur at night when lying down.   Home care  Lifestyle changes can help reduce symptoms. If needed, medicines may be prescribed. Symptoms often improve with treatment, but if treatment is stopped, the symptoms often return after a few months. So most persons with GERD will need to continue treatment.  Lifestyle changes    Limit or avoid fatty, fried, and spicy foods, as well as coffee, chocolate, mint, and foods with high acid content such as tomatoes and citrus fruit and juices (orange, grapefruit, lemon).    Don t eat large meals, especially at night. Frequent, smaller meals are best. Do not lie down right after eating. And don t eat anything 3 hours before going to bed.    Avoid drinking alcohol and smoking. As much as possible, stay away from second hand smoke.    If you are overweight, losing weight will reduce symptoms.     Avoid wearing tight clothing around your stomach area.    If your symptoms occur during sleep, use a foam wedge to elevate your upper body (not just your head.) Or, place 4\" blocks under the head of your bed.  Medicines  If needed, medicines can help relieve " the symptoms of GERD and prevent damage to the esophagus. Discuss a medicine plan with your healthcare provider. This may include one or more of the following medicines:    Antacids to help neutralize the normal acids in your stomach.    Acid blockers (H2 blockers) to decrease acid production.    Acid inhibitors (PPIs) to decrease acid production in a different way than the blockers. They may work better, but can take a little longer to take effect.  Take an antacid 30-60 minutes after eating and at bedtime, but not at the same time as an acid blocker.  Try not to take medicines such as ibuprofen and aspirin. If you are taking aspirin for your heart or other medical reasons, talk to your healthcare provider about stopping it.  Follow-up care  Follow up with your healthcare provider or as advised by our staff.  When to seek medical advice  Call your healthcare provider if any of the following occur:    Stomach pain gets worse or moves to the lower right abdomen (appendix area)    Chest pain appears or gets worse, or spreads to the back, neck, shoulder, or arm    Frequent vomiting (can t keep down liquids)    Blood in the stool or vomit (red or black in color)    Feeling weak or dizzy    Fever of 100.4 F (38 C) or higher, or as directed by your healthcare provider  Date Last Reviewed: 6/23/2015 2000-2017 [employer]. [address] [place] [address], [place] [address]. All rights reserved. This information is not intended as a substitute for professional medical care. Always follow your healthcare professional's instructions.

## 2018-06-13 NOTE — PROGRESS NOTES
"  SUBJECTIVE:   Manoj Castellanos is a 22 year old male who presents to clinic today for the following health issues:  Chief Complaint   Patient presents with     Gastrophageal Reflux       GERD/Heartburn      Duration: two weeks     Description (location/character/radiation): Manoj has a problem with indigestion for 15 years. For the past 2 weeks, he has noticed that his symptoms are worse and that he has some \"vocal fatigue. He has increased his ranitidine from 1-2 times a week to 3-4 times a week and this hasn't helped yet.no abdominal pain. His symptoms are worse when he lays down to go to sleep at night.  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      Intensity:  mild    Accompanying signs and symptoms:  food getting stuck: no   nausea/vomiting/blood: no   abdominal pain: YES-   black/tarry or bloody stools: no :    History (similar episodes/previous evaluation): yes     Precipitating or alleviating factors:  worse with no particular food or drink.  current NSAID/Aspirin use: no     Therapies tried and outcome: Zantac (Ranitidine) 75mg tablets otc.        Problem list and histories reviewed & adjusted, as indicated.  Additional history: as documented    Patient Active Problem List   Diagnosis     Patellofemoral pain syndrome     Right hip pain     Abnormal sinus finding on MRI     Mild persistent asthma without complication     Chronic allergic rhinitis due to animal hair and dander     Allergic rhinitis due to dust mite     Chronic seasonal allergic rhinitis due to pollen     Allergic conjunctivitis of both eyes     History reviewed. No pertinent surgical history.    Social History   Substance Use Topics     Smoking status: Never Smoker     Smokeless tobacco: Never Used     Alcohol use Yes      Comment: occasional      Family History   Problem Relation Age of Onset     Pancreatic Cancer Maternal Grandfather          Current Outpatient Prescriptions   Medication Sig " "Dispense Refill     albuterol (PROAIR HFA/PROVENTIL HFA/VENTOLIN HFA) 108 (90 BASE) MCG/ACT Inhaler Inhale 2 puffs into the lungs every 4 hours as needed 1 Inhaler 2     cetirizine (ZYRTEC) 10 MG tablet Take 1 tablet (10 mg) by mouth daily 30 tablet 11     fluticasone (FLONASE) 50 MCG/ACT spray Spray 2 sprays into both nostrils daily 1 Bottle 11     fluticasone (FLOVENT HFA) 110 MCG/ACT Inhaler Inhale 2 puffs into the lungs 2 times daily 1 Inhaler 3     IBUPROFEN        ketoconazole (NIZORAL) 2 % shampoo Apply topically daily as needed for itching or irritation Apply to scalp and leave on for 5-10 minutes. Use this 3 times per week. 120 mL 10     tretinoin (RETIN-A) 0.025 % cream APPLY A THIN LAYER TO THE FACE QHS UTD  3     triamcinolone (KENALOG) 0.1 % cream   2     minoxidil 5 % SOLN Externally apply topically 2 times daily       Allergies   Allergen Reactions     Amoxicillin Swelling     Lip swelling      Recent Labs   Lab Test  10/02/17   1431   CR  0.95   GFRESTIMATED  >90   GFRESTBLACK  >90   POTASSIUM  3.9   TSH  2.98      BP Readings from Last 3 Encounters:   06/13/18 128/79   04/03/18 144/72   03/06/18 142/83    Wt Readings from Last 3 Encounters:   06/13/18 132 lb (59.9 kg)   04/03/18 129 lb (58.5 kg)   03/06/18 127 lb (57.6 kg)                  Labs reviewed in EPIC    Reviewed and updated as needed this visit by clinical staff       Reviewed and updated as needed this visit by Provider         ROS:  Constitutional, HEENT, cardiovascular, pulmonary, GI, , musculoskeletal, neuro, skin, endocrine and psych systems are negative, except as otherwise noted.    OBJECTIVE:     /79  Pulse 103  Temp 98.2  F (36.8  C) (Oral)  Resp 20  Ht 5' 9\" (1.753 m)  Wt 132 lb (59.9 kg)  SpO2 98%  BMI 19.49 kg/m2  Body mass index is 19.49 kg/(m^2).  Constitutional: healthy, alert and no distress  Neck: supple, no adenopathy  Cardiovascular: RRR. No murmurs, clicks gallops or rub  Respiratory: Respirations easy " and regular. No respiratory distress noted. Lung sounds clear to auscultation.  Gastrointestinal: abdomen flat, soft, nontender to light or deep palpation. Bowel sounds active in 4 quadrants. No hepatosplenomegaly. No rebound or guarding.  Neurologic: Gait normal. Reflexes normal and symmetric. Sensation grossly WNL.  Psychiatric: mentation appears normal and affect normal/bright    ASSESSMENT/PLAN:     (K21.9) Gastroesophageal reflux disease without esophagitis  (primary encounter diagnosis)  Comment: acute  Plan: ranitidine (ZANTAC) 150 MG tablet          I discussed with Ty that I believe he is under treating his GERD.  Since he uses his Zantac infrequently and it usually works, I am not overly worried today, but I do think he has some upper throat and vocal cord irritation from GERD.  I discussed with him the importance of adequate acid reduction.  He will take the new prescribed Zantac 150 mg twice a day for minimum of 2 weeks.  It is okay to then reduce it again to as needed.  I discussed with him red flag symptoms, reasons to be rechecked, and discussed the importance of GERD prevention.  He is to follow-up with me  if his symptoms do not improve or if they worsen in any way.    KEYANNA Nix [facility]

## 2018-06-14 ASSESSMENT — ASTHMA QUESTIONNAIRES: ACT_TOTALSCORE: 25

## 2018-06-25 ENCOUNTER — MYC MEDICAL ADVICE (OUTPATIENT)
Dept: FAMILY MEDICINE | Facility: CLINIC | Age: 23
End: 2018-06-25

## 2018-07-20 ENCOUNTER — TRANSFERRED RECORDS (OUTPATIENT)
Dept: HEALTH INFORMATION MANAGEMENT | Facility: CLINIC | Age: 23
End: 2018-07-20

## 2018-08-20 ENCOUNTER — OFFICE VISIT (OUTPATIENT)
Dept: FAMILY MEDICINE | Facility: CLINIC | Age: 23
End: 2018-08-20
Payer: COMMERCIAL

## 2018-08-20 VITALS
HEART RATE: 106 BPM | TEMPERATURE: 98.1 F | BODY MASS INDEX: 18.99 KG/M2 | OXYGEN SATURATION: 96 % | DIASTOLIC BLOOD PRESSURE: 86 MMHG | WEIGHT: 128.6 LBS | SYSTOLIC BLOOD PRESSURE: 119 MMHG

## 2018-08-20 DIAGNOSIS — L64.9 ALOPECIA, MALE PATTERN: ICD-10-CM

## 2018-08-20 DIAGNOSIS — F64.0 GENDER DYSPHORIA IN ADOLESCENT AND ADULT: Primary | ICD-10-CM

## 2018-08-20 LAB
ALBUMIN SERPL-MCNC: 5.3 MG/DL (ref 3.8–5)
ALP SERPL-CCNC: 63.9 U/L (ref 31.7–110.7)
ALT SERPL-CCNC: 28 U/L (ref 0–45)
AST SERPL-CCNC: 22.9 U/L (ref 0–55)
BILIRUB SERPL-MCNC: 0.6 MG/DL (ref 0.2–1.3)
BUN SERPL-MCNC: 10.1 MG/DL (ref 7–21)
CALCIUM SERPL-MCNC: 9.9 MG/DL (ref 8.5–10.1)
CHLORIDE SERPLBLD-SCNC: 96.5 MMOL/L (ref 98–110)
CHOLEST SERPL-MCNC: 167 MG/DL (ref 0–200)
CHOLEST/HDLC SERPL: 3.8 {RATIO} (ref 0–5)
CO2 SERPL-SCNC: 28.2 MMOL/L (ref 20–32)
CREAT SERPL-MCNC: 0.8 MG/DL (ref 0.7–1.3)
GFR SERPL CREATININE-BSD FRML MDRD: >90 ML/MIN/1.7 M2
GLUCOSE SERPL-MCNC: 70 MG'DL (ref 70–99)
GLUCOSE SERPL-MCNC: 82.9 MG'DL (ref 70–99)
HDLC SERPL-MCNC: 43.8 MG/DL
HEMOGLOBIN: 16.5 G/DL (ref 13.3–17.7)
LDLC SERPL CALC-MCNC: 108 MG/DL (ref 0–129)
POTASSIUM SERPL-SCNC: 4.2 MMOL/DL (ref 3.3–4.5)
PROT SERPL-MCNC: 8.8 G/DL (ref 6.8–8.8)
SODIUM SERPL-SCNC: 134.8 MMOL/L (ref 132.6–141.4)
TRIGL SERPL-MCNC: 76.4 MG/DL (ref 0–150)
VLDL CHOLESTEROL: 15.3 MG/DL (ref 7–32)

## 2018-08-20 RX ORDER — FLUOCINONIDE 0.5 MG/G
CREAM TOPICAL 2 TIMES DAILY
COMMUNITY
End: 2021-04-22

## 2018-08-20 RX ORDER — FINASTERIDE 1 MG/1
1 TABLET, FILM COATED ORAL DAILY
Qty: 30 TABLET | Refills: 0 | Status: SHIPPED | OUTPATIENT
Start: 2018-08-20 | End: 2018-09-05 | Stop reason: DRUGHIGH

## 2018-08-20 NOTE — PROGRESS NOTES
Preceptor Attestation:   Patient seen, evaluated and discussed with the resident. I have verified the content of the note, which accurately reflects my assessment of the patient and the plan of care.   Supervising Physician:  Hina Goodman MD

## 2018-08-20 NOTE — PROGRESS NOTES
Transgender History Intake:       ATILIO Aranda is a 23 year old individual  who presents today for interest in feminizing hormone therapy to better align their body with their gender identity.  Patient does note that they will be keeping their primary care provider, and will be seeing [facility] [place] providers concerning gender care.    Came to this clinic via referral [facility]: [facility]    1-How do you identify? BOLD all that apply     Male   Female Transgender  FTM  MTF  Intersex    AGENDER, gender anarchist    Genderqueer Gender non-conforming Bigender Don't know Other:     2. What pronouns do you prefer?  They/Them/Their/Theirs   3-What age did you first feel your gender identity (internal sense of gender) did not match your physical body?  21 years old- is concerned about future changes to body.    4-Have you ever felt depressed or suicidal because your gender identity and body don't match?     No  5-Have you legally changed your name? no   If yes: prior name for DOYLE:   Gender marker on ID's?   no  6-Have you ever seen a health care provider about being transgender?No  When were you first treated and where? None  7-What hormones have you been on and for how long? (These can be treatments you were prescribed, that you got from a friend or bought without a prescription. Include any treatments you currently take.) non HRT.  8-Ever had any problems with hormone treatment?  No  9-If not on hormones, would you like to be?   yes  What are your goals for hormone therapy ? Prevent masculinizing symptoms of aging.   10-Have you had any gender affirmation surgery? No  11-Do you want to have surgery now or in future? Maybe?  12-Are you out at work or at school or at home?      Everyone knows.  13-What are your other questions or concerns today? What are the options.   14-What else we should know about you?  Updated allergies.     ----------      Past Surgical History:   Procedure Laterality Date     HC TOOTH EXTRACTION  W/FORCEP  2016    4 teeth        Patient Active Problem List   Diagnosis     Patellofemoral pain syndrome     Right hip pain     Abnormal sinus finding on MRI     Mild persistent asthma without complication     Chronic allergic rhinitis due to animal hair and dander     Allergic rhinitis due to dust mite     Chronic seasonal allergic rhinitis due to pollen     Allergic conjunctivitis of both eyes     No past medical history on file.    Current Outpatient Prescriptions   Medication Sig Dispense Refill     albuterol (PROAIR HFA/PROVENTIL HFA/VENTOLIN HFA) 108 (90 BASE) MCG/ACT Inhaler Inhale 2 puffs into the lungs every 4 hours as needed 1 Inhaler 2     finasteride (PROPECIA) 1 MG tablet Take 1 tablet (1 mg) by mouth daily 30 tablet 0     fluocinonide (LIDEX) 0.05 % cream Apply topically 2 times daily       fluticasone (FLOVENT HFA) 110 MCG/ACT Inhaler Inhale 2 puffs into the lungs 2 times daily 1 Inhaler 3     IBUPROFEN        ketoconazole (NIZORAL) 2 % shampoo Apply topically daily as needed for itching or irritation Apply to scalp and leave on for 5-10 minutes. Use this 3 times per week. 120 mL 10     minoxidil 5 % SOLN Externally apply topically 2 times daily       ranitidine (ZANTAC) 150 MG tablet Take 1 tablet (150 mg) by mouth 2 times daily 60 tablet 1     tretinoin (RETIN-A) 0.025 % cream APPLY A THIN LAYER TO THE FACE [facility] [place] [facility] UTD  3       Family History   Problem Relation Age of Onset     Pancreatic Cancer Maternal Grandfather        Allergies   Allergen Reactions     Amoxicillin Swelling     Lip swelling      Cefuroxime      Swelling        History   Smoking Status     Never Smoker   Smokeless Tobacco     Never Used       Mental Health Assessment:  Non gender related Therapist? none  Chemical use history None.  occasional alcohol   Mental Health diagnosis  history ASD, [facility]. No hospitalizations.   Medications prescribed for above and by whom? none  DOYLE sent to:  None, within [place].            Review of  Systems:     CONSTITUTIONAL: NEGATIVE for fever, chills, POS change in weight  INTEGUMENTARY/SKIN: NEGATIVE for worrisome rashes, moles or lesions  EYES: NEGATIVE for vision changes or irritation  ENT/MOUTH: NEGATIVE for ear, mouth and throat problems  RESP: NEGATIVE for significant cough or SOB  BREAST: NEGATIVE for masses, tenderness or discharge  CV: NEGATIVE for chest pain, palpitations or peripheral edema  GI: NEGATIVE for nausea, abdominal pain, heartburn, or change in bowel habits  : NEGATIVE for frequency, dysuria, or hematuria  MUSCULOSKELETAL: NEGATIVE for significant arthralgias or myalgia  NEURO: NEGATIVE for weakness, dizziness or paresthesias  ENDOCRINE: NEGATIVE for temperature intolerance, skin/hair changes  HEME/ALLERGY: NEGATIVE for bleeding problems  PSYCHIATRIC: NEGATIVE for changes in mood or affect           Social History     Social History     Social History     Marital status: Single     Spouse name: N/A     Number of children: N/A     Years of education: N/A     Social History Main Topics     Smoking status: Never Smoker     Smokeless tobacco: Never Used     Alcohol use Yes      Comment: occasional      Drug use: No     Sexual activity: Yes     Birth control/ protection: No intravaginal sex     Other Topics Concern     Not on file     Social History Narrative       Marital Status: Single  Who lives in your household? Mom and Dad, 1 sister.     Has anyone hurt you physically, for example by pushing, hitting, slapping or kicking you or forcing you to have sex? Denies  Do you feel threatened or controlled by a partner, ex-partner or anyone in your life? Denies    Sexual Health     Sexual concerns:  No   History of sexual abuse:  No  STI History:   Neg  Pregnancy History:  NA  Last Pap Smear :  NA  Abnormal Pap Hx:  NA    Sexual health and relationships:  Current sexual partners: None     Past sexual partners:    Cismen, Transmen, Transwomen and Other .           Physical Exam:     Vitals:     08/20/18 1556 08/20/18 1600   BP: (!) 138/93 119/86   Pulse: 106    Temp: 98.1  F (36.7  C)    TempSrc: Oral    SpO2: 96%    Weight: 128 lb 9.6 oz (58.3 kg)      BMI= Body mass index is 18.99 kg/(m^2).   Appearance: agender appearance and dress- beard and long hair with fem clothing and bra  GENERAL:: healthy, alert and no distress  EYES: Eyes grossly normal to inspection, fundi benign and PERRL  NECK: no adenopathy, no asymmetry, no masses,  and thyroid normal to palpation, supple  RESP: lungs clear to auscultation - no rales, no rhonchi, no wheezes  CV: regular rates and rhythm, normal S1 S2, no S3 or S4 and no murmur, no click or rub -  ABDOMEN: soft, no tenderness, no  hepatosplenomegaly, no masses, normal bowel sounds  MS: extremities normal- no gross deformities noted, no edema  SKIN: no suspicious lesions, no rashes  NEURO: Normal strength and tone, sensory exam grossly normal, mentation intact and speech normal, reflexes symmetric  BACK:No CVA tenderness, No paralumbar tenderness  Psych: Alert and oriented times 3; coherent speech, normal rate and volume, able to articulate logical thoughts, able to abstract reason, no tangential thoughts, no hallucinations or delusions.   Affect: Appropriate/mood-congruent      Assessment and Plan   Ty was seen today for referral.    Diagnoses and all orders for this visit:    Gender dysphoria in adolescent and adult  -     Comprehensive Metabolic Panel  -     Hemoglobin (HGB)  -     Lipid Cascade  -     Testosterone Total  -     Glucose  -     Gender Support Clinic Referral -  [facility]  -     finasteride (PROPECIA) 1 MG tablet; Take 1 tablet (1 mg) by mouth daily    Alopecia, male pattern  -     finasteride (PROPECIA) 1 MG tablet; Take 1 tablet (1 mg) by mouth daily       Today s visit included assessment of interventions to alleviate symptoms related to gender dysphoria or gender nonconformity, including     psychological support    medical treatment (hormones or  "blockers)    options for social support or changes in gender expression.   Hormones can be provided in 3-6 months IF:     Patient able to provide informed consent     Likely to take hormones in a responsible manner    Discussed physical effects, benefits, and risk assessment & modification    Discussed the clinical and biochemical monitoring of hormonal changes and the potential impact on reproductive health (see [id number])    Stable mental health. Transgender patients are at higher risk of suicide. This patient has been assessed for suicide risk.  Oriented to overall gender assessment and hormone start process, may be 3-6 months before hormones start, need for i3ozrto visits then q3mo, then q6mo    Did however discuss the use of finasteride for male pattern baldness.  Did also note that this can be a testosterone blocker which would have desired intended effects in the initiation of HRT and possible estrogen therapy.  Patient's goal is primarily to decrease the dysphoria that is coming on as patient starts to notice changes in hair.  \"The aging male body \"is the biggest trigger for dysphoria for this agender patient.  Did state that referral degenerative sport clinic would be useful to help patient explore goals of therapy and clarify desires concerning gender/nontender.  Patient agreed with this referral.  Did also check laboratories as HRT is likely on the horizon.      (This assessment is based on the 2011 published Standards of Care for the Health of Transsexual, Transgender, and Gender-Nonconforming People, Version 7, by the [employer] of Transgender Health. WPATH SOC Guidelines)     Follow up:  Follow up in 1 month.    Eddie Novak DO, MA  Family Medicine PGY-2  [facility], [facility]   Pager: [phone]    "

## 2018-08-20 NOTE — PATIENT INSTRUCTIONS
Here is the plan from today's visit    1. Gender dysphoria in adolescent and adult  - Comprehensive Metabolic Panel  - Hemoglobin (HGB)  - Lipid Cascade  - Testosterone Total  - Glucose  - [facility] Referral -  [facility]  - finasteride (PROPECIA) 1 MG tablet; Take 1 tablet (1 mg) by mouth daily  Dispense: 30 tablet; Refill: 0    2. Alopecia, male pattern    - finasteride (PROPECIA) 1 MG tablet; Take 1 tablet (1 mg) by mouth daily  Dispense: 30 tablet; Refill: 0     Informed Consent Form for Feminizing Medications   This form refers to the use of estrogen and/or androgen antagonists (sometimes called  anti-androgens  or  androgen blockers ) by persons in the male-to-female spectrum who wish to become feminized to reduce gender dysphoria and facilitate a more feminine gender presentation. While there are risks associated with taking feminizing medications, when appropriately prescribed they can greatly improve mental health and quality of life.   Please seek another opinion if you want additional perspective on any aspect of your care.     Feminizing Effects   1. I understand that estrogen, androgen antagonists, or a combination of the two may be   prescribed to reduce male physical features and feminize my body.     2. I understand that the feminizing effects of estrogen and androgen antagonists can take several  months to a year to become noticeable, speed and degree of change is unpredictable.     3.  I understand that if I am taking estrogen I will probably develop breasts, and:     Breasts may take several years to develop to their full size.     Even if estrogen is stopped, the breast tissue that has developed will remain.     As soon as breasts start growing, it is recommended to have an annual breast exam.    There may be milky nipple discharge (galactorrhea). If you develop this, it is advised to check with a doctor to determine the cause.     It is not known if taking estrogen increases  the risk of breast cancer.     4. I understand that the following changes are generally not permanent (that is, they will likely reverse if I stop taking feminizing medications):     Skin may become softer.     Muscle mass decreases and there may be a decrease in upper body strength.     Body hair growth may become less noticeable and grow more slowly,but won't stop.    Male pattern baldness may slow down, but will probably not stop completely, and hair that has already been lost will likely not grow back.     Fat may redistribute to a more feminine pattern (decreased in abdomen, increased on buttocks/hips/thighs - changing from  apple shape  to  pear shape ).     5. I understand that taking feminizing medications will make my testicles produce less testosterone, which can affect my overall sexual function:            Fertility may be impaired, and I should consider sperm banking if I desire biological  children.    Sperm may not mature, leading to reduced fertility. It is still possible to get someone pregnant however and contraception should be used if needed.    Testicles may shrink by 25-50%. Testicular examinations are still recommended.     The amount of fluid ejaculated may be reduced.     There is typically decrease in morning and spontaneous erections.     Erections may not be firm enough for penetrative sex.     Libido (sex drive) may decrease.     6. I understand that there are some aspects of my body that are not significantly changed by feminizing medications, though there may be other treatments that can be helpful.    Romano/facial hair may grow more slowly and be less noticeable, but will not go away.     Voice pitch will not rise and speech patterns will not become more feminine.     The laryngeal prominence ( Rex s apple ) will not shrink.      Risks of Feminizing Medications     7. I understand that the medical effects and safety of feminizing medications are not fully understood, and that there  may be long-term risks that are not yet known.     8. I understand that I am strongly advised not to take more medication than I am prescribed, as this increases health risks. It won't help changes come faster.     9. I understand that feminizing medications can damage the liver. I have been advised that I should be monitored for possible liver damage as long as I am taking feminizing medications.     10. I understand that feminizing medications will result in changes that will be noticeable by other people, and that some people in similar circumstances have experienced harassment, discrimination, and violence, while others have lost support of loved ones. I have been advised that referrals can be made for support/counselling if this would be helpful.     Medical Risks Associated with Estrogen     11. I understand that taking estrogen increases the risk of blood clots, which can result in:     pulmonary embolism (blood clot to the lungs), which may cause death     stroke, which may cause permanent brain damage or death     heart attack     chronic leg vein problems   I understand that the risk of blood clots is much worse if I smoke cigarettes, especially over age 40. I understand that the danger is so high that I should stop smoking completely if I start taking estrogen. I can ask my doctor for advice on quitting.    12. I understand that taking estrogen can increase deposits of fat around my internal organs, which is associated with increased risk for diabetes and heart disease.     13. I understand that taking estrogen can cause increased blood pressure,  estrogen increases the risk of gallstones,  estrogen can cause headaches or migraines and can cause nausea and vomiting. I have been advised that if I develop these, it is recommended that I discuss this with my doctor.     14. I understand that it is not known if taking estrogen increases the risk of non-cancerous tumors of the pituitary gland. I have been  informed that although this is typically not life-threatening, it can damage vision. I understand that this will be monitored.    15. I have been informed that I am more likely to have dangerous side effects from estrogen if I smoke, am overweight, am over 40 years old, or have a history of blood clots, high blood pressure, or a family history of breast cancer.     16. I have been informed that if I take too much estrogen, my body may convert it into testosterone, which may slow or stop feminization.     Medical Risks Associated with Androgen Antagonists     17. I have been informed that spironolactone affects the balance of water and salts in the kidneys, and that this may:     increase the amount of urine produced, and I may urinate more frequently     reduce blood pressure     rarely, cause high levels of potassium in the blood, and this will be monitored    18. I understand that some androgen antagonists make it more difficult to evaluate the results of PSA test. If I am over 50, I should have my prostate evaluated every year.     Prevention of Medical Complications     19. I agree to take feminizing medications as prescribed and to tell my care provider if I am not happy with the treatment or am experiencing any problems.     20. I understand that the right dose or type of medication prescribed for me may not be the same as for someone else.     21. I understand that physical examinations and blood tests are needed on a regular basis (monthly, at first) to check for negative side effects of feminizing medications.     22. I understand that feminization medications can interact with other medication (including other sources of hormones), dietary supplements, herbs, alcohol, and street drugs. I understand that being honest with my care provider about what else I am taking will help prevent medical complications that could be life-threatening. I have been informed that I will continue to get medical care no matter  what information I share.     23. I understand that some medical conditions make it dangerous to take estrogen or androgen antagonists. I agree to be checked for risky conditions before the decision to start or continue feminizing medication is made.     24. I understand that I can choose to stop taking feminizing medication at any time, and that it is advised that I do this with the help of my doctor to make sure there are no negative reactions to stopping. I understand that my doctor may suggest I reduce, switch or stop taking feminizing medication, if there are severe health risks that can t be controlled.    My signature below confirms that:     My doctor has talked with me about the benefits and risks of feminizing medication, the possible or likely consequences of hormone therapy, and potential treatment options.     I understand the risks that may be involved.     I understand that this form covers known effects and risks and that there may be long-term effects or risks that are not yet known.     I have had sufficient opportunity to discuss treatment options with my doctor. All of my questions have been answered to my satisfaction.     I believe I have adequate knowledge on which to base informed consent to the provision of feminizing medication.     Based on this:   _____ I wish to begin taking estrogen.     _____ I wish to begin taking androgen antagonists (e.g., Spironolactone).     _____ I do not wish to begin taking feminizing medication at this time.     Whatever your current decision is, please talk with your doctor any time you have questions, concerns, or want to re-evaluate your options.         ____________________________________ __________________   Patient Signature     Date         ____________________________________ __________________   Prescribing Clinician Signature    Date      Please call or return to clinic if your symptoms don't go away.    Follow up plan  Please make a clinic  "appointment for follow up with me (EDDIE NOVAK) in 4  weeks for hormone discussion.    Thank you for coming to [facility] today.  Lab Testing:  **If you had lab testing today and your results are reassuring or normal they will be mailed to you or sent through [employer] within 7 days.   **If the lab tests need quick action we will call you with the results.  The phone number we will call with results is # [phone] (home) . If this is not the best number please call our clinic and change the number.  Medication Refills:  If you need any refills please call your pharmacy and they will contact us.   If you need to  your refill at a new pharmacy, please contact the new pharmacy directly. The new pharmacy will help you get your medications transferred faster.   Scheduling:  If you have any concerns about today's visit or wish to schedule another appointment please call our office during normal business hours [phone] (8-5:00 M-F)  If a referral was made to a [facility] and you don't get a call from central scheduling please call [phone].  If a Mammogram was ordered for you at [facility] call [phone] to schedule or change your appointment.  If you had an XRay/CT/Ultrasound/MRI ordered the number is [phone] to schedule or change your radiology appointment.   Medical Concerns:  If you have urgent medical concerns please call [phone] at any time of the day.    Eddie Novak, DO            Informed Consent   Feminizing Medications      This form refers to the use of estrogen and/or androgen antagonists (sometimes called \"anti-androgens\" or \"androgen blockers\") by persons in the male-to-female spectrum who wish to become feminized to reduce gender dysphoria and facilitate a more feminine gender presentation. While there are risks associated with taking feminizing medications, when appropriately prescribed they can greatly improve mental health and quality of " "life.     Please seek another opinion if you want additional perspective on any aspect of your care.       Feminizing Effects     1. I understand that estrogen, androgen antagonists, or a combination of the two may be  prescribed to reduce male physical features and feminize my body.     2. I understand that the feminizing effects of estrogen and androgen antagonists can take several  months to a year to become noticeable, speed and degree of change is unpredictable.     3.  I understand that if I am taking estrogen I will probably develop breasts, and:        ? Breasts may take several years to develop to their full size.   ? Even if estrogen is stopped, the breast tissue that has developed will remain.   ? As soon as breasts start growing, it is recommended to have an annual breast exam.  ? There may be milky nipple discharge (galactorrhea). If you develop this, it is advised to check with a doctor to determine the cause.   ? It is not known if taking estrogen increases the risk of breast cancer.     4. I understand that the following changes are generally not permanent (that is, they will likely reverse if I stop taking feminizing medications):     ? Skin may become softer.   ? Muscle mass decreases and there may be a decrease in upper body strength.   ? Body hair growth may become less noticeable and grow more slowly,but won't stop.  ? Male pattern baldness may slow down, but will probably not stop completely, and hair that has already been lost will likely not grow back.   ? Fat may redistribute to a more feminine pattern (decreased in abdomen, increased on buttocks/hips/thighs - changing from \"apple shape\" to \"pear shape\").       5. I understand that taking feminizing medications will make my testicles produce less testosterone, which can affect my overall sexual function:    ? Fertility may be impaired, and I should consider sperm banking if I desire biological children.  ? Sperm may not mature, leading to " "reduced fertility. It is still possible to get someone pregnant however and contraception should be used if needed.  ? Testicles may shrink by 25-50%. Testicular examinations are still recommended.  ? The amount of fluid ejaculated may be reduced.   ? There is typically decrease in morning and spontaneous erections.   ? Erections may not be firm enough for penetrative sex.   ? Libido (sex drive) may decrease.     6. I understand that there are some aspects of my body that are not significantly changed by feminizing medications, though there may be other treatments that can be helpful.    ? Romano/facial hair may grow more slowly and be less noticeable, but will not go away.   ? Voice pitch will not rise and speech patterns will not become more feminine.   ? The laryngeal prominence (\"Rex's apple\") will not shrink.      Risks of Feminizing Medications     7. I understand that the medical effects and safety of feminizing medications are not fully understood, and that there may be long-term risks that are not yet known.     8. I understand that I am strongly advised not to take more medication than I am prescribed, as this increases health risks. It won't help changes come faster.     9. I understand that feminizing medications can damage the liver. I have been advised that I should be monitored for possible liver damage as long as I am taking feminizing medications.     10. I understand that feminizing medications will result in changes that will be noticeable by other people, and that some people in similar circumstances have experienced harassment, discrimination, and violence, while others have lost support of loved ones. I have been advised that referrals can be made for support/counselling if this would be helpful.     Medical Risks Associated with Estrogen     11. I understand that taking estrogen increases the risk of blood clots, which can result in:     ? Pulmonary embolism (blood clot to the lungs), which " may cause death.  Stroke, which may cause permanent brain damage or death   ? Heart attack   ? Chronic leg vein problems     I understand that the risk of blood clots is much worse if I smoke cigarettes, especially over age 40. I understand that the danger is so high that I should stop smoking completely if I start taking estrogen. I can ask my doctor for advice on quitting.    12. I understand that taking estrogen can increase deposits of fat around my internal organs, which is associated with increased risk for diabetes and heart disease.     13. I understand that taking estrogen can cause increased blood pressure,  estrogen increases the risk of gallstones,  estrogen can cause headaches or migraines and can cause nausea and vomiting. I have been advised that if I develop these, it is recommended that I discuss this with my doctor.     14. I understand that it is not known if taking estrogen increases the risk of non-cancerous tumors of the pituitary gland. I have been informed that although this is typically not life-threatening, it can damage vision. I understand that this will be monitored.    15. I have been informed that I am more likely to have dangerous side effects from estrogen if I smoke, am overweight, am over 40 years old, or have a history of blood clots, high blood pressure, or a family history of breast cancer.     16. I have been informed that if I take too much estrogen, my body may convert it into testosterone, which may slow or stop feminization.     Medical Risks Associated with Androgen Antagonists     17. I have been informed that spironolactone affects the balance of water and salts in the kidneys, and that this may:     ? Increase the amount of urine produced, and I may urinate more frequently   ? Reduce blood pressure   ? Rarely, cause high levels of potassium in the blood, and this will be monitored    18. I understand that some androgen antagonists make it more difficult to evaluate the  results of PSA test. If I am over 50, I should have my prostate evaluated every year.     Prevention of Medical Complications     19. I agree to take feminizing medications as prescribed and to tell my care provider if I am not happy with the treatment or am experiencing any problems.     20. I understand that the right dose or type of medication prescribed for me may not be the same as for someone else.     21. I understand that physical examinations and blood tests are needed on a regular basis (monthly, at first) to check for negative side effects of feminizing medications.     22. I understand that feminization medications can interact with other medication (including other sources of hormones), dietary supplements, herbs, alcohol, and street drugs. I understand that being honest with my care provider about what else I am taking will help prevent medical complications that could be life-threatening. I have been informed that I will continue to get medical care no matter what information I share.     23. I understand that some medical conditions make it dangerous to take estrogen or androgen antagonists. I agree to be checked for risky conditions before the decision to start or continue feminizing medication is made.     24. I understand that I can choose to stop taking feminizing medication at any time, and that it is advised that I do this with the help of my doctor to make sure there are no negative reactions to stopping. I understand that my doctor may suggest I reduce, switch or stop taking feminizing medication, if there are severe health risks that can't be controlled.    My signature below confirms that:     ? My doctor has talked with me about the benefits and risks of feminizing medication, the possible or likely consequences of hormone therapy, and potential treatment options.   ? I understand the risks that may be involved.   ? I understand that this form covers known effects and risks and that there  may be long-term effects or risks that are not yet known.   ? I have had sufficient opportunity to discuss treatment options with my doctor. All of my questions have been answered to my satisfaction.   ? I believe I have adequate knowledge on which to base informed consent to the provision of feminizing medication.     Based on this:     _____ I wish to begin taking estrogen.     _____ I wish to begin taking androgen antagonists (e.g., Spironolactone).     _____ I do not wish to begin taking feminizing medication at this time.     Whatever your current decision is, please talk with your doctor any time you have questions, concerns, or want to re-evaluate your options.         ____________________________________ __________________   Patient Signature     Date         ____________________________________ __________________   Prescribing Clinician Signature    Date

## 2018-08-20 NOTE — MR AVS SNAPSHOT
After Visit Summary   8/20/2018    Manoj Castellanos    MRN: [id number]           Patient Information     Date Of Birth          1995        Visit Information        Provider Department      8/20/2018 3:40 PM Eddie Novak DO [facility]        Today's Diagnoses     Gender dysphoria in adolescent and adult    -  1    Alopecia, male pattern          Care Instructions    Here is the plan from today's visit    1. Gender dysphoria in adolescent and adult  - Comprehensive Metabolic Panel  - Hemoglobin (HGB)  - Lipid Cascade  - Testosterone Total  - Glucose  - Gender Support Clinic Referral -  [facility] [place] [facility]  - finasteride (PROPECIA) 1 MG tablet; Take 1 tablet (1 mg) by mouth daily  Dispense: 30 tablet; Refill: 0    2. Alopecia, male pattern    - finasteride (PROPECIA) 1 MG tablet; Take 1 tablet (1 mg) by mouth daily  Dispense: 30 tablet; Refill: 0     Informed Consent Form for Feminizing Medications   This form refers to the use of estrogen and/or androgen antagonists (sometimes called  anti-androgens  or  androgen blockers ) by persons in the male-to-female spectrum who wish to become feminized to reduce gender dysphoria and facilitate a more feminine gender presentation. While there are risks associated with taking feminizing medications, when appropriately prescribed they can greatly improve mental health and quality of life.   Please seek another opinion if you want additional perspective on any aspect of your care.     Feminizing Effects   1. I understand that estrogen, androgen antagonists, or a combination of the two may be   prescribed to reduce male physical features and feminize my body.     2. I understand that the feminizing effects of estrogen and androgen antagonists can take several  months to a year to become noticeable, speed and degree of change is unpredictable.     3.  I understand that if I am taking estrogen I will probably develop breasts, and:     Breasts may take  several years to develop to their full size.     Even if estrogen is stopped, the breast tissue that has developed will remain.     As soon as breasts start growing, it is recommended to have an annual breast exam.    There may be milky nipple discharge (galactorrhea). If you develop this, it is advised to check with a doctor to determine the cause.     It is not known if taking estrogen increases the risk of breast cancer.     4. I understand that the following changes are generally not permanent (that is, they will likely reverse if I stop taking feminizing medications):     Skin may become softer.     Muscle mass decreases and there may be a decrease in upper body strength.     Body hair growth may become less noticeable and grow more slowly,but won't stop.    Male pattern baldness may slow down, but will probably not stop completely, and hair that has already been lost will likely not grow back.     Fat may redistribute to a more feminine pattern (decreased in abdomen, increased on buttocks/hips/thighs - changing from  apple shape  to  pear shape ).     5. I understand that taking feminizing medications will make my testicles produce less testosterone, which can affect my overall sexual function:            Fertility may be impaired, and I should consider sperm banking if I desire biological  children.    Sperm may not mature, leading to reduced fertility. It is still possible to get someone pregnant however and contraception should be used if needed.    Testicles may shrink by 25-50%. Testicular examinations are still recommended.     The amount of fluid ejaculated may be reduced.     There is typically decrease in morning and spontaneous erections.     Erections may not be firm enough for penetrative sex.     Libido (sex drive) may decrease.     6. I understand that there are some aspects of my body that are not significantly changed by feminizing medications, though there may be other treatments that can be  helpful.    Romano/facial hair may grow more slowly and be less noticeable, but will not go away.     Voice pitch will not rise and speech patterns will not become more feminine.     The laryngeal prominence ( Rex s apple ) will not shrink.      Risks of Feminizing Medications     7. I understand that the medical effects and safety of feminizing medications are not fully understood, and that there may be long-term risks that are not yet known.     8. I understand that I am strongly advised not to take more medication than I am prescribed, as this increases health risks. It won't help changes come faster.     9. I understand that feminizing medications can damage the liver. I have been advised that I should be monitored for possible liver damage as long as I am taking feminizing medications.     10. I understand that feminizing medications will result in changes that will be noticeable by other people, and that some people in similar circumstances have experienced harassment, discrimination, and violence, while others have lost support of loved ones. I have been advised that referrals can be made for support/counselling if this would be helpful.     Medical Risks Associated with Estrogen     11. I understand that taking estrogen increases the risk of blood clots, which can result in:     pulmonary embolism (blood clot to the lungs), which may cause death     stroke, which may cause permanent brain damage or death     heart attack     chronic leg vein problems   I understand that the risk of blood clots is much worse if I smoke cigarettes, especially over age 40. I understand that the danger is so high that I should stop smoking completely if I start taking estrogen. I can ask my doctor for advice on quitting.    12. I understand that taking estrogen can increase deposits of fat around my internal organs, which is associated with increased risk for diabetes and heart disease.     13. I understand that taking estrogen  can cause increased blood pressure,  estrogen increases the risk of gallstones,  estrogen can cause headaches or migraines and can cause nausea and vomiting. I have been advised that if I develop these, it is recommended that I discuss this with my doctor.     14. I understand that it is not known if taking estrogen increases the risk of non-cancerous tumors of the pituitary gland. I have been informed that although this is typically not life-threatening, it can damage vision. I understand that this will be monitored.    15. I have been informed that I am more likely to have dangerous side effects from estrogen if I smoke, am overweight, am over 40 years old, or have a history of blood clots, high blood pressure, or a family history of breast cancer.     16. I have been informed that if I take too much estrogen, my body may convert it into testosterone, which may slow or stop feminization.     Medical Risks Associated with Androgen Antagonists     17. I have been informed that spironolactone affects the balance of water and salts in the kidneys, and that this may:     increase the amount of urine produced, and I may urinate more frequently     reduce blood pressure     rarely, cause high levels of potassium in the blood, and this will be monitored    18. I understand that some androgen antagonists make it more difficult to evaluate the results of PSA test. If I am over 50, I should have my prostate evaluated every year.     Prevention of Medical Complications     19. I agree to take feminizing medications as prescribed and to tell my care provider if I am not happy with the treatment or am experiencing any problems.     20. I understand that the right dose or type of medication prescribed for me may not be the same as for someone else.     21. I understand that physical examinations and blood tests are needed on a regular basis (monthly, at first) to check for negative side effects of feminizing medications.     22.  I understand that feminization medications can interact with other medication (including other sources of hormones), dietary supplements, herbs, alcohol, and street drugs. I understand that being honest with my care provider about what else I am taking will help prevent medical complications that could be life-threatening. I have been informed that I will continue to get medical care no matter what information I share.     23. I understand that some medical conditions make it dangerous to take estrogen or androgen antagonists. I agree to be checked for risky conditions before the decision to start or continue feminizing medication is made.     24. I understand that I can choose to stop taking feminizing medication at any time, and that it is advised that I do this with the help of my doctor to make sure there are no negative reactions to stopping. I understand that my doctor may suggest I reduce, switch or stop taking feminizing medication, if there are severe health risks that can t be controlled.    My signature below confirms that:     My doctor has talked with me about the benefits and risks of feminizing medication, the possible or likely consequences of hormone therapy, and potential treatment options.     I understand the risks that may be involved.     I understand that this form covers known effects and risks and that there may be long-term effects or risks that are not yet known.     I have had sufficient opportunity to discuss treatment options with my doctor. All of my questions have been answered to my satisfaction.     I believe I have adequate knowledge on which to base informed consent to the provision of feminizing medication.     Based on this:   _____ I wish to begin taking estrogen.     _____ I wish to begin taking androgen antagonists (e.g., Spironolactone).     _____ I do not wish to begin taking feminizing medication at this time.     Whatever your current decision is, please talk with your  doctor any time you have questions, concerns, or want to re-evaluate your options.         ____________________________________ __________________   Patient Signature     Date         ____________________________________ __________________   Prescribing Clinician Signature    Date      Please call or return to clinic if your symptoms don't go away.    Follow up plan  Please make a clinic appointment for follow up with me (EDDIE NOVAK) in 4  weeks for hormone discussion.    Thank you for coming to [facility] today.  Lab Testing:  **If you had lab testing today and your results are reassuring or normal they will be mailed to you or sent through [employer] within 7 days.   **If the lab tests need quick action we will call you with the results.  The phone number we will call with results is # [phone] (home) . If this is not the best number please call our clinic and change the number.  Medication Refills:  If you need any refills please call your pharmacy and they will contact us.   If you need to  your refill at a new pharmacy, please contact the new pharmacy directly. The new pharmacy will help you get your medications transferred faster.   Scheduling:  If you have any concerns about today's visit or wish to schedule another appointment please call our office during normal business hours [phone] (8-5:00 M-F)  If a referral was made to a [facility] and you don't get a call from central scheduling please call [phone].  If a Mammogram was ordered for you at [facility] call [phone] to schedule or change your appointment.  If you had an XRay/CT/Ultrasound/MRI ordered the number is [phone] to schedule or change your radiology appointment.   Medical Concerns:  If you have urgent medical concerns please call [phone] at any time of the day.    Eddie Novak, DO            Informed Consent   Feminizing Medications      This form refers to the use of estrogen  "and/or androgen antagonists (sometimes called \"anti-androgens\" or \"androgen blockers\") by persons in the male-to-female spectrum who wish to become feminized to reduce gender dysphoria and facilitate a more feminine gender presentation. While there are risks associated with taking feminizing medications, when appropriately prescribed they can greatly improve mental health and quality of life.     Please seek another opinion if you want additional perspective on any aspect of your care.       Feminizing Effects     1. I understand that estrogen, androgen antagonists, or a combination of the two may be  prescribed to reduce male physical features and feminize my body.     2. I understand that the feminizing effects of estrogen and androgen antagonists can take several  months to a year to become noticeable, speed and degree of change is unpredictable.     3.  I understand that if I am taking estrogen I will probably develop breasts, and:        ? Breasts may take several years to develop to their full size.   ? Even if estrogen is stopped, the breast tissue that has developed will remain.   ? As soon as breasts start growing, it is recommended to have an annual breast exam.  ? There may be milky nipple discharge (galactorrhea). If you develop this, it is advised to check with a doctor to determine the cause.   ? It is not known if taking estrogen increases the risk of breast cancer.     4. I understand that the following changes are generally not permanent (that is, they will likely reverse if I stop taking feminizing medications):     ? Skin may become softer.   ? Muscle mass decreases and there may be a decrease in upper body strength.   ? Body hair growth may become less noticeable and grow more slowly,but won't stop.  ? Male pattern baldness may slow down, but will probably not stop completely, and hair that has already been lost will likely not grow back.   ? Fat may redistribute to a more feminine pattern " "(decreased in abdomen, increased on buttocks/hips/thighs - changing from \"apple shape\" to \"pear shape\").       5. I understand that taking feminizing medications will make my testicles produce less testosterone, which can affect my overall sexual function:    ? Fertility may be impaired, and I should consider sperm banking if I desire biological children.  ? Sperm may not mature, leading to reduced fertility. It is still possible to get someone pregnant however and contraception should be used if needed.  ? Testicles may shrink by 25-50%. Testicular examinations are still recommended.  ? The amount of fluid ejaculated may be reduced.   ? There is typically decrease in morning and spontaneous erections.   ? Erections may not be firm enough for penetrative sex.   ? Libido (sex drive) may decrease.     6. I understand that there are some aspects of my body that are not significantly changed by feminizing medications, though there may be other treatments that can be helpful.    ? Romano/facial hair may grow more slowly and be less noticeable, but will not go away.   ? Voice pitch will not rise and speech patterns will not become more feminine.   ? The laryngeal prominence (\"Rex's apple\") will not shrink.      Risks of Feminizing Medications     7. I understand that the medical effects and safety of feminizing medications are not fully understood, and that there may be long-term risks that are not yet known.     8. I understand that I am strongly advised not to take more medication than I am prescribed, as this increases health risks. It won't help changes come faster.     9. I understand that feminizing medications can damage the liver. I have been advised that I should be monitored for possible liver damage as long as I am taking feminizing medications.     10. I understand that feminizing medications will result in changes that will be noticeable by other people, and that some people in similar circumstances have " experienced harassment, discrimination, and violence, while others have lost support of loved ones. I have been advised that referrals can be made for support/counselling if this would be helpful.     Medical Risks Associated with Estrogen     11. I understand that taking estrogen increases the risk of blood clots, which can result in:     ? Pulmonary embolism (blood clot to the lungs), which may cause death.  Stroke, which may cause permanent brain damage or death   ? Heart attack   ? Chronic leg vein problems     I understand that the risk of blood clots is much worse if I smoke cigarettes, especially over age 40. I understand that the danger is so high that I should stop smoking completely if I start taking estrogen. I can ask my doctor for advice on quitting.    12. I understand that taking estrogen can increase deposits of fat around my internal organs, which is associated with increased risk for diabetes and heart disease.     13. I understand that taking estrogen can cause increased blood pressure,  estrogen increases the risk of gallstones,  estrogen can cause headaches or migraines and can cause nausea and vomiting. I have been advised that if I develop these, it is recommended that I discuss this with my doctor.     14. I understand that it is not known if taking estrogen increases the risk of non-cancerous tumors of the pituitary gland. I have been informed that although this is typically not life-threatening, it can damage vision. I understand that this will be monitored.    15. I have been informed that I am more likely to have dangerous side effects from estrogen if I smoke, am overweight, am over 40 years old, or have a history of blood clots, high blood pressure, or a family history of breast cancer.     16. I have been informed that if I take too much estrogen, my body may convert it into testosterone, which may slow or stop feminization.     Medical Risks Associated with Androgen Antagonists      17. I have been informed that spironolactone affects the balance of water and salts in the kidneys, and that this may:     ? Increase the amount of urine produced, and I may urinate more frequently   ? Reduce blood pressure   ? Rarely, cause high levels of potassium in the blood, and this will be monitored    18. I understand that some androgen antagonists make it more difficult to evaluate the results of PSA test. If I am over 50, I should have my prostate evaluated every year.     Prevention of Medical Complications     19. I agree to take feminizing medications as prescribed and to tell my care provider if I am not happy with the treatment or am experiencing any problems.     20. I understand that the right dose or type of medication prescribed for me may not be the same as for someone else.     21. I understand that physical examinations and blood tests are needed on a regular basis (monthly, at first) to check for negative side effects of feminizing medications.     22. I understand that feminization medications can interact with other medication (including other sources of hormones), dietary supplements, herbs, alcohol, and street drugs. I understand that being honest with my care provider about what else I am taking will help prevent medical complications that could be life-threatening. I have been informed that I will continue to get medical care no matter what information I share.     23. I understand that some medical conditions make it dangerous to take estrogen or androgen antagonists. I agree to be checked for risky conditions before the decision to start or continue feminizing medication is made.     24. I understand that I can choose to stop taking feminizing medication at any time, and that it is advised that I do this with the help of my doctor to make sure there are no negative reactions to stopping. I understand that my doctor may suggest I reduce, switch or stop taking feminizing medication,  if there are severe health risks that can't be controlled.    My signature below confirms that:     ? My doctor has talked with me about the benefits and risks of feminizing medication, the possible or likely consequences of hormone therapy, and potential treatment options.   ? I understand the risks that may be involved.   ? I understand that this form covers known effects and risks and that there may be long-term effects or risks that are not yet known.   ? I have had sufficient opportunity to discuss treatment options with my doctor. All of my questions have been answered to my satisfaction.   ? I believe I have adequate knowledge on which to base informed consent to the provision of feminizing medication.     Based on this:     _____ I wish to begin taking estrogen.     _____ I wish to begin taking androgen antagonists (e.g., Spironolactone).     _____ I do not wish to begin taking feminizing medication at this time.     Whatever your current decision is, please talk with your doctor any time you have questions, concerns, or want to re-evaluate your options.         ____________________________________ __________________   Patient Signature     Date         ____________________________________ __________________   Prescribing Clinician Signature    Date            Follow-ups after your visit        Additional Services     Gender Support Clinic Referral -  [facility]       Urgency of Appointment: Next Available    Is the patient < 18 years old? No    Is the patient on hormones already? No    What is the consult question? Unclear Goals of Treatment - Please explain: agender but not specifically wanting HRT. Mostly concerned about hairloss.     DOYLE sent? No-Pt in [employer] Qualified system.     Ordering provider is responsible for obtaining any DOYLE's and updating Epic with relevant history.                  Follow-up notes from your care team     Return in about 4 weeks (around 9/17/2018).      Who to contact      Please call your clinic at [phone] to:    Ask questions about your health    Make or cancel appointments    Discuss your medicines    Learn about your test results    Speak to your doctor            Additional Information About Your Visit        [employer] Information     [employer] gives you secure access to your electronic health record. If you see a primary care provider, you can also send messages to your care team and make appointments. If you have questions, please call your primary care clinic.  If you do not have a primary care provider, please call [phone] and they will assist you.      [employer] is an electronic gateway that provides easy, online access to your medical records. With [employer], you can request a clinic appointment, read your test results, renew a prescription or communicate with your care team.     To access your existing account, please contact your [facility] or call [phone] for assistance.        Care EveryWhere ID     This is your Care EveryWhere ID. This could be used by other organizations to access your [place] medical records  [id number]        Your Vitals Were     Pulse Temperature Pulse Oximetry BMI (Body Mass Index)          106 98.1  F (36.7  C) (Oral) 96% 18.99 kg/m2         Blood Pressure from Last 3 Encounters:   08/20/18 119/86   06/13/18 128/79   04/03/18 144/72    Weight from Last 3 Encounters:   08/20/18 128 lb 9.6 oz (58.3 kg)   06/13/18 132 lb (59.9 kg)   04/03/18 129 lb (58.5 kg)              We Performed the Following     Comprehensive Metabolic Panel     Gender Support Clinic Referral -  [facility]     Glucose     Hemoglobin (HGB)     Lipid Cascade     Testosterone Total          Today's Medication Changes          These changes are accurate as of 8/20/18  5:15 PM.  If you have any questions, ask your nurse or doctor.               Start taking these medicines.        Dose/Directions    finasteride 1 MG tablet   Commonly  known as:  PROPECIA   Used for:  Gender dysphoria in adolescent and adult, Alopecia, male pattern   Started by:  Eddie Novak DO        Dose:  1 mg   Take 1 tablet (1 mg) by mouth daily   Quantity:  30 tablet   Refills:  0            Where to get your medicines      [facility] [address] [place] [address] & [address] [address]     Phone:  [phone]     finasteride 1 MG tablet                Primary Care Provider Office Phone # Fax #    KEYANNA Jenkins [phone] [address] [place] [address]        Equal Access to Services     DAV ARIAS : Hadii aad ku hadasho Soshelbieali, waaxda luqadaha, qaybta kaalmada adeegyada, waxay tia millan. So [facility] [phone].    ATENCIÓN: Si habla español, tiene a carmona disposición servicios gratuitos de asistencia lingüística. [facility] [phone].    We comply with applicable federal civil rights laws and [place] laws. We do not discriminate on the basis of race, color, national origin, age, disability, sex, sexual orientation, or gender identity.            Thank you!     Thank you for choosing [facility]  for your care. Our goal is always to provide you with excellent care. Hearing back from our patients is one way we can continue to improve our services. Please take a few minutes to complete the written survey that you may receive in the mail after your visit with us. Thank you!             Your Updated Medication List - Protect others around you: Learn how to safely use, store and throw away your medicines at www.disposemymeds.org.          This list is accurate as of 8/20/18  5:15 PM.  Always use your most recent med list.                   Brand Name Dispense Instructions for use Diagnosis    albuterol 108 (90 Base) MCG/ACT inhaler    PROAIR HFA/PROVENTIL  HFA/VENTOLIN HFA    1 Inhaler    Inhale 2 puffs into the lungs every 4 hours as needed    Mild persistent asthma without complication       finasteride 1 MG tablet    PROPECIA    30 tablet    Take 1 tablet (1 mg) by mouth daily    Gender dysphoria in adolescent and adult, Alopecia, male pattern       fluocinonide 0.05 % cream    LIDEX     Apply topically 2 times daily        fluticasone 110 MCG/ACT Inhaler    FLOVENT HFA    1 Inhaler    Inhale 2 puffs into the lungs 2 times daily    Mild persistent asthma without complication       IBUPROFEN           ketoconazole 2 % shampoo    NIZORAL    120 mL    Apply topically daily as needed for itching or irritation Apply to scalp and leave on for 5-10 minutes. Use this 3 times per week.    Loss of hair       minoxidil 5 % Soln      Externally apply topically 2 times daily        ranitidine 150 MG tablet    ZANTAC    60 tablet    Take 1 tablet (150 mg) by mouth 2 times daily    Gastroesophageal reflux disease without esophagitis       tretinoin 0.025 % cream    RETIN-A     APPL[facility]

## 2018-08-21 DIAGNOSIS — F64.0 GENDER DYSPHORIA IN ADOLESCENT AND ADULT: ICD-10-CM

## 2018-08-21 DIAGNOSIS — L64.9 ALOPECIA, MALE PATTERN: ICD-10-CM

## 2018-08-21 RX ORDER — FINASTERIDE 1 MG/1
1 TABLET, FILM COATED ORAL DAILY
Qty: 30 TABLET | Refills: 0 | Status: CANCELLED | OUTPATIENT
Start: 2018-08-21

## 2018-08-21 NOTE — TELEPHONE ENCOUNTER
"Request for medication refill:    Pharmacy stated that plan does not cover medication prescribed. Per Rx benefit plan alternative medications include: .... Please fax back with approval along with strength, directions, quantity, and refills.    Providers if patient needs an appointment and you are willing to give a one month supply please refill for one month and  send a letter/MyChart using \".SMILLIMITEDREFILL\" .smillimited and route chart to \"P SMI \" (Giving one month refill in non controlled medications is strongly recommended before denial)    If refill has been denied, meaning absolutely no refills without visit, please complete the smart phrase \".smirxrefuse\" and route it to the \"P SMI MED REFILLS\"  pool to inform the patient and the pharmacy.    Ana Fabian CMA    '  "

## 2018-08-22 NOTE — TELEPHONE ENCOUNTER
I do not manage this prescription.    This request needs to be forwarded to the prescribing provider.

## 2018-08-23 LAB — TESTOST SERPL-MCNC: 776 NG/DL (ref 240–950)

## 2018-09-05 DIAGNOSIS — L64.9 ALOPECIA, MALE PATTERN: ICD-10-CM

## 2018-09-05 DIAGNOSIS — F64.0 GENDER DYSPHORIA IN ADOLESCENT AND ADULT: Primary | ICD-10-CM

## 2018-09-05 RX ORDER — FINASTERIDE 5 MG/1
2.5 TABLET, FILM COATED ORAL DAILY
Qty: 30 TABLET | Refills: 1 | Status: CANCELLED | OUTPATIENT
Start: 2018-09-05

## 2018-09-05 NOTE — PROGRESS NOTES
9/5/2018  8:32 AM    Called Pharmacy and placed 2.5mg finasteride PO every day. This quantity unable to be e-ordered.     Eddie Novak DO, MA  Family Medicine PGY-2  [facility]   Pager: [phone]

## 2018-09-18 ENCOUNTER — OFFICE VISIT (OUTPATIENT)
Dept: FAMILY MEDICINE | Facility: CLINIC | Age: 23
End: 2018-09-18
Payer: COMMERCIAL

## 2018-09-18 VITALS
WEIGHT: 131.8 LBS | SYSTOLIC BLOOD PRESSURE: 125 MMHG | RESPIRATION RATE: 16 BRPM | DIASTOLIC BLOOD PRESSURE: 89 MMHG | HEART RATE: 97 BPM | BODY MASS INDEX: 19.46 KG/M2 | TEMPERATURE: 98.3 F | OXYGEN SATURATION: 97 %

## 2018-09-18 DIAGNOSIS — F64.0 GENDER DYSPHORIA IN ADULT: ICD-10-CM

## 2018-09-18 DIAGNOSIS — L64.9 ANDROGENIC ALOPECIA: Primary | ICD-10-CM

## 2018-09-18 NOTE — MR AVS SNAPSHOT
After Visit Summary   9/18/2018    Manoj Castellanos    MRN: [id number]           Patient Information     Date Of Birth          1995        Visit Information        Provider Department      9/18/2018 10:40 AM Kasie Uriostegui DO [facility]        Care Instructions    Please follow up with me as needed-perhaps in the next 3-6 months  Jakehart as needed            Follow-ups after your visit        Who to contact     Please call your clinic at [phone] to:    Ask questions about your health    Make or cancel appointments    Discuss your medicines    Learn about your test results    Speak to your doctor            Additional Information About Your Visit        MyChart Information     [facility] gives you secure access to your electronic health record. If you see a primary care provider, you can also send messages to your care team and make appointments. If you have questions, please call your primary care clinic.  If you do not have a primary care provider, please call [phone] and they will assist you.      [facility] is an electronic gateway that provides easy, online access to your medical records. With [facility], you can request a clinic appointment, read your test results, renew a prescription or communicate with your care team.     To access your existing account, please contact your [facility] or call [phone] for assistance.        Care EveryWhere ID     This is your Care EveryWhere ID. This could be used by other organizations to access your [place] medical records  [id number]        Your Vitals Were     Pulse Temperature Respirations Pulse Oximetry BMI (Body Mass Index)       97 98.3  F (36.8  C) (Oral) 16 97% 19.46 kg/m2        Blood Pressure from Last 3 Encounters:   09/18/18 125/89   08/20/18 119/86   06/13/18 128/79    Weight from Last 3 Encounters:   09/18/18 131 lb 12.8 oz (59.8 kg)   08/20/18 128 lb 9.6 oz (58.3 kg)   06/13/18 132 lb  (59.9 kg)              Today, you had the following     No orders found for display       Primary Care Provider Office Phone # Fax #    KEYANNA Jenkins [facility] [phone] [address] [place] [address]        Equal Access to Services     DAV ARIAS : Hadii aad ku hadasho Soomaali, waaxda luqadaha, qaybta kaalmada adeegyada, waxay idiin hayaan adeheidy khkyleesh larene . So [facility] [phone].    ATENCIÓN: Si habla español, tiene a carmona disposición servicios gratuitos de asistencia lingüística. [facility] [phone].    We comply with applicable federal civil rights laws and [place] laws. We do not discriminate on the basis of race, color, national origin, age, disability, sex, sexual orientation, or gender identity.            Thank you!     Thank you for choosing [facility]  for your care. Our goal is always to provide you with excellent care. Hearing back from our patients is one way we can continue to improve our services. Please take a few minutes to complete the written survey that you may receive in the mail after your visit with us. Thank you!             Your Updated Medication List - Protect others around you: Learn how to safely use, store and throw away your medicines at www.disposemymeds.org.          This list is accurate as of 9/18/18 11:42 AM.  Always use your most recent med list.                   Brand Name Dispense Instructions for use Diagnosis    albuterol 108 (90 Base) MCG/ACT inhaler    PROAIR HFA/PROVENTIL HFA/VENTOLIN HFA    1 Inhaler    Inhale 2 puffs into the lungs every 4 hours as needed    Mild persistent asthma without complication       fluocinonide 0.05 % cream    LIDEX     Apply topically 2 times daily        fluticasone 110 MCG/ACT Inhaler    FLOVENT HFA    1 Inhaler    Inhale 2 puffs into the lungs 2 times daily    Mild persistent asthma without complication       IBUPROFEN           ketoconazole 2 % shampoo    NIZORAL     120 mL    Apply topically daily as needed for itching or irritation Apply to scalp and leave on for 5-10 minutes. Use this 3 times per week.    Loss of hair       minoxidil 5 % Soln      Externally apply topically 2 times daily        ranitidine 150 MG tablet    ZANTAC    60 tablet    Take 1 tablet (150 mg) by mouth 2 times daily    Gastroesophageal reflux disease without esophagitis       tretinoin 0.025 % cream    RETIN-A     APPLY A THIN LAYER TO THE FACE [facility]

## 2018-09-18 NOTE — PROGRESS NOTES
"    [facility] Consultation:        Referred by: Dr. Novak  Referral Question: Discuss medication for hair loss  History Provided by: Patient        Assessment:     1. Gender Dysphoria  2. Androgenic alopecia     Manoj is a 22 yo agender individual who has been using medications to help prevent scalp hair loss. They have no interest in using other medications, such as estrogen or spironolactone, and are happy with their androgenous apperance. I told Manoj it makes sense to continue the finasteride and minoxidil for now and monitor hair changes. They plan to keep their PCP at [facility] for now and follow up with me PRN.           Recommendations:      - Recommend continuing finasteride/minoxidil as already prescribed  - Encouraged Ty to monitor their hair every couple of months for changes  - Return to [facility] in 3-6 months; ongoing f/u with PCP as already planned    Kasie Uriostegui DO  [facility]         HPI:       Manoj Castellanos s a 23 year old individual who uses pronouns they/them/theirs and is consulting to discuss medication for hair loss. Manoj has previously seen Dr. Novak and this note was reviewed in detail prior to our visit.    -Manoj has felt trans/agender since teenage years. They are using finasteride-now on 1.25mg daily (splits a 5 mg tab in quarters). Only been using for 8 days, so too early to see benefit.  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 Mom is non-binary      Outside records reviewed.  ----  Past Surgical History:   Procedure Laterality Date     HC TOOTH EXTRACTION W/FORCEP  2016    4 teeth        Patient Active Problem List   Diagnosis     Patellofemoral pain syndrome     Right hip pain     Abnormal sinus finding on MRI     Mild persistent asthma without complication     Chronic allergic rhinitis due to animal hair and dander     Allergic rhinitis due to dust mite     Chronic seasonal allergic rhinitis due to pollen     Allergic conjunctivitis of both eyes     History reviewed. No pertinent past medical history.    Current Outpatient Prescriptions   Medication Sig Dispense Refill     albuterol (PROAIR HFA/PROVENTIL HFA/VENTOLIN HFA) 108 (90 BASE) MCG/ACT Inhaler Inhale 2 puffs into the lungs every 4 hours as needed 1 Inhaler 2     fluocinonide (LIDEX) 0.05 % cream Apply topically 2 times daily       fluticasone (FLOVENT HFA) 110 MCG/ACT Inhaler Inhale 2 puffs into the lungs 2 times daily 1 Inhaler 3     IBUPROFEN        ketoconazole (NIZORAL) 2 % shampoo Apply topically daily as needed for itching or irritation Apply to scalp and leave on for 5-10 minutes. Use this 3 times per week. 120 mL 10     minoxidil 5 % SOLN Externally apply topically 2 times daily       ranitidine (ZANTAC) 150 MG tablet Take 1 tablet (150 mg) by mouth 2 times daily 60 tablet 1     tretinoin (RETIN-A) 0.025 % cream APPLY A THIN LAYER TO THE FACE [facility] UTD  3       Family History   Problem Relation Age of Onset     Pancreatic Cancer Maternal Grandfather        Allergies   Allergen Reactions     Amoxicillin Swelling     Lip swelling      Cefuroxime      Swelling        Social History     Social History     Marital status: Single     Spouse name: N/A     Number of children: N/A     Years of education: N/A     Social History Main Topics     Smoking status: Never Smoker     Smokeless tobacco: Never Used     Alcohol use Yes      Comment:  occasional      Drug use: No     Sexual activity: Yes     Birth control/ protection: Condom     Other Topics Concern     None     Social History Narrative              Review of Systems:      10 point ROS of systems including Constitutional, Eyes, Respiratory, Cardiovascular, Gastroenterology, Genitourinary, Integumentary, Muscularskeletal, Psychiatric were all negative except for pertinent positives noted in my HPI.          Physical Exam:     Vitals:    09/18/18 1105   BP: 125/89   Pulse: 97   Resp: 16   Temp: 98.3  F (36.8  C)   TempSrc: Oral   SpO2: 97%   Weight: 131 lb 12.8 oz (59.8 kg)     BMI= Body mass index is 19.46 kg/(m^2).   Wt Readings from Last 10 Encounters:   09/18/18 131 lb 12.8 oz (59.8 kg)   08/20/18 128 lb 9.6 oz (58.3 kg)   06/13/18 132 lb (59.9 kg)   04/03/18 129 lb (58.5 kg)   03/06/18 127 lb (57.6 kg)   01/17/18 125 lb (56.7 kg)   01/10/18 125 lb (56.7 kg)   10/12/17 120 lb (54.4 kg)   10/02/17 121 lb (54.9 kg)   09/11/17 120 lb (54.4 kg)       GENERAL:healthy, alert and no distress, very pleasant  RESP: calm respirations on room air  CV: warm and well perfused  ABDOMEN: soft, no tenderness, no  hepatosplenomegaly, no masses, normal bowel sounds  SKIN: +slight temporal hair thinning (~Salvador 2), +mild scattered facial papular acne  Psych: Alert and oriented times 3; affect normal.         Labs:     Office Visit on 08/20/2018   Component Date Value Ref Range Status     Albumin 08/20/2018 5.3* 3.8 - 5.0 mg/dL Final     Alkaline Phosphatase 08/20/2018 63.9  31.7 - 110.7 U/L Final     ALT 08/20/2018 28.0  0.0 - 45.0 U/L Final     AST 08/20/2018 22.9  0.0 - 55.0 U/L Final     Bilirubin Total 08/20/2018 0.6  0.2 - 1.3 mg/dL Final     Urea Nitrogen 08/20/2018 10.1  7.0 - 21.0 mg/dL Final     Calcium 08/20/2018 9.9  8.5 - 10.1 mg/dL Final     Chloride 08/20/2018 96.5* 98.0 - 110.0 mmol/L Final     Carbon Dioxide 08/20/2018 28.2  20.0 - 32.0 mmol/L Final     Creatinine 08/20/2018 0.8  0.7 - 1.3 mg/dL  Final     Glucose 08/20/2018 82.9  70.0 - 99.0 mg'dL Final     Potassium 08/20/2018 4.2  3.3 - 4.5 mmol/dL Final     Sodium 08/20/2018 134.8  132.6 - 141.4 mmol/L Final     Protein Total 08/20/2018 8.8  6.8 - 8.8 g/dL Final     GFR Estimate 08/20/2018 >90  >60.0 mL/min/1.7 m2 Final     GFR Estimate If Black 08/20/2018 >90  >60.0 mL/min/1.7 m2 Final     Hemoglobin 08/20/2018 16.5  13.3 - 17.7 g/dL Final     Cholesterol 08/20/2018 167.0  0.0 - 200.0 mg/dL Final     Cholesterol/HDL Ratio 08/20/2018 3.8  0.0 - 5.0 Final     HDL Cholesterol 08/20/2018 43.8  >40.0 mg/dL Final     LDL Cholesterol Calculated 08/20/2018 108  0 - 129 mg/dL Final     Triglycerides 08/20/2018 76.4  0.0 - 150.0 mg/dL Final     VLDL Cholesterol 08/20/2018 15.3  7.0 - 32.0 mg/dL Final     Testosterone Total 08/20/2018 776  240 - 950 ng/dL Final    Comment: This test was developed and its performance characteristics determined by the   [facility],  Special [facility] Laboratory. It has   not been cleared or approved by the FDA. The laboratory is regulated under   CLIA as qualified to perform high-complexity testing. This test is used for   clinical purposes. It should not be regarded as investigational or for   research.       Glucose 08/20/2018 70.0  70.0 - 99.0 mg'dL Final          I spent 35 min face to face with the patient and >50% was spent counselling the patient about the above medical conditions, educating patient and discussing the recommendations and followup.

## 2018-10-20 ASSESSMENT — ENCOUNTER SYMPTOMS
FREQUENCY: 0
HEMATURIA: 0
JOINT SWELLING: 0
FEVER: 0
DIZZINESS: 0
SHORTNESS OF BREATH: 1
PARESTHESIAS: 0
PALPITATIONS: 0
DYSURIA: 0
DIARRHEA: 0
CONSTIPATION: 0
ABDOMINAL PAIN: 0
NAUSEA: 0
NERVOUS/ANXIOUS: 0
SORE THROAT: 1
EYE PAIN: 0
WEAKNESS: 0
CHILLS: 0
HEADACHES: 0
ARTHRALGIAS: 0
HEMATOCHEZIA: 0
COUGH: 0
HEARTBURN: 0
MYALGIAS: 0

## 2018-10-22 ENCOUNTER — TRANSFERRED RECORDS (OUTPATIENT)
Dept: HEALTH INFORMATION MANAGEMENT | Facility: CLINIC | Age: 23
End: 2018-10-22

## 2018-10-23 ENCOUNTER — OFFICE VISIT (OUTPATIENT)
Dept: FAMILY MEDICINE | Facility: CLINIC | Age: 23
End: 2018-10-23
Payer: COMMERCIAL

## 2018-10-23 VITALS
RESPIRATION RATE: 18 BRPM | OXYGEN SATURATION: 100 % | SYSTOLIC BLOOD PRESSURE: 123 MMHG | HEART RATE: 92 BPM | BODY MASS INDEX: 19.05 KG/M2 | WEIGHT: 129 LBS | DIASTOLIC BLOOD PRESSURE: 77 MMHG | TEMPERATURE: 98 F

## 2018-10-23 DIAGNOSIS — J45.30 MILD PERSISTENT ASTHMA WITHOUT COMPLICATION: ICD-10-CM

## 2018-10-23 DIAGNOSIS — Z23 NEED FOR INFLUENZA VACCINATION: ICD-10-CM

## 2018-10-23 DIAGNOSIS — Z00.00 ROUTINE GENERAL MEDICAL EXAMINATION AT A HEALTH CARE FACILITY: Primary | ICD-10-CM

## 2018-10-23 DIAGNOSIS — Z23 NEED FOR VACCINATION: ICD-10-CM

## 2018-10-23 DIAGNOSIS — J45.20 MILD INTERMITTENT ASTHMA IN ADULT WITHOUT COMPLICATION: ICD-10-CM

## 2018-10-23 PROCEDURE — 90471 IMMUNIZATION ADMIN: CPT | Performed by: NURSE PRACTITIONER

## 2018-10-23 PROCEDURE — 90686 IIV4 VACC NO PRSV 0.5 ML IM: CPT | Performed by: NURSE PRACTITIONER

## 2018-10-23 PROCEDURE — 99395 PREV VISIT EST AGE 18-39: CPT | Mod: 25 | Performed by: NURSE PRACTITIONER

## 2018-10-23 RX ORDER — FLUTICASONE PROPIONATE 110 UG/1
2 AEROSOL, METERED RESPIRATORY (INHALATION) 2 TIMES DAILY
Qty: 1 INHALER | Refills: 0 | Status: SHIPPED | OUTPATIENT
Start: 2018-10-23 | End: 2018-10-29

## 2018-10-23 RX ORDER — CLINDAMYCIN PHOSPHATE 11.9 MG/ML
SOLUTION TOPICAL
COMMUNITY
Start: 2018-08-21 | End: 2019-07-22

## 2018-10-23 ASSESSMENT — ENCOUNTER SYMPTOMS
ABDOMINAL PAIN: 0
MYALGIAS: 0
CHILLS: 0
HEARTBURN: 0
DIZZINESS: 0
ARTHRALGIAS: 0
FREQUENCY: 0
PARESTHESIAS: 0
NERVOUS/ANXIOUS: 0
FEVER: 0
DIARRHEA: 0
SHORTNESS OF BREATH: 0
DYSURIA: 0
JOINT SWELLING: 0
WEAKNESS: 0
HEMATURIA: 0
HEADACHES: 0
CONSTIPATION: 0
COUGH: 0
HEMATOCHEZIA: 0
EYE PAIN: 0
PALPITATIONS: 0
SORE THROAT: 0
NAUSEA: 0

## 2018-10-23 NOTE — NURSING NOTE
Injectable Influenza Immunization Documentation    1.  Has the patient received the information for the injectable influenza vaccine? YES     2. Is the patient 6 months of age or older? YES     3. Does the patient have any of the following contraindications?         Severe allergy to eggs? No     Severe allergic reaction to previous influenza vaccines? No   Severe allergy to latex? No       History of Guillain-Rosedale syndrome? No     Currently have a temperature greater than 100.4F? No       Vaccination given by Renard Hodges MA

## 2018-10-23 NOTE — TELEPHONE ENCOUNTER
Received call from patient stating that he needed refills on his Flovent inhaler. Informed patient that he is due for a follow-up which is why he ran out of refills. Patient stated that he will schedule a follow-up visit via [facility]. Refill sent for 1 inhaler per verbal order from Dr. Fraser. No further refills until patient is seen in clinic.    Roxanne Garibay RN

## 2018-10-23 NOTE — MR AVS SNAPSHOT
After Visit Summary   10/23/2018    Manoj Castellanos    MRN: [id number]           Patient Information     Date Of Birth          1995        Visit Information        Provider Department      10/23/2018 2:40 PM Kina Rivera APRN CNP [facility]        Today's Diagnoses     Routine general medical examination at a health care facility    -  1    Mild intermittent asthma in adult without complication        Need for influenza vaccination        Need for vaccination          Care Instructions      Preventive Health Recommendations  Male Ages 21 - 25     Yearly exam:             See your health care provider every year in order to  o   Review health changes.   o   Discuss preventive care.    o   Review your medicines if your doctor has prescribed any.    You should be tested each year for STDs (sexually transmitted diseases).     Talk to your provider about cholesterol testing.      If you are at risk for diabetes, you should have a diabetes test (fasting glucose).    Shots: Get a flu shot each year. Get a tetanus shot every 10 years.     Nutrition:    Eat at least 5 servings of fruits and vegetables daily.     Eat whole-grain bread, whole-wheat pasta and brown rice instead of white grains and rice.     Get adequate calcium and Vitamin D.     Lifestyle    Exercise for at least 150 minutes a week (30 minutes a day, 5 days a week). This will help you control your weight and prevent disease.     Limit alcohol to one drink per day.     No smoking.     Wear sunscreen to prevent skin cancer.     See your dentist every six months for an exam and cleaning.             Follow-ups after your visit        Follow-up notes from your care team     Return in about 1 year (around 10/23/2019) for Physical Exam.      Who to contact     If you have questions or need follow up information about today's clinic visit or your schedule please contact [facility] hospital directly at  [phone].  Normal or non-critical lab and imaging results will be communicated to you by [employer], letter or phone within 4 business days after the clinic has received the results. If you do not hear from us within 7 days, please contact the clinic through [employer] or phone. If you have a critical or abnormal lab result, we will notify you by phone as soon as possible.  Submit refill requests through Coin or call your pharmacy and they will forward the refill request to us. Please allow 3 business days for your refill to be completed.          Additional Information About Your Visit        [employer] Information     [facility] gives you secure access to your electronic health record. If you see a primary care provider, you can also send messages to your care team and make appointments. If you have questions, please call your primary care clinic.  If you do not have a primary care provider, please call [phone] and they will assist you.        Care EveryWhere ID     This is your Care EveryWhere ID. This could be used by other organizations to access your [place] medical records  [id number]        Your Vitals Were     Pulse Temperature Respirations Pulse Oximetry BMI (Body Mass Index)       92 98  F (36.7  C) (Oral) 18 100% 19.05 kg/m2        Blood Pressure from Last 3 Encounters:   10/23/18 123/77   09/18/18 125/89   08/20/18 119/86    Weight from Last 3 Encounters:   10/23/18 129 lb (58.5 kg)   09/18/18 131 lb 12.8 oz (59.8 kg)   08/20/18 128 lb 9.6 oz (58.3 kg)              We Performed the Following     ADMIN 1st VACCINE     HC FLU VAC PRESRV FREE QUAD SPLIT VIR 3+YRS IM          Today's Medication Changes          These changes are accurate as of 10/23/18  8:24 PM.  If you have any questions, ask your nurse or doctor.               These medicines have changed or have updated prescriptions.        Dose/Directions    ranitidine 150 MG tablet   Commonly known as:  ZANTAC   This may have changed:    - when to  take this  - reasons to take this   Used for:  Gastroesophageal reflux disease without esophagitis        Dose:  150 mg   Take 1 tablet (150 mg) by mouth 2 times daily   Quantity:  60 tablet   Refills:  1            Where to get your medicines      [id number] [address] [place] [address] AT [facility]  [address] [place] [id number]     Phone:  [phone]     fluticasone 110 MCG/ACT Inhaler                Primary Care Provider Office Phone # Fax #    Kina Hickeysheanbangelique, KEYANNA [phone] [address] [place] [address]        Equal Access to Services     DAV ARIAS : Hadii jas girono Socornelius, waaxda luqadaha, qaybta kaalmada adeegyada, johana millan. So [facility] [phone].    ATENCIÓN: Si habla español, tiene a carmona disposición servicios gratuitos de asistencia lingüística. [facility] [phone].    We comply with applicable federal civil rights laws and [place] laws. We do not discriminate on the basis of race, color, national origin, age, disability, sex, sexual orientation, or gender identity.            Thank you!     Thank you for choosing [facility]  for your care. Our goal is always to provide you with excellent care. Hearing back from our patients is one way we can continue to improve our services. Please take a few minutes to complete the written survey that you may receive in the mail after your visit with us. Thank you!             Your Updated Medication List - Protect others around you: Learn how to safely use, store and throw away your medicines at www.disposemymeds.org.          This list is accurate as of 10/23/18  8:24 PM.  Always use your most recent med list.                   Brand Name Dispense Instructions for use Diagnosis    albuterol 108 (90 Base) MCG/ACT inhaler    PROAIR HFA/PROVENTIL HFA/VENTOLIN HFA    1  Inhaler    Inhale 2 puffs into the lungs every 4 hours as needed    Mild persistent asthma without complication       clindamycin 1 % solution    CLEOCIN T          FINASTERIDE PO      Take 1.25 mg by mouth daily        fluocinonide 0.05 % cream    LIDEX     Apply topically 2 times daily        fluticasone 110 MCG/ACT Inhaler    FLOVENT HFA    1 Inhaler    Inhale 2 puffs into the lungs 2 times daily    Mild persistent asthma without complication       IBUPROFEN           ketoconazole 2 % shampoo    NIZORAL    120 mL    Apply topically daily as needed for itching or irritation Apply to scalp and leave on for 5-10 minutes. Use this 3 times per week.    Loss of hair       minoxidil 5 % Soln      Externally apply topically 2 times daily        ranitidine 150 MG tablet    ZANTAC    60 tablet    Take 1 tablet (150 mg) by mouth 2 times daily    Gastroesophageal reflux disease without esophagitis       tretinoin 0.025 % cream    RETIN-A     APPL[facility] A [facility]

## 2018-10-23 NOTE — PROGRESS NOTES
SUBJECTIVE:   CC: Manoj Castellanos is an 23 year old male who presents for preventative health visit.     Physical   Annual:     Getting at least 3 servings of Calcium per day:  NO    Bi-annual eye exam:  Yes    Dental care twice a year:  Yes    Sleep apnea or symptoms of sleep apnea:  None    Diet:  Regular (no restrictions)    Frequency of exercise:  2-3 days/week    Duration of exercise:  Less than 15 minutes    Taking medications regularly:  Yes    Medication side effects:  None    Additional concerns today:  No          Today's PHQ-2 Score:   PHQ-2 ( 1999 [employer]) 10/20/2018   Q1: Little interest or pleasure in doing things 0   Q2: Feeling down, depressed or hopeless 0   PHQ-2 Score 0   Q1: Little interest or pleasure in doing things Not at all   Q2: Feeling down, depressed or hopeless Not at all   PHQ-2 Score 0       Abuse: Current or Past(Physical, Sexual or Emotional)- No  Do you feel safe in your environment - Yes    Social History   Substance Use Topics     Smoking status: Never Smoker     Smokeless tobacco: Never Used     Alcohol use Yes      Comment: infrequent at worst     Alcohol Use 10/20/2018   If you drink alcohol do you typically have greater than 3 drinks per day OR greater than 7 drinks per week? No   No flowsheet data found.    Last PSA: No results found for: PSA    Reviewed orders with patient. Reviewed health maintenance and updated orders accordingly - Yes  Labs reviewed in EPIC  BP Readings from Last 3 Encounters:   10/23/18 123/77   09/18/18 125/89   08/20/18 119/86    Wt Readings from Last 3 Encounters:   10/23/18 129 lb (58.5 kg)   09/18/18 131 lb 12.8 oz (59.8 kg)   08/20/18 128 lb 9.6 oz (58.3 kg)                  Patient Active Problem List   Diagnosis     Patellofemoral pain syndrome     Right hip pain     Abnormal sinus finding on MRI     Mild persistent asthma without complication     Chronic allergic rhinitis due to animal hair and dander     Allergic rhinitis due to dust mite     Chronic  seasonal allergic rhinitis due to pollen     Allergic conjunctivitis of both eyes     Past Surgical History:   Procedure Laterality Date     HC TOOTH EXTRACTION W/FORCEP  2016    4 teeth        Social History   Substance Use Topics     Smoking status: Never Smoker     Smokeless tobacco: Never Used     Alcohol use Yes      Comment: infrequent at worst     Family History   Problem Relation Age of Onset     Substance Abuse Father      Asthma Father      Pancreatic Cancer Maternal Grandfather      Other Cancer Maternal Grandfather      Prostate Cancer Paternal Grandfather      Depression Other      Anxiety Disorder Other      Mental Illness Other          Current Outpatient Prescriptions   Medication Sig Dispense Refill     FINASTERIDE PO Take 1.25 mg by mouth daily       albuterol (PROAIR HFA/PROVENTIL HFA/VENTOLIN HFA) 108 (90 BASE) MCG/ACT Inhaler Inhale 2 puffs into the lungs every 4 hours as needed 1 Inhaler 2     clindamycin (CLEOCIN T) 1 % solution        fluocinonide (LIDEX) 0.05 % cream Apply topically 2 times daily       fluticasone (FLOVENT HFA) 110 MCG/ACT Inhaler Inhale 2 puffs into the lungs 2 times daily 1 Inhaler 0     IBUPROFEN        ketoconazole (NIZORAL) 2 % shampoo Apply topically daily as needed for itching or irritation Apply to scalp and leave on for 5-10 minutes. Use this 3 times per week. 120 mL 10     minoxidil 5 % SOLN Externally apply topically 2 times daily       ranitidine (ZANTAC) 150 MG tablet Take 1 tablet (150 mg) by mouth 2 times daily (Patient taking differently: Take 150 mg by mouth as needed ) 60 tablet 1     tretinoin (RETIN-A) 0.025 % cream APPLY A THIN LAYER TO THE FACE QHS UTD  3     [DISCONTINUED] fluticasone (FLOVENT HFA) 110 MCG/ACT Inhaler Inhale 2 puffs into the lungs 2 times daily 1 Inhaler 3     Allergies   Allergen Reactions     Amoxicillin Swelling     Lip swelling      Cefuroxime      Swelling      Recent Labs   Lab Test  08/20/18   1634  10/02/17   1431   LDL  108    --    HDL  43.8   --    TRIG  76.4   --    ALT  28.0   --    CR  0.8  0.95   GFRESTIMATED  >90  >90   GFRESTBLACK  >90  >90   POTASSIUM  4.2  3.9   TSH   --   2.98        Reviewed and updated as needed this visit by clinical staff  Tobacco  Allergies  Meds  Med Hx  Surg Hx  Fam Hx  Soc Hx        Reviewed and updated as needed this visit by Provider            Review of Systems   Constitutional: Negative for chills and fever.   HENT: Negative for congestion, ear pain, hearing loss and sore throat.    Eyes: Negative for pain and visual disturbance.   Respiratory: Negative for cough and shortness of breath.         History of asthma well controlled.     Cardiovascular: Negative for chest pain, palpitations and peripheral edema.   Gastrointestinal: Negative for abdominal pain, constipation, diarrhea, heartburn, hematochezia and nausea.   Genitourinary: Negative for discharge, dysuria, frequency, genital sores, hematuria, impotence and urgency.   Musculoskeletal: Negative for arthralgias, joint swelling and myalgias.   Skin: Negative for rash.   Neurological: Negative for dizziness, weakness, headaches and paresthesias.   Psychiatric/Behavioral: Negative for mood changes. The patient is not nervous/anxious.      OBJECTIVE:   /77  Pulse 92  Temp 98  F (36.7  C) (Oral)  Resp 18  Wt 129 lb (58.5 kg)  SpO2 100%  BMI 19.05 kg/m2    Physical Exam  GENERAL: healthy, alert and no distress  EYES: Eyes grossly normal to inspection, PERRL and conjunctivae and sclerae normal  HENT: ear canals and TM's normal, nose and mouth without ulcers or lesions  NECK: no adenopathy, no asymmetry, masses, or scars and thyroid normal to palpation  RESP: lungs clear to auscultation - no rales, rhonchi or wheezes  CV: regular rate and rhythm, normal S1 S2, no S3 or S4, no murmur, click or rub, no peripheral edema and peripheral pulses strong  ABDOMEN: soft, nontender, no hepatosplenomegaly, no masses and bowel sounds normal    "(male): normal male genitalia without lesions or urethral discharge, no hernia  MS: no gross musculoskeletal defects noted, no edema  SKIN: no suspicious lesions or rashes  NEURO: Normal strength and tone, mentation intact and speech normal  PSYCH: mentation appears normal, affect normal/bright    Diagnostic Test Results:  none     ASSESSMENT/PLAN:     (Z00.00) Routine general medical examination at a health care facility  (primary encounter diagnosis)  Comment:   Plan:     (J45.20) Mild intermittent asthma in adult without complication  Comment: controlled  Plan:     (Z23) Need for influenza vaccination  Comment:   Plan: HC FLU VAC PRESRV FREE QUAD SPLIT VIR 3+YRS IM,        ADMIN 1st VACCINE        given    (Z23) Need for vaccination  Comment:   Plan:             COUNSELING:   Reviewed preventive health counseling, as reflected in patient instructions    BP Readings from Last 1 Encounters:   10/23/18 123/77     Estimated body mass index is 19.05 kg/(m^2) as calculated from the following:    Height as of 6/13/18: 5' 9\" (1.753 m).    Weight as of this encounter: 129 lb (58.5 kg).           reports that he has never smoked. He has never used smokeless tobacco.      Counseling Resources:  ATP IV Guidelines  Pooled Cohorts Equation Calculator  FRAX Risk Assessment  ICSI Preventive Guidelines  Dietary Guidelines for Americans, 2010  USDA's MyPlate  ASA Prophylaxis  Lung CA Screening    KEYANNA Nix [facility]  Answers for HPI/ROS submitted by the patient on 10/20/2018   PHQ-2 Score: 0    "

## 2018-10-23 NOTE — TELEPHONE ENCOUNTER
Patient called requesting a refill of his Flovent inhaler. Informed patient that he is due for a follow-up visit which is why he no longer had refills. Patient stated that he will schedule a follow-up visit via [facility]. Sent refill for 1 inhaler per verbal order from Dr. Fraser. No further refills until patient has been in for an office visit.    Roxanne Garibay RN

## 2018-10-24 ASSESSMENT — ASTHMA QUESTIONNAIRES: ACT_TOTALSCORE: 23

## 2018-10-29 ENCOUNTER — OFFICE VISIT (OUTPATIENT)
Dept: ALLERGY | Facility: CLINIC | Age: 23
End: 2018-10-29
Payer: COMMERCIAL

## 2018-10-29 VITALS
OXYGEN SATURATION: 98 % | SYSTOLIC BLOOD PRESSURE: 142 MMHG | BODY MASS INDEX: 19.36 KG/M2 | TEMPERATURE: 97 F | RESPIRATION RATE: 18 BRPM | WEIGHT: 131.13 LBS | DIASTOLIC BLOOD PRESSURE: 75 MMHG | HEART RATE: 102 BPM

## 2018-10-29 DIAGNOSIS — J45.30 MILD PERSISTENT ASTHMA IN ADULT WITHOUT COMPLICATION: Primary | ICD-10-CM

## 2018-10-29 LAB
FEF 25/75: NORMAL
FEV-1: NORMAL
FEV1/FVC: NORMAL
FVC: NORMAL

## 2018-10-29 PROCEDURE — 94010 BREATHING CAPACITY TEST: CPT | Performed by: ALLERGY & IMMUNOLOGY

## 2018-10-29 PROCEDURE — 99213 OFFICE O/P EST LOW 20 MIN: CPT | Mod: 25 | Performed by: ALLERGY & IMMUNOLOGY

## 2018-10-29 RX ORDER — FLUTICASONE PROPIONATE 110 UG/1
2 AEROSOL, METERED RESPIRATORY (INHALATION) 2 TIMES DAILY
Qty: 1 INHALER | Refills: 5 | Status: SHIPPED | OUTPATIENT
Start: 2018-10-29 | End: 2019-06-24

## 2018-10-29 NOTE — PROGRESS NOTES
Manoj Castellanos was seen in the Allergy Clinic at [facility]. The following are my recommendations regarding his Mild Persistent Asthma    1. Continue flovent 110mcg, 2 puffs twice daily  2. Use albuterol HFA, 2-4 puffs every 4 hours as needed  3. Follow-up in 6 months, sooner if needed      Manoj Castellanos is a 23 year old American male who is seen today for follow-up of asthma. He states that he has done well since his last visit. He continues to take flovent twice daily and has used albuterol only once in the last several months. Manoj ran out of the flovent a couple of weeks ago and noticed increased asthma symptoms, primarily shortness of breath. He did fill the flovent yesterday but had not gotten to the point where he felt the need to use albuterol. He is tolerating the flovent well and denies having any side effects. Manoj has not had any limitations in his activity or nocturnal asthma symptoms.      Past Medical History:   Diagnosis Date     Depressive disorder 2014    it s chill now tho I m doing fine     Uncomplicated asthma early 2000s       REVIEW OF SYSTEMS:  General: negative for weight gain. negative for weight loss. negative for changes in sleep.   Eyes: negative for itching. negative for redness. negative for tearing/watering. negative for vision changes  Ears: negative for fullness. negative for hearing loss. negative for dizziness.   Nose: negative for snoring.negative for changes in smell. negative for drainage.   Throat: negative for hoarseness. negative for sore throat. negative for trouble swallowing.   Lungs: negative for cough. negative for shortness of breath.negative for wheezing. negative for sputum production.   Cardiovascular: negative for chest pain. negative for swelling of ankles. negative for fast or irregular heartbeat.   Gastrointestinal: negative for nausea. negative for heartburn. negative for acid reflux.   Musculoskeletal: negative for joint pain. negative for joint stiffness.  negative for joint swelling.   Neurologic: negative for seizures. negative for fainting. negative for weakness.   Psychiatric: negative for changes in mood. negative for anxiety.   Endocrine: negative for cold intolerance. negative for heat intolerance. negative for tremors.   Hematologic: negative for easy bruising. negative for easy bleeding.  Integumentary: negative for rash. negative for scaling. negative for nail changes.       Current Outpatient Prescriptions:      albuterol (PROAIR HFA/PROVENTIL HFA/VENTOLIN HFA) 108 (90 BASE) MCG/ACT Inhaler, Inhale 2 puffs into the lungs every 4 hours as needed, Disp: 1 Inhaler, Rfl: 2     clindamycin (CLEOCIN T) 1 % solution, , Disp: , Rfl:      FINASTERIDE PO, Take 1.25 mg by mouth daily, Disp: , Rfl:      fluticasone (FLOVENT HFA) 110 MCG/ACT Inhaler, Inhale 2 puffs into the lungs 2 times daily, Disp: 1 Inhaler, Rfl: 0     IBUPROFEN, , Disp: , Rfl:      ketoconazole (NIZORAL) 2 % shampoo, Apply topically daily as needed for itching or irritation Apply to scalp and leave on for 5-10 minutes. Use this 3 times per week., Disp: 120 mL, Rfl: 10     minoxidil 5 % SOLN, Externally apply topically 2 times daily, Disp: , Rfl:      ranitidine (ZANTAC) 150 MG tablet, Take 1 tablet (150 mg) by mouth 2 times daily (Patient taking differently: Take 150 mg by mouth as needed ), Disp: 60 tablet, Rfl: 1     tretinoin (RETIN-A) 0.025 % cream, APPLY A THIN LAYER TO THE FACE [facility] UTD, Disp: , Rfl: 3     fluocinonide (LIDEX) 0.05 % cream, Apply topically 2 times daily, Disp: , Rfl:     EXAM:   /75  Pulse 102  Temp 97  F (36.1  C) (Oral)  Resp 18  Wt 59.5 kg (131 lb 2 oz)  SpO2 98%  BMI 19.36 kg/m2  GENERAL APPEARANCE: alert, cooperative and not in distress  SKIN: no rashes, no lesions  HEAD: atraumatic, normocephalic  ENT: no scars or lesions, nasal exam showed no discharge, swelling or lesions noted, tongue midline and normal, soft palate, uvula, and tonsils normal  NECK: no  asymmetry, masses, or scars, supple without significant adenopathy  LUNGS: unlabored respirations, no intercostal retractions or accessory muscle use, clear to auscultation without rales or wheezes  HEART: regular rate and rhythm without murmurs and normal S1 and S2  MUSCULOSKELETAL: no musculoskeletal defects are noted  NEURO: no focal deficits noted  PSYCH: does not appear depressed or anxious      WORKUP:  Spirometry  SPIROMETRY       FVC 3.95L (73% of predicted).     FEV1 3.43L (76% of predicted).     FEV1/FVC 87%     FEF 25%-75%  4.19L/s (87% of predicted).    These values are consistent with mixed mild airflow obstruction and restriction.    Asthma Control Test (ACT) total score: 24       ASSESSMENT/PLAN:  Manoj Castellanos is a 23 year old male here for follow-up of asthma. He reports that he has been doing well with twice daily flovent. He has needed albuterol only once over the last several months. Manoj did note that he felt his asthma was not as well controlled in the past week since he had recently run out of Flovent. Given his increased symptoms we discussed continuing with his current medications and not weaning off of steroids at this time.    1. Continue flovent 110mcg, 2 puffs twice daily  2. Use albuterol HFA, 2-4 puffs every 4 hours as needed  3. Follow-up in 6 months, sooner if needed      Adriana Fraser MD  Allergy/Immunology  [facility] and [place], [place]      Chart documentation done in part with Dragon Voice Recognition Software. Although reviewed after completion, some word and grammatical errors may remain.

## 2018-10-29 NOTE — LETTER
My Asthma Action Plan  Name: Manoj Castellanos   YOB: 1995  Date: 10/29/2018   My doctor: Adriana Fraser MD   My clinic: [facility]        My Control Medicine: Fluticasone propionate (Flovent) -   mcg 2 puffs twice daily  My Rescue Medicine: Albuterol (Proair/Ventolin/Proventil) inhaler 2-4 puffs every 4 hours as needed   My Asthma Severity: mild persistent  Avoid your asthma triggers: smoke, upper respiratory infections, animal dander, strong odors and fumes, exercise or sports and cold air               GREEN ZONE   Good Control    I feel good    No cough or wheeze    Can work, sleep and play without asthma symptoms       Take your asthma control medicine every day.     1. If exercise triggers your asthma, take your rescue medication    15 minutes before exercise or sports, and    During exercise if you have asthma symptoms  2. Spacer to use with inhaler: If you have a spacer, make sure to use it with your inhaler             YELLOW ZONE Getting Worse  I have ANY of these:    I do not feel good    Cough or wheeze    Chest feels tight    Wake up at night   1. Keep taking your Green Zone medications  2. Start taking your rescue medicine:    every 20 minutes for up to 1 hour. Then every 4 hours for 24-48 hours.  3. If you stay in the Yellow Zone for more than 12-24 hours, contact your doctor.           RED ZONE Medical Alert - Get Help  I have ANY of these:    I feel awful    Medicine is not helping    Breathing getting harder    Trouble walking or talking    Nose opens wide to breathe       1. Take your rescue medicine NOW  2. If your provider has prescribed an oral steroid medicine, start taking it NOW  3. Call your doctor NOW  4. If you are still in the Red Zone after 20 minutes and you have not reached your doctor:    Take your rescue medicine again and    Call 911 or go to the emergency room right away    See your regular doctor within 2 weeks of an Emergency Room or Urgent Care visit  for follow-up treatment.          Annual Reminders:  Meet with Asthma Educator,  Flu Shot in the Fall, consider Pneumonia Vaccination for patients with asthma (aged 19 and older).    Pharmacy: [facility] [address] [place] [address] AT [facility]                      Asthma Triggers  How To Control Things That Make Your Asthma Worse    Triggers are things that make your asthma worse.  Look at the list below to help you find your triggers and what you can do about them.  You can help prevent asthma flare-ups by staying away from your triggers.      Trigger                                                          What you can do   Cigarette Smoke  Tobacco smoke can make asthma worse. Do not allow smoking in your home, car or around you.  Be sure no one smokes at a child s day care or school.  If you smoke, ask your health care provider for ways to help you quit.  Ask family members to quit too.  Ask your health care provider for a referral to Quit Plan to help you quit smoking, or call 8-243-510-PLAN.     Colds, Flu, Bronchitis  These are common triggers of asthma. Wash your hands often.  Don t touch your eyes, nose or mouth.  Get a flu shot every year.     Dust Mites  These are tiny bugs that live in cloth or carpet. They are too small to see. Wash sheets and blankets in hot water every week.   Encase pillows and mattress in dust mite proof covers.  Avoid having carpet if you can. If you have carpet, vacuum weekly.   Use a dust mask and HEPA vacuum.   Pollen and Outdoor Mold  Some people are allergic to trees, grass, or weed pollen, or molds. Try to keep your windows closed.  Limit time out doors when pollen count is high.   Ask you health care provider about taking medicine during allergy season.     Animal Dander  Some people are allergic to skin flakes, urine or saliva from pets with fur or feathers. Keep pets with fur or feathers out of your home.    If you can t keep  the pet outdoors, then keep the pet out of your bedroom.  Keep the bedroom door closed.  Keep pets off cloth furniture and away from stuffed toys.     Mice, Rats, and Cockroaches  Some people are allergic to the waste from these pests.   Cover food and garbage.  Clean up spills and food crumbs.  Store grease in the refrigerator.   Keep food out of the bedroom.   Indoor Mold  This can be a trigger if your home has high moisture. Fix leaking faucets, pipes, or other sources of water.   Clean moldy surfaces.  Dehumidify basement if it is damp and smelly.   Smoke, Strong Odors, and Sprays  These can reduce air quality. Stay away from strong odors and sprays, such as perfume, powder, hair spray, paints, smoke incense, paint, cleaning products, candles and new carpet.   Exercise or Sports  Some people with asthma have this trigger. Be active!  Ask your doctor about taking medicine before sports or exercise to prevent symptoms.    Warm up for 5-10 minutes before and after sports or exercise.     Other Triggers of Asthma  Cold air:  Cover your nose and mouth with a scarf.  Sometimes laughing or crying can be a trigger.  Some medicines and food can trigger asthma.

## 2018-10-29 NOTE — LETTER
10/29/2018         RE: Manoj Castellanos  [address]  [facility] [address]        Dear Colleague,    Thank you for referring your patient, Manoj Castellanos, to the [facility]. Please see a copy of my visit note below.    Manoj Castellanos was seen in the Allergy Clinic at [facility]. The following are my recommendations regarding his Mild Persistent Asthma    1. Continue flovent 110mcg, 2 puffs twice daily  2. Use albuterol HFA, 2-4 puffs every 4 hours as needed  3. Follow-up in 6 months, sooner if needed      Manoj Castellanos is a 23 year old American male who is seen today for follow-up of asthma. He states that he has done well since his last visit. He continues to take flovent twice daily and has used albuterol only once in the last several months. Manoj ran out of the flovent a couple of weeks ago and noticed increased asthma symptoms, primarily shortness of breath. He did fill the flovent yesterday but had not gotten to the point where he felt the need to use albuterol. He is tolerating the flovent well and denies having any side effects. Manoj has not had any limitations in his activity or nocturnal asthma symptoms.      Past Medical History:   Diagnosis Date     Depressive disorder 2014    it s chill now tho I m doing fine     Uncomplicated asthma early 2000s       REVIEW OF SYSTEMS:  General: negative for weight gain. negative for weight loss. negative for changes in sleep.   Eyes: negative for itching. negative for redness. negative for tearing/watering. negative for vision changes  Ears: negative for fullness. negative for hearing loss. negative for dizziness.   Nose: negative for snoring.negative for changes in smell. negative for drainage.   Throat: negative for hoarseness. negative for sore throat. negative for trouble swallowing.   Lungs: negative for cough. negative for shortness of breath.negative for wheezing. negative for sputum production.   Cardiovascular: negative for chest pain. negative for  swelling of ankles. negative for fast or irregular heartbeat.   Gastrointestinal: negative for nausea. negative for heartburn. negative for acid reflux.   Musculoskeletal: negative for joint pain. negative for joint stiffness. negative for joint swelling.   Neurologic: negative for seizures. negative for fainting. negative for weakness.   Psychiatric: negative for changes in mood. negative for anxiety.   Endocrine: negative for cold intolerance. negative for heat intolerance. negative for tremors.   Hematologic: negative for easy bruising. negative for easy bleeding.  Integumentary: negative for rash. negative for scaling. negative for nail changes.       Current Outpatient Prescriptions:      albuterol (PROAIR HFA/PROVENTIL HFA/VENTOLIN HFA) 108 (90 BASE) MCG/ACT Inhaler, Inhale 2 puffs into the lungs every 4 hours as needed, Disp: 1 Inhaler, Rfl: 2     clindamycin (CLEOCIN T) 1 % solution, , Disp: , Rfl:      FINASTERIDE PO, Take 1.25 mg by mouth daily, Disp: , Rfl:      fluticasone (FLOVENT HFA) 110 MCG/ACT Inhaler, Inhale 2 puffs into the lungs 2 times daily, Disp: 1 Inhaler, Rfl: 0     IBUPROFEN, , Disp: , Rfl:      ketoconazole (NIZORAL) 2 % shampoo, Apply topically daily as needed for itching or irritation Apply to scalp and leave on for 5-10 minutes. Use this 3 times per week., Disp: 120 mL, Rfl: 10     minoxidil 5 % SOLN, Externally apply topically 2 times daily, Disp: , Rfl:      ranitidine (ZANTAC) 150 MG tablet, Take 1 tablet (150 mg) by mouth 2 times daily (Patient taking differently: Take 150 mg by mouth as needed ), Disp: 60 tablet, Rfl: 1     tretinoin (RETIN-A) 0.025 % cream, APPLY A THIN LAYER TO THE FACE [facility] UTD, Disp: , Rfl: 3     fluocinonide (LIDEX) 0.05 % cream, Apply topically 2 times daily, Disp: , Rfl:     EXAM:   /75  Pulse 102  Temp 97  F (36.1  C) (Oral)  Resp 18  Wt 59.5 kg (131 lb 2 oz)  SpO2 98%  BMI 19.36 kg/m2  GENERAL APPEARANCE: alert, cooperative and not in  distress  SKIN: no rashes, no lesions  HEAD: atraumatic, normocephalic  ENT: no scars or lesions, nasal exam showed no discharge, swelling or lesions noted, tongue midline and normal, soft palate, uvula, and tonsils normal  NECK: no asymmetry, masses, or scars, supple without significant adenopathy  LUNGS: unlabored respirations, no intercostal retractions or accessory muscle use, clear to auscultation without rales or wheezes  HEART: regular rate and rhythm without murmurs and normal S1 and S2  MUSCULOSKELETAL: no musculoskeletal defects are noted  NEURO: no focal deficits noted  PSYCH: does not appear depressed or anxious      WORKUP:  Spirometry  SPIROMETRY       FVC 3.95L (73% of predicted).     FEV1 3.43L (76% of predicted).     FEV1/FVC 87%     FEF 25%-75%  4.19L/s (87% of predicted).    These values are consistent with mixed mild airflow obstruction and restriction.    Asthma Control Test (ACT) total score: 24       ASSESSMENT/PLAN:  Manoj Castellanos is a 23 year old male here for follow-up of asthma. He reports that he has been doing well with twice daily flovent. He has needed albuterol only once over the last several months. Manoj did note that he felt his asthma was not as well controlled in the past week since he had recently run out of Flovent. Given his increased symptoms we discussed continuing with his current medications and not weaning off of steroids at this time.    1. Continue flovent 110mcg, 2 puffs twice daily  2. Use albuterol HFA, 2-4 puffs every 4 hours as needed  3. Follow-up in 6 months, sooner if needed      Adriana Fraser MD  Allergy/Immunology  [facility] and [place], [place]      Chart documentation done in part with Dragon Voice Recognition Software. Although reviewed after completion, some word and grammatical errors may remain.      Again, thank you for allowing me to participate in the care of your patient.        Sincerely,        Adriana Fraser MD

## 2018-10-29 NOTE — MR AVS SNAPSHOT
After Visit Summary   10/29/2018    Manoj Castellanos    MRN: [id number]           Patient Information     Date Of Birth          1995        Visit Information        Provider Department      10/29/2018 2:40 PM Adriana Fraser MD [facility]        Today's Diagnoses     Mild persistent asthma in adult without complication    -  1       Follow-ups after your visit        Your next 10 appointments already scheduled     Apr 29, 2019  3:00 PM CDT   Return Visit with Adriana Fraser MD   [facility]    [address] [facility] [address] [facility] [address] [phone]              Who to contact     If you have questions or need follow up information about today's clinic visit or your schedule please contact [facility] directly at [phone].  Normal or non-critical lab and imaging results will be communicated to you by MyChart, letter or phone within 4 business days after the clinic has received the results. If you do not hear from us within 7 days, please contact the clinic through MyChart or phone. If you have a critical or abnormal lab result, we will notify you by phone as soon as possible.  Submit refill requests through [employer] or call your pharmacy and they will forward the refill request to us. Please allow 3 business days for your refill to be completed.          Additional Information About Your Visit        MyChart Information     [employer] gives you secure access to your electronic health record. If you see a primary care provider, you can also send messages to your care team and make appointments. If you have questions, please call your primary care clinic.  If you do not have a primary care provider, please call [phone] and they will assist you.        Care EveryWhere ID     This is your Care EveryWhere ID. This could be used by other organizations to access your [place] medical records  [id number]        Your Vitals Were     Pulse  Temperature Respirations Pulse Oximetry BMI (Body Mass Index)       102 97  F (36.1  C) (Oral) 18 98% 19.36 kg/m2        Blood Pressure from Last 3 Encounters:   10/29/18 142/75   10/23/18 123/77   09/18/18 125/89    Weight from Last 3 Encounters:   10/29/18 59.5 kg (131 lb 2 oz)   10/23/18 58.5 kg (129 lb)   09/18/18 59.8 kg (131 lb 12.8 oz)              We Performed the Following     OPTICHAMBER     Spirometry, Breathing Capacity          Today's Medication Changes          These changes are accurate as of 10/29/18  9:20 PM.  If you have any questions, ask your nurse or doctor.               These medicines have changed or have updated prescriptions.        Dose/Directions    ranitidine 150 MG tablet   Commonly known as:  ZANTAC   This may have changed:    - when to take this  - reasons to take this   Used for:  Gastroesophageal reflux disease without esophagitis        Dose:  150 mg   Take 1 tablet (150 mg) by mouth 2 times daily   Quantity:  60 tablet   Refills:  1            Where to get your medicines      These medications were sent to [facility] [address] [place] [address] & [address] [address]     Phone:  [phone]     fluticasone 110 MCG/ACT Inhaler                Primary Care Provider Office Phone # Fax #    Kina KEYANNA Sesay [facility] [phone] [address] [place] [address]        Equal Access to Services     DAV ARIAS AH: Hadii aad ku hadasho Soomaali, waaxda luqadaha, qaybta kaalmada adeegyada, waxay tia millan. So [facility] [phone].    ATENCIÓN: Si habla español, tiene a carmona disposición servicios gratuitos de asistencia lingüística. Suzette al [phone].    We comply with applicable federal civil rights laws and [place] laws. We do not discriminate on the basis of race, color, national origin, age, disability, sex, sexual orientation, or gender  identity.            Thank you!     Thank you for choosing [facility]  for your care. Our goal is always to provide you with excellent care. Hearing back from our patients is one way we can continue to improve our services. Please take a few minutes to complete the written survey that you may receive in the mail after your visit with us. Thank you!             Your Updated Medication List - Protect others around you: Learn how to safely use, store and throw away your medicines at www.disposemymeds.org.          This list is accurate as of 10/29/18  9:20 PM.  Always use your most recent med list.                   Brand Name Dispense Instructions for use Diagnosis    albuterol 108 (90 Base) MCG/ACT inhaler    PROAIR HFA/PROVENTIL HFA/VENTOLIN HFA    1 Inhaler    Inhale 2 puffs into the lungs every 4 hours as needed    Mild persistent asthma without complication       clindamycin 1 % solution    CLEOCIN T          FINASTERIDE PO      Take 1.25 mg by mouth daily        fluocinonide 0.05 % cream    LIDEX     Apply topically 2 times daily        fluticasone 110 MCG/ACT Inhaler    FLOVENT HFA    1 Inhaler    Inhale 2 puffs into the lungs 2 times daily    Mild persistent asthma in adult without complication       IBUPROFEN           ketoconazole 2 % shampoo    NIZORAL    120 mL    Apply topically daily as needed for itching or irritation Apply to scalp and leave on for 5-10 minutes. Use this 3 times per week.    Loss of hair       minoxidil 5 % Soln      Externally apply topically 2 times daily        ranitidine 150 MG tablet    ZANTAC    60 tablet    Take 1 tablet (150 mg) by mouth 2 times daily    Gastroesophageal reflux disease without esophagitis       tretinoin 0.025 % cream    RETIN-A     APPLY A TH[facility] L[facility]

## 2018-10-30 ASSESSMENT — ASTHMA QUESTIONNAIRES: ACT_TOTALSCORE: 24

## 2019-01-03 DIAGNOSIS — L64.9 MALE PATTERN ALOPECIA: Primary | ICD-10-CM

## 2019-01-03 NOTE — TELEPHONE ENCOUNTER

## 2019-01-04 RX ORDER — FINASTERIDE 5 MG/1
TABLET, FILM COATED ORAL
Qty: 30 TABLET | Refills: 1 | Status: SHIPPED | OUTPATIENT
Start: 2019-01-04 | End: 2019-08-09

## 2019-01-17 ENCOUNTER — TRANSFERRED RECORDS (OUTPATIENT)
Dept: HEALTH INFORMATION MANAGEMENT | Facility: CLINIC | Age: 24
End: 2019-01-17

## 2019-01-31 ENCOUNTER — OFFICE VISIT (OUTPATIENT)
Dept: URGENT CARE | Facility: URGENT CARE | Age: 24
End: 2019-01-31
Payer: COMMERCIAL

## 2019-01-31 VITALS
TEMPERATURE: 98.4 F | WEIGHT: 131 LBS | DIASTOLIC BLOOD PRESSURE: 78 MMHG | OXYGEN SATURATION: 98 % | BODY MASS INDEX: 19.35 KG/M2 | RESPIRATION RATE: 16 BRPM | SYSTOLIC BLOOD PRESSURE: 124 MMHG | HEART RATE: 109 BPM

## 2019-01-31 DIAGNOSIS — R06.9 RESPIRATORY ABNORMALITY: Primary | ICD-10-CM

## 2019-01-31 PROCEDURE — 99214 OFFICE O/P EST MOD 30 MIN: CPT | Performed by: FAMILY MEDICINE

## 2019-02-01 NOTE — PROGRESS NOTES
SUBJECTIVE:   Manoj Castellanos is a 23 year old male presenting with a chief complaint of   Chief Complaint   Patient presents with     Urgent Care     URI     [place] a burning sensation in chest when went downstatirs, no currnet sx, little cough, burning in upper throat      Sensation which was acute comes when he went downstairs.  He went to go to have the cats who live downstairs.  He has done this before.  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     He is an established patient of [facility].    Note    Review of Systems    Past Medical History:   Diagnosis Date     Depressive disorder 2014    it s chill now tho I m doing fine     Uncomplicated asthma early 2000s     Family History   Problem Relation Age of Onset     Substance Abuse Father      Asthma Father      Pancreatic Cancer Maternal Grandfather      Other Cancer Maternal Grandfather      Prostate Cancer Paternal Grandfather      Depression Other      Anxiety Disorder Other      Mental Illness Other      Current Outpatient Medications   Medication Sig Dispense Refill     albuterol (PROAIR HFA/PROVENTIL HFA/VENTOLIN HFA) 108 (90 BASE) MCG/ACT Inhaler Inhale 2 puffs into the lungs every 4 hours as needed 1 Inhaler 2     clindamycin (CLEOCIN T) 1 % solution        finasteride (PROSCAR) 5 MG tablet Take 1.25 mg by mouth daily 30 tablet 1     fluocinonide (LIDEX) 0.05 % cream Apply topically 2 times daily       fluticasone (FLOVENT HFA) 110 MCG/ACT Inhaler Inhale 2 puffs into the lungs 2 times daily 1 Inhaler 5     IBUPROFEN        ketoconazole (NIZORAL) 2 % shampoo Apply topically daily as needed for itching or irritation Apply to scalp and leave on for 5-10 minutes. Use this 3 times per week. 120 mL 10     minoxidil 5 % SOLN Externally apply topically 2 times daily       ranitidine (ZANTAC) 150 MG tablet Take 1 tablet (150 mg) by mouth 2 times daily (Patient taking differently: Take 150 mg by mouth as needed ) 60 tablet 1     tretinoin (RETIN-A) 0.025 % cream APPLY A THIN  LAYER TO THE FACE [facility] UTD  3     Social History     Tobacco Use     Smoking status: Never Smoker     Smokeless tobacco: Never Used   Substance Use Topics     Alcohol use: Yes     Comment: infrequent at worst       OBJECTIVE  /78   Pulse 109   Temp 98.4  F (36.9  C) (Oral)   Resp 16   Wt 59.4 kg (131 lb)   SpO2 98%   BMI 19.35 kg/m       Physical Exam    Labs:  No results found for this or any previous visit (from the past 24 hour(s)).    X-Ray was not done.    ASSESSMENT:      ICD-10-CM    1. Respiratory abnormality J98.9         Medical Decision Making:    Differential Diagnosis:  Bronchospasm, bronchitis, pneumonia, tracheal irritation,    Serious Comorbid Conditions:  Adult:  None    PLAN:    1.  Observation  2.  This he was encouraged to have his inhaler close to him in case there is any bronchospasm that acutely happens when he goes back to the house.    Followup:    .  gentleman encouraged to see their primary care in the coming week.  In addition he will do some more investigating to see if there is indeed this since he says there was a part that was changed today I suspect that this is not carbon monoxide poisoning.  No other types of chemicals such as chlorine    There are no Patient Instructions on file for this visit.

## 2019-02-07 ENCOUNTER — NURSE TRIAGE (OUTPATIENT)
Dept: NURSING | Facility: CLINIC | Age: 24
End: 2019-02-07

## 2019-02-08 NOTE — TELEPHONE ENCOUNTER
"Patient reports odd reaction for the past week while house-sitting.  Patient has also started minocycline for acne.  Patient reports a couple episodes of feeling like he swallowing burning matches that causes throat pain and interferes with his breathing.  Patient says it does not feel like acid reflux.  Patient denies nausea/vomiting.  Patient was seen on 1/31/19 and recommendation was to monitor and follow up with provider.  FNA advised patient to call dermatologist who ordered the medication.  Caller verbalizes understanding.              Reason for Disposition    Caller requesting a NON-URGENT new prescription or refill and triager unable to refill per unit policy    Additional Information    Negative: Drug overdose and nurse unable to answer question    Negative: Caller requesting information not related to medicine    Negative: Caller requesting a prescription for Strep throat and has a positive culture result    Negative: Rash while taking a medication or within 3 days of stopping it    Negative: Immunization reaction suspected    Negative: [1] Asthma and [2] having symptoms of asthma (cough, wheezing, etc)    Negative: MORE THAN A DOUBLE DOSE of a prescription or over-the-counter (OTC) drug    Negative: [1] DOUBLE DOSE (an extra dose or lesser amount) of over-the-counter (OTC) drug AND [2] any symptoms (e.g., dizziness, nausea, pain, sleepiness)    Negative: [1] DOUBLE DOSE (an extra dose or lesser amount) of prescription drug AND [2] any symptoms (e.g., dizziness, nausea, pain, sleepiness)    Negative: Took another person's prescription drug    Negative: [1] DOUBLE DOSE (an extra dose or lesser amount) of prescription drug AND [2] NO symptoms (Exception: a double dose of antibiotics)    Negative: Diabetes drug error or overdose (e.g., insulin or extra dose)    Negative: [1] Request for URGENT new prescription or refill of \"essential\" medication (i.e., likelihood of harm to patient if not taken) AND [2] " triager unable to fill per unit policy    Negative: [1] Prescription not at pharmacy AND [2] was prescribed today by PCP    Negative: Pharmacy calling with prescription questions and triager unable to answer question    Negative: Caller has URGENT medication question about med that PCP prescribed and triager unable to answer question    Negative: Caller has NON-URGENT medication question about med that PCP prescribed and triager unable to answer question    Protocols used: MEDICATION QUESTION CALL-ADULT-

## 2019-03-24 ENCOUNTER — MYC MEDICAL ADVICE (OUTPATIENT)
Dept: FAMILY MEDICINE | Facility: CLINIC | Age: 24
End: 2019-03-24

## 2019-03-25 ENCOUNTER — TELEPHONE (OUTPATIENT)
Dept: FAMILY MEDICINE | Facility: CLINIC | Age: 24
End: 2019-03-25

## 2019-03-25 NOTE — TELEPHONE ENCOUNTER
Please see my chart message and response     Thank you,   Shefali Son, Registered Nurse   [facility]

## 2019-03-25 NOTE — TELEPHONE ENCOUNTER
Patient tried to schedule appointment in clinic for heart palpitations     This writer placed call to patient     He is currently NOT having any chest pain/palpitations/sob -     He states he has had this heart palpitations on and off for most of his adult life     Patient would like to see pcp only - appointment scheduled for 3/28/19    Patient is advised that If he has chest pain/pressure/palpitations/sob to go to ER for evaluation and patient states understanding     He does not feel this is cardiac related and ensures writer that he will go to ER if he feels it is as he has good insurance and he is not worried about that     Shefali Son Registered Nurse   [facility]

## 2019-03-28 ENCOUNTER — OFFICE VISIT (OUTPATIENT)
Dept: FAMILY MEDICINE | Facility: CLINIC | Age: 24
End: 2019-03-28
Payer: COMMERCIAL

## 2019-03-28 VITALS
OXYGEN SATURATION: 97 % | BODY MASS INDEX: 19.2 KG/M2 | WEIGHT: 130 LBS | RESPIRATION RATE: 16 BRPM | HEART RATE: 94 BPM | DIASTOLIC BLOOD PRESSURE: 84 MMHG | SYSTOLIC BLOOD PRESSURE: 126 MMHG | TEMPERATURE: 99.1 F

## 2019-03-28 DIAGNOSIS — R00.2 PALPITATIONS: Primary | ICD-10-CM

## 2019-03-28 LAB — HGB BLD-MCNC: 17 G/DL (ref 13.3–17.7)

## 2019-03-28 PROCEDURE — 84443 ASSAY THYROID STIM HORMONE: CPT | Performed by: NURSE PRACTITIONER

## 2019-03-28 PROCEDURE — 99214 OFFICE O/P EST MOD 30 MIN: CPT | Performed by: NURSE PRACTITIONER

## 2019-03-28 PROCEDURE — 85018 HEMOGLOBIN: CPT | Performed by: NURSE PRACTITIONER

## 2019-03-28 PROCEDURE — 93000 ELECTROCARDIOGRAM COMPLETE: CPT | Performed by: NURSE PRACTITIONER

## 2019-03-28 PROCEDURE — 36415 COLL VENOUS BLD VENIPUNCTURE: CPT | Performed by: NURSE PRACTITIONER

## 2019-03-28 PROCEDURE — 80048 BASIC METABOLIC PNL TOTAL CA: CPT | Performed by: NURSE PRACTITIONER

## 2019-03-28 RX ORDER — CETIRIZINE HYDROCHLORIDE 10 MG/1
TABLET ORAL
Refills: 11 | COMMUNITY
Start: 2019-03-07 | End: 2019-04-05

## 2019-03-28 NOTE — PATIENT INSTRUCTIONS
"Patient Education     Heart Monitor:  [phone]     Heart Palpitations    Palpitations are the feeling that your heart is beating hard, fast, or irregular. Some describe it as \"pounding\" or \"skipped beats.\" Palpitations may occur in someone with heart disease, but can also occur in a healthy person.  Heart-related causes:    Arrhythmia (a change from the heart's normal rhythm)    Heart valve disease    Disease of the heart muscle    Coronary artery disease    High blood pressure  Non-heart-related causes:    Certain medicines such as asthma inhalers and decongestants    Some herbal supplements, energy drinks and pills, and weight loss pills    Illegal stimulant drugs such as cocaine, crank, methamphetamine, PCP, bath salts, or ecstasy    Caffeine, alcohol, and tobacco    Medical conditions such as thyroid disease, anemia, anxiety, and panic disorder  Sometimes the cause can't be found.  Home care  Follow these home care tips:    Don't use too much caffeine, alcohol, tobacco, or any stimulant drugs.    Tell your doctor about any prescription or over-the-counter or herbal medicines you take.  Follow-up care    Follow up with your doctor, or as advised.  Call 911  This is the fastest and safest way to get to the emergency department. The paramedics can also begin treatment on the way to the hospital, if needed.  Don't wait until your symptoms are severe to call 911. These are reasons to call 911:    Chest pain    Shortness of breath    Feeling lightheaded, faint, or dizzy    Fainting or loss of consciousness    Very irregular heartbeat    Rapid heartbeat that makes you uncomfortable    Slower than usual heart rate associated with symptoms    Slower than usual heart rate    Chest pain with weakness, dizziness, heavy sweating, nausea, or vomiting    Extreme drowsiness or confusion    Weakness of an arm or leg, or on 1 side of the face    Difficulty with speech or vision  When to seek medical advice  Call your " healthcare provider right away if you have palpitations and any of the following:    Weakness    Dizziness    Lightheadedness    Fainting  Date Last Reviewed: 4/27/2016 2000-2018 [employer]. [address] [place] [address], [place] [address]. All rights reserved. This information is not intended as a substitute for professional medical care. Always follow your healthcare professional's instructions.

## 2019-03-28 NOTE — PROGRESS NOTES
"  SUBJECTIVE:   Manoj Castellanos is a 23 year old male who presents to clinic today for the following health issues:  Chief Complaint   Patient presents with     Palpitations     past 5 years on and off and now nearly daily over the last 2-3 weeks,        For the past 2-3 weeks, Manoj has had a near daily heart palpitation.  It happens for \"one beat\" and then no more.  No symptoms of feeling idzzy, light headed, fainting, chest pain, SOB, etc.  Last occurrence was yesterday.    Eating \"enough food in the day.\"  History of being a grazer.    Fluids:  Drinkng adequate fluids.  He has been drinking more diet sodas recently (ginger ale, root beer, cherry 7 up).  Rare alcohol.    No caffeine usually: as this will usually make him feel jittery.    Sleep:  Steady.  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    Problem list and histories reviewed & adjusted, as indicated.  Additional history: as documented    Patient Active Problem List   Diagnosis     Patellofemoral pain syndrome     Right hip pain     Abnormal sinus finding on MRI     Mild intermittent asthma in adult without complication     Chronic allergic rhinitis due to animal hair and dander     Allergic rhinitis due to dust mite     Chronic seasonal allergic rhinitis due to pollen     Allergic conjunctivitis of both eyes     Past Surgical History:   Procedure Laterality Date     HC TOOTH EXTRACTION W/FORCEP  2016    4 teeth        Social History     Tobacco Use     Smoking status: Never Smoker     Smokeless tobacco: Never Used   Substance Use Topics     Alcohol use: Yes     Comment: infrequent at worst     Family History   Problem Relation Age of Onset     Substance Abuse Father      Asthma Father      Pancreatic Cancer Maternal Grandfather      Other Cancer Maternal Grandfather      Prostate Cancer Paternal Grandfather      Depression Other      Anxiety Disorder Other      Mental Illness Other          Current Outpatient Medications   Medication Sig Dispense " Refill     albuterol (PROAIR HFA/PROVENTIL HFA/VENTOLIN HFA) 108 (90 BASE) MCG/ACT Inhaler Inhale 2 puffs into the lungs every 4 hours as needed 1 Inhaler 2     finasteride (PROSCAR) 5 MG tablet Take 1.25 mg by mouth daily 30 tablet 1     fluocinonide (LIDEX) 0.05 % cream Apply topically 2 times daily       fluticasone (FLOVENT HFA) 110 MCG/ACT Inhaler Inhale 2 puffs into the lungs 2 times daily 1 Inhaler 5     IBUPROFEN        minoxidil 5 % SOLN Externally apply topically 2 times daily       ranitidine (ZANTAC) 150 MG tablet Take 1 tablet (150 mg) by mouth 2 times daily (Patient taking differently: Take 150 mg by mouth as needed ) 60 tablet 1     cetirizine (ZYRTEC) 10 MG tablet   11     clindamycin (CLEOCIN T) 1 % solution        ketoconazole (NIZORAL) 2 % shampoo Apply topically daily as needed for itching or irritation Apply to scalp and leave on for 5-10 minutes. Use this 3 times per week. (Patient not taking: Reported on 3/28/2019) 120 mL 10     tretinoin (RETIN-A) 0.025 % cream APPLY A THIN LAYER TO THE FACE QHS UTD  3     Allergies   Allergen Reactions     Amoxicillin Swelling     Lip swelling      Cefuroxime      Swelling      Minocycline Other (See Comments)     Throat felt like it was on fire     Recent Labs   Lab Test 08/20/18  1634 10/02/17  1431     --    HDL 43.8  --    TRIG 76.4  --    ALT 28.0  --    CR 0.8 0.95   GFRESTIMATED >90 >90   GFRESTBLACK >90 >90   POTASSIUM 4.2 3.9   TSH  --  2.98      BP Readings from Last 3 Encounters:   03/28/19 126/84   01/31/19 124/78   10/29/18 142/75    Wt Readings from Last 3 Encounters:   03/28/19 59 kg (130 lb)   01/31/19 59.4 kg (131 lb)   10/29/18 59.5 kg (131 lb 2 oz)                  Labs reviewed in EPIC    Reviewed and updated as needed this visit by clinical staff  Tobacco  Allergies  Meds  Med Hx  Surg Hx  Fam Hx  Soc Hx      Reviewed and updated as needed this visit by Provider         ROS:  Constitutional, HEENT, cardiovascular, pulmonary,  GI, , musculoskeletal, neuro, skin, endocrine and psych systems are negative, except as otherwise noted.    OBJECTIVE:     /84   Pulse 94   Temp 99.1  F (37.3  C) (Oral)   Resp 16   Wt 59 kg (130 lb)   SpO2 97%   BMI 19.20 kg/m    Body mass index is 19.2 kg/m .  GENERAL: healthy, alert and no distress  EYES: Eyes grossly normal to inspection, PERRL and conjunctivae and sclerae normal  HENT: ear canals and TM's normal, nose and mouth without ulcers or lesions  NECK: no adenopathy and thyroid normal to palpation  RESP: lungs clear to auscultation - no rales, rhonchi or wheezes  CV: regular rate and rhythm, normal S1 S2, no S3 or S4, no murmur, click or rub, no peripheral edema and peripheral pulses strong   MS: no gross musculoskeletal defects noted, no edema. Ambulatory with a steady gait.   SKIN: warm and dry  NEURO: Normal strength and tone, mentation intact and speech normal  PSYCH: mentation appears normal, affect normal/bright    Diagnostics:  EKG:  Sinus arrhythmia     Lab testing pending    ASSESSMENT/PLAN:     (R00.2) Palpitations  (primary encounter diagnosis)  Comment: Uncertain  Plan: EKG 12-lead complete w/read - Clinics,         Hemoglobin, TSH, Basic metabolic panel, Cardiac        Event Monitor Adult Pediatric        I reassured type today that his EKG showed a sinus arrhythmia with no concerning significant findings.  I did talk to him about doing labs to make sure all of his baseline labs look normal.  I also discussed in the event monitor which he would like to do.  I did place order for the event monitor.  He is to have that placed under his earliest convenience.  I did tell him that if in the event that after the event monitor is removed in a week goes by, if he is not heard from me regarding his results, he is to let me know so we can look into getting those results to him.  I talked to him about eating healthy diet, abstaining from all alcohol, extending from all caffeine, drinking  plenty of water, getting sleep etc.  I did tell him that if he develops any chest pain, shortness of breath, dizziness, fainting, especially with the palpitation, he is to go the ER.  He verbalizes understanding.  Questions were answered.    This note was created using the Dragon Medical Dictating Software and therefore should be read with the understanding of the potential for subtle errors in transcription.    [facility]ANNA [facility]

## 2019-03-29 LAB
ANION GAP SERPL CALCULATED.3IONS-SCNC: 4 MMOL/L (ref 3–14)
BUN SERPL-MCNC: 9 MG/DL (ref 7–30)
CALCIUM SERPL-MCNC: 9.2 MG/DL (ref 8.5–10.1)
CHLORIDE SERPL-SCNC: 106 MMOL/L (ref 94–109)
CO2 SERPL-SCNC: 29 MMOL/L (ref 20–32)
CREAT SERPL-MCNC: 0.93 MG/DL (ref 0.66–1.25)
GFR SERPL CREATININE-BSD FRML MDRD: >90 ML/MIN/{1.73_M2}
GLUCOSE SERPL-MCNC: 100 MG/DL (ref 70–99)
POTASSIUM SERPL-SCNC: 3.9 MMOL/L (ref 3.4–5.3)
SODIUM SERPL-SCNC: 139 MMOL/L (ref 133–144)
TSH SERPL DL<=0.005 MIU/L-ACNC: 1.71 MU/L (ref 0.4–4)

## 2019-04-02 ENCOUNTER — TRANSFERRED RECORDS (OUTPATIENT)
Dept: HEALTH INFORMATION MANAGEMENT | Facility: CLINIC | Age: 24
End: 2019-04-02

## 2019-04-02 ENCOUNTER — ANCILLARY PROCEDURE (OUTPATIENT)
Dept: CARDIOLOGY | Facility: CLINIC | Age: 24
End: 2019-04-02
Attending: NURSE PRACTITIONER
Payer: COMMERCIAL

## 2019-04-02 DIAGNOSIS — R00.2 PALPITATIONS: ICD-10-CM

## 2019-04-02 PROCEDURE — 93270 REMOTE 30 DAY ECG REV/REPORT: CPT | Mod: ZF

## 2019-04-02 NOTE — RESULT ENCOUNTER NOTE
Karlee Aranda,    This note is to let you know that all of your blood test results look fine.  Your blood sugar, glucose, came back borderline but this is not overly concerning.  I would recommend that you eat a healthy diet and take care of yourself as we talked at your appointment.      Let me know if you have any questions.    Kina BRICEÑO CNP

## 2019-04-05 DIAGNOSIS — J30.89 ALLERGIC RHINITIS DUE TO DUST MITE: ICD-10-CM

## 2019-04-05 DIAGNOSIS — J30.81 CHRONIC ALLERGIC RHINITIS DUE TO ANIMAL HAIR AND DANDER: Primary | ICD-10-CM

## 2019-04-05 DIAGNOSIS — J30.1 CHRONIC SEASONAL ALLERGIC RHINITIS DUE TO POLLEN: ICD-10-CM

## 2019-04-05 NOTE — TELEPHONE ENCOUNTER
Requested Prescriptions   Pending Prescriptions Disp Refills     cetirizine (ZYRTEC) 10 MG tablet  11    There is no refill protocol information for this order

## 2019-04-05 NOTE — TELEPHONE ENCOUNTER
Pending Prescriptions:                       Disp   Refills    cetirizine (ZYRTEC) 10 MG tablet                 11         Routing refill request to provider for review/approval because:  Medication is reported/historical.    Estefany Cloud RN

## 2019-04-06 ENCOUNTER — OFFICE VISIT (OUTPATIENT)
Dept: URGENT CARE | Facility: URGENT CARE | Age: 24
End: 2019-04-06
Payer: COMMERCIAL

## 2019-04-06 VITALS
HEART RATE: 87 BPM | WEIGHT: 130 LBS | OXYGEN SATURATION: 98 % | TEMPERATURE: 97.1 F | SYSTOLIC BLOOD PRESSURE: 121 MMHG | DIASTOLIC BLOOD PRESSURE: 80 MMHG | BODY MASS INDEX: 19.2 KG/M2

## 2019-04-06 DIAGNOSIS — R07.89 CHEST WALL PAIN: Primary | ICD-10-CM

## 2019-04-06 PROCEDURE — 99213 OFFICE O/P EST LOW 20 MIN: CPT | Performed by: FAMILY MEDICINE

## 2019-04-06 RX ORDER — CETIRIZINE HYDROCHLORIDE 10 MG/1
10 TABLET ORAL DAILY
Qty: 30 TABLET | Refills: 11 | Status: SHIPPED | OUTPATIENT
Start: 2019-04-06 | End: 2019-06-25

## 2019-04-06 NOTE — PROGRESS NOTES
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  It happened for a little while and then it quit.  He felt a little numbness in his face which he recognized as being from anxiety and breathing fast which he has had before.  He is in the process of having a heart monitor to evaluate for some arrhythmias, has had it on for about 2 weeks, and has had occasional irregular heartbeats during that time but not during this time.  He does have asthma.  He did take an albuterol inhaler shot even though this did not have any wheezing associated with it.  He does not have a cold.    Objective: NAD.  Vitals are stable.  Oxygenation is normal.  Lungs are clear.  The heart is regular without murmurs.  Abdomen benign.  I cannot cause any pain by pressing in the ribs in the lower rib areas where the pain was.  He is pain-free right now    Assessment and plan: Chest wall spasms.  It is hard to know why they happen.  Exercise may actually help.  Reassured.  If they keep coming back ibuprofen might help.

## 2019-04-09 ENCOUNTER — TRANSFERRED RECORDS (OUTPATIENT)
Dept: HEALTH INFORMATION MANAGEMENT | Facility: CLINIC | Age: 24
End: 2019-04-09

## 2019-04-19 ENCOUNTER — MYC MEDICAL ADVICE (OUTPATIENT)
Dept: FAMILY MEDICINE | Facility: CLINIC | Age: 24
End: 2019-04-19

## 2019-04-19 NOTE — TELEPHONE ENCOUNTER
He was seen in UC for this 4/6/2019 and dx with chest wall pain. Seeing he is still having this pain do you want him to follow up with you?

## 2019-04-19 NOTE — TELEPHONE ENCOUNTER
I do not have any appointments available today.  Ty could come into urgent care this afternoon if his symptoms are urgent as I will not be back in clinic until 4/23/2019.    I do not have the cardiac monitor results.  Please check with Ty as to when the heart monitor was removed and call the cardiology clinic asking for the report as these have a tendency to get delayed in transit/reports to me.    Thank you.

## 2019-04-22 NOTE — TELEPHONE ENCOUNTER
Triage called cardiology. Patient needs to return the event monitor first.    Message patient with the plan.  Ashley Narayanan RN

## 2019-04-24 NOTE — RESULT ENCOUNTER NOTE
Karlee Aranda,    This note is to let you know that your cardiac event monitor came back good.  There were no worrisome beats with your palpitations.  At the time that you noted issues, you were having with called  premature ventricular contractions, or PVCs.    If this is bothersome to you, I would recommend that you follow-up with a cardiologist for a consultation.  Let me know how you are feeling.    Kina BRICEÑO CNP

## 2019-04-30 ENCOUNTER — MYC MEDICAL ADVICE (OUTPATIENT)
Dept: FAMILY MEDICINE | Facility: CLINIC | Age: 24
End: 2019-04-30

## 2019-04-30 NOTE — LETTER
[address] [address]  [phone]         April 30, 2019       Ty Jasmin    [address]  [facility] [address]      Karlee Ty,    This note is to let you know that your cardiac event monitor came back good.  There were no worrisome beats with your palpitations.  At the time that you noted issues, you were having with called  premature ventricular contractions, or PVCs.    If this is bothersome to you, I would recommend that you follow-up with a cardiologist for a consultation.  Let me know how you are feeling.        Kina BRICEÑO CNP

## 2019-04-30 NOTE — TELEPHONE ENCOUNTER
Kina-Please review and advise if patient's cardiac event monitoring results can be released to [employer]?  Writer does not find cardiac event monitoring as an option to release to [employer] under [employer] Results Release tab.    Thank you!  KEIRY HolmanN, RN

## 2019-05-01 NOTE — TELEPHONE ENCOUNTER
Writer relayed message from PCP regarding cardiac monitor. Per patient he does not need the report if everything looked good. No further action needed at this time.    Lianne aDvila

## 2019-05-01 NOTE — TELEPHONE ENCOUNTER
Please ask pt if he would like to  a copy of this cardiac event monitor report, or we can mail to him.   Either way, can you please print out the report and get it to him? lmk if you have trouble finding it to print off.     Thanks!     Steffanie Avitia RN

## 2019-05-01 NOTE — TELEPHONE ENCOUNTER
"According to the result note, \"This result is not viewable to the patient.\"    I am not sure what this means.  If Ty wants the original report, we can print it out and mail it to Ty.    "

## 2019-05-24 ENCOUNTER — OFFICE VISIT (OUTPATIENT)
Dept: URGENT CARE | Facility: URGENT CARE | Age: 24
End: 2019-05-24
Payer: COMMERCIAL

## 2019-05-24 VITALS
HEIGHT: 70 IN | TEMPERATURE: 98.7 F | DIASTOLIC BLOOD PRESSURE: 62 MMHG | SYSTOLIC BLOOD PRESSURE: 100 MMHG | BODY MASS INDEX: 18.61 KG/M2 | HEART RATE: 104 BPM | RESPIRATION RATE: 14 BRPM | WEIGHT: 130 LBS

## 2019-05-24 DIAGNOSIS — J06.9 VIRAL URI: ICD-10-CM

## 2019-05-24 DIAGNOSIS — R07.0 THROAT PAIN: Primary | ICD-10-CM

## 2019-05-24 LAB
DEPRECATED S PYO AG THROAT QL EIA: NORMAL
SPECIMEN SOURCE: NORMAL

## 2019-05-24 PROCEDURE — 87880 STREP A ASSAY W/OPTIC: CPT | Performed by: HOSPITALIST

## 2019-05-24 PROCEDURE — 87081 CULTURE SCREEN ONLY: CPT | Performed by: HOSPITALIST

## 2019-05-24 PROCEDURE — 99213 OFFICE O/P EST LOW 20 MIN: CPT

## 2019-05-24 RX ORDER — ONDANSETRON 4 MG/1
4 TABLET, ORALLY DISINTEGRATING ORAL EVERY 8 HOURS PRN
Qty: 20 TABLET | Refills: 0 | Status: SHIPPED | OUTPATIENT
Start: 2019-05-24 | End: 2020-03-17

## 2019-05-24 ASSESSMENT — MIFFLIN-ST. JEOR: SCORE: 1600.93

## 2019-05-24 NOTE — PROGRESS NOTES
"  Pt came here for headache. Nausea, vomiting. Slight chest congestion. Pt has no fever but felt chill. No other complain.     Allergies   Allergen Reactions     Amoxicillin Swelling     Lip swelling      Cefuroxime      Swelling      Minocycline Other (See Comments)     Throat felt like it was on fire       Past Medical History:   Diagnosis Date     Depressive disorder 2014    it s chill now tho I m doing fine     Uncomplicated asthma early 2000s         Current Outpatient Medications on File Prior to Visit:  albuterol (PROAIR HFA/PROVENTIL HFA/VENTOLIN HFA) 108 (90 BASE) MCG/ACT Inhaler Inhale 2 puffs into the lungs every 4 hours as needed   finasteride (PROSCAR) 5 MG tablet Take 1.25 mg by mouth daily   fluticasone (FLOVENT HFA) 110 MCG/ACT Inhaler Inhale 2 puffs into the lungs 2 times daily   IBUPROFEN    ISOtretinoin (ACCUTANE PO)    ranitidine (ZANTAC) 150 MG tablet Take 1 tablet (150 mg) by mouth 2 times daily (Patient taking differently: Take 150 mg by mouth as needed )   cetirizine (ZYRTEC) 10 MG tablet Take 1 tablet (10 mg) by mouth daily   clindamycin (CLEOCIN T) 1 % solution    fluocinonide (LIDEX) 0.05 % cream Apply topically 2 times daily   ketoconazole (NIZORAL) 2 % shampoo Apply topically daily as needed for itching or irritation Apply to scalp and leave on for 5-10 minutes. Use this 3 times per week.   minoxidil 5 % SOLN Externally apply topically 2 times daily   tretinoin (RETIN-A) 0.025 % cream APPLY A THIN LAYER TO THE FACE QHS UTD     No current facility-administered medications on file prior to visit.     Social History     Tobacco Use     Smoking status: Never Smoker     Smokeless tobacco: Never Used   Substance Use Topics     Alcohol use: Yes     Comment: infrequent at worst       ROS:  12 point ROS is done and aside that mention above all other review of system is negative    OBJECTIVE:  /62   Pulse 104   Temp 98.7  F (37.1  C) (Oral)   Resp 14   Ht 1.778 m (5' 10\")   Wt 59 kg " (130 lb)   BMI 18.65 kg/m    GENERAL APPEARANCE: healthy, alert and moderate distress  EYES: conjunctiva clear  EARS:no cerumen.   Ear canals no erythema, TM's intact no erythema .    NOSE/MOUTH: Nose and mouth is normal, no erythema or lesions  THROAT: positive erythema w/ no tonsillar enlargement . no exudates  NECK: supple, nontender, positive lymphadenopathy  RESP: lungs clear to auscultation - no rales, rhonchi or wheezes  CV: regular rates and rhythm, normal S1 S2, no murmur noted  NEURO: awake, alert        Recent Results (from the past 168 hour(s))   Strep, Rapid Screen    Collection Time: 05/24/19  1:35 PM   Result Value Ref Range    Specimen Description Throat     Rapid Strep A Screen       NEGATIVE: No Group A streptococcal antigen detected by immunoassay, await culture report.        ASSESSMENT:     ICD-10-CM    1. Throat pain R07.0 Strep, Rapid Screen     Beta strep group A culture         PLAN:    Seem to be viral infection. Tylenol and ibuprofen prn for  Pain or fever. Will give zofran prn for nausea.   Lots of rest and fluids.  Follow up in 4-7 days if not better or sooner if getting worse .    Osman Zhang MD

## 2019-05-25 LAB
BACTERIA SPEC CULT: NORMAL
SPECIMEN SOURCE: NORMAL

## 2019-05-31 ENCOUNTER — TRANSFERRED RECORDS (OUTPATIENT)
Dept: HEALTH INFORMATION MANAGEMENT | Facility: CLINIC | Age: 24
End: 2019-05-31

## 2019-06-21 ENCOUNTER — TELEPHONE (OUTPATIENT)
Dept: ALLERGY | Facility: CLINIC | Age: 24
End: 2019-06-21

## 2019-06-21 DIAGNOSIS — J45.30 MILD PERSISTENT ASTHMA IN ADULT WITHOUT COMPLICATION: ICD-10-CM

## 2019-06-21 NOTE — TELEPHONE ENCOUNTER
Central Prior Authorization Team   Phone: [phone]      Prior Authorization Retail Medication Request    Medication/Dose: fluticasone (FLOVENT HFA) 110 MCG/ACT Inhaler  ICD code (if different than what is on RX):    Previously Tried and Failed:    Rationale:      Insurance Name:    Insurance ID:  [id number]      Pharmacy Information (if different than what is on RX)  Name:  [facility] [address] [place] [address]  Phone:  [phone]    Patient called in stating that the pharmacy told him a PA is required on his Flovent HFA.  He is out of medication and needs ASAP, without medication it is hard to breath.  Will make a follow up appt with Dr. Fraser once his cough is gone for a check up.

## 2019-06-21 NOTE — TELEPHONE ENCOUNTER
Prior Authorization Not Needed per Insurance    Medication: fluticasone (FLOVENT HFA) 110 MCG/ACT Inhaler  Insurance Company: [employer] - Phone [phone] Fax [phone]  Pharmacy Filling the Rx: [facility] [address] [place] [address] Prior Authorization Team   Phone: [phone]    Per response back from insurance a PA is not needed this is covered by patient's plan. Called [facility] at [phone] to see what the rejection might state, per pharmacist this actually just needs refills sent as the last one was on 05/10/19 and was a $3 copay. Routing to clinic as no PA is needed, pharmacy just needs refills.

## 2019-06-24 RX ORDER — FLUTICASONE PROPIONATE 110 UG/1
2 AEROSOL, METERED RESPIRATORY (INHALATION) 2 TIMES DAILY
Qty: 1 INHALER | Refills: 0 | Status: SHIPPED | OUTPATIENT
Start: 2019-06-24 | End: 2019-06-25

## 2019-06-24 NOTE — TELEPHONE ENCOUNTER
Requested Prescriptions   Pending Prescriptions Disp Refills     fluticasone (FLOVENT HFA) 110 MCG/ACT inhaler 1 Inhaler 5     Sig: Inhale 2 puffs into the lungs 2 times daily       There is no refill protocol information for this order        Routing refill request to provider for review/approval because:  Last office visit: 10/28/2018  Recommended follow-up in 6 months.     Roxanne Garibay RN

## 2019-06-24 NOTE — PROGRESS NOTES
"Subjective     Manoj Castellanos is a 23 year old adult who presents to clinic today for the following health issues:  Chief Complaint   Patient presents with     Cough     x 05/24/2019 here and there     Nausea     x 05/24/2019     Throat Problem     left side pain hurts to swallow and sing x a year or so          Nasal congestion, froggy throat.  Throat irritated, intermittent on the left side of the back of this throat.  Intermittently this pain is severe.  Nausea, \"it is part of this viral infection bullshit.\"  Taking ondensatron intermittently which helps when he takes it but he does not want to take it daily.    Fatigue, nausea, coughing.         Patient Active Problem List   Diagnosis     Patellofemoral pain syndrome     Right hip pain     Abnormal sinus finding on MRI     Mild intermittent asthma in adult without complication     Chronic allergic rhinitis due to animal hair and dander     Allergic rhinitis due to dust mite     Chronic seasonal allergic rhinitis due to pollen     Allergic conjunctivitis of both eyes     Palpitations     Past Surgical History:   Procedure Laterality Date     HC TOOTH EXTRACTION W/FORCEP  2016    4 teeth        Social History     Tobacco Use     Smoking status: Never Smoker     Smokeless tobacco: Never Used   Substance Use Topics     Alcohol use: Yes     Comment: infrequent at worst     Family History   Problem Relation Age of Onset     Substance Abuse Father      Asthma Father      Pancreatic Cancer Maternal Grandfather      Other Cancer Maternal Grandfather      Prostate Cancer Paternal Grandfather      Depression Other      Anxiety Disorder Other      Mental Illness Other          Current Outpatient Medications   Medication Sig Dispense Refill     albuterol (PROAIR HFA/PROVENTIL HFA/VENTOLIN HFA) 108 (90 BASE) MCG/ACT Inhaler Inhale 2 puffs into the lungs every 4 hours as needed 1 Inhaler 2     cetirizine (ZYRTEC) 10 MG tablet Take 1 tablet (10 mg) by mouth daily 30 tablet 11     " clindamycin (CLEOCIN T) 1 % solution        finasteride (PROSCAR) 5 MG tablet Take 1.25 mg by mouth daily 30 tablet 1     fluocinonide (LIDEX) 0.05 % cream Apply topically 2 times daily       fluticasone (FLOVENT HFA) 110 MCG/ACT inhaler Inhale 2 puffs into the lungs 2 times daily Needs appointment for further refills 1 Inhaler 0     IBUPROFEN        ISOtretinoin (ACCUTANE PO)        ketoconazole (NIZORAL) 2 % shampoo Apply topically daily as needed for itching or irritation Apply to scalp and leave on for 5-10 minutes. Use this 3 times per week. 120 mL 10     minoxidil 5 % SOLN Externally apply topically 2 times daily       ondansetron (ZOFRAN-ODT) 4 MG ODT tab Take 1 tablet (4 mg) by mouth every 8 hours as needed for nausea 20 tablet 0     ranitidine (ZANTAC) 150 MG tablet Take 1 tablet (150 mg) by mouth 2 times daily (Patient taking differently: Take 150 mg by mouth as needed ) 60 tablet 1     tretinoin (RETIN-A) 0.025 % cream APPLY A THIN LAYER TO THE FACE [facility] UTD  3     Allergies   Allergen Reactions     Amoxicillin Swelling     Lip swelling      Cefuroxime      Swelling      Minocycline Other (See Comments)     Throat felt like it was on fire     Recent Labs   Lab Test 03/28/19  1432 08/20/18  1634 10/02/17  1431   LDL  --  108  --    HDL  --  43.8  --    TRIG  --  76.4  --    ALT  --  28.0  --    CR 0.93 0.8 0.95   GFRESTIMATED >90 >90 >90   GFRESTBLACK >90 >90 >90   POTASSIUM 3.9 4.2 3.9   TSH 1.71  --  2.98      BP Readings from Last 3 Encounters:   06/25/19 129/82   05/24/19 100/62   04/06/19 121/80    Wt Readings from Last 3 Encounters:   06/25/19 57.2 kg (126 lb)   05/24/19 59 kg (130 lb)   04/06/19 59 kg (130 lb)            Reviewed and updated as needed this visit by Provider         Review of Systems   ROS COMP: Constitutional, HEENT, cardiovascular, pulmonary, GI, , musculoskeletal, neuro, skin, endocrine and psych systems are negative, except as otherwise noted.      Objective    /82 (BP  "Location: Right arm, Patient Position: Sitting, Cuff Size: Adult Regular)   Pulse 103   Temp 98.5  F (36.9  C) (Oral)   Resp 16   Ht 1.778 m (5' 10\")   Wt 57.2 kg (126 lb)   SpO2 96%   BMI 18.08 kg/m    Body mass index is 18.08 kg/m .  Physical Exam   EXAM:  Constitutional: healthy, alert, active and no distress  Neck: Neck supple. No adenopathy.  ENT: Bilateral TM's are normal.  Posterior oropharynx is clear.  Nares with congestion.  Cardiovascular: S1, S2  Respiratory: Occasional hacky cough.   Respirations easy and regular. No respiratory distress. Lungs sounds CTA.  Skin: warm and dry  Psychiatric: mentation appears normal. and affect normal/bright          Assessment & Plan     (J30.81) Chronic allergic rhinitis due to animal hair and dander  Comment: History of  Plan: cetirizine (ZYRTEC) 10 MG tablet,         OTOLARYNGOLOGY REFERRAL        I talked with ty today at length about allergies, treating regularly with Zyrtec daily, and a follow-up appoint with ENT if symptoms not improved.  I reassured ty that there is no need for antibiotics today.  Ty agrees.      (J30.1) Chronic seasonal allergic rhinitis due to pollen  Comment:   Plan: cetirizine (ZYRTEC) 10 MG tablet,         OTOLARYNGOLOGY REFERRAL        As above    (J30.89) Allergic rhinitis due to dust mite  Comment:   Plan: cetirizine (ZYRTEC) 10 MG tablet,         OTOLARYNGOLOGY REFERRAL        As above  (J45.30) Mild persistent asthma in adult without complication  Comment: History of  Plan: fluticasone (FLOVENT HFA) 110 MCG/ACT inhaler        Refills given.  Follow-up with allergy and asthma specialty as planned.         See Patient Instructions    Return in about 3 months (around 9/25/2019) for Follow up regarding today's concerns.    KEYANNA Nix [facility]        "

## 2019-06-24 NOTE — TELEPHONE ENCOUNTER
Rx sent for 30 day supply. Patient is overdue for follow-up visit and will need to be seen for additional medication refills.

## 2019-06-25 ENCOUNTER — OFFICE VISIT (OUTPATIENT)
Dept: FAMILY MEDICINE | Facility: CLINIC | Age: 24
End: 2019-06-25
Payer: COMMERCIAL

## 2019-06-25 VITALS
OXYGEN SATURATION: 96 % | DIASTOLIC BLOOD PRESSURE: 82 MMHG | HEART RATE: 103 BPM | HEIGHT: 70 IN | SYSTOLIC BLOOD PRESSURE: 129 MMHG | BODY MASS INDEX: 18.04 KG/M2 | WEIGHT: 126 LBS | RESPIRATION RATE: 16 BRPM | TEMPERATURE: 98.5 F

## 2019-06-25 DIAGNOSIS — J30.89 ALLERGIC RHINITIS DUE TO DUST MITE: ICD-10-CM

## 2019-06-25 DIAGNOSIS — J45.30 MILD PERSISTENT ASTHMA IN ADULT WITHOUT COMPLICATION: ICD-10-CM

## 2019-06-25 DIAGNOSIS — J30.1 CHRONIC SEASONAL ALLERGIC RHINITIS DUE TO POLLEN: ICD-10-CM

## 2019-06-25 DIAGNOSIS — J30.81 CHRONIC ALLERGIC RHINITIS DUE TO ANIMAL HAIR AND DANDER: ICD-10-CM

## 2019-06-25 PROCEDURE — 99213 OFFICE O/P EST LOW 20 MIN: CPT | Performed by: NURSE PRACTITIONER

## 2019-06-25 RX ORDER — FLUTICASONE PROPIONATE 110 UG/1
2 AEROSOL, METERED RESPIRATORY (INHALATION) 2 TIMES DAILY
Qty: 3 INHALER | Refills: 3 | Status: SHIPPED | OUTPATIENT
Start: 2019-06-25 | End: 2020-07-25

## 2019-06-25 RX ORDER — CETIRIZINE HYDROCHLORIDE 10 MG/1
10 TABLET ORAL DAILY
Qty: 30 TABLET | Refills: 11 | Status: SHIPPED | OUTPATIENT
Start: 2019-06-25 | End: 2020-04-10

## 2019-06-25 ASSESSMENT — MIFFLIN-ST. JEOR: SCORE: 1582.78

## 2019-06-26 ENCOUNTER — TRANSFERRED RECORDS (OUTPATIENT)
Dept: HEALTH INFORMATION MANAGEMENT | Facility: CLINIC | Age: 24
End: 2019-06-26

## 2019-06-26 ENCOUNTER — MYC MEDICAL ADVICE (OUTPATIENT)
Dept: FAMILY MEDICINE | Facility: CLINIC | Age: 24
End: 2019-06-26

## 2019-06-26 ASSESSMENT — ASTHMA QUESTIONNAIRES: ACT_TOTALSCORE: 23

## 2019-07-01 ENCOUNTER — TRANSFERRED RECORDS (OUTPATIENT)
Dept: HEALTH INFORMATION MANAGEMENT | Facility: CLINIC | Age: 24
End: 2019-07-01

## 2019-07-18 ENCOUNTER — TRANSFERRED RECORDS (OUTPATIENT)
Dept: HEALTH INFORMATION MANAGEMENT | Facility: CLINIC | Age: 24
End: 2019-07-18

## 2019-07-22 ENCOUNTER — OFFICE VISIT (OUTPATIENT)
Dept: ALLERGY | Facility: CLINIC | Age: 24
End: 2019-07-22
Payer: COMMERCIAL

## 2019-07-22 VITALS
HEART RATE: 98 BPM | WEIGHT: 128.4 LBS | DIASTOLIC BLOOD PRESSURE: 89 MMHG | BODY MASS INDEX: 18.42 KG/M2 | SYSTOLIC BLOOD PRESSURE: 145 MMHG | OXYGEN SATURATION: 97 %

## 2019-07-22 DIAGNOSIS — J45.30 MILD PERSISTENT ASTHMA WITHOUT COMPLICATION: Primary | ICD-10-CM

## 2019-07-22 DIAGNOSIS — J30.81 CHRONIC ALLERGIC RHINITIS DUE TO ANIMAL HAIR AND DANDER: ICD-10-CM

## 2019-07-22 DIAGNOSIS — J30.1 CHRONIC SEASONAL ALLERGIC RHINITIS DUE TO POLLEN: ICD-10-CM

## 2019-07-22 DIAGNOSIS — H10.13 ALLERGIC CONJUNCTIVITIS OF BOTH EYES: ICD-10-CM

## 2019-07-22 DIAGNOSIS — J30.89 ALLERGIC RHINITIS DUE TO DUST MITE: ICD-10-CM

## 2019-07-22 LAB
FEF 25/75: NORMAL
FEV-1: NORMAL
FEV1/FVC: NORMAL
FVC: NORMAL

## 2019-07-22 PROCEDURE — 99214 OFFICE O/P EST MOD 30 MIN: CPT | Mod: 25 | Performed by: ALLERGY & IMMUNOLOGY

## 2019-07-22 PROCEDURE — 94010 BREATHING CAPACITY TEST: CPT | Performed by: ALLERGY & IMMUNOLOGY

## 2019-07-22 RX ORDER — ALBUTEROL SULFATE 90 UG/1
2 AEROSOL, METERED RESPIRATORY (INHALATION) EVERY 4 HOURS PRN
Qty: 1 INHALER | Refills: 2 | Status: SHIPPED | OUTPATIENT
Start: 2019-07-22 | End: 2021-04-22

## 2019-07-22 NOTE — PATIENT INSTRUCTIONS
If you have any questions regarding your allergies, asthma, or what we discussed during your visit today please call the allergy clinic or contact us via [employer].    [facility] RN Line: [phone]  [facility] Scheduling Line: [phone]  [facility] Scheduling Line: [phone]    Follow-up in 6 months    Continue your current medications as prescribed

## 2019-07-22 NOTE — LETTER
My Asthma Action Plan  Name: Manoj Castellanos   YOB: 1995  Date: 7/22/2019   My doctor: Adriana Fraser MD   My clinic: [facility]        My Control Medicine: Fluticasone propionate (Flovent) -   mcg 2 puffs twice daily  My Rescue Medicine: Albuterol (Proair/Ventolin/Proventil) inhaler 2-4 puffs every 4 hours as needed   My Asthma Severity: mild persistent  Avoid your asthma triggers: smoke, upper respiratory infections, animal dander, strong odors and fumes, exercise or sports and cold air               GREEN ZONE   Good Control    I feel good    No cough or wheeze    Can work, sleep and play without asthma symptoms       Take your asthma control medicine every day.     1. If exercise triggers your asthma, take your rescue medication    15 minutes before exercise or sports, and    During exercise if you have asthma symptoms  2. Spacer to use with inhaler: If you have a spacer, make sure to use it with your inhaler             YELLOW ZONE Getting Worse  I have ANY of these:    I do not feel good    Cough or wheeze    Chest feels tight    Wake up at night   1. Keep taking your Green Zone medications  2. Start taking your rescue medicine:    every 20 minutes for up to 1 hour. Then every 4 hours for 24-48 hours.  3. If you stay in the Yellow Zone for more than 12-24 hours, contact your doctor.  4. If you do not return to the Green Zone in 12-24 hours or you get worse, start taking your oral steroid medicine if prescribed by your provider.           RED ZONE Medical Alert - Get Help  I have ANY of these:    I feel awful    Medicine is not helping    Breathing getting harder    Trouble walking or talking    Nose opens wide to breathe       1. Take your rescue medicine NOW  2. If your provider has prescribed an oral steroid medicine, start taking it NOW  3. Call your doctor NOW  4. If you are still in the Red Zone after 20 minutes and you have not reached your doctor:    Take your rescue medicine  again and    Call 911 or go to the emergency room right away    See your regular doctor within 2 weeks of an Emergency Room or Urgent Care visit for follow-up treatment.          Annual Reminders:  Meet with Asthma Educator,  Flu Shot in the Fall, consider Pneumonia Vaccination for patients with asthma (aged 19 and older).    Pharmacy: [facility] [address] [place] [address] AT [facility]                      Asthma Triggers  How To Control Things That Make Your Asthma Worse    Triggers are things that make your asthma worse.  Look at the list below to help you find your triggers and what you can do about them.  You can help prevent asthma flare-ups by staying away from your triggers.      Trigger                                                          What you can do   Cigarette Smoke  Tobacco smoke can make asthma worse. Do not allow smoking in your home, car or around you.  Be sure no one smokes at a child s day care or school.  If you smoke, ask your health care provider for ways to help you quit.  Ask family members to quit too.  Ask your health care provider for a referral to Quit Plan to help you quit smoking, or call 2-810-700-PLAN.     Colds, Flu, Bronchitis  These are common triggers of asthma. Wash your hands often.  Don t touch your eyes, nose or mouth.  Get a flu shot every year.     Dust Mites  These are tiny bugs that live in cloth or carpet. They are too small to see. Wash sheets and blankets in hot water every week.   Encase pillows and mattress in dust mite proof covers.  Avoid having carpet if you can. If you have carpet, vacuum weekly.   Use a dust mask and HEPA vacuum.   Pollen and Outdoor Mold  Some people are allergic to trees, grass, or weed pollen, or molds. Try to keep your windows closed.  Limit time out doors when pollen count is high.   Ask you health care provider about taking medicine during allergy season.     Animal Dander  Some people are  allergic to skin flakes, urine or saliva from pets with fur or feathers. Keep pets with fur or feathers out of your home.    If you can t keep the pet outdoors, then keep the pet out of your bedroom.  Keep the bedroom door closed.  Keep pets off cloth furniture and away from stuffed toys.     Mice, Rats, and Cockroaches  Some people are allergic to the waste from these pests.   Cover food and garbage.  Clean up spills and food crumbs.  Store grease in the refrigerator.   Keep food out of the bedroom.   Indoor Mold  This can be a trigger if your home has high moisture. Fix leaking faucets, pipes, or other sources of water.   Clean moldy surfaces.  Dehumidify basement if it is damp and smelly.   Smoke, Strong Odors, and Sprays  These can reduce air quality. Stay away from strong odors and sprays, such as perfume, powder, hair spray, paints, smoke incense, paint, cleaning products, candles and new carpet.   Exercise or Sports  Some people with asthma have this trigger. Be active!  Ask your doctor about taking medicine before sports or exercise to prevent symptoms.    Warm up for 5-10 minutes before and after sports or exercise.     Other Triggers of Asthma  Cold air:  Cover your nose and mouth with a scarf.  Sometimes laughing or crying can be a trigger.  Some medicines and food can trigger asthma.

## 2019-07-22 NOTE — LETTER
7/22/2019         RE: Manoj Castellanos  [address]  [facility] [id number]        Dear Colleague,    Thank you for referring your patient, Manoj Castellanos, to the [facility]. Please see a copy of my visit note below.    Manoj Castellanos was seen in the Allergy Clinic at [facility]. The following are my recommendations regarding Manoj Castellanos's Mild Persistent Asthma, Allergic Rhinitis Due to Animals, Allergic Rhinitis Due to Pollen, Allergic Rhinitis Due to Dust Mites and Allergic Conjunctivitis    1. Continue Flovent 110mcg, 2 puffs twice daily  2. Take 2 to 4 puffs of albuterol HFA every 4 hours as needed  3. Continue cetirizine 10mg daily  4. Follow-up in 6 months, sooner if needed  5. Ty to follow up with Primary Care provider regarding elevated blood pressure.      Manoj Castellanos is a 23 year old American adult who is seen today for follow-up of asthma and allergies. He states that he has been feeling well and has no particular questions or concerns today. He feels that recently he has had some increase in chest tightness but overall finds this to be minor. He continues to take Flovent twice daily and uses albuterol no more than once or twice per month. Manoj denies having nocturnal asthma symptoms or limitations in his activity. He has had no asthma exacerbations, ED visits, or use of prednisone for acute asthma symptoms in the past year.    Manoj states that his allergic rhinoconjunctivitis symptoms have been well controlled. He takes cetirizine daily and uses OTC eye drops as needed. He does not use nasal sprays and has not found them to be effective in the past.      Past Medical History:   Diagnosis Date     Depressive disorder 2014    it s chill now tho I m doing fine     Uncomplicated asthma early 2000s     Family History   Problem Relation Age of Onset     Substance Abuse Father      Asthma Father      Pancreatic Cancer Maternal Grandfather      Other Cancer Maternal Grandfather      Prostate Cancer Paternal  Grandfather      Depression Other      Anxiety Disorder Other      Mental Illness Other      Social History     Tobacco Use     Smoking status: Never Smoker     Smokeless tobacco: Never Used   Substance Use Topics     Alcohol use: Yes     Comment: infrequent at worst     Drug use: No       Past medical, family, and social history were reviewed.    REVIEW OF SYSTEMS:  General: negative for weight gain. negative for weight loss. negative for changes in sleep.   Eyes: negative for itching. negative for redness. negative for tearing/watering. negative for vision changes  Ears: negative for fullness. negative for hearing loss. negative for dizziness.   Nose: negative for snoring.negative for changes in smell. negative for drainage.   Throat: negative for hoarseness. negative for sore throat. negative for trouble swallowing.   Lungs: negative for cough. negative for shortness of breath.negative for wheezing. negative for sputum production.   Cardiovascular: negative for chest pain. negative for swelling of ankles. negative for fast or irregular heartbeat.   Gastrointestinal: negative for nausea. negative for heartburn. negative for acid reflux.   Musculoskeletal: negative for joint pain. negative for joint stiffness. negative for joint swelling.   Neurologic: negative for seizures. negative for fainting. negative for weakness.   Psychiatric: negative for changes in mood. negative for anxiety.   Endocrine: negative for cold intolerance. negative for heat intolerance. negative for tremors.   Hematologic: negative for easy bruising. negative for easy bleeding.  Integumentary: negative for rash. negative for scaling. negative for nail changes.       Current Outpatient Medications:      albuterol (PROAIR HFA/PROVENTIL HFA/VENTOLIN HFA) 108 (90 BASE) MCG/ACT Inhaler, Inhale 2 puffs into the lungs every 4 hours as needed, Disp: 1 Inhaler, Rfl: 2     cetirizine (ZYRTEC) 10 MG tablet, Take 1 tablet (10 mg) by mouth daily, Disp: 30  tablet, Rfl: 11     finasteride (PROSCAR) 5 MG tablet, Take 1.25 mg by mouth daily, Disp: 30 tablet, Rfl: 1     fluocinonide (LIDEX) 0.05 % cream, Apply topically 2 times daily, Disp: , Rfl:      fluticasone (FLOVENT HFA) 110 MCG/ACT inhaler, Inhale 2 puffs into the lungs 2 times daily, Disp: 3 Inhaler, Rfl: 3     ISOtretinoin (ACCUTANE PO), Take 80 mg by mouth daily , Disp: , Rfl:      ondansetron (ZOFRAN-ODT) 4 MG ODT tab, Take 1 tablet (4 mg) by mouth every 8 hours as needed for nausea, Disp: 20 tablet, Rfl: 0     ranitidine (ZANTAC) 150 MG tablet, Take 1 tablet (150 mg) by mouth 2 times daily (Patient taking differently: Take 150 mg by mouth as needed ), Disp: 60 tablet, Rfl: 1     IBUPROFEN, , Disp: , Rfl:      minoxidil 5 % SOLN, Externally apply topically 2 times daily, Disp: , Rfl:     EXAM:   /89 (BP Location: Left arm, Patient Position: Sitting, Cuff Size: Adult Regular)   Pulse 98   Wt 58.2 kg (128 lb 6.4 oz)   SpO2 97%   BMI 18.42 kg/m     GENERAL APPEARANCE: alert, cooperative and not in distress  SKIN: no rashes, no lesions  HEAD: atraumatic, normocephalic  EYES: lids and lashes normal, conjunctivae and sclerae clear  ENT: no scars or lesions, nasal exam showed no discharge, swelling or lesions noted, tongue midline and normal, soft palate, uvula, and tonsils normal  NECK: no asymmetry, masses, or scars, supple without significant adenopathy  LUNGS: unlabored respirations, no intercostal retractions or accessory muscle use, clear to auscultation without rales or wheezes  HEART: regular rate and rhythm without murmurs and normal S1 and S2  MUSCULOSKELETAL: no musculoskeletal defects are noted  NEURO: no focal deficits noted  PSYCH: does not appear depressed or anxious      WORKUP:  Spirometry    SPIROMETRY       FVC 4.12L (74% of predicted).     FEV1 3.73L (80% of predicted).     FEV1/FVC 91%      I have reviewed and interpreted these results. These values are suggestive of a possible  restrictive process.    Asthma Control Test (ACT) total score: 23       ASSESSMENT/PLAN:  Manoj Castellanos is a 23 year old adult here for follow-up of asthma and allergic rhinitis.  He reports that while he has had slight increased in chest tightness overall he feels that his asthma is well controlled.  His spirometry suggestive of a possible restrictive process but these values have improved when compared to those obtained last October.  He is rarely needing to use his albuterol inhaler and has not had any side effects or problems with the Flovent.  He did not wish to make any changes to his medication regimen at this time.    Manoj continues to take antihistamines daily and finds that this was helpful in managing his allergic rhinoconjunctivitis symptoms.    1. Continue Flovent 110mcg, 2 puffs twice daily  2. Take 2 to 4 puffs of albuterol HFA every 4 hours as needed  3. Continue cetirizine 10mg daily  4. Follow-up in 6 months, sooner if needed  5. Ty to follow up with Primary Care provider regarding elevated blood pressure.      Thank you for allowing me to participate in the care of Manoj Castellanos.      Adriana Fraser MD  Allergy/Immunology  [place], [place]      Chart documentation done in part with Dragon Voice Recognition Software. Although reviewed after completion, some word and grammatical errors may remain.    Again, thank you for allowing me to participate in the care of your patient.        Sincerely,        Adriana Fraser MD

## 2019-07-22 NOTE — PROGRESS NOTES
Manoj Castellanos was seen in the Allergy Clinic at [facility]. The following are my recommendations regarding Manoj Castellanos's Mild Persistent Asthma, Allergic Rhinitis Due to Animals, Allergic Rhinitis Due to Pollen, Allergic Rhinitis Due to Dust Mites and Allergic Conjunctivitis    1. Continue Flovent 110mcg, 2 puffs twice daily  2. Take 2 to 4 puffs of albuterol HFA every 4 hours as needed  3. Continue cetirizine 10mg daily  4. Follow-up in 6 months, sooner if needed  5. Ty to follow up with Primary Care provider regarding elevated blood pressure.      Manoj Castellanos is a 23 year old American adult who is seen today for follow-up of asthma and allergies. He states that he has been feeling well and has no particular questions or concerns today. He feels that recently he has had some increase in chest tightness but overall finds this to be minor. He continues to take Flovent twice daily and uses albuterol no more than once or twice per month. Manoj denies having nocturnal asthma symptoms or limitations in his activity. He has had no asthma exacerbations, ED visits, or use of prednisone for acute asthma symptoms in the past year.    Manoj states that his allergic rhinoconjunctivitis symptoms have been well controlled. He takes cetirizine daily and uses OTC eye drops as needed. He does not use nasal sprays and has not found them to be effective in the past.      Past Medical History:   Diagnosis Date     Depressive disorder 2014    it s chill now tho I m doing fine     Uncomplicated asthma early 2000s     Family History   Problem Relation Age of Onset     Substance Abuse Father      Asthma Father      Pancreatic Cancer Maternal Grandfather      Other Cancer Maternal Grandfather      Prostate Cancer Paternal Grandfather      Depression Other      Anxiety Disorder Other      Mental Illness Other      Social History     Tobacco Use     Smoking status: Never Smoker     Smokeless tobacco: Never Used   Substance Use Topics     Alcohol  use: Yes     Comment: infrequent at worst     Drug use: No       Past medical, family, and social history were reviewed.    REVIEW OF SYSTEMS:  General: negative for weight gain. negative for weight loss. negative for changes in sleep.   Eyes: negative for itching. negative for redness. negative for tearing/watering. negative for vision changes  Ears: negative for fullness. negative for hearing loss. negative for dizziness.   Nose: negative for snoring.negative for changes in smell. negative for drainage.   Throat: negative for hoarseness. negative for sore throat. negative for trouble swallowing.   Lungs: negative for cough. negative for shortness of breath.negative for wheezing. negative for sputum production.   Cardiovascular: negative for chest pain. negative for swelling of ankles. negative for fast or irregular heartbeat.   Gastrointestinal: negative for nausea. negative for heartburn. negative for acid reflux.   Musculoskeletal: negative for joint pain. negative for joint stiffness. negative for joint swelling.   Neurologic: negative for seizures. negative for fainting. negative for weakness.   Psychiatric: negative for changes in mood. negative for anxiety.   Endocrine: negative for cold intolerance. negative for heat intolerance. negative for tremors.   Hematologic: negative for easy bruising. negative for easy bleeding.  Integumentary: negative for rash. negative for scaling. negative for nail changes.       Current Outpatient Medications:      albuterol (PROAIR HFA/PROVENTIL HFA/VENTOLIN HFA) 108 (90 BASE) MCG/ACT Inhaler, Inhale 2 puffs into the lungs every 4 hours as needed, Disp: 1 Inhaler, Rfl: 2     cetirizine (ZYRTEC) 10 MG tablet, Take 1 tablet (10 mg) by mouth daily, Disp: 30 tablet, Rfl: 11     finasteride (PROSCAR) 5 MG tablet, Take 1.25 mg by mouth daily, Disp: 30 tablet, Rfl: 1     fluocinonide (LIDEX) 0.05 % cream, Apply topically 2 times daily, Disp: , Rfl:      fluticasone (FLOVENT HFA) 110  MCG/ACT inhaler, Inhale 2 puffs into the lungs 2 times daily, Disp: 3 Inhaler, Rfl: 3     ISOtretinoin (ACCUTANE PO), Take 80 mg by mouth daily , Disp: , Rfl:      ondansetron (ZOFRAN-ODT) 4 MG ODT tab, Take 1 tablet (4 mg) by mouth every 8 hours as needed for nausea, Disp: 20 tablet, Rfl: 0     ranitidine (ZANTAC) 150 MG tablet, Take 1 tablet (150 mg) by mouth 2 times daily (Patient taking differently: Take 150 mg by mouth as needed ), Disp: 60 tablet, Rfl: 1     IBUPROFEN, , Disp: , Rfl:      minoxidil 5 % SOLN, Externally apply topically 2 times daily, Disp: , Rfl:     EXAM:   /89 (BP Location: Left arm, Patient Position: Sitting, Cuff Size: Adult Regular)   Pulse 98   Wt 58.2 kg (128 lb 6.4 oz)   SpO2 97%   BMI 18.42 kg/m    GENERAL APPEARANCE: alert, cooperative and not in distress  SKIN: no rashes, no lesions  HEAD: atraumatic, normocephalic  EYES: lids and lashes normal, conjunctivae and sclerae clear  ENT: no scars or lesions, nasal exam showed no discharge, swelling or lesions noted, tongue midline and normal, soft palate, uvula, and tonsils normal  NECK: no asymmetry, masses, or scars, supple without significant adenopathy  LUNGS: unlabored respirations, no intercostal retractions or accessory muscle use, clear to auscultation without rales or wheezes  HEART: regular rate and rhythm without murmurs and normal S1 and S2  MUSCULOSKELETAL: no musculoskeletal defects are noted  NEURO: no focal deficits noted  PSYCH: does not appear depressed or anxious      WORKUP:  Spirometry    SPIROMETRY       FVC 4.12L (74% of predicted).     FEV1 3.73L (80% of predicted).     FEV1/FVC 91%      I have reviewed and interpreted these results. These values are suggestive of a possible restrictive process.    Asthma Control Test (ACT) total score: 23       ASSESSMENT/PLAN:  Manoj Castellanos is a 23 year old adult here for follow-up of asthma and allergic rhinitis.  He reports that while he has had slight increased in chest  tightness overall he feels that his asthma is well controlled.  His spirometry suggestive of a possible restrictive process but these values have improved when compared to those obtained last October.  He is rarely needing to use his albuterol inhaler and has not had any side effects or problems with the Flovent.  He did not wish to make any changes to his medication regimen at this time.    Manoj continues to take antihistamines daily and finds that this was helpful in managing his allergic rhinoconjunctivitis symptoms.    1. Continue Flovent 110mcg, 2 puffs twice daily  2. Take 2 to 4 puffs of albuterol HFA every 4 hours as needed  3. Continue cetirizine 10mg daily  4. Follow-up in 6 months, sooner if needed  5. Ty to follow up with Primary Care provider regarding elevated blood pressure.      Thank you for allowing me to participate in the care of Manoj Castellanos.      Adriana Fraser MD  Allergy/Immunology  [facility] and [place], [place]      Chart documentation done in part with Dragon Voice Recognition Software. Although reviewed after completion, some word and grammatical errors may remain.

## 2019-07-23 ASSESSMENT — ASTHMA QUESTIONNAIRES: ACT_TOTALSCORE: 23

## 2019-07-26 ENCOUNTER — TRANSFERRED RECORDS (OUTPATIENT)
Dept: HEALTH INFORMATION MANAGEMENT | Facility: CLINIC | Age: 24
End: 2019-07-26

## 2019-08-08 DIAGNOSIS — L64.9 MALE PATTERN ALOPECIA: ICD-10-CM

## 2019-08-08 NOTE — TELEPHONE ENCOUNTER

## 2019-08-09 RX ORDER — FINASTERIDE 5 MG/1
TABLET, FILM COATED ORAL
Qty: 30 TABLET | Refills: 1 | Status: SHIPPED | OUTPATIENT
Start: 2019-08-09 | End: 2019-11-05

## 2019-08-27 ENCOUNTER — TRANSFERRED RECORDS (OUTPATIENT)
Dept: HEALTH INFORMATION MANAGEMENT | Facility: CLINIC | Age: 24
End: 2019-08-27

## 2019-09-14 DIAGNOSIS — K21.9 GASTROESOPHAGEAL REFLUX DISEASE WITHOUT ESOPHAGITIS: ICD-10-CM

## 2019-09-26 ENCOUNTER — TRANSFERRED RECORDS (OUTPATIENT)
Dept: HEALTH INFORMATION MANAGEMENT | Facility: CLINIC | Age: 24
End: 2019-09-26

## 2019-09-30 ENCOUNTER — MYC MEDICAL ADVICE (OUTPATIENT)
Dept: FAMILY MEDICINE | Facility: CLINIC | Age: 24
End: 2019-09-30

## 2019-09-30 DIAGNOSIS — K21.9 GERD WITHOUT ESOPHAGITIS: Primary | ICD-10-CM

## 2019-10-01 NOTE — TELEPHONE ENCOUNTER
Any/all of the H2 blockers and PPI's have the same black box warnings.  I recommend a consultation with GI for additional treatment recommendations.  [facility] would probably be the preferred provider as the [facility] can take months for an appointment.

## 2019-10-01 NOTE — TELEPHONE ENCOUNTER
Per FDA website, only some lots and brands are affected by recall.    Do you want pt to check with his pharmacy or just prescribed something else?    Mayra Caputo, RN, BSN

## 2019-10-01 NOTE — TELEPHONE ENCOUNTER
I would recommend that Ty not have uncontrolled indigestion and if going off of the zantac causes problems, I would like Ty to continue the zantac until seen by gastro.  If ty is able to go off of the zantac without difficulty, then yes, go off of the zantac.

## 2019-10-03 RX ORDER — OMEPRAZOLE 20 MG/1
20 TABLET, DELAYED RELEASE ORAL DAILY
Qty: 30 TABLET | Refills: 0 | COMMUNITY
Start: 2019-10-03 | End: 2021-04-22

## 2019-10-24 ENCOUNTER — TRANSFERRED RECORDS (OUTPATIENT)
Dept: HEALTH INFORMATION MANAGEMENT | Facility: CLINIC | Age: 24
End: 2019-10-24

## 2019-11-05 ENCOUNTER — OFFICE VISIT (OUTPATIENT)
Dept: FAMILY MEDICINE | Facility: CLINIC | Age: 24
End: 2019-11-05
Payer: COMMERCIAL

## 2019-11-05 VITALS
TEMPERATURE: 97.7 F | WEIGHT: 136 LBS | SYSTOLIC BLOOD PRESSURE: 126 MMHG | HEIGHT: 70 IN | OXYGEN SATURATION: 98 % | RESPIRATION RATE: 16 BRPM | DIASTOLIC BLOOD PRESSURE: 66 MMHG | BODY MASS INDEX: 19.47 KG/M2 | HEART RATE: 99 BPM

## 2019-11-05 DIAGNOSIS — L64.9 MALE PATTERN ALOPECIA: ICD-10-CM

## 2019-11-05 DIAGNOSIS — J45.30 MILD PERSISTENT ASTHMA IN ADULT WITHOUT COMPLICATION: ICD-10-CM

## 2019-11-05 DIAGNOSIS — J45.30 MILD PERSISTENT ASTHMA, UNSPECIFIED WHETHER COMPLICATED: ICD-10-CM

## 2019-11-05 DIAGNOSIS — F32.A DEPRESSIVE DISORDER: ICD-10-CM

## 2019-11-05 DIAGNOSIS — Z00.00 ROUTINE GENERAL MEDICAL EXAMINATION AT A HEALTH CARE FACILITY: Primary | ICD-10-CM

## 2019-11-05 DIAGNOSIS — M79.18 GLUTEAL PAIN: ICD-10-CM

## 2019-11-05 DIAGNOSIS — L20.9 ATOPIC DERMATITIS, UNSPECIFIED TYPE: ICD-10-CM

## 2019-11-05 PROCEDURE — 99395 PREV VISIT EST AGE 18-39: CPT | Mod: 25 | Performed by: NURSE PRACTITIONER

## 2019-11-05 PROCEDURE — 99214 OFFICE O/P EST MOD 30 MIN: CPT | Mod: 25 | Performed by: NURSE PRACTITIONER

## 2019-11-05 PROCEDURE — 90471 IMMUNIZATION ADMIN: CPT | Performed by: NURSE PRACTITIONER

## 2019-11-05 PROCEDURE — 90686 IIV4 VACC NO PRSV 0.5 ML IM: CPT | Performed by: NURSE PRACTITIONER

## 2019-11-05 RX ORDER — FINASTERIDE 5 MG/1
TABLET, FILM COATED ORAL
Qty: 90 TABLET | Refills: 3 | Status: SHIPPED | OUTPATIENT
Start: 2019-11-05 | End: 2020-11-22

## 2019-11-05 ASSESSMENT — ENCOUNTER SYMPTOMS
CONSTIPATION: 0
ARTHRALGIAS: 0
SHORTNESS OF BREATH: 0
HEMATURIA: 0
NERVOUS/ANXIOUS: 0
DIZZINESS: 0
FREQUENCY: 0
NAUSEA: 0
JOINT SWELLING: 0
HEMATOCHEZIA: 0
WEAKNESS: 0
EYE PAIN: 0
DIARRHEA: 0
PARESTHESIAS: 0
FEVER: 0
HEADACHES: 0
PALPITATIONS: 0
HEARTBURN: 0
MYALGIAS: 1
DYSURIA: 0
SORE THROAT: 0
COUGH: 0
ABDOMINAL PAIN: 0
CHILLS: 0

## 2019-11-05 ASSESSMENT — MIFFLIN-ST. JEOR: SCORE: 1623.14

## 2019-11-05 NOTE — PROGRESS NOTES
"SUBJECTIVE:   CC: Manoj Castellanos is an 24 year old male who presents for preventative health visit.     Healthy Habits:     Getting at least 3 servings of Calcium per day:  NO    Bi-annual eye exam:  Yes    Dental care twice a year:  Yes    Sleep apnea or symptoms of sleep apnea:  None    Diet:  Regular (no restrictions)    Frequency of exercise:  None    Taking medications regularly:  Yes    Medication side effects:  None    PHQ-2 Total Score: 0    Additional concerns today:  No    HPI: Manoj has been in their usual state of health, no acute concerns or recent hospitalizations.     Musculoskeletal Pain:  2-3 months of gluteal soreness/ache; worse with muscle contraction. Intermittent soreness, worse at the end of the day. Wants to start a regular exercise program by the end of the year, financial limitations prevent regular exercise. Denies hip or knee joint pain. Full ROM. Denies paresthesia. History of patellofemoral pain syndrome.    Mood (Seasonal Affective Disorder):   Sees a therapist for depression, not taking any medications. Notices depressed mood worse in the winter, wants a mood light. States he has adequate coping mechanisms: \"my mood has been the best it has been in years\".     Underarm Irritation:  Usually occurs under left armpit, red rash and pain. Occurs every 1-5 weeks, unsure etiology or trigger. Lasts about 1-2 days. Has history of eczema and psoriasis. Uses natural deodorant, does not shave underarm. Has tried hydrocortisone cream OTC with mild improvement in symptoms. Usually goes away on its own.     Asthma  Sees Asthma and allergy specialist, asthma is under control. Medications are managed through the specialist. Using albuterol about every 2 weeks. Exercise induced asthma.     Today's PHQ-2 Score:   PHQ-2 ( 1999 Pfizer) 11/5/2019   Q1: Little interest or pleasure in doing things 0   Q2: Feeling down, depressed or hopeless 0   PHQ-2 Score 0   Q1: Little interest or pleasure in doing things Not at all "   Q2: Feeling down, depressed or hopeless Not at all   PHQ-2 Score 0       Abuse: Current or Past(Physical, Sexual or Emotional)- No  Do you feel safe in your environment? Yes        Social History     Tobacco Use     Smoking status: Never Smoker     Smokeless tobacco: Never Used   Substance Use Topics     Alcohol use: Yes     Comment: infrequent at worst       Alcohol Use 11/5/2019   Prescreen: >3 drinks/day or >7 drinks/week? No   Prescreen: >3 drinks/day or >7 drinks/week? -       Last PSA: No results found for: PSA; not indicated    Reviewed orders with patient. Reviewed health maintenance and updated orders accordingly - Yes  Labs reviewed in EPIC  BP Readings from Last 3 Encounters:   11/05/19 126/66   07/22/19 145/89   06/25/19 129/82    Wt Readings from Last 3 Encounters:   11/05/19 61.7 kg (136 lb)   07/22/19 58.2 kg (128 lb 6.4 oz)   06/25/19 57.2 kg (126 lb)             Patient Active Problem List   Diagnosis     Patellofemoral pain syndrome     Right hip pain     Abnormal sinus finding on MRI     Mild persistent asthma     Chronic allergic rhinitis due to animal hair and dander     Allergic rhinitis due to dust mite     Chronic seasonal allergic rhinitis due to pollen     Allergic conjunctivitis of both eyes     Palpitations     Past Surgical History:   Procedure Laterality Date     HC TOOTH EXTRACTION W/FORCEP  2016    4 teeth        Social History     Tobacco Use     Smoking status: Never Smoker     Smokeless tobacco: Never Used   Substance Use Topics     Alcohol use: Yes     Comment: infrequent at worst     Family History   Problem Relation Age of Onset     Substance Abuse Father      Asthma Father      Pancreatic Cancer Maternal Grandfather      Other Cancer Maternal Grandfather      Prostate Cancer Paternal Grandfather      Depression Other      Anxiety Disorder Other      Mental Illness Other          Current Outpatient Medications   Medication Sig Dispense Refill     albuterol (PROAIR  HFA/PROVENTIL HFA/VENTOLIN HFA) 108 (90 Base) MCG/ACT inhaler Inhale 2 puffs into the lungs every 4 hours as needed for shortness of breath / dyspnea or wheezing 1 Inhaler 2     cetirizine (ZYRTEC) 10 MG tablet Take 1 tablet (10 mg) by mouth daily 30 tablet 11     finasteride (PROSCAR) 5 MG tablet Take 1.25 mg by mouth daily 30 tablet 1     fluocinonide (LIDEX) 0.05 % cream Apply topically 2 times daily       fluticasone (FLOVENT HFA) 110 MCG/ACT inhaler Inhale 2 puffs into the lungs 2 times daily 3 Inhaler 3     IBUPROFEN        ISOtretinoin (ACCUTANE PO) Take 80 mg by mouth daily        minoxidil 5 % SOLN Externally apply topically 2 times daily       ondansetron (ZOFRAN-ODT) 4 MG ODT tab Take 1 tablet (4 mg) by mouth every 8 hours as needed for nausea 20 tablet 0     omeprazole (PRILOSEC OTC) 20 MG EC tablet Take 1 tablet (20 mg) by mouth daily (Patient not taking: Reported on 11/5/2019) 30 tablet 0     Allergies   Allergen Reactions     Amoxicillin Swelling     Lip swelling      Cefuroxime      Swelling      Minocycline Other (See Comments)     Throat felt like it was on fire       Reviewed and updated as needed this visit by clinical staff         Reviewed and updated as needed this visit by Provider            Review of Systems   Constitutional: Negative for chills and fever.   HENT: Negative for congestion, ear pain, hearing loss and sore throat.    Eyes: Negative for pain and visual disturbance.   Respiratory: Negative for cough and shortness of breath.    Cardiovascular: Negative for chest pain and palpitations.   Gastrointestinal: Negative for abdominal pain, constipation, diarrhea and nausea.   Genitourinary: Negative for dysuria, frequency, genital sores, hematuria and urgency.   Musculoskeletal: Positive for myalgias. Negative for arthralgias and joint swelling.   Skin: Negative for rash.   Neurological: Negative for dizziness, weakness and headaches.   Psychiatric/Behavioral: The patient is not  "nervous/anxious.        OBJECTIVE:   /66 (BP Location: Right arm, Patient Position: Sitting, Cuff Size: Adult Regular)   Pulse 99   Temp 97.7  F (36.5  C) (Oral)   Resp 16   Ht 1.778 m (5' 10\")   Wt 61.7 kg (136 lb)   SpO2 98%   BMI 19.51 kg/m      Physical Exam  GENERAL: healthy, alert and no distress  EYES: Eyes grossly normal to inspection, PERRL and conjunctivae and sclerae normal  HENT: ear canals and TM's normal with moderate wax, nose and mouth without ulcers or lesions  NECK: no adenopathy, no asymmetry, masses, or scars and thyroid normal to palpation  RESP: lungs clear to auscultation - no rales, rhonchi or wheezes  CV: regular rate and rhythm, normal S1 S2, no S3 or S4, no murmur, click or rub, no peripheral edema and peripheral pulses strong  ABDOMEN: soft, nontender, no hepatosplenomegaly, no masses and bowel sounds normal  MS: no gross musculoskeletal defects noted, no edema. LE muscle strength 5/5, full ROM hips, knees, ankles. No asymmetry or deformity.   SKIN: Left axillary without erythema or lesions. Overall skin moist, intact, without significant rash or lesions.   NEURO: Normal strength and tone, mentation intact and speech normal. Patellar DTR 2+.   PSYCH: mentation appears normal, affect normal/bright    Labs reviewed in Epic  Pt waives any screening lab studies today including STD testing    ASSESSMENT/PLAN:   (Z00.00) Routine general medical examination at a health care facility  (primary encounter diagnosis)  Comment: No acute concerns  Plan: Influenza vaccination today    (F32.9) Depressive disorder  Comment: Stable  Plan: order for DME        Mood lamp prescription given. He is to continue seeing therapist to address symptoms and coping mechanisms. Encouraged exercise to improve mood    (J45.30) Mild persistent asthma, unspecified whether complicated  Comment: Controlled  Plan: Follow up with Asthma specialist.    (M79.18) Gluteal pain  Comment: Likely MSK  Plan: Encouraged " "strength building exercises and avoiding prolonged sitting. Significant time spent on addressing activities he enjoys and how to incorporate into life    (L20.9) Atopic dermatitis, unspecified type  Comment: No current symptoms  Plan: May use hydrocortisone cream 1% OTC for underarm irritation. Keep area clean and dry, avoid contact irritants. Follow up if symptoms persist or worsen.    (L64.9) Male pattern alopecia  Comment: Controlled  Plan: finasteride (PROSCAR) 5 MG tablet        Continue proscar 5mg PO daily. Refills given.    (J45.30) Mild persistent asthma in adult without complication  Comment: Controlled  Plan: Follow up with Asthma Specialist    COUNSELING:   Reviewed preventive health counseling, as reflected in patient instructions       Regular exercise       Healthy diet/nutrition       Immunizations    Vaccinated for: Influenza          Estimated body mass index is 18.42 kg/m  as calculated from the following:    Height as of 6/25/19: 1.778 m (5' 10\").    Weight as of 7/22/19: 58.2 kg (128 lb 6.4 oz).          reports that Manoj Castellanos has never smoked. Manoj Castellanos has never used smokeless tobacco.      Counseling Resources:  ATP IV Guidelines  Pooled Cohorts Equation Calculator  FRAX Risk Assessment  ICSI Preventive Guidelines  Dietary Guidelines for Americans, 2010  USDA's MyPlate  ASA Prophylaxis  Lung CA Screening    KEYANNA Nix [facility]  Answers for HPI/ROS submitted by the patient on 11/5/2019   Annual Exam:  Blood in stool: No  heartburn: No  peripheral edema: No  mood changes: No  Skin sensation changes: No  impotence: No  penile discharge: No    "

## 2019-11-06 ASSESSMENT — ASTHMA QUESTIONNAIRES: ACT_TOTALSCORE: 22

## 2019-11-07 ENCOUNTER — TELEPHONE (OUTPATIENT)
Dept: FAMILY MEDICINE | Facility: CLINIC | Age: 24
End: 2019-11-07

## 2019-11-07 DIAGNOSIS — F33.8 SEASONAL AFFECTIVE DISORDER (H): ICD-10-CM

## 2019-11-07 NOTE — TELEPHONE ENCOUNTER
Reason for Call:  Other order    Detailed comments: Patient is requesting the DX be changed on the DME order for SAD light to Seasonal Affective Disorder (not depressive disorder) for insurance coverage. Please assist. Thanks!    Phone Number Patient can be reached at: Home number on file [phone] (home)    Best Time: Any    Can we leave a detailed message on this number? YES    Call taken on 11/7/2019 at 2:49 PM by Angelia Swift  DX is seasonal effective disorder

## 2019-11-07 NOTE — TELEPHONE ENCOUNTER
Kina Rivera [facility]    New order queued up - can you sign with SAD dx?    Mayra Caputo, RN, BSN

## 2019-11-09 ENCOUNTER — MYC MEDICAL ADVICE (OUTPATIENT)
Dept: FAMILY MEDICINE | Facility: CLINIC | Age: 24
End: 2019-11-09

## 2019-11-09 DIAGNOSIS — F64.0 GENDER DYSPHORIA IN ADOLESCENT AND ADULT: Primary | ICD-10-CM

## 2019-11-11 NOTE — TELEPHONE ENCOUNTER
JJ informing patient that new order was done & asked patient what he would like us to do with it.    Lianne Davila

## 2019-11-11 NOTE — TELEPHONE ENCOUNTER
,    Kina printed up a new order for light therapy with the new dx of SAD per pt request. Please find the printed copy and call pt to let him know this is done. Ask him how he would like to get it.             Thank you,  Mayra Caputo RN

## 2019-11-12 NOTE — TELEPHONE ENCOUNTER
Order faxed to [facility] on [facility]. Fax: [phone].    Lianne Davila

## 2019-11-18 ENCOUNTER — PRE VISIT (OUTPATIENT)
Dept: PLASTIC SURGERY | Facility: CLINIC | Age: 24
End: 2019-11-18

## 2019-11-18 ENCOUNTER — PATIENT OUTREACH (OUTPATIENT)
Dept: PLASTIC SURGERY | Facility: CLINIC | Age: 24
End: 2019-11-18

## 2019-11-18 NOTE — TELEPHONE ENCOUNTER
FUTURE VISIT INFORMATION      FUTURE VISIT INFORMATION:    Date: 12/6/19    Time: 3:00pm    Location: [facility] [place]  REFERRAL INFORMATION:    Referring providers clinic:  [facility]    Reason for visit/diagnosis  FGCS     RECORDS REQUESTED FROM:       No recs to collect

## 2019-11-18 NOTE — PROGRESS NOTES
"[facility]:  Care Coordination Note     SITUATION   Patient (ty, they/them)  is a 24 year old who is receiving support for:  Clinic Care Coordination - Initial (Pt scheduled for FGCS with Dr. Morris)  .    BACKGROUND     Pt referred by [facility] [place] [place] Pt requesting FGCS.     ASSESSMENT     Surgery              CGC Assessment  Comprehensive Gender Care (CGC) Enrollment: Enrolled(not on hormones)  Patient has a therapist: Yes  Name of therapist: Kristy Doe @ [facility] for psychology  Letter of support #1: Requested  Surgery being considered: Yes(FFS)          PLAN          Nursing Interventions:   CGC program and services discussed with patient. CGC referral placed. CGC assessment completed. Process for accessing surgery discussed including: WPATH standards of care, Letters of support, [place] insurance process, surgery scheduling, and approximate timeline. Pt questions answered. Registration completed & reviewed; scheduling process discussed & completed, as necessary.     More than 50% of the time was used to educate patient on medical & surgical process.     Follow-up plan:  Pt informed we will need one letter of support. Pt is not on hormones, pt was made aware this may/may not be a requirement, but will be reviewed with Dr. Morris. Pt stated \"its crap and not required to be on hormones for FGCS.\"       Roly Corral  "

## 2019-11-21 ENCOUNTER — TRANSFERRED RECORDS (OUTPATIENT)
Dept: HEALTH INFORMATION MANAGEMENT | Facility: CLINIC | Age: 24
End: 2019-11-21

## 2019-11-23 ENCOUNTER — MYC MEDICAL ADVICE (OUTPATIENT)
Dept: FAMILY MEDICINE | Facility: CLINIC | Age: 24
End: 2019-11-23

## 2019-11-23 ENCOUNTER — OFFICE VISIT (OUTPATIENT)
Dept: URGENT CARE | Facility: URGENT CARE | Age: 24
End: 2019-11-23
Payer: COMMERCIAL

## 2019-11-23 ENCOUNTER — ANCILLARY PROCEDURE (OUTPATIENT)
Dept: GENERAL RADIOLOGY | Facility: CLINIC | Age: 24
End: 2019-11-23
Attending: FAMILY MEDICINE
Payer: COMMERCIAL

## 2019-11-23 VITALS
SYSTOLIC BLOOD PRESSURE: 129 MMHG | WEIGHT: 137 LBS | TEMPERATURE: 98.5 F | DIASTOLIC BLOOD PRESSURE: 87 MMHG | BODY MASS INDEX: 19.61 KG/M2 | OXYGEN SATURATION: 98 % | HEIGHT: 70 IN | RESPIRATION RATE: 16 BRPM | HEART RATE: 91 BPM

## 2019-11-23 DIAGNOSIS — R20.2 NUMBNESS AND TINGLING: Primary | ICD-10-CM

## 2019-11-23 DIAGNOSIS — R07.89 CHEST PRESSURE: ICD-10-CM

## 2019-11-23 DIAGNOSIS — G62.9 NEUROPATHY: ICD-10-CM

## 2019-11-23 DIAGNOSIS — G62.9 NEUROPATHY: Primary | ICD-10-CM

## 2019-11-23 DIAGNOSIS — J00 ACUTE NASOPHARYNGITIS: ICD-10-CM

## 2019-11-23 DIAGNOSIS — R20.0 NUMBNESS AND TINGLING: Primary | ICD-10-CM

## 2019-11-23 PROCEDURE — 71046 X-RAY EXAM CHEST 2 VIEWS: CPT

## 2019-11-23 PROCEDURE — 99214 OFFICE O/P EST MOD 30 MIN: CPT | Performed by: FAMILY MEDICINE

## 2019-11-23 ASSESSMENT — MIFFLIN-ST. JEOR: SCORE: 1627.68

## 2019-11-23 NOTE — PROGRESS NOTES
"SUBJECTIVE:   Manoj Castellanos is a 24 year old adult presenting with multiple complaints today:  Chief Complaint   Patient presents with     Pharyngitis     Musculoskeletal Problem     chest muscle issue-numbness     (1) Symptoms started 4 days ago with sore throat, hoarse voice, nasal congestion and coughing.  No fevers, chest pain or dyspnea.  Not concerned for strep.   H/o asthma, has non- albuterol inhaler, but hasn't needed/used during current illness.  Predisposing factors include:  Non smoker.  No vaping.    (2)  Chest wall numbness.   Last few weeks has had right upper chest pressure intermittently between level of nipple and clavicle.  Then on  developed sharp pain this same area that lasted all day and resolved spontaneously.  No change in symptoms with movement, breathing or exercise.  On  an intermittent slight feeling of numbness/decrease in sensation has been noticed in this same area.  No pain or pressure in the area now, no stinging/burning, no rashes.  No dyspnea.  There has been a h/o intermittent perioral and jaw neuropathy and was seeing neurology regarding this several years ago.  Has noticed those symptoms are happening again lately.        OBJECTIVE  /87 (BP Location: Left arm, Cuff Size: Adult Regular)   Pulse 91   Temp 98.5  F (36.9  C) (Oral)   Resp 16   Ht 1.778 m (5' 10\")   Wt 62.1 kg (137 lb)   SpO2 98%   BMI 19.66 kg/m    GENERAL:  Awake, alert and interactive. No acute distress.  HEENT:   NC/AT, EOMI, clear conjunctiva.  Clear nasal discharge.  Oropharynx with mild erythema, no exudate, moist and clear.  TM's and EAC's benign.  NECK: supple and free of adenopathy  CHEST:  Lungs are clear, no rhonchi, wheezing or rales. Normal symmetric air entry throughout both lung fields. No erythema, bruising, swelling or rash.  No tenderness to palpation over chest wall.  HEART:  S1 and S2 normal, no murmurs. Regular rate and rhythm.    CXR:  No infiltrate, effusion or " pneumothorax noted on my review.  Official read by radiology pending.  The clinic will call patient if result different than discussed during visit today.      ASSESSMENT/PLAN    ICD-10-CM    1. Neuropathy G62.9 XR Chest 2 Views    right upper chest wall   2. Chest pressure R07.89 XR Chest 2 Views    intermittent right upper chest   3. Acute nasopharyngitis J00        F/U with PCP for further evaluation of the intermittent neuropathy.  We discussed the expected course of the viral URI and symptomatic cares in detail.   Advised to return to care if symptoms not improving as expected, do not resolve completely, or if any new or worsening symptoms develop.

## 2019-12-03 ENCOUNTER — OFFICE VISIT (OUTPATIENT)
Dept: URGENT CARE | Facility: URGENT CARE | Age: 24
End: 2019-12-03
Payer: COMMERCIAL

## 2019-12-03 VITALS
DIASTOLIC BLOOD PRESSURE: 82 MMHG | HEART RATE: 108 BPM | SYSTOLIC BLOOD PRESSURE: 134 MMHG | BODY MASS INDEX: 19.61 KG/M2 | TEMPERATURE: 97.9 F | OXYGEN SATURATION: 97 % | HEIGHT: 70 IN | WEIGHT: 137 LBS

## 2019-12-03 DIAGNOSIS — H00.011 HORDEOLUM EXTERNUM OF RIGHT UPPER EYELID: Primary | ICD-10-CM

## 2019-12-03 PROCEDURE — 99213 OFFICE O/P EST LOW 20 MIN: CPT | Performed by: PHYSICIAN ASSISTANT

## 2019-12-03 RX ORDER — POLYMYXIN B SULFATE AND TRIMETHOPRIM 1; 10000 MG/ML; [USP'U]/ML
1-2 SOLUTION OPHTHALMIC EVERY 4 HOURS
Qty: 5 ML | Refills: 0 | Status: SHIPPED | OUTPATIENT
Start: 2019-12-03 | End: 2020-02-19

## 2019-12-03 ASSESSMENT — ENCOUNTER SYMPTOMS
EYE REDNESS: 1
DIFFICULTY URINATING: 0
HEADACHES: 0
EYE PAIN: 1
CHILLS: 0
FEVER: 0
SHORTNESS OF BREATH: 0
FATIGUE: 1
COUGH: 1
EYE ITCHING: 1
EYE DISCHARGE: 1
LIGHT-HEADEDNESS: 0

## 2019-12-03 ASSESSMENT — VISUAL ACUITY: OU: 1

## 2019-12-03 ASSESSMENT — MIFFLIN-ST. JEOR: SCORE: 1627.68

## 2019-12-03 NOTE — PROGRESS NOTES
"SUBJECTIVE:   Manoj Castellanos is a 24 year old adult presenting with a chief complaint of   Chief Complaint   Patient presents with     Urgent Care     Pt in clinic to have eval for right eye redness, pain, and swelling,      Eye Problem       Manoj Castellanos is an established patient of [facility].    Eye Problem    Onset of symptoms was 2 day(s) ago.   Location: right eye- upper eyelid. Denies trauma, known foreign body. He has been sick the last couple weeks. The first night he had \"gunk\" to his eyelid and lashes when he first woke up 2 days ago. Painful to touch, intermittent itchiness. Dull pain with blinking. No seasonal allergies.   No issues with left eye.   He denies vision changes, blurry, or double vision.   Course of illness is same.    Severity mild  Current and Associated symptoms: mattering, pain, redness, eyelid swelling, itching  Treatment measures tried include none  Denies: Pink eye exposure, Recent URI, Contact lens use, Possible foreign body and History of styes    Review of Systems   Constitutional: Positive for fatigue. Negative for chills and fever.   HENT: Positive for congestion.    Eyes: Positive for pain, discharge, redness and itching.   Respiratory: Positive for cough. Negative for shortness of breath.    Cardiovascular: Negative for chest pain.   Genitourinary: Negative for difficulty urinating.   Skin: Negative for rash.   Neurological: Negative for light-headedness and headaches.       Past Medical History:   Diagnosis Date     Depressive disorder 2014    it s chill now tho I m doing fine     Uncomplicated asthma early 2000s     Family History   Problem Relation Age of Onset     Substance Abuse Father      Asthma Father      Pancreatic Cancer Maternal Grandfather      Other Cancer Maternal Grandfather      Prostate Cancer Paternal Grandfather      Depression Other      Anxiety Disorder Other      Mental Illness Other      Current Outpatient Medications   Medication Sig Dispense Refill     albuterol " "(PROAIR HFA/PROVENTIL HFA/VENTOLIN HFA) 108 (90 Base) MCG/ACT inhaler Inhale 2 puffs into the lungs every 4 hours as needed for shortness of breath / dyspnea or wheezing 1 Inhaler 2     cetirizine (ZYRTEC) 10 MG tablet Take 1 tablet (10 mg) by mouth daily 30 tablet 11     finasteride (PROSCAR) 5 MG tablet Take 1.25 mg by mouth daily 90 tablet 3     fluocinonide (LIDEX) 0.05 % cream Apply topically 2 times daily       fluticasone (FLOVENT HFA) 110 MCG/ACT inhaler Inhale 2 puffs into the lungs 2 times daily 3 Inhaler 3     IBUPROFEN        ISOtretinoin (ACCUTANE PO) Take 80 mg by mouth daily        minoxidil 5 % SOLN Externally apply topically 2 times daily       omeprazole (PRILOSEC OTC) 20 MG EC tablet Take 1 tablet (20 mg) by mouth daily 30 tablet 0     ondansetron (ZOFRAN-ODT) 4 MG ODT tab Take 1 tablet (4 mg) by mouth every 8 hours as needed for nausea 20 tablet 0     order for DME Equipment being ordered: SAD light 1 Units 0     trimethoprim-polymyxin b (POLYTRIM) 19279-5.1 UNIT/ML-% ophthalmic solution Place 1-2 drops into both eyes every 4 hours for 7 days 5 mL 0     Social History     Tobacco Use     Smoking status: Never Smoker     Smokeless tobacco: Never Used   Substance Use Topics     Alcohol use: Yes     Comment: infrequent at worst       OBJECTIVE  /82   Pulse 108   Temp 97.9  F (36.6  C) (Oral)   Ht 1.778 m (5' 10\")   Wt 62.1 kg (137 lb)   SpO2 97%   BMI 19.66 kg/m      Physical Exam  Vitals signs and nursing note reviewed.   Constitutional:       General: Manoj Castellanos is not in acute distress.     Appearance: Manoj Castellanos is normal weight. Manoj Castellanos is not toxic-appearing.   HENT:      Head: Normocephalic.   Eyes:      General: Vision grossly intact.         Right eye: Discharge and hordeolum (upper lid) present. No foreign body.      Extraocular Movements: Extraocular movements intact.      Conjunctiva/sclera: Conjunctivae normal.      Left eye: Left conjunctiva is not injected.      Pupils: Pupils " are equal, round, and reactive to light.   Pulmonary:      Effort: Pulmonary effort is normal.   Skin:     General: Skin is warm.   Neurological:      General: No focal deficit present.      Mental Status: Manoj Castellanos is alert and oriented to person, place, and time.   Psychiatric:         Mood and Affect: Mood normal.         Behavior: Behavior normal.          Labs:  No results found for this or any previous visit (from the past 24 hour(s)).    X-Ray was not done.    ASSESSMENT:      ICD-10-CM    1. Hordeolum externum of right upper eyelid H00.011 trimethoprim-polymyxin b (POLYTRIM) 77350-9.1 UNIT/ML-% ophthalmic solution        Medical Decision Making:    Differential Diagnosis:  Eye Problem: Bacterial conjunctivitis  Viral conjunctivitis  Allergic conjunctivitis  Stye (internal)  Corneal abrasion  Foreign body  Preseptal cellulitis  Periorbital cellulitis  Blepharitis  Iritis  Scleritis    Serious Comorbid Conditions:  Adult:  Asthma    PLAN:    Eye Problem: Warm packs for comfort. Hygiene measures discussed. Polytrim ophthalmic drops-1-2 drops in the affected eye(s) every 4 hours while awake for 3 days.    Followup:    If not improving or if condition worsens, follow up with Ophtho    Patient Instructions     Patient Education     Sty (or Stye)  A sty is an infection of the oil gland of the eyelid. It may develop into a small pocket of pus (an abscess). This can cause pain, redness, and swelling. In early stages, a sty is treated with antibiotic cream, eye drops, or a small towel soaked in warm water (a warm compress). More severe cases may need to be opened and drained by a healthcare provider.  Home care    Eye drops or ointment are usually prescribed to treat the infection. Use these as directed.     Artificial tears may also be used to lubricate the eye and make it more comfortable. You can buy these over the counter without a prescription. Talk with your healthcare provider before using any over-the-counter  treatment for a sty.    Apply a warm, damp towel to the affected eye for at least 5 minutes, 3 to 4 times a day for a week. Warm compresses open the pores and speed the healing. But if the compresses are too hot, they may burn your eyelid.    Sometimes the sty will drain with this treatment alone. If this happens, keep using the antibiotic until all the redness and swelling are gone.    Wash your hands before and after touching the infected eyelid to avoid spreading the infection.    Don t squeeze or try to break open the sty.  Follow-up care  Follow up with your healthcare provider, or as advised.   When to seek medical advice  Call your healthcare provider right away if any of these occur:    Increase in swelling or redness around the eyelid after 48 to 72 hours    Increase in eye pain or the eyelid blisters    Increase in warmth--the eyelid feels hot    Drainage of blood or thick pus from the sty    Blister on the eyelid    Inability to open the eyelid due to swelling    Fever of 100.4 F (38 C) or above, or as directed by your provider    Vision changes    Headache or stiff neck    The sty comes back  Date Last Reviewed: 8/1/2017 2000-2018 [employer]. [address] [place] [address], [place] [address]. All rights reserved. This information is not intended as a substitute for professional medical care. Always follow your healthcare professional's instructions.

## 2019-12-03 NOTE — PATIENT INSTRUCTIONS
Patient Education     Sty (or Stye)  A sty is an infection of the oil gland of the eyelid. It may develop into a small pocket of pus (an abscess). This can cause pain, redness, and swelling. In early stages, a sty is treated with antibiotic cream, eye drops, or a small towel soaked in warm water (a warm compress). More severe cases may need to be opened and drained by a healthcare provider.  Home care    Eye drops or ointment are usually prescribed to treat the infection. Use these as directed.     Artificial tears may also be used to lubricate the eye and make it more comfortable. You can buy these over the counter without a prescription. Talk with your healthcare provider before using any over-the-counter treatment for a sty.    Apply a warm, damp towel to the affected eye for at least 5 minutes, 3 to 4 times a day for a week. Warm compresses open the pores and speed the healing. But if the compresses are too hot, they may burn your eyelid.    Sometimes the sty will drain with this treatment alone. If this happens, keep using the antibiotic until all the redness and swelling are gone.    Wash your hands before and after touching the infected eyelid to avoid spreading the infection.    Don t squeeze or try to break open the sty.  Follow-up care  Follow up with your healthcare provider, or as advised.   When to seek medical advice  Call your healthcare provider right away if any of these occur:    Increase in swelling or redness around the eyelid after 48 to 72 hours    Increase in eye pain or the eyelid blisters    Increase in warmth--the eyelid feels hot    Drainage of blood or thick pus from the sty    Blister on the eyelid    Inability to open the eyelid due to swelling    Fever of 100.4 F (38 C) or above, or as directed by your provider    Vision changes    Headache or stiff neck    The sty comes back  Date Last Reviewed: 8/1/2017 2000-2018 [employer]. [address] [facility], [place]  [address]. All rights reserved. This information is not intended as a substitute for professional medical care. Always follow your healthcare professional's instructions.

## 2019-12-06 ENCOUNTER — OFFICE VISIT (OUTPATIENT)
Dept: PLASTIC SURGERY | Facility: CLINIC | Age: 24
End: 2019-12-06
Payer: COMMERCIAL

## 2019-12-06 ENCOUNTER — PATIENT OUTREACH (OUTPATIENT)
Dept: PLASTIC SURGERY | Facility: CLINIC | Age: 24
End: 2019-12-06

## 2019-12-06 VITALS
BODY MASS INDEX: 19.61 KG/M2 | DIASTOLIC BLOOD PRESSURE: 95 MMHG | OXYGEN SATURATION: 97 % | RESPIRATION RATE: 18 BRPM | WEIGHT: 137 LBS | HEIGHT: 70 IN | HEART RATE: 106 BPM | SYSTOLIC BLOOD PRESSURE: 143 MMHG | TEMPERATURE: 98.3 F

## 2019-12-06 DIAGNOSIS — F64.9 GENDER DYSPHORIA: Primary | ICD-10-CM

## 2019-12-06 ASSESSMENT — PAIN SCALES - GENERAL: PAINLEVEL: NO PAIN (0)

## 2019-12-06 ASSESSMENT — MIFFLIN-ST. JEOR: SCORE: 1627.68

## 2019-12-06 NOTE — PROGRESS NOTES
PLASTIC SURGERY HISTORY AND PHYSICAL    Manoj Castellanos MRN# [id number]   Age: 24 year old YOB: 1995     Primary care provider: Kina Rivera    CHIEF COMPLAINT: gender dysphoria    HPI: Manoj Castellanos is a very pleasant 24-year-old 9 binary person who currently presents for evaluation of their face for possible gender confirmation surgery in addition to the concerns regarding their hairline.  Regarding the latter, the patient is most acutely concerned about the peaked shape of the hairline bilaterally.  They would prefer a more rounded hairline.  In addition to that the patient is aware of some asymmetries of their nasal tip but they are otherwise not overtly concerned about any specific features of their face.  The patient is currently not on any estrogen therapy.  They have been interested in potential surgical intervention to manage their gender dysphoria for some time but have only recently become aware of the availability of facial gender confirmation surgery.    PMH:  Past Medical History:   Diagnosis Date     Depressive disorder 2014    it s chill now tho I m doing fine     Uncomplicated asthma early 2000s       PSH:  Past Surgical History:   Procedure Laterality Date     HC TOOTH EXTRACTION W/FORCEP  2016    4 teeth        FH:  Family History   Problem Relation Age of Onset     Substance Abuse Father      Asthma Father      Pancreatic Cancer Maternal Grandfather      Other Cancer Maternal Grandfather      Prostate Cancer Paternal Grandfather      Depression Other      Anxiety Disorder Other      Mental Illness Other        SH:  Social History     Tobacco Use     Smoking status: Never Smoker     Smokeless tobacco: Never Used   Substance Use Topics     Alcohol use: Yes     Comment: infrequent at worst     Drug use: No       MEDS:  Current Outpatient Medications   Medication     albuterol (PROAIR HFA/PROVENTIL HFA/VENTOLIN HFA) 108 (90 Base) MCG/ACT inhaler     cetirizine (ZYRTEC) 10 MG tablet      finasteride (PROSCAR) 5 MG tablet     fluocinonide (LIDEX) 0.05 % cream     fluticasone (FLOVENT HFA) 110 MCG/ACT inhaler     IBUPROFEN     ISOtretinoin (ACCUTANE PO)     minoxidil 5 % SOLN     omeprazole (PRILOSEC OTC) 20 MG EC tablet     ondansetron (ZOFRAN-ODT) 4 MG ODT tab     order for DME     trimethoprim-polymyxin b (POLYTRIM) 18665-6.1 UNIT/ML-% ophthalmic solution     No current facility-administered medications for this visit.        ALLERGIES:     Allergies   Allergen Reactions     Amoxicillin Swelling     Lip swelling      Cefuroxime      Swelling      Minocycline Other (See Comments)     Throat felt like it was on fire       ROS: Negative except for history of present illness.    EXAM:  No acute distress  Regular  Nonlabored  FACE generally androgynous  HAIRLINE with acute peaking bilaterally  FOREHEAD/SUPRAORBITAL RIDGE relatively effaced with no overt hooding, no brow ptosis  NOSE with very mild dorsal hump, adequate projection, very slightly deviated nasal tip to RIGHT  FACIAL HAIR present along upper lip and jawline  VIKKI'S APPLE not visible and slightly palpable  Warm, well-perfused    ASSESSMENT/PLAN:  Manoj Castellanos  is a very pleasant 24-year-old non-binary person with a generally androgynous facial appearance.  There is no clear surgical indication at this time.  We discussed the patient's concern regarding their peaked hairline and we recommended that hair transportation may offer a more natural appearing solution.  We recommend that the patient seek a referral for a hair transplant specialist.  Regarding the rest of the face, extensive time was spent discussing each component and it was agreed that there was minimal benefit for surgically addressing any component in the setting of a relatively androgynous appearance.  The patient may call us should they having further questions and they can follow-up on an as-needed basis.    At least 30 minutes was spent with patient, of which more than 50 percent  of that time is spent discussing the diagnosis, prognosis, risk and benefits, instructions for management, and education.     Ellen Morris MD  Plastic & Reconstructive Surgery  Pager: [phone]

## 2019-12-06 NOTE — PROGRESS NOTES
[facility]:  Care Coordination Note     SITUATION   Patient (Ty, They/Them)  is a 24 year old who is receiving support for:  Clinic Care Coordination - Face To Face and Consult For (Facial Gender Confirmation Surgery)  .    BACKGROUND     Pt attended facial gender confirmation surgery consultation with Dr. Morris.     ASSESSMENT     Surgery              [facility] Assessment  Comprehensive Gender Care (CGC) Enrollment: Enrolled(not on hormones)  Patient has a therapist: Yes  Name of therapist: Kristy Doe @ [facility] for psychology  Letter of support #1: Requested  Surgery being considered: Yes(FFS)    Pt identifies as nonbinary uses they them pronouns. Reports they helped develop [employer], a youth trans and nonbinary support group. Pt is not on hormones and has no current plans to start. Pt understand [employer] may not be covered and it is likely to need to appeal denials through [place] insurance.       PLAN          Nursing Interventions:   [facility] program and services discussed with patient. Educational surgical packet provided and reviewed with patient. Process for accessing surgery discussed, including: WPATH standards of care, letters of support, treatment plan action steps, [place] insurance process, surgery scheduling, and approximate timeline.     DOYLE signed by patient for therapist and Nino Alvarez at [facility] along with a records release. Pt questions answered within scope of practice.     Follow-up plan:  Pt to obtain one letter of support. See Dr. Morris's consult note for additional follow-up plans.        Roly Corral

## 2019-12-06 NOTE — LETTER
12/6/2019       RE: Manoj Castellanos  [address]  [facility] [id number]     Dear Colleague,    Thank you for referring your patient, Manoj Castellanos, to the [facility] PLASTIC AND RECONSTRUCTIVE SURGERY at [facility]. Please see a copy of my visit note below.    PLASTIC SURGERY HISTORY AND PHYSICAL    Manoj Castellanos MRN# [id number]   Age: 24 year old YOB: 1995     Primary care provider: Kina Rivera    CHIEF COMPLAINT: gender dysphoria    HPI: Manoj Castellanos is a very pleasant 24-year-old 9 binary person who currently presents for evaluation of their face for possible gender confirmation surgery in addition to the concerns regarding their hairline.  Regarding the latter, the patient is most acutely concerned about the peaked shape of the hairline bilaterally.  They would prefer a more rounded hairline.  In addition to that the patient is aware of some asymmetries of their nasal tip but they are otherwise not overtly concerned about any specific features of their face.  The patient is currently not on any estrogen therapy.  They have been interested in potential surgical intervention to manage their gender dysphoria for some time but have only recently become aware of the availability of facial gender confirmation surgery.    PMH:  Past Medical History:   Diagnosis Date     Depressive disorder 2014    it s chill now tho I m doing fine     Uncomplicated asthma early 2000s       PSH:  Past Surgical History:   Procedure Laterality Date     HC TOOTH EXTRACTION W/FORCEP  2016    4 teeth        FH:  Family History   Problem Relation Age of Onset     Substance Abuse Father      Asthma Father      Pancreatic Cancer Maternal Grandfather      Other Cancer Maternal Grandfather      Prostate Cancer Paternal Grandfather      Depression Other      Anxiety Disorder Other      Mental Illness Other        SH:  Social History     Tobacco Use     Smoking status: Never Smoker     Smokeless tobacco:  Never Used   Substance Use Topics     Alcohol use: Yes     Comment: infrequent at worst     Drug use: No       MEDS:  Current Outpatient Medications   Medication     albuterol (PROAIR HFA/PROVENTIL HFA/VENTOLIN HFA) 108 (90 Base) MCG/ACT inhaler     cetirizine (ZYRTEC) 10 MG tablet     finasteride (PROSCAR) 5 MG tablet     fluocinonide (LIDEX) 0.05 % cream     fluticasone (FLOVENT HFA) 110 MCG/ACT inhaler     IBUPROFEN     ISOtretinoin (ACCUTANE PO)     minoxidil 5 % SOLN     omeprazole (PRILOSEC OTC) 20 MG EC tablet     ondansetron (ZOFRAN-ODT) 4 MG ODT tab     order for DME     trimethoprim-polymyxin b (POLYTRIM) 79238-9.1 UNIT/ML-% ophthalmic solution     No current facility-administered medications for this visit.        ALLERGIES:     Allergies   Allergen Reactions     Amoxicillin Swelling     Lip swelling      Cefuroxime      Swelling      Minocycline Other (See Comments)     Throat felt like it was on fire       ROS: Negative except for history of present illness.    EXAM:  No acute distress  Regular  Nonlabored  FACE generally androgynous  HAIRLINE with acute peaking bilaterally  FOREHEAD/SUPRAORBITAL RIDGE relatively effaced with no overt hooding, no brow ptosis  NOSE with very mild dorsal hump, adequate projection, very slightly deviated nasal tip to RIGHT  FACIAL HAIR present along upper lip and jawline  VIKKI'S APPLE not visible and slightly palpable  Warm, well-perfused    ASSESSMENT/PLAN:  Manoj Castellanos  is a very pleasant 24-year-old non-binary person with a generally androgynous facial appearance.  There is no clear surgical indication at this time.  We discussed the patient's concern regarding their peaked hairline and we recommended that hair transportation may offer a more natural appearing solution.  We recommend that the patient seek a referral for a hair transplant specialist.  Regarding the rest of the face, extensive time was spent discussing each component and it was agreed that there was minimal  benefit for surgically addressing any component in the setting of a relatively androgynous appearance.  The patient may call us should they having further questions and they can follow-up on an as-needed basis.    At least 30 minutes was spent with patient, of which more than 50 percent of that time is spent discussing the diagnosis, prognosis, risk and benefits, instructions for management, and education.     Ellen Morris MD  Plastic & Reconstructive Surgery  Pager: [phone]

## 2019-12-06 NOTE — NURSING NOTE
"Chief Complaint   Patient presents with     Consult     Pt here for GFCs consult       Vitals:    12/06/19 1506   BP: (!) 143/95   BP Location: Left arm   Patient Position: Sitting   Cuff Size: Adult Regular   Pulse: 106   Resp: 18   Temp: 98.3  F (36.8  C)   TempSrc: Oral   SpO2: 97%   Weight: 62.1 kg (137 lb)   Height: 1.778 m (5' 10\")       Body mass index is 19.66 kg/m .      TYREE Herzog NREMT                      "

## 2019-12-20 ENCOUNTER — TRANSFERRED RECORDS (OUTPATIENT)
Dept: HEALTH INFORMATION MANAGEMENT | Facility: CLINIC | Age: 24
End: 2019-12-20

## 2019-12-22 ENCOUNTER — OFFICE VISIT (OUTPATIENT)
Dept: URGENT CARE | Facility: URGENT CARE | Age: 24
End: 2019-12-22
Payer: COMMERCIAL

## 2019-12-22 VITALS
BODY MASS INDEX: 19.61 KG/M2 | HEART RATE: 71 BPM | HEIGHT: 70 IN | OXYGEN SATURATION: 100 % | DIASTOLIC BLOOD PRESSURE: 75 MMHG | SYSTOLIC BLOOD PRESSURE: 116 MMHG | WEIGHT: 137 LBS | TEMPERATURE: 97.9 F

## 2019-12-22 DIAGNOSIS — R05.8 COUGH PRESENT FOR GREATER THAN 3 WEEKS: Primary | ICD-10-CM

## 2019-12-22 DIAGNOSIS — L60.0 INGROWN TOENAIL: ICD-10-CM

## 2019-12-22 DIAGNOSIS — L08.9 SKIN INFECTION: ICD-10-CM

## 2019-12-22 PROCEDURE — 99214 OFFICE O/P EST MOD 30 MIN: CPT | Performed by: NURSE PRACTITIONER

## 2019-12-22 RX ORDER — AZITHROMYCIN 250 MG/1
TABLET, FILM COATED ORAL
Qty: 6 TABLET | Refills: 0 | Status: SHIPPED | OUTPATIENT
Start: 2019-12-22 | End: 2020-02-19

## 2019-12-22 RX ORDER — MUPIROCIN 20 MG/G
OINTMENT TOPICAL 3 TIMES DAILY
Qty: 30 G | Refills: 0 | Status: SHIPPED | OUTPATIENT
Start: 2019-12-22 | End: 2020-02-19

## 2019-12-22 ASSESSMENT — ENCOUNTER SYMPTOMS
NAUSEA: 0
FEVER: 0
COUGH: 1
DIARRHEA: 0
WHEEZING: 0
NECK PAIN: 0
SORE THROAT: 0
VOMITING: 0
HEADACHES: 0
SHORTNESS OF BREATH: 0
RHINORRHEA: 1

## 2019-12-22 ASSESSMENT — MIFFLIN-ST. JEOR: SCORE: 1627.68

## 2019-12-23 NOTE — PROGRESS NOTES
SUBJECTIVE:   Manoj Castellanos is a 24 year old adult presenting with a chief complaint of   Chief Complaint   Patient presents with     Urgent Care     Ear Problem     c/o outter ear infected for 2 weeks     Cough     c/o cough for 8 weeks     Ingrown Toenail     c/o ingrown toenail for 6 days       Manoj Castellanos is an established patient of [place].    URI Adult    Onset of symptoms was 8 week(s) ago.  Course of illness is waxing and waning.    Severity moderate  Current and Associated symptoms: runny nose, stuffy nose and cough - non-productive  Treatment measures tried include mucinex.  Predisposing factors include ill contact: Work, seasonal allergies and HX of asthma.      Also, Manoj Castellanos has additional complaints of skin infection to the right ear lobe for the last 2 weeks. Seems to be worsening. States does have some swelling, crusting, redness, and tenderness. Has tried using peroxide without much relief.     Also, Manoj Castellanos has additional complaints of ingrown toenail on the left for the last 6 days.         Review of Systems   Constitutional: Negative for fever.   HENT: Positive for congestion, ear pain (right outer ear) and rhinorrhea. Negative for sore throat.    Respiratory: Positive for cough. Negative for shortness of breath and wheezing.    Gastrointestinal: Negative for diarrhea, nausea and vomiting.   Musculoskeletal: Negative for neck pain.   Skin: Positive for rash.   Neurological: Negative for headaches.       Past Medical History:   Diagnosis Date     Depressive disorder 2014    it s chill now tho I m doing fine     Uncomplicated asthma early 2000s     Family History   Problem Relation Age of Onset     Substance Abuse Father      Asthma Father      Pancreatic Cancer Maternal Grandfather      Other Cancer Maternal Grandfather      Prostate Cancer Paternal Grandfather      Depression Other      Anxiety Disorder Other      Mental Illness Other      Current Outpatient Medications   Medication Sig Dispense Refill  "    albuterol (PROAIR HFA/PROVENTIL HFA/VENTOLIN HFA) 108 (90 Base) MCG/ACT inhaler Inhale 2 puffs into the lungs every 4 hours as needed for shortness of breath / dyspnea or wheezing 1 Inhaler 2     azithromycin (ZITHROMAX) 250 MG tablet Take 2 tablets (500 mg) by mouth daily for 1 day, THEN 1 tablet (250 mg) daily for 4 days. 6 tablet 0     cetirizine (ZYRTEC) 10 MG tablet Take 1 tablet (10 mg) by mouth daily 30 tablet 11     finasteride (PROSCAR) 5 MG tablet Take 1.25 mg by mouth daily 90 tablet 3     fluocinonide (LIDEX) 0.05 % cream Apply topically 2 times daily       fluticasone (FLOVENT HFA) 110 MCG/ACT inhaler Inhale 2 puffs into the lungs 2 times daily 3 Inhaler 3     IBUPROFEN        ISOtretinoin (ACCUTANE PO) Take 80 mg by mouth daily        minoxidil 5 % SOLN Externally apply topically 2 times daily       mupirocin (BACTROBAN) 2 % external ointment Apply topically 3 times daily 30 g 0     omeprazole (PRILOSEC OTC) 20 MG EC tablet Take 1 tablet (20 mg) by mouth daily 30 tablet 0     ondansetron (ZOFRAN-ODT) 4 MG ODT tab Take 1 tablet (4 mg) by mouth every 8 hours as needed for nausea 20 tablet 0     order for DME Equipment being ordered: SAD light 1 Units 0     Social History     Tobacco Use     Smoking status: Never Smoker     Smokeless tobacco: Never Used   Substance Use Topics     Alcohol use: Yes     Comment: infrequent at worst       OBJECTIVE  /75   Pulse 71   Temp 97.9  F (36.6  C) (Oral)   Ht 1.778 m (5' 10\")   Wt 62.1 kg (137 lb)   SpO2 100%   BMI 19.66 kg/m      Physical Exam  Vitals signs and nursing note reviewed.   Constitutional:       Appearance: Normal appearance. Ty Gale is well-developed.   HENT:      Head: Normocephalic and atraumatic.      Right Ear: Tympanic membrane and ear canal normal.      Left Ear: Tympanic membrane, ear canal and external ear normal.      Ears:        Nose: Congestion and rhinorrhea present.      Right Sinus: No maxillary sinus tenderness or frontal " sinus tenderness.      Left Sinus: No maxillary sinus tenderness or frontal sinus tenderness.      Mouth/Throat:      Pharynx: Posterior oropharyngeal erythema present. No oropharyngeal exudate.   Eyes:      Extraocular Movements: Extraocular movements intact.      Conjunctiva/sclera: Conjunctivae normal.      Pupils: Pupils are equal, round, and reactive to light.   Neck:      Musculoskeletal: Normal range of motion and neck supple.   Cardiovascular:      Rate and Rhythm: Normal rate and regular rhythm.      Heart sounds: Normal heart sounds.   Pulmonary:      Effort: Pulmonary effort is normal.      Breath sounds: Normal breath sounds and air entry.   Feet:      Left foot:      Skin integrity: No ulcer or skin breakdown.      Toenail Condition: Left toenails are ingrown.   Lymphadenopathy:      Head:      Right side of head: No submandibular or tonsillar adenopathy.      Left side of head: No submandibular or tonsillar adenopathy.      Cervical: No cervical adenopathy.   Skin:     General: Skin is warm and dry.      Capillary Refill: Capillary refill takes less than 2 seconds.      Comments: See Ear.    Neurological:      Mental Status: Manoj Castellanos is alert and oriented to person, place, and time.   Psychiatric:         Behavior: Behavior is cooperative.           ASSESSMENT:      ICD-10-CM    1. Cough present for greater than 3 weeks R05 azithromycin (ZITHROMAX) 250 MG tablet   2. Skin infection L08.9 mupirocin (BACTROBAN) 2 % external ointment   3. Ingrown toenail L60.0         Medical Decision Making:    Differential Diagnosis:  URI Adult/Peds:  Bronchitis-viral, Pneumonia, Sinusitis and Viral upper respiratory illness    Serious Comorbid Conditions:  Adult:  Asthma.     PLAN:  Discussed with patient that will start him on zithromax. Will do the mupirocin three times a day to the ear. Discussed additional symptomatic treatment recommendations. Education was added to AVS. Patient was agreeable to plan and verbalized  understanding.     Followup:    In 1 week if symptoms do not improve or sooner if symptoms worsen.     Patient Instructions   zithromax as prescribed  Mupirocin three times a day  Push Fluids  Plenty of rest  Tylenol and ibuprofen to help with pain or fever  Humidified air can help with congestion  Nasal saline or Flonase nasal spray to help with congestion  Salt water gargles, anesthetic throat spray, or lozenges to help with sore throat.   Can do the episome salt soaks to help with the toe.

## 2019-12-23 NOTE — PATIENT INSTRUCTIONS
zithromax as prescribed  Mupirocin three times a day  Push Fluids  Plenty of rest  Tylenol and ibuprofen to help with pain or fever  Humidified air can help with congestion  Nasal saline or Flonase nasal spray to help with congestion  Salt water gargles, anesthetic throat spray, or lozenges to help with sore throat.   Can do the episome salt soaks to help with the toe.

## 2020-01-20 ENCOUNTER — TELEPHONE (OUTPATIENT)
Dept: OPHTHALMOLOGY | Facility: CLINIC | Age: 25
End: 2020-01-20

## 2020-01-20 NOTE — TELEPHONE ENCOUNTER
In reviewing referral and notes from [facility], it appears patient is interested in surgery and would like a second opinion as to whether or not can have surgery.  Dr. Shah would be more appropriate provider than Dr. Maguire.  Assisted in rescheduling.

## 2020-02-19 ENCOUNTER — OFFICE VISIT (OUTPATIENT)
Dept: OPHTHALMOLOGY | Facility: CLINIC | Age: 25
End: 2020-02-19
Attending: OPHTHALMOLOGY
Payer: COMMERCIAL

## 2020-02-19 DIAGNOSIS — H53.10 SUBJECTIVE VISUAL DISTURBANCE: ICD-10-CM

## 2020-02-19 DIAGNOSIS — H50.05 ESOTROPIA, ALTERNATING: Primary | ICD-10-CM

## 2020-02-19 DIAGNOSIS — H53.2 DOUBLE VISION: ICD-10-CM

## 2020-02-19 PROCEDURE — G0463 HOSPITAL OUTPT CLINIC VISIT: HCPCS | Mod: ZF | Performed by: TECHNICIAN/TECHNOLOGIST

## 2020-02-19 PROCEDURE — 92060 SENSORIMOTOR EXAMINATION: CPT | Mod: ZF | Performed by: OPHTHALMOLOGY

## 2020-02-19 ASSESSMENT — REFRACTION_WEARINGRX
OD_SPHERE: -6.00
OS_AXIS: 063
SPECS_TYPE: SVL
OS_CYLINDER: +2.50
OS_SPHERE: -7.00
OD_CYLINDER: +1.50
OD_AXIS: 135

## 2020-02-19 ASSESSMENT — TONOMETRY
OS_IOP_MMHG: 20
IOP_METHOD: ICARE
OD_IOP_MMHG: 19

## 2020-02-19 ASSESSMENT — VISUAL ACUITY
CORRECTION_TYPE: GLASSES
OD_CC+: -2
METHOD: SNELLEN - LINEAR
OD_CC: 20/20
OS_CC: 20/20

## 2020-02-19 ASSESSMENT — EXTERNAL EXAM - RIGHT EYE: OD_EXAM: NORMAL

## 2020-02-19 ASSESSMENT — REFRACTION_FINALRX
OS_HPRISM: 6
OD_HPRISM: 6
OS_HBASE: BASE OUT
OD_HBASE: BASE OUT

## 2020-02-19 ASSESSMENT — SLIT LAMP EXAM - LIDS
COMMENTS: NORMAL
COMMENTS: NORMAL

## 2020-02-19 ASSESSMENT — EXTERNAL EXAM - LEFT EYE: OS_EXAM: NORMAL

## 2020-02-19 ASSESSMENT — CUP TO DISC RATIO
OS_RATIO: 0.25
OD_RATIO: 0.25

## 2020-02-19 ASSESSMENT — CONF VISUAL FIELD
METHOD: COUNTING FINGERS
OS_NORMAL: 1
OD_NORMAL: 1

## 2020-02-19 NOTE — NURSING NOTE
Chief Complaint(s) and History of Present Illness(es)     New Patient     In both eyes (Consult for strabismus surgery).  Onset: 4420-7691.  Since onset it is gradually worsening.  Treatments tried: Fresnel.  Response to treatment: Double vision persists.              Comments     Patient states constant horizontal double vision. Currently has 4 OLIVER fresnel OD.  No previous eye muscle surgery. No known eye misalignment as a child. No history of patching.   Eye misalignment noted since 2016 and gradually getting worse. Patient states he is here for a second opinion and curious about strabismus surgery.     CESARIO Nguyen 2/19/2020 12:39 PM

## 2020-02-19 NOTE — LETTER
2020    RE: Manoj Castellanos  : 1995  MRN: [id number]    Dear YONAS Rivera and Dr. Chandler,    Thank you for referring your patient, Manoj Castellanos, to my neuro-ophthalmology clinic recently.  After a thorough neuro-ophthalmic history and examination, I came to the following conclusions:   1.  Decompensated esotropia associated with moderate myopia-   24 year old with progressive diplopia and decompensated esodeviation interested in strabismus surgery, with a goal of being glasses free (CTLs). Manoj is a good candidate for surgery or prism glasses, and today I discussed the risks, benefits and alternatives to strabismus surgery.  At this time he is interested in a trial of prism glasses.  He may consider strabismus surgery in the future if he becomes prism glasses intolerant.    Manoj Castellanos is a 24 year old adult sent to us for consultation by Dr. Nilsa Rivera for evaluation of diplopia. This is a second opinion for Manoj, who was evaluated by Dr. Beck Chandler at [facility]. First noted his diplopia in , initially noted as a blur in his vision that became very apparent when attempting to drive, and since had been driving with one eye. Denies any trauma to the head or eye at that time, of note they do persistent RIGHT sided V3 numbness since a child after dental anesthetic. No history of strokes or blood clots. Occasional headaches, described as tension like. Initially was corrected with a 4BO prism, which lasted him until about  but noted it was starting to become more apparent even with the prism. They saw Dr. Chandler in 2019 at which time they were measured at a 6BO prism, but never filled the prescription.     At this time, they are interested in surgical correction options for their diplopia.     Patient had MRI done several years ago at [facility].  This was completed 2017 and aside from opacification of the right frontal sinus was unremarkable.  He had double  vision at that time and hence I see no reason to repeat this study.  I reviewed the images and did not see any finding for a central esotropia or bilateral 6th nerve palsy.    POH: high myopia with astigmatism, esophoria; no eye surgeries, no DM/HTN  PMH: Peridental surgery 2014, wisdom teeth, recurrent respiratory infections / sinus infections.  FH: Mother with multiple sclerosis, grandfather with  shunt 2/2 hydrocephalus    Visual acuity is 20/20. Intraocular pressure is normal. Pupils equal and reactive without relative afferent pupillary defect. Confrontational visual fields full. Motility full. Slit lamp exam unremarkable. Dilated fundus exam normal nerve and fundus and periphery sensorimotor examination today revealed.  Trace abduction deficits bilaterally with otherwise full motility.  He had a 14 prism diopter exodeviation in primary gaze which he could intermittently fuse.  He had a manifest 16-18 prism diopter esotropia and right and left gaze respectively. Cranial nerves V1-3, 7,8,9,11, and 12 were normal bilaterally.     We discussed in detail the risks, benefits, and alternatives of eye muscle correction surgery including the very rare risk of death or serious morbidity from a general anesthesia complication and the rare risk of severe vision loss in the operative eye(s) secondary to retinal detachment or endophthalmitis.  We discussed more likely sub-optimal outcomes including the unanticipated need for additional strabismus surgery.  Finally the patient was aware that prisms glasses may be required to optimize single vision following surgery.    After a thorough discussion of these risks, the patient decided to consider strabismus surgery.  The surgical plan would be as follows:      1. Bilateral medial rectus recessions- one on adjustable suture.    Plan [facility] at: [facility]    Patient to trial prism glasses first so filling new prescription for ground in prism glasses.  I did not make a follow-up  appointment, but I would be happy to see the patient back in the future should any new neuro-ophthalmic concern arise or should he wish to proceed with strabismus surgery.      Again, thank you for trusting me with the care of your patient.  For further exam details, please feel free to contact our office for additional records.  If you wish to contact me regarding this patient please email me at [email or web address] or give my clinic a call to arrange a phone conversation.    Sincerely,    Kel Shah MD  , Neuro-Ophthalmology and Adult Strabismus Surgery  The Dragan Thompson Chair in Neuro-Ophthalmology  Department of Ophthalmology and Visual Neurosciences  [facility]    DX: esotropia, moderate myopia

## 2020-02-19 NOTE — PROGRESS NOTES
1.  Decompensated esotropia associated with moderate myopia-   24 year old with progressive diplopia and decompensated esodeviation interested in strabismus surgery, with a goal of being glasses free (CTLs). Manoj is a good candidate for surgery or prism glasses, and today I discussed the risks, benefits and alternatives to strabismus surgery.  At this time he is interested in a trial of prism glasses.  He may consider strabismus surgery in the future if he becomes prism glasses intolerant.    Manoj Castellanos is a 24 year old adult sent to us for consultation by Dr. Nilsa Rivera for evaluation of diplopia. This is a second opinion for Manoj, who was evaluated by Dr. Beck Chandler at [facility]. First noted his diplopia in 2016, initially noted as a blur in his vision that became very apparent when attempting to drive, and since had been driving with one eye. Denies any trauma to the head or eye at that time, of note they do persistent RIGHT sided V3 numbness since a child after dental anesthetic. No history of strokes or blood clots. Occasional headaches, described as tension like. Initially was corrected with a 4BO prism, which lasted him until about 2018 but noted it was starting to become more apparent even with the prism. They saw Dr. Chandler in 7/2019 at which time they were measured at a 6BO prism, but never filled the prescription.     At this time, they are interested in surgical correction options for their diplopia.     Patient had MRI done several years ago at [facility].  This was completed October 2017 and aside from opacification of the right frontal sinus was unremarkable.  He had double vision at that time and hence I see no reason to repeat this study.  I reviewed the images and did not see any finding for a central esotropia or bilateral 6th nerve palsy.    POH: high myopia with astigmatism, esophoria; no eye surgeries, no DM/HTN  PMH: Peridental surgery 2014, wisdom teeth,  recurrent respiratory infections / sinus infections.  FH: Mother with multiple sclerosis, grandfather with  shunt 2/2 hydrocephalus    Visual acuity is 20/20. Intraocular pressure is normal. Pupils equal and reactive without relative afferent pupillary defect. Confrontational visual fields full. Motility full. Slit lamp exam unremarkable. Dilated fundus exam normal nerve and fundus and periphery sensorimotor examination today revealed.  Trace abduction deficits bilaterally with otherwise full motility.  He had a 14 prism diopter exodeviation in primary gaze which she could intermittently fuse.  He had a manifest 16-18 prism diopter esotropia and right and left gaze respectively. Cranial nerves V1-3, 7,8,9,11, and 12 were normal bilaterally.     We discussed in detail the risks, benefits, and alternatives of eye muscle correction surgery including the very rare risk of death or serious morbidity from a general anesthesia complication and the rare risk of severe vision loss in the operative eye(s) secondary to retinal detachment or endophthalmitis.  We discussed more likely sub-optimal outcomes including the unanticipated need for additional strabismus surgery.  Finally the patient was aware that prisms glasses may be required to optimize single vision following surgery.    After a thorough discussion of these risks, the patient decided to consider strabismus surgery.  The surgical plan would be as follows:      1. Bilateral medial rectus recessions- one on adjustable suture.    [facility] at: [facility]    Patient to trial prism glasses first so filling new prescription for ground in prism glasses.  I did not make a follow-up appointment, but I would be happy to see the patient back in the future should any new neuro-ophthalmic concern arise or should he wish to proceed with strabismus surgery.         Complete documentation of historical and exam elements from today's encounter can be found in the full encounter  summary report (not reduplicated in this progress note).  I personally obtained the chief complaint(s) and history of present illness.  I confirmed and edited as necessary the review of systems, past medical/surgical history, family history, social history, and examination findings as documented by others; and I examined the patient myself.  I personally reviewed the relevant tests, images, and reports as documented above.  I formulated and edited as necessary the assessment and plan and discussed the findings and management plan with the patient and family.  I personally reviewed the ophthalmic test(s) associated with this encounter, agree with the interpretation(s) as documented by the resident/fellow, and have edited the corresponding report(s) as necessary.     MD Jarred León MD  Ophthalmology Resident  PGY-3

## 2020-03-05 ENCOUNTER — PRE VISIT (OUTPATIENT)
Dept: NEUROLOGY | Facility: CLINIC | Age: 25
End: 2020-03-05

## 2020-03-05 NOTE — TELEPHONE ENCOUNTER
FUTURE VISIT INFORMATION      FUTURE VISIT INFORMATION:    Date: 4/6/2020    Time: 9AM     Location: [facility]  REFERRAL INFORMATION:    Referring provider:  Kina BRICEÑO CNP     Referring providers clinic:  [facility]     Reason for visit/diagnosis  Numbness and Tingling     RECORDS REQUESTED FROM:       Clinic name Comments Records Status Imaging Status   Internal  Kina Kent-11/5/2019, 6/25/2019, 3/28/2019    MR Brain 10/17/2017 Epic PACS

## 2020-03-08 ENCOUNTER — MYC MEDICAL ADVICE (OUTPATIENT)
Dept: FAMILY MEDICINE | Facility: CLINIC | Age: 25
End: 2020-03-08

## 2020-03-08 DIAGNOSIS — R41.840 POOR CONCENTRATION: Primary | ICD-10-CM

## 2020-03-12 ENCOUNTER — MYC MEDICAL ADVICE (OUTPATIENT)
Dept: FAMILY MEDICINE | Facility: CLINIC | Age: 25
End: 2020-03-12

## 2020-03-13 DIAGNOSIS — J06.9 VIRAL URI: ICD-10-CM

## 2020-03-13 NOTE — TELEPHONE ENCOUNTER
"Requested Prescriptions   Pending Prescriptions Disp Refills     ondansetron (ZOFRAN-ODT) 4 MG ODT tab  Last Written Prescription Date:  5/24/19  Last Fill Quantity: 20,  # refills: 0   Last office visit: 12/22/2019 with prescribing provider:  Caleb   Future Office Visit:     20 tablet 0     Sig: Take 1 tablet (4 mg) by mouth every 8 hours as needed for nausea        Antivertigo/Antiemetic Agents Passed - 3/13/2020  4:28 PM        Passed - Recent (12 mo) or future (30 days) visit within the authorizing provider's specialty     Patient has had an office visit with the authorizing provider or a provider within the authorizing providers department within the previous 12 mos or has a future within next 30 days. See \"Patient Info\" tab in inbasket, or \"Choose Columns\" in Meds & Orders section of the refill encounter.              Passed - Medication is active on med list        Passed - Patient is 18 years of age or older             "

## 2020-03-13 NOTE — TELEPHONE ENCOUNTER
Instructions for Patients  Additional General Information About Corona Virus 19    Coronavirus - General Information:    The coronavirus infection starts within 14 days of an exposure.    Symptoms are those of a respiratory infection (such as fever, cough).     If you have not had symptoms by day 15, you should be safe from getting the coronavirus.     Coronavirus - Symptoms:     The coronavirus can cause a respiratory illness, such as bronchitis or pneumonia.    The most common symptoms are: cough, fever, and shortness of breath.     Other symptoms are: body aches, chills, diarrhea, fatigue, headache, runny nose, and sore throat     Coronavirus - Exposure Risk Factors:    Exposure to a person who has been diagnosed with coronavirus.    Travel from an area with recent local transmission of coronavirus.    The CDC (www.cdc.gov) has the most up-to-date list of where the coronavirus outbreak is occurring.    Coronavirus - Spreading:     The virus likely spreads through respiratory droplets produced when a person coughs or sneezes. These respiratory droplets can travel approximately 6 feet and can remain on surfaces.  Common disinfectants will kill the virus.    The CDC currently does not recommend healthy people wearing masks.    Coronavirus - Protect Yourself:     Avoid close contact with people known to have this new coronavirus infection.    Wash hands often with soap and water or alcohol-based hand .    Avoid touching the eyes, nose or mouth.       Thank you for limiting contact with others, wearing a simple mask to cover your cough, practice good hand hygiene habits and accessing our virtual services where possible to limit the spread of this virus.    For more information about COVID19 and options for caring for yourself at home, please visit the CDC website at https://www.cdc.gov/coronavirus/2019-ncov/about/steps-when-sick.html  For more options for care at [facility], please visit our website at  [email or web address]

## 2020-03-17 RX ORDER — ONDANSETRON 4 MG/1
4 TABLET, ORALLY DISINTEGRATING ORAL EVERY 8 HOURS PRN
Qty: 20 TABLET | Refills: 0 | Status: SHIPPED | OUTPATIENT
Start: 2020-03-17 | End: 2021-11-22

## 2020-03-18 ENCOUNTER — OFFICE VISIT (OUTPATIENT)
Dept: OPHTHALMOLOGY | Facility: CLINIC | Age: 25
End: 2020-03-18
Attending: OPHTHALMOLOGY
Payer: COMMERCIAL

## 2020-03-18 DIAGNOSIS — H50.05 ESOTROPIA, ALTERNATING: Primary | ICD-10-CM

## 2020-03-18 PROCEDURE — 92015 DETERMINE REFRACTIVE STATE: CPT | Mod: ZF

## 2020-03-18 PROCEDURE — 40000269 ZZH STATISTIC NO CHARGE FACILITY FEE: Mod: ZF | Performed by: TECHNICIAN/TECHNOLOGIST

## 2020-03-18 ASSESSMENT — REFRACTION_FINALRX
OS_HPRISM: 6 BO
OD_HPRISM: 6 BO

## 2020-03-18 ASSESSMENT — REFRACTION_WEARINGRX
OS_AXIS: 070
SPECS_TYPE: SVL
OD_SPHERE: -6.00
OD_AXIS: 135
OD_CYLINDER: +1.50
OS_SPHERE: -7.75
OS_CYLINDER: +1.25
OS_SPHERE: -7.00
OD_HPRISM: 6 BO
OS_CYLINDER: +2.50
OS_AXIS: 063
OS_HPRISM: 6 BO

## 2020-03-18 ASSESSMENT — REFRACTION_MANIFEST
OS_CYLINDER: +1.50
OD_SPHERE: -5.50
OS_SPHERE: -8.00
OD_CYLINDER: +0.75
OS_AXIS: 065
OD_AXIS: 135

## 2020-03-18 ASSESSMENT — VISUAL ACUITY
CORRECTION_TYPE: GLASSES
METHOD: SNELLEN - LINEAR

## 2020-04-03 ASSESSMENT — ENCOUNTER SYMPTOMS
EYE REDNESS: 0
WEAKNESS: 0
EYE WATERING: 0
NAIL CHANGES: 0
NUMBNESS: 1
EYE IRRITATION: 0
DECREASED CONCENTRATION: 1
PARALYSIS: 0
PANIC: 1
DOUBLE VISION: 1
DIZZINESS: 0
SEIZURES: 0
SPEECH CHANGE: 0
DISTURBANCES IN COORDINATION: 0
MEMORY LOSS: 0
TREMORS: 0
DEPRESSION: 1
EYE PAIN: 0
LOSS OF CONSCIOUSNESS: 0
HEADACHES: 0
SKIN CHANGES: 0
TINGLING: 1
INSOMNIA: 0
POOR WOUND HEALING: 0
NERVOUS/ANXIOUS: 1

## 2020-04-06 ENCOUNTER — VIRTUAL VISIT (OUTPATIENT)
Dept: NEUROLOGY | Facility: CLINIC | Age: 25
End: 2020-04-06
Attending: NURSE PRACTITIONER
Payer: COMMERCIAL

## 2020-04-06 ENCOUNTER — TELEPHONE (OUTPATIENT)
Dept: NEUROLOGY | Facility: CLINIC | Age: 25
End: 2020-04-06

## 2020-04-06 DIAGNOSIS — R20.2 PARESTHESIAS: Primary | ICD-10-CM

## 2020-04-06 RX ORDER — FAMOTIDINE 20 MG/1
20 TABLET, FILM COATED ORAL PRN
COMMUNITY
End: 2023-03-24

## 2020-04-06 NOTE — PROGRESS NOTES
"Manoj Castellanos is a 24 year old adult who is being evaluated via a billable video visit.      The patient has been notified of following:     \"This video visit will be conducted via a call between you and your physician/provider. We have found that certain health care needs can be provided without the need for an in-person physical exam.  This service lets us provide the care you need with a video conversation.  If a prescription is necessary we can send it directly to your pharmacy.  If lab work is needed we can place an order for that and you can then stop by our lab to have the test done at a later time.    If during the course of the call the physician/provider feels a video visit is not appropriate, you will not be charged for this service.\"     Patient has given verbal consent for Video visit? Yes    Patient would like the video invitation sent by: Text to cell phone: [phone]    Video Start Time: 0858    Chief Complaint   Patient presents with     Consult     [facility] NEW - VIDEO VISIT     Bladimir Milian, EMT      I have reviewed and updated the patient's Past Medical History, Social History, Family History and Medication List.    ALLERGIES  Amoxicillin; Cefuroxime; and Minocycline      Video-Visit Details    Type of service:  Video Visit transitioned to telephone    Video End Time (time video stopped): 0928    Originating Location (pt. Location): Home    Distant Location (provider location):  [facility] NEUROLOGY     Mode of Communication:  Video Conference via Patricia Andrade D.O.  [facility] Neurology    "

## 2020-04-06 NOTE — PROGRESS NOTES
"[facility] Neurology Follow Up Visit    Manoj Castellanos MRN# [id number]   Age: 24 year old YOB: 1995     -------------------------------------------------------------------------  Tried to connect by video, but technical difficulties ensued past 12-15 minutes.  Switched to telephone alone  -------------------------------------------------------------------------    Brief history of symptoms: The patient was initially seen in neurologic consultation on 10/12/2017 for evaluation of intermittent facial numbness. Please see the comprehensive neurologic consultation note from that date in the Epic records for details. At the time, the patient reported 3 years of mild numbness in the V3/2 distribution on his right side.  This may have occurred since dental anesthetic use as a child, but this is unclear.  The patient also reported worsened numbness and tingling over entire lower part of face which worsened with hyperventilation and anxiety \"held by magnets\" which interfered with the patient speaking (thought expression/receptiveness were still present).  The patient had recurrent attacks of this numbness feeling lasting hours or 20 minutes and could occur with sex.  MRI wasn't revealing for etiology, but did show compete opacification of right frontal sinus.  EEG was was considered if spells continued and MRI was negative.  There was no direct follow up past this initial w/up.    On 11/23/2019, the patient was evaluated for chest wall numbness in the setting of increased anxiety and stress.  The patient had recurrent chest pressure from nipple through clavicle on the right, which lasted a short time and then resolved spontaneously.  This is no longer happening, and the patient has minimal concern for this.     Interval history: The patient reports that his facial numbness has been advancing over the last six months. Constantly, the patient feels a numbness/tingling just above his zygomatic process to his lip.  Now, he is " feeling this numbness and tingling in his lower part of the nose (left nostril and right nostril).  The nostril symptoms are more episodic, and occur when the patient has been talking for a while.  The patient also reports that if he is laughing or talking a great deal, he may have puckering of his lips that is uncontrollable.  There is no atypical smells or loss of smell, and during the episode there is no rising sensation, balance difficulties, weakness of face.         Past Medical History:     Past Medical History:   Diagnosis Date     Depressive disorder 2014    it s chill now tho I m doing fine     Uncomplicated asthma early 2000s      Past Surgical History:     Past Surgical History:   Procedure Laterality Date     HC TOOTH EXTRACTION W/FORCEP  2016    4 teeth       Social History:     Tobacco Use     Smoking status: Never Smoker     Smokeless tobacco: Never Used   Substance Use Topics     Alcohol use: Yes     Comment: infrequent at worst     Drug use: No      Family History:     Family History   Problem Relation Age of Onset     Substance Abuse Father      Asthma Father      Pancreatic Cancer Maternal Grandfather      Other Cancer Maternal Grandfather      Prostate Cancer Paternal Grandfather      Depression Other      Anxiety Disorder Other      Mental Illness Other    MS- mother       Medications:     Current Outpatient Medications   Medication Sig     albuterol (PROAIR HFA/PROVENTIL HFA/VENTOLIN HFA) 108 (90 Base) MCG/ACT inhaler Inhale 2 puffs into the lungs every 4 hours as needed for shortness of breath / dyspnea or wheezing     cetirizine (ZYRTEC) 10 MG tablet Take 1 tablet (10 mg) by mouth daily     finasteride (PROSCAR) 5 MG tablet Take 1.25 mg by mouth daily     fluocinonide (LIDEX) 0.05 % cream Apply topically 2 times daily     fluticasone (FLOVENT HFA) 110 MCG/ACT inhaler Inhale 2 puffs into the lungs 2 times daily     IBUPROFEN      minoxidil 5 % SOLN Externally apply topically 2 times daily      omeprazole (PRILOSEC OTC) 20 MG EC tablet Take 1 tablet (20 mg) by mouth daily     ondansetron (ZOFRAN-ODT) 4 MG ODT tab Take 1 tablet (4 mg) by mouth every 8 hours as needed for nausea     order for DME Equipment being ordered: SAD light      Allergies:     Allergies   Allergen Reactions     Amoxicillin Swelling     Lip swelling      Cefuroxime      Swelling      Minocycline Other (See Comments)     Throat felt like it was on fire      Review of Systems:   A comprehensive 10 point review of systems (constitutional, ENT, cardiac, peripheral vascular, lymphatic, respiratory, GI, , Musculoskeletal, skin, Neurological) was performed and found to be negative except as described in this note.          Assessment and Plan:   Assessment:  - facial paresthesias of V2/3 distribution on right    The patient has chronic paresthesias, possibly due to nerve trauma at a young age, but this has never been confirmed.  There is progression in V2 to the opposite side of the face, which could be an anatomic variant of V2 but also could indicate b/l viral infection flare in trigeminal ganglia or brain stem/cervical irritation or insult.  Given that the last time there were worsened episodes of sensory symptoms there was also a sinus infection, obtaining imaging would be beneficial.  MRI would be best considering the patient's family history of autoimmune issues, and lack of cervical imaging which does carry with it the sensory nuclei.  Other lesion possibilities could be the VPM of the thalamus.  If imaging returns negative, further testing for prior viral infections may be needed, as well as consideration for symptomatic treatment with gabapentin or Carbamezapine.    Plan:  - MRI brain and cervical spine : addenda ~ urgent, should happen w/in the next 2-4 weeks.  - Follow up in 3 months for examination  - Will discuss results through Mychart or telephone, and consider utilizing medication if negative      Follow up in Neurology  clinic in 3 months or earlier as needed should new concerns arise.    SHA Andrade D.O.   of Neurology      Greater than 50% of the total time (30 min) in this patient encounter was spent on counseling and/or coordination of care. We reviewed diagnostic results, impressions, and discussed other possible tests if symptoms do not improve. We discussed the implications of the diagnosis, as well as risks and benefits of management options. We reviewed treatment instructions and our scheduled follow-up as specified in the discharge plan. We also discussed the importance of compliance with the chosen course of treatment. The patient is in agreement with this plan and has no further questions.

## 2020-04-06 NOTE — LETTER
"4/6/2020       RE: Manoj Castellanos  [address]  [facility] [address]     Dear Colleague,    Thank you for referring your patient, Manoj Castellanos, to the [facility] at [facility]. Please see a copy of my visit note below.    [facility] Follow Up Visit    Manoj Castellanos MRN# [id number]   Age: 24 year old YOB: 1995     -------------------------------------------------------------------------  Tried to connect by video, but technical difficulties ensued past 12-15 minutes.  Switched to telephone alone  -------------------------------------------------------------------------    Brief history of symptoms: The patient was initially seen in neurologic consultation on 10/12/2017 for evaluation of intermittent facial numbness. Please see the comprehensive neurologic consultation note from that date in the Epic records for details. At the time, the patient reported 3 years of mild numbness in the V3/2 distribution on his right side.  This may have occurred since dental anesthetic use as a child, but this is unclear.  The patient also reported worsened numbness and tingling over entire lower part of face which worsened with hyperventilation and anxiety \"held by magnets\" which interfered with the patient speaking (thought expression/receptiveness were still present).  The patient had recurrent attacks of this numbness feeling lasting hours or 20 minutes and could occur with sex.  MRI wasn't revealing for etiology, but did show compete opacification of right frontal sinus.  EEG was was considered if spells continued and MRI was negative.  There was no direct follow up past this initial w/up.    On 11/23/2019, the patient was evaluated for chest wall numbness in the setting of increased anxiety and stress.  The patient had recurrent chest pressure from nipple through clavicle on the right, which lasted a short time and then resolved spontaneously.  This is no longer happening, " and the patient has minimal concern for this.     Interval history: The patient reports that his facial numbness has been advancing over the last six months. Constantly, the patient feels a numbness/tingling just above his zygomatic process to his lip.  Now, he is feeling this numbness and tingling in his lower part of the nose (left nostril and right nostril).  The nostril symptoms are more episodic, and occur when the patient has been talking for a while.  The patient also reports that if he is laughing or talking a great deal, he may have puckering of his lips that is uncontrollable.  There is no atypical smells or loss of smell, and during the episode there is no rising sensation, balance difficulties, weakness of face.         Past Medical History:     Past Medical History:   Diagnosis Date     Depressive disorder 2014    it s chill now tho I m doing fine     Uncomplicated asthma early 2000s      Past Surgical History:     Past Surgical History:   Procedure Laterality Date     HC TOOTH EXTRACTION W/FORCEP  2016    4 teeth       Social History:     Tobacco Use     Smoking status: Never Smoker     Smokeless tobacco: Never Used   Substance Use Topics     Alcohol use: Yes     Comment: infrequent at worst     Drug use: No      Family History:     Family History   Problem Relation Age of Onset     Substance Abuse Father      Asthma Father      Pancreatic Cancer Maternal Grandfather      Other Cancer Maternal Grandfather      Prostate Cancer Paternal Grandfather      Depression Other      Anxiety Disorder Other      Mental Illness Other    MS- mother       Medications:     Current Outpatient Medications   Medication Sig     albuterol (PROAIR HFA/PROVENTIL HFA/VENTOLIN HFA) 108 (90 Base) MCG/ACT inhaler Inhale 2 puffs into the lungs every 4 hours as needed for shortness of breath / dyspnea or wheezing     cetirizine (ZYRTEC) 10 MG tablet Take 1 tablet (10 mg) by mouth daily     finasteride (PROSCAR) 5 MG tablet Take  1.25 mg by mouth daily     fluocinonide (LIDEX) 0.05 % cream Apply topically 2 times daily     fluticasone (FLOVENT HFA) 110 MCG/ACT inhaler Inhale 2 puffs into the lungs 2 times daily     IBUPROFEN      minoxidil 5 % SOLN Externally apply topically 2 times daily     omeprazole (PRILOSEC OTC) 20 MG EC tablet Take 1 tablet (20 mg) by mouth daily     ondansetron (ZOFRAN-ODT) 4 MG ODT tab Take 1 tablet (4 mg) by mouth every 8 hours as needed for nausea     order for DME Equipment being ordered: SAD light      Allergies:     Allergies   Allergen Reactions     Amoxicillin Swelling     Lip swelling      Cefuroxime      Swelling      Minocycline Other (See Comments)     Throat felt like it was on fire      Review of Systems:   A comprehensive 10 point review of systems (constitutional, ENT, cardiac, peripheral vascular, lymphatic, respiratory, GI, , Musculoskeletal, skin, Neurological) was performed and found to be negative except as described in this note.          Assessment and Plan:   Assessment:  - facial paresthesias of V2/3 distribution on right    The patient has chronic paresthesias, possibly due to nerve trauma at a young age, but this has never been confirmed.  There is progression in V2 to the opposite side of the face, which could be an anatomic variant of V2 but also could indicate b/l viral infection flare in trigeminal ganglia or brain stem/cervical irritation or insult.  Given that the last time there were worsened episodes of sensory symptoms there was also a sinus infection, obtaining imaging would be beneficial.  MRI would be best considering the patient's family history of autoimmune issues, and lack of cervical imaging which does carry with it the sensory nuclei.  Other lesion possibilities could be the VPM of the thalamus.  If imaging returns negative, further testing for prior viral infections may be needed, as well as consideration for symptomatic treatment with gabapentin or  "Carbamezapine.    Plan:  - MRI brain and cervical spine  - Follow up in 3 months for examination  - Will discuss results through Mychart or telephone, and consider utilizing medication if negative      Follow up in Neurology clinic in 3 months or earlier as needed should new concerns arise.    SHA Andrade D.O.   of Neurology      Greater than 50% of the total time (30 min) in this patient encounter was spent on counseling and/or coordination of care. We reviewed diagnostic results, impressions, and discussed other possible tests if symptoms do not improve. We discussed the implications of the diagnosis, as well as risks and benefits of management options. We reviewed treatment instructions and our scheduled follow-up as specified in the discharge plan. We also discussed the importance of compliance with the chosen course of treatment. The patient is in agreement with this plan and has no further questions.      Manoj Castellanos is a 24 year old adult who is being evaluated via a billable video visit.      The patient has been notified of following:     \"This video visit will be conducted via a call between you and your physician/provider. We have found that certain health care needs can be provided without the need for an in-person physical exam.  This service lets us provide the care you need with a video conversation.  If a prescription is necessary we can send it directly to your pharmacy.  If lab work is needed we can place an order for that and you can then stop by our lab to have the test done at a later time.    If during the course of the call the physician/provider feels a video visit is not appropriate, you will not be charged for this service.\"     Patient has given verbal consent for Video visit? Yes    Patient would like the video invitation sent by: Text to cell phone: [phone]    Video Start Time: 0858    Chief Complaint   Patient presents with     Consult     [facility] NEW - VIDEO " VISIT     Bladimir Milian, EMT      I have reviewed and updated the patient's Past Medical History, Social History, Family History and Medication List.    ALLERGIES  Amoxicillin; Cefuroxime; and Minocycline      Video-Visit Details    Type of service:  Video Visit transitioned to telephone    Video End Time (time video stopped): 0928    Originating Location (pt. Location): Home    Distant Location (provider location):  [facility] NEUROLOGY     Mode of Communication:  Video Conference via Patricia Andrade D.O.  [facility]

## 2020-04-06 NOTE — TELEPHONE ENCOUNTER
[facility] Call Center    Phone Message    May a detailed message be left on voicemail: yes     Reason for Call: Other: Ty called reporting that they are having issues with the video link that was sent to them for the virtual visit that is scheduled for today 4/6/20. Ty was given a support agent number of [phone] to help with the virtual visit. If Ty can't be reached via virtual visit, they would like to request a phone call on their cell phone of [phone].      Action Taken: Message routed to:  [facility]  Neuro    Travel Screening: Not Applicable

## 2020-04-07 ENCOUNTER — VIRTUAL VISIT (OUTPATIENT)
Dept: FAMILY MEDICINE | Facility: OTHER | Age: 25
End: 2020-04-07
Payer: COMMERCIAL

## 2020-04-07 PROCEDURE — 99421 OL DIG E/M SVC 5-10 MIN: CPT | Performed by: INTERNAL MEDICINE

## 2020-04-07 NOTE — PROGRESS NOTES
"Date: 2020 16:34:08  Clinician: Ana Jo  Clinician NPI: [id number]  Patient: Manoj Castellanos  Patient : 1995  Patient Address: [address] [place] [address] [place], [place] [address]  Patient Phone: [phone]  Visit Protocol: URI  Patient Summary:  Manoj is a 24 year old ( : 1995 ) male who initiated a Visit for COVID-19 (Coronavirus) evaluation and screening. When asked the question \"Please sign me up to receive news, health information and promotions from [employer].\", Manoj responded \"No\".    Manoj states his symptoms started 1-2 days ago.   His symptoms consist of a cough, nasal congestion, and malaise. He is experiencing mild difficulty breathing with activities but can speak normally in full sentences.   Symptom details     Nasal secretions: The color of his mucus is green and clear.    Cough: Manoj coughs every 5-10 minutes and his cough is not more bothersome at night. Phlegm does not come into his throat when he coughs. He does not believe his cough is caused by post-nasal drip.      Manoj denies having rhinitis, facial pain or pressure, myalgias, sore throat, ear pain, enlarged lymph nodes, chills, diarrhea, vomiting, nausea, teeth pain, headache, fever, and wheezing. He also denies taking antibiotic medication for the symptoms and having recent facial or sinus surgery in the past 60 days.   Precipitating events  He has not recently been exposed to someone with influenza. Manoj has been in close contact with the following high risk individuals: people with asthma, heart disease or diabetes.   Pertinent COVID-19 (Coronavirus) information  Manoj has not traveled internationally or to the areas where COVID-19 (Coronavirus) is widespread, including cruise ship travel in the last 14 days before the start of his symptoms.   Manoj has not had a close contact with a laboratory-confirmed COVID-19 patient within 14 days of symptom onset. He also has not had a close contact with a suspected COVID-19 patient within 14 days of symptom onset.  "  Manoj does not work or volunteer as healthcare worker or a  and does not work or volunteer in a healthcare facility. He does not live with a healthcare worker.   Pertinent medical history  Manoj does not need a return to work/school note.   Weight: 135 lbs   Manoj does not smoke or use smokeless tobacco.   Weight: 135 lbs    MEDICATIONS: famotidine oral, cetirizine oral, Flovent HFA inhalation, albuterol sulfate inhalation, minoxidil topical, finasteride oral, ALLERGIES: amoxicillin, cefuroxime, minocycline  Clinician Response:  Dear Ty,   Based on your symptoms, currently you are likely developing a viral upper respiratory illness, especially with the cough. The onset of your symptoms is only a couple of days, so it is difficult to tell at this point what will develop. Given your symptoms, they are some of the symptoms associated with COVID-19. Please continue to check your temperature. With your breathing, looks like you have a history of asthma. Make sure you are using your inhalers as prescribed and can use your albuterol inhaler as needed. With an asthma history, an underlying viral illness can make asthma, wheezing and breathing worse. Please review the warning signs below. Also, I will sign you up for the [employer] Loop which allows you to have someone to follow up with regarding your symptoms. For your cough, try honey with warm beverages. You can also take cetirizine daily as well.&nbsp;     Based on the information you have provided, you do have symptoms that are consistent with Coronavirus (COVID-19).  The coronavirus causes mild to severe respiratory illness with the most common symptoms including fever, cough and difficulty breathing. Unfortunately, many viruses cause similar symptoms and it can be difficult to distinguish between viruses, especially in mild cases, so we are presuming that anyone with cough or fever has coronavirus at this time.  Coronavirus/COVID-19 has reached the point of  community spread in [place], meaning that we are finding the virus in people with no known exposure risk for jessi the virus. Given the increasing commonness of coronavirus in the community we are no longer testing patients who are not critically ill.  If you are a health care worker, you should refer to your employee health office for instructions about testing and returning to work.  For everyone else who has cough or fever, you should assume you are infected with coronavirus. Since you will not be tested but have symptoms that may be consistent with coronavirus, the CDC recommends you stay in self-isolation until these three things have happened:    You have had no fever for at least 72 hours (that is three full days of no fever without the use of medicine that reduces fevers)    AND   Other symptoms have improved (for example, when your cough or shortness of breath have improved)   AND   At least 7 days have passed since your symptoms first appeared.   How to Isolate:   Isolate yourself at home.  Do Not allow any visitors  Do Not go to work or school  Do Not go to [facility],  centers, shopping, or other public places.  Do Not shake hands.  Avoid close contact with others (hugging, kissing).   Protect Others:   Cover Your Mouth and Nose with a mask, disposable tissue or wash cloth to avoid spreading germs to others.  Wash your hands and face frequently with soap and water.   We know it can be scary to hear that you might have COVID-19. Our team can help track your symptoms and make sure you are doing ok over the next two weeks using a program called [employer] to keep in touch. When you receive an email from [employer], please consider enrolling in our monitoring program. There is no cost to you for monitoring. Here is a URL where you can learn more: [email or web address]  Managing Symptoms:   At this time, we primarily recommend Tylenol (Acetaminophen) for fever or pain. If you  have liver or kidney problems, contact your primary care provider for instructions on use of tylenol. Adults can take 650 mg (two 325 mg pills) by mouth every 4-6 hours as needed OR 1,000 mg (two 500 mg pills) every 8 hours as needed. MAXIMUM DAILY DOSE: 3,000mg. For children, refer to dosing on bottle based on age or weight.   If you develop significant shortness of breath that prevents you from doing normal activities, please call 911 or proceed to the nearest emergency room and alert them immediately that you have been in self-isolation for possible coronavirus.  If you have a higher risk medical condition such as cancer, heart failure, end stage renal disease on dialysis or have a transplant, please reach out to your specialist's clinic to advise them of your [facility] visit should you not improve within the next two days.   For more information about COVID19 and options for caring for yourself at home, please visit the CDC website at https://www.cdc.gov/coronavirus/2019-ncov/about/steps-when-sick.htmlFor more options for care at [facility], please visit our website at [email or web address]    Diagnosis: Cough  Diagnosis ICD: R05  Addendum created: April 07 16:50:53, 2020 created by: Elmo Manley body: I reviewed the resident's encounter and agree with the plan as documented

## 2020-04-08 ENCOUNTER — TELEPHONE (OUTPATIENT)
Dept: NEUROLOGY | Facility: CLINIC | Age: 25
End: 2020-04-08

## 2020-04-08 NOTE — TELEPHONE ENCOUNTER
[facility] Call Center    Phone Message    May a detailed message be left on voicemail: yes     Reason for Call: Other: Ty calling to request a call back.Pt states his foot has been numb for the last 3 days and would like to speak with a nurse. Please call pt back to discuss.  (Dont call pt between 2-3pm 04/08)     Action Taken: Message routed to:  [facility]  neuro     Travel Screening: Not Applicable

## 2020-04-08 NOTE — TELEPHONE ENCOUNTER
I spoke with pt and had him clarify when/where his numbness started. He said a few hours after his appointment with Dr. Andrade on Monday he noticed new numbness to his L big toe that extended to the ball of his foot. The numbness has persisted. He would like provider to be made aware.     He is also wondering about his MRI's. He had them scheduled for next week but they were cancelled. Imaging stated they were cancelled by the provider. I see an active order for MRI so I will clarify with the doctor. I feel maybe they were not deemed urgent and therefore were cancelled. If so I will help pt reschedule further out.     Guerline ROYAL

## 2020-04-10 DIAGNOSIS — R20.2 PARESTHESIAS: Primary | ICD-10-CM

## 2020-04-29 ENCOUNTER — ANCILLARY PROCEDURE (OUTPATIENT)
Dept: MRI IMAGING | Facility: CLINIC | Age: 25
End: 2020-04-29
Attending: PSYCHIATRY & NEUROLOGY
Payer: COMMERCIAL

## 2020-04-29 DIAGNOSIS — R20.2 PARESTHESIAS: ICD-10-CM

## 2020-04-29 RX ORDER — GADOBUTROL 604.72 MG/ML
7.5 INJECTION INTRAVENOUS ONCE
Status: COMPLETED | OUTPATIENT
Start: 2020-04-29 | End: 2020-04-29

## 2020-04-29 RX ADMIN — GADOBUTROL 6.5 ML: 604.72 INJECTION INTRAVENOUS at 14:13

## 2020-04-30 ENCOUNTER — VIRTUAL VISIT (OUTPATIENT)
Dept: PSYCHOLOGY | Facility: CLINIC | Age: 25
End: 2020-04-30
Attending: NURSE PRACTITIONER
Payer: COMMERCIAL

## 2020-04-30 ENCOUNTER — FCC EXTENDED DOCUMENTATION (OUTPATIENT)
Dept: PSYCHOLOGY | Facility: CLINIC | Age: 25
End: 2020-04-30

## 2020-04-30 DIAGNOSIS — F33.1 MAJOR DEPRESSIVE DISORDER, RECURRENT EPISODE, MODERATE WITH ANXIOUS DISTRESS (H): Primary | ICD-10-CM

## 2020-04-30 PROCEDURE — 90791 PSYCH DIAGNOSTIC EVALUATION: CPT | Mod: 95 | Performed by: PSYCHOLOGIST

## 2020-04-30 ASSESSMENT — ANXIETY QUESTIONNAIRES
2. NOT BEING ABLE TO STOP OR CONTROL WORRYING: SEVERAL DAYS
5. BEING SO RESTLESS THAT IT IS HARD TO SIT STILL: NOT AT ALL
6. BECOMING EASILY ANNOYED OR IRRITABLE: NOT AT ALL
IF YOU CHECKED OFF ANY PROBLEMS ON THIS QUESTIONNAIRE, HOW DIFFICULT HAVE THESE PROBLEMS MADE IT FOR YOU TO DO YOUR WORK, TAKE CARE OF THINGS AT HOME, OR GET ALONG WITH OTHER PEOPLE: VERY DIFFICULT
1. FEELING NERVOUS, ANXIOUS, OR ON EDGE: NEARLY EVERY DAY
3. WORRYING TOO MUCH ABOUT DIFFERENT THINGS: SEVERAL DAYS
7. FEELING AFRAID AS IF SOMETHING AWFUL MIGHT HAPPEN: NOT AT ALL
GAD7 TOTAL SCORE: 6

## 2020-04-30 ASSESSMENT — COLUMBIA-SUICIDE SEVERITY RATING SCALE - C-SSRS
1. IN THE PAST MONTH, HAVE YOU WISHED YOU WERE DEAD OR WISHED YOU COULD GO TO SLEEP AND NOT WAKE UP?: NO
5. HAVE YOU STARTED TO WORK OUT OR WORKED OUT THE DETAILS OF HOW TO KILL YOURSELF? DO YOU INTEND TO CARRY OUT THIS PLAN?: NO
2. HAVE YOU ACTUALLY HAD ANY THOUGHTS OF KILLING YOURSELF?: NO
5. HAVE YOU STARTED TO WORK OUT OR WORKED OUT THE DETAILS OF HOW TO KILL YOURSELF? DO YOU INTEND TO CARRY OUT THIS PLAN?: NO
4. HAVE YOU HAD THESE THOUGHTS AND HAD SOME INTENTION OF ACTING ON THEM?: NO
4. HAVE YOU HAD THESE THOUGHTS AND HAD SOME INTENTION OF ACTING ON THEM?: NO
1. IN THE PAST MONTH, HAVE YOU WISHED YOU WERE DEAD OR WISHED YOU COULD GO TO SLEEP AND NOT WAKE UP?: NO
3. HAVE YOU BEEN THINKING ABOUT HOW YOU MIGHT KILL YOURSELF?: NO
2. HAVE YOU ACTUALLY HAD ANY THOUGHTS OF KILLING YOURSELF LIFETIME?: NO

## 2020-04-30 ASSESSMENT — PATIENT HEALTH QUESTIONNAIRE - PHQ9
5. POOR APPETITE OR OVEREATING: SEVERAL DAYS
SUM OF ALL RESPONSES TO PHQ QUESTIONS 1-9: 10
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
## 2020-05-01 ENCOUNTER — TELEPHONE (OUTPATIENT)
Dept: NEUROLOGY | Facility: CLINIC | Age: 25
End: 2020-05-01

## 2020-05-01 DIAGNOSIS — R20.2 FACIAL PARESTHESIA: ICD-10-CM

## 2020-05-01 DIAGNOSIS — G62.9 NEUROPATHY: Primary | ICD-10-CM

## 2020-05-01 DIAGNOSIS — R20.2 PARESTHESIAS: ICD-10-CM

## 2020-05-01 RX ORDER — GABAPENTIN 100 MG/1
100 CAPSULE ORAL 3 TIMES DAILY
Qty: 90 CAPSULE | Refills: 1 | Status: SHIPPED | OUTPATIENT
Start: 2020-05-01 | End: 2020-07-09

## 2020-05-01 ASSESSMENT — ANXIETY QUESTIONNAIRES: GAD7 TOTAL SCORE: 6

## 2020-05-01 NOTE — TELEPHONE ENCOUNTER
I spoke with Ty about the imaging findings.  Symptoms are not severe at this time, but the patient is interested in trying Gabapentin for some symptom relief.  While surgery isn't likely needed at this time due to minimal symptoms, I would like neurosurgery to weigh in on possible intervention should the patient's symptoms turn painful and refractory to medication.    - Gabapentin 100 mg TID  - Neurosurgery referral    SHA Andrade D.O.  [facility] Neurology

## 2020-05-08 ENCOUNTER — VIRTUAL VISIT (OUTPATIENT)
Dept: PSYCHOLOGY | Facility: CLINIC | Age: 25
End: 2020-05-08
Attending: NURSE PRACTITIONER
Payer: COMMERCIAL

## 2020-05-08 DIAGNOSIS — F33.1 MAJOR DEPRESSIVE DISORDER, RECURRENT EPISODE, MODERATE WITH ANXIOUS DISTRESS (H): Primary | ICD-10-CM

## 2020-05-08 PROCEDURE — 90834 PSYTX W PT 45 MINUTES: CPT | Mod: 95 | Performed by: PSYCHOLOGIST

## 2020-05-08 NOTE — PROGRESS NOTES
"Progress Note     Client Name:  Manoj Castellanos Date: 5/8/2020         Service Type: Phone Visit  Video Visit: No     Session Start Time: 11:00  Session End Time: 11:40     Session Length: 40 minutes    Session #: 2     Attendees: Client attended alone     The patient has been notified of the following:      \"We have found that certain health care needs can be provided without the need for a face to face visit.  This service lets us provide the care you need with a phone conversation.       I will have full access to your [facility] medical record during this entire phone call.   I will be taking notes for your medical record.      Since this is like an office visit, we will bill your insurance company for this service.       There are potential benefits and risks of telephone visits (e.g. limits to patient confidentiality) that differ from in-person visits.?  Confidentiality still applies for telephone services, and nobody will record the visit.  It is important to be in a quiet, private space that is free of distractions (including cell phone or other devices) during the visit.??      If during the course of the call I believe a telephone visit is not appropriate, you will not be charged for this service\"     Consent has been obtained for this service by care team member: Yes     Intervention:  Reviewed coping skills for ADHD (what is working for Client and what he has tried). Motivational interviewing: explored potential barriers to change     Identifying Information:  Client is a 24 year old, , (\"badillo\" \"trans\") single male. Client was referred for a diagnostic assessment by PCP. The purpose of this evaluation is to: provide treatment recommendations and clarify diagnosis.  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 Client attended the session alone.       Client's Statement of Presenting Concern:  Client reported seeking services at this time for diagnostic assessment and " "recommendations for treatment. Client's presenting concerns include:  difficulty starting tasks and doing things. They feel \"there is a blockage in my head when I need to sit down and be productive. I just can't do it. I just can't.\" They noted this has been an issue their whole life. They have been procrastinating their whole life. They have been \"gifted\" and didn't develop a work ethic. They noted that depression and autism could also be responsible for this. They enjouy feeling \"hypomanic\" because they can self-start and get a lot done. This is refreshing and enjoyable to them. They stated, \"It's not an issue. I spend weeks doing very little and then part of my brain boils over and feels like I have to do everything because I've done nothing for too long.\" They would like to be able to self-start on a daily basis. Client stated that symptoms have resulted in the following functional impairments: academic performance and management of the household and or completion of tasks.      History of Presenting Concern:  Client reported that they has not completed a previous ADHD diagnostic assessment.  Client has not received a previous diagnosis of ADHD. Client has not been prescribed medication to address these problems. Client reported that these problem(s) began in childhood. They reported a history of Autism, Seasonal Affective Disorder, Cyclothymia, Panic Disorder and dysphoria. They described experiencing \"hypomanic phases.\" Client has not attempted to resolve these concerns in the past. Client reported that other professional(s) are involved in providing support / services. They are working with a therapist through the [facility]. They have been working together for the past year.      Social History:  As a child, client reported that they failed to complete assigned chores in the home environment, had problems with organization and keeping track of items, displayed argumentative or oppositional " "behaviors and being \"combative\" with teachers. They felt their behavioral issues were related to a \"poor school environment and the system I was in.\" Client reported difficulty with childhood peer relationships. Client stated, \"I am autistic, so there was that. I was a very polarizing kid. Some kids wanted nothing to do with me and some kids wanted to be my friend. I was decently well socialized and had friends everywhere I went. I'd have a tight knit smaller [place] of friends.\" They reported that they felt teased/bullied in elementary school. They felt that kids would \"mess with me a lot at that age and I was easily bothered.\"  As a child, client reported having sleep disturbance, including: insomnia. They have always had difficulty falling asleep. Client reported currently experiencing sleep disturbance, including: insomnia.  They had middle insomnia where they were waking up in the middle of the night. They can't settle down to sleep and \"want to do stuff.\" Client reported sleeping approximately 7-10 hours per night. They explained that their sleep schedule/rhythm is inconsistent. They are in bed \"horizontal\" for a long amount of time but they aren't sure how much sleep they are getting. Client reported that they has not completed a sleep study. Client reported having a well balanced diet. There are not significant nutritional concerns. Client reported no current exercise routine.    Client's highest education level was high school graduate and some college. Client dropped out of high school with a 4.2 GPA. They dropped out after one quarter of their shruthi year in fall of 2014. They explained, \"I felt like shit all the time. I hated the environment. I didn't like my teachers and was the most depressed I had ever been in my life. I was miserable and couldn't stand it. I had a decent social [place] and was in youth programs I liked and took classes I was interested in but couldn't do it anymore.\" They noted they " "took time off and considers themselves a \"school push-out\" as a student who \"left school due to pressures in the school system that pushes them out.\" They felt the school environment was \"hostile to my brain.\" They were taking classes at a school that had two schools combined and then over lunch they had to leave to take a Slovenian class at another school. They stated, \"This was brutal. I was in a school environment that sucked.\"    They took a \"gap year\" and did home school and [employer] for the remaining two years and graduated from high school in 2017. During the elementary, middle, and high school years, patient recalls academic strengths in the area of all. They stated, \"I learn very quickly and I take in information really well. I pick things up easily.\" Client reported experiencing academic problems in visual arts (they noted they lack fine motor skills and are disinterested in this topic). Client got a B in a drawing class. They also struggled in advanced math classes (AP math classes). Client did identify the following learning problems: attention, concentration and autism. They noted they were \"advanced\" intellectually but could not complete tasks on time. Client noted that there were a lot of environmental factors that contributed to Client's academic issues (Client felt \"so god damned bored in school all of the time\"). They stated, \"I was bored and miserable and hated school. I have participated in many different theories with how best to educate children but have clicked with very few of them.\" Client noted they have had great teachers but also some who \"did not know what to do with me.\" They noted they don't do homework and don't believe in homework and think that it is bad. Client has family involved in education and education reform so he has opinions. They stated, \"I was a pain in the ass who really wanted to learn and expand my horizons but I was horrible at doing my homework on time.\" Client would wait " "until the last minute to do things. They find it hard to start tasks; once they are fully engaged in a task it is easy to \"go go go\" and they have the capacity to hyperfocus. They stated, \"The problem is I can't find a balance between not being able to start something and then focusing on something for 6 hours and forgetting to work.\" Client noted that productivity and hyperfocus tends to correlate with inflated mood (elevated hypomanic episodes). Client can set the environment up to be conducive to focusing but they are unable to focus. They stated, \"I dropped out of high school my shruthi year but had a 4.2 GPA. I was so obsessed with good grades and it didn't help me do things in a timely fashion but it helped me to panic and get things done at the deadline.\" Client reported they attended a [employer], a baccalaureate middle school, and their high school followed an \"expeditionary learning model.\" They noted that they were allowed to retake tests whenever they wanted. They noted that they procrastinated and waited until the last minute to do work and would spend \"8 hours freaking out and have a terrible time forcing myself to work.\" Client stated, \"I don't want to do this but I have to do this or something bad will happen.\" Client noted that they did well in school but \"this came at the expense of me being paralyzed constantly until I had to un-paralyze myself where my academic anxiety became stronger than my inability to self-start. I had to work myself to the bone to pull this off.\"    Client did not receive tutoring services during the school years. Client did receive special education services. Client didn't have an IEP but had candid conversations with teachers about their needs. They had an in-school skills therapy program when they were younger that they were involved in. There was a person in school that helped them from 2nd-4th grade. In middle school they were briefly in a SPED program " "that they felt was a \"total waste of time\" (this was a social skills group). Client reported significant behavior and discipline problems including: disruptive classroom behavior and failure to finish or complete homework. Client did attend post-secondary school. Client reported they did [employer]SE[employer] classes through [employer]. They attended school for 2 years [employer]. Client noted they did \"badly\" but had a good GPA. They obtained mostly As but had one D. They had between a 3.0 and 3.5 GPA when they stopped going to [employer]. They struggled with technical engineering courses because they didn't want to do it. Client went to special services to try to get a plan in place and wanted assistance  - they wanted this department to \"have my back when I complained to teachers.\" They noted that this involved \"candid conversations.\" They have no interest in returning to school.    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 Client reported that the current job is a good fit for their skills and personality. Client reported that they have not had issues at work because they don't feel they have had a \"real\" job to consider. They are habitually late for everything. 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 Client has not been terminated from a place of employment.       Risk Taking Behaviors:  Client reported no history of risk taking behaviors      Motor Vehicle Operation:  Client has received a 's license. Client has not received any moving violations. Client reported the following driving habits: attentive and cautious. According to client, other people are comfortable riding as a " "passenger when he is driving.        Mental Status Assessment:  Appearance:   Unable to assess on the phone  Eye Contact:   Unable to assess on the phone  Psychomotor Behavior: Unable to assess on the phone  Attitude:   Cooperative   Orientation:   All  Speech   Rate / Production: Hyperverbal    Volume:  Normal   Mood:    Normal  Affect:    Appropriate   Thought Content:  Clear   Thought Form:  Coherent  Logical   Insight:    Good       Review of Symptoms:  Depression: Change in sleep, Lack of interest, Excessive or inappropriate guilt, Change in energy level, Difficulties concentrating and Feeling sad, down, or depressed  Mayra:  Elevated mood, Racing thoughts and Increased activity  Psychosis: No symptoms  Anxiety: Worries Nervousness psychomotor agitation and poor concentration  Panic:  No symptoms  Post-Traumatic Stress Disorder: No symptoms  Obsessive Compulsive Disorder: No symptoms  Eating Disorder: Restriction (\"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 I hate that I gained 10 pounds in the last year but I haven't pursued healthy or unhealthy ways to lose weight.\"  Oppositional Defiant Disorder: No symptoms  ADD / ADHD: Attention Organization Distractiblity  Conduct Disorder: No symptoms  Autism Spectrum Disorder:     Sensory processing issues; issues with food; had emotional meltdowns as a kid; difficulty regulating mood and processing information; difficulty understanding social cues - Client feels they are \"sociable because they are well socialized\" and they feel they have learned this as a skill - they feel they responded well to \"Autism skills focused therapy  Reckless Behavior: None reported      Safety Issues and Plan for Safety and Risk Management:  Client denies a history " "of suicidal ideation, suicide attempts, self-injurious behavior, homicidal ideation, homicidal behavior and and other safety concerns    Client denies current fears or concerns for personal safety.  Client denies current or recent suicidal ideation or behaviors.  Client denies current or recent homicidal ideation or behaviors.  Client denies current or recent self injurious behavior or ideation.  Client denies other safety concerns.  Client reports there are no firearms in the house.  Recommended that patient call 911 or go to the local ED should there be a change in any of these risk factors.        Diagnostic Criteria:    1. Decreased need for sleep (e.g., feels rested after only 3 hours of sleep) (Client stated they have \"constant insomnia and hypomania is infrequent.\" \"My moods change rapidly but aren't extreme. I have mostly regulated moods but mood regulation is hard because they change often and drastically. My hypomania is marked by an absolute need to do something.\"   2.  More talkative than usual or pressure to keep talking (\"I'm always this way\")  3. Flight of ideas or subjective experience that thoughts are racing.  4. Distractibility (i.e., attention too easily drawn to unimportant or irrelevant external stimuli) as reported or observed.  5. Increase in goal-directed activity (either socially, at work or school, sexually) or increased psychomotor agitation (i.e., purposeless non-goal directed activity).      A. Excessive anxiety and worry about a number of events or activities (such as work or school performance).   B. The person finds it difficult to control the worry.  C. Select 3 or more symptoms (required for diagnosis). Only one item is required in children.   - Restlessness or feeling keyed up or on edge.    - Being easily fatigued.    - Difficulty concentrating or mind going blank.    - Sleep disturbance (difficulty falling or staying asleep, or restless unsatisfying sleep).    - Depressed mood. " "Note: In children and adolescents, can be irritable mood.     - Diminished interest or pleasure in all, or almost all, activities.    - Decreased sleep.    - Fatigue or loss of energy.    - Feelings of worthlessness or inappropriate guilt.    - Diminished ability to think or concentrate, or indecisiveness.   - Often has difficulty sustaining attention in tasks or play activities  - Often does not follow through on instructions and fails to finish schoolwork, chores, or duties in the workplace  - Often has difficulty organizing tasks and activities  - Often avoids, dislikes, or is reluctant to engage in tasks that require sustained mental effort  - Is often easily distractedby extraneous stimuli     -restriction and preoccupation/concern about weight/appearance (reported \"disordered eating habits\")    Functional Status:  Client's symptoms are causing reduced functional status in the following areas: school, home      DSM-5Diagnoses: (Sustained by DSM5 Criteria Listed Above)    296.32 (F33.1) Major Depressive Disorder, Recurrent Episode, Moderate With anxious distress    Autism Spectrum Disorder 299.00(F84.0)  Associated Current severity for Criterion A and Criterion B: 299.00(F84.0) Without accompanying intellectual impairment    RULE OUT: Cyclothymic Disorder     RULE OUT: ADHD      Attendance Agreement:  Client has signed Attendance Agreement:No: unable to sign via telehealth      Preliminary Plan:  The client reports no currently identified Baptism, ethnic or cultural issues relevant to therapy.     services are not indicated.    Modifications to assist communication are not indicated.    The concerns identified by the client will be addressed in therapy.    Collaboration / coordination of treatment will be initiated with the following support professionals: primary care physician.    Referral to another professional/service is not indicated at this time..    A Release of Information is not needed at " this time.    Client was given self and collaborative rating scales to be completed prior to the next appointment.  Client consented to sending/receiving these measures via email. Depression and anxiety rating scales will be completed. A third appointment was not scheduled at this time.       Report to child / adult protection services was NA.    Patient will have open access to their mental health medical record.    Nubia Hughes, PhD, LP  May 8, 2020

## 2020-05-12 ENCOUNTER — DOCUMENTATION ONLY (OUTPATIENT)
Dept: PSYCHOLOGY | Facility: CLINIC | Age: 25
End: 2020-05-12

## 2020-05-12 NOTE — PROGRESS NOTES
"Client Name: Manoj Castellanos  MRN: [id number]  : 1995    [place] Adult ADHD Rating Scale-IV: Self and Other Reports (BAARS-IV)  The BAARS-IV assesses for symptoms of ADHD that are experienced in one's daily life. This assessment measure includes self and collateral rating scales designed to provide information regarding current and childhood symptoms of ADHD including inattention, hyperactivity, and impulsivity. Self-report scores are reported as percentiles. Scores at the 76th-83rd percentile are considered marginal, scores at the 84th-92nd percentile are considered borderline, scores at the 93rd-95th percentile are considered mild, scores at the 96th-98th percentile are considered moderate, and those at the 99th percentile are considered severe. Collateral or \"other\" rating scales are reported as number of symptoms observed in comparison to those reported by the client. Norms and percentile scores are not available for collateral reports.      Current Symptoms Scale--Self Report:   Client completed the self-report inventory of current symptoms. The results indicate that the client's Total ADHD Score was 58 which places him in the 99th percentile for overall ADHD symptoms. In addition, the client endorsed the following occur \"often\" or \"very often\": 6/9 (97th percentile) Inattention symptoms, 4/9 (95th percentile) Hyperactivity/Impulsivity symptoms, and 6/9 (96th percentile) Sluggish Cognitive Tempo symptoms. Client indicated that the reported symptoms have resulted in impaired functioning in school, home, work, and social relationships. Overall, the results suggest the client is reporting moderate symptoms of inattention and moderate symptoms of hyperactivity/impulsivity at this time.      Current Symptoms Scale--Other Report:  Client's mother completed the collateral report inventory of current symptoms. Based on the collateral contact's observation of symptoms, the client demonstrates the following \"often\" or " "\"very often\": 4/9 Inattention symptoms, 1/5 Hyperactivity symptoms, 4/4 Impulsivity symptoms, and 1/9 Sluggish Cognitive Tempo symptoms. The client's Total ADHD Score was 50. The collateral- and self-report scores are similar and suggest he experienced symptoms of inattention or hyperactivity/impulsivity at this time.     Childhood Symptoms Scale--Self-Report:  Client completed the self-report inventory of childhood symptoms. The results indicate that the client's Total ADHD Score was 62 which places him in the 99th percentile for overall ADHD symptoms in childhood. In addition, the client endorsed having experienced the following \"often\" or \"very often\": 9/9 (99th percentile) Inattention symptoms and 4/9 (95th percentile) Hyperactivity-Impulsivity symptoms. Client indicated that the reported symptoms resulted in impaired functioning in school, home, and social relationships. Overall, the results suggest the client reported experiencing severe symptoms of inattention and mild symptoms of hyperactivity/impulsivity as a child.     Childhood Symptoms Scale--Other Report:  Client s mother completed the collateral report inventory of childhood symptoms. Based on the collateral contact's recollection of client's childhood symptoms, the client demonstrated the following \"often\" or \"very often\": 5/9 Inattention symptoms and 5/9 Hyperactivity-Impulsivity symptoms. The client's Total ADHD Score was 53. The collateral- and self-report scores are similar and suggest he experienced symptoms of inattention and hyperactivity/impulsivity in childhood.     [place] Functional Impairment Scale: Self and Other Reports (BFIS)  The BFIS is used to assess an individuals' psychosocial impairment in major life/daily activities that may be due to a mental health disorder. This assessment measure includes self and collateral rating scales. Self-report scores are reported as percentiles. Scores at the 76th-83rd percentile are considered " "marginal, scores at the 84th-92nd percentile are considered borderline, scores at the 93rd-95th percentile are considered mild, scores at the 96th-98th percentile are considered moderate, and those at the 99th percentile are considered severe. Collateral or \"other\" rating scales are reported as number of symptoms observed in comparison to those reported by the client. Norms and percentile scores are not available for collateral reports.      Results indicate the client identified impairment (scores at or greater than 93rd percentile) in the areas of: home-chores, self-care routines, health maintenance, daily responsibilities. The client's Mean Impairment Score was 4.0 (75-84th percentile) indicating the client is not reporting impairment in functioning across domains. Client's mother completed the collateral rating scale, which indicated similar results (e.g., Mean Impairment Score of 4.0). She noted impairment in the areas of: home-chores, self-care routines, health maintenance, daily responsibilities.      [place] Deficits in Executive Functioning Scale (BDEFS)  The BDEFS is a measure used for evaluating dimensions of adult executive functioning in daily life. This assessment measure includes self and collateral rating scales. Self-report scores are reported as percentiles. Scores at the 76th-83rd percentile are considered marginal, scores at the 84th-92nd percentile are considered borderline, scores at the 93rd-95th percentile are considered mild, scores at the 96th-98th percentile are considered moderate, and those at the 99th percentile are considered severe. Collateral or \"other\" rating scales are reported as number of symptoms observed in comparison to those reported by the client. Norms and percentile scores are not available for collateral reports.      Results indicate the client's Total Executive Functioning Score was 204 (95th percentile). The ADHD-Executive Functioning Index score was 23 (86th " percentile). These scores suggest the client has mild deficits in executive functioning. Specifically, he noted severe deficits in self-management to time. Client s mother completed the collateral report which indicated similar results. She only noted a deficit in self-management to time.    Generalized Anxiety Disorder Questionnaire (CONCETTA-7)  This questionnaire is designed to screen for anxiety in adults. Based on the client's score of 13, he is reporting moderate symptoms of anxiety at this time. Client identified the following symptoms of anxiety: feeling nervous, anxious or on edge; not being able to stop or control worrying, worrying too much about different things, trouble relaxing, becoming easily annoyed or irritable and feeling afraid as if something awful might happen.    Patient Health Questionnaire- 9 (PHQ-9)   This questionnaire is designed to screen for depression in adults. Based on the client's score of 11, he is reporting moderate symptoms of depression at this time. Symptoms endorsed include: little interest or pleasure in doing things; feeling down, depressed or hopeless; trouble falling asleep/staying asleep/sleeping too much; feeling tired or having little energy; poor appetite or overeating; feeling bad about self and poor concentration.

## 2020-05-13 ENCOUNTER — TELEPHONE (OUTPATIENT)
Dept: NEUROSURGERY | Facility: CLINIC | Age: 25
End: 2020-05-13

## 2020-05-13 NOTE — TELEPHONE ENCOUNTER
[facility] Call Center    Phone Message    May a detailed message be left on voicemail: yes     Reason for Call: Other: Ty is returning a voicemail message recieved from Angely regarding scheduling with Neurosurgery. He states he just got out of an appointment and will be available for the remainder of the day. Please give him a call back at your earliest convenience. Thank you.      Action Taken: Message routed to:  [facility]    Travel Screening: Not Applicable

## 2020-05-29 ENCOUNTER — DOCUMENTATION ONLY (OUTPATIENT)
Dept: PSYCHOLOGY | Facility: CLINIC | Age: 25
End: 2020-05-29

## 2020-05-29 NOTE — PROGRESS NOTES
Client Name: Manoj Castellanos  MRN: [id number]  : 1995    Client completed the [place] Multiphasic Personality Inventory-2 (MMPI-2), a self-report personality inventory, as part of his evaluation. Validity scales indicate that the client responded in an open and consistent manner, resulting in a valid profile. The following results are likely to be an accurate reflection of client's current functioning. Individuals with similar profiles experience anxiety, worry, nervous tension, disturbed sleep and problems with attention and concentration. They may feel stressed out and vulnerable to upset. They may feel they have lost control over their cognitive processes. They may also experience resentment and feel they have been mistreated by others. They may experience loss of interest and anhedonia that defeats the completion - even the initiation - of mental and behavioral projects. They may lack a psychic starter and lapse into regression and apathy.

## 2020-06-04 ENCOUNTER — PRE VISIT (OUTPATIENT)
Dept: NEUROSURGERY | Facility: CLINIC | Age: 25
End: 2020-06-04

## 2020-06-04 NOTE — TELEPHONE ENCOUNTER
FUTURE VISIT INFORMATION      FUTURE VISIT INFORMATION:    Date: 6/16/2020    Time: 3pm    [place] [facility] [place]  REFERRAL INFORMATION:    Referring provider:  Dr. Andrade    Referring providers clinic:  [facility] Neurology     Reason for visit/diagnosis  Facial Paresthesia     RECORDS REQUESTED FROM:       Clinic name Comments Records Status Imaging Status   Internal Dr. Andrade-4/6/2020    MR Brain 4/29/2020, 10/17/2017  MR Cervical Spine 4/29/2020 Epic PACS

## 2020-06-16 ENCOUNTER — VIRTUAL VISIT (OUTPATIENT)
Dept: NEUROSURGERY | Facility: CLINIC | Age: 25
End: 2020-06-16
Payer: COMMERCIAL

## 2020-06-16 ENCOUNTER — TELEPHONE (OUTPATIENT)
Dept: NEUROSURGERY | Facility: CLINIC | Age: 25
End: 2020-06-16

## 2020-06-16 DIAGNOSIS — R51.9 FACIAL PAIN: Primary | ICD-10-CM

## 2020-06-16 NOTE — LETTER
Date:July 25, 2020      Provider requested that no letter be sent. Do not send.       [facility] Health Information

## 2020-06-16 NOTE — TELEPHONE ENCOUNTER
[facility] Call Center    Phone Message    May a detailed message be left on voicemail: no     Reason for Call: Other: Pt is waiting for Dr. Schreiber appt at 3 pm.  Please call: [phone] Manoj Castellanos     Action Taken: Message routed to:  [facility] Neurosurgery    Travel Screening: Not Applicable

## 2020-06-16 NOTE — PROGRESS NOTES
"Manoj Castellanos is a 24 year old adult who is being evaluated via a billable video visit.      The patient has been notified of following:     \"This video visit will be conducted via a call between you and your physician/provider. We have found that certain health care needs can be provided without the need for an in-person physical exam.  This service lets us provide the care you need with a video conversation.  If a prescription is necessary we can send it directly to your pharmacy.  If lab work is needed we can place an order for that and you can then stop by our lab to have the test done at a later time.    Video visits are billed at different rates depending on your insurance coverage.  Please reach out to your insurance provider with any questions.    If during the course of the call the physician/provider feels a video visit is not appropriate, you will not be charged for this service.\"    Patient has given verbal consent for Video visit? YES    Will anyone else be joining your video visit? no        Video-Visit Details    Type of service:  Video Visit    Video Start Time:   Video End Time:    Originating Location (pt. Location): Home    Distant Location (provider location):  [facility] NEUROSURGERY     Platform used for Video Visit: Travis Jerome, EMT          "

## 2020-06-16 NOTE — LETTER
"6/16/2020       RE: Manoj Castellanos  [address]  [place] [address]     Dear Colleague,    Thank you for referring your patient, Manoj Castellanos, to the [facility] NEUROSURGERY at [facility]. Please see a copy of my visit note below.    Manoj Castellanos is a 24 year old adult who is being evaluated via a billable video visit.      The patient has been notified of following:     \"This video visit will be conducted via a call between you and your physician/provider. We have found that certain health care needs can be provided without the need for an in-person physical exam.  This service lets us provide the care you need with a video conversation.  If a prescription is necessary we can send it directly to your pharmacy.  If lab work is needed we can place an order for that and you can then stop by our lab to have the test done at a later time.    Video visits are billed at different rates depending on your insurance coverage.  Please reach out to your insurance provider with any questions.    If during the course of the call the physician/provider feels a video visit is not appropriate, you will not be charged for this service.\"    Patient has given verbal consent for Video visit? YES    Will anyone else be joining your video visit? no        Video-Visit Details    Type of service:  Video Visit    Video Start Time:   Video End Time:    Originating Location (pt. Location): Home    Distant Location (provider location):  [facility] NEUROSURGERY     Platform used for Video Visit: RUDY Min            Again, thank you for allowing me to participate in the care of your patient.      Sincerely,    Pepe Schreiber MD      "

## 2020-06-24 ENCOUNTER — MYC MEDICAL ADVICE (OUTPATIENT)
Dept: FAMILY MEDICINE | Facility: CLINIC | Age: 25
End: 2020-06-24

## 2020-06-24 DIAGNOSIS — R41.840 POOR CONCENTRATION: Primary | ICD-10-CM

## 2020-06-24 DIAGNOSIS — R53.83 FATIGUE: ICD-10-CM

## 2020-07-09 DIAGNOSIS — G62.9 NEUROPATHY: ICD-10-CM

## 2020-07-09 RX ORDER — GABAPENTIN 100 MG/1
100 CAPSULE ORAL 3 TIMES DAILY
Qty: 180 CAPSULE | Refills: 3 | Status: SHIPPED | OUTPATIENT
Start: 2020-07-09 | End: 2020-09-08

## 2020-07-17 ENCOUNTER — VIRTUAL VISIT (OUTPATIENT)
Dept: NEUROLOGY | Facility: CLINIC | Age: 25
End: 2020-07-17
Payer: COMMERCIAL

## 2020-07-17 DIAGNOSIS — G50.0 TRIGEMINAL NEURALGIA: Primary | ICD-10-CM

## 2020-07-17 NOTE — PROGRESS NOTES
"Manoj Castellanos is a 24 year old adult who is being evaluated via a billable telephone visit.      The patient has been notified of following:     \"This telephone visit will be conducted via a call between you and your physician/provider. We have found that certain health care needs can be provided without the need for a physical exam.  This service lets us provide the care you need with a short phone conversation.  If a prescription is necessary we can send it directly to your pharmacy.  If lab work is needed we can place an order for that and you can then stop by our lab to have the test done at a later time.    Telephone visits are billed at different rates depending on your insurance coverage. During this emergency period, for some insurers they may be billed the same as an in-person visit.  Please reach out to your insurance provider with any questions.    If during the course of the call the physician/provider feels a telephone visit is not appropriate, you will not be charged for this service.\"    Patient has given verbal consent for Telephone visit?  Yes    What phone number would you like to be contacted at? phone    How would you like to obtain your AVS? Travis    Phone call duration: 15 minutes    Noe Andrade, DO        "

## 2020-07-17 NOTE — PROGRESS NOTES
[facility] Neurology Follow Up Visit - Telephone    Manoj Castellanos MRN# [id number]   Age: 24 year old YOB: 1995     Brief history of symptoms: The patient was initially seen in neurologic consultation on 1012/2017, and for follow up on 4/6/2020 for evaluation of intermittent facial numbness. Please see the comprehensive neurologic consultation note from that date in the Epic records for details. 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 No major findings were seen on cervical MRI.  The patient was started on gabapentin and was to be evaluated with neurosurgery.    Interval history: The patient reports that he did discuss his treatment options with neurosurgery, and he will continue with medical management of his symptoms.  His massive attacks with facial numbness has not gone away as of yet, but his foot numbness has improved with gabapentin.  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  The attacks don't necessarily cause pain, but it does make his lips stiffer and harder to control.      Medications:     Current Outpatient Medications   Medication Sig     albuterol (PROAIR HFA/PROVENTIL HFA/VENTOLIN HFA) 108 (90 Base) MCG/ACT inhaler Inhale 2 puffs into the lungs every 4 hours as needed for shortness of breath / dyspnea or wheezing     cetirizine (ZYRTEC) 10 MG tablet Take 1 tablet (10 mg) by mouth 2 times daily     famotidine (PEPCID) 20 MG tablet Take 20 mg by mouth as needed     finasteride (PROSCAR) 5 MG tablet Take 1.25 mg by mouth daily     fluocinonide (LIDEX) 0.05 % cream Apply topically 2 times daily     fluticasone (FLOVENT HFA) 110 MCG/ACT inhaler Inhale 2  puffs into the lungs 2 times daily     gabapentin (NEURONTIN) 100 MG capsule Take 1 capsule (100 mg) by mouth 3 times daily     IBUPROFEN      minoxidil 5 % SOLN Externally apply topically 2 times daily     omeprazole (PRILOSEC OTC) 20 MG EC tablet Take 1 tablet (20 mg) by mouth daily     ondansetron (ZOFRAN-ODT) 4 MG ODT tab Take 1 tablet (4 mg) by mouth every 8 hours as needed for nausea     order for DME Equipment being ordered: SAD light      Allergies:     Allergies   Allergen Reactions     Amoxicillin Swelling     Lip swelling      Cefuroxime      Swelling      Minocycline Other (See Comments)     Throat felt like it was on fire        Review of Systems:   A comprehensive 10 point review of systems (constitutional, ENT, cardiac, peripheral vascular, lymphatic, respiratory, GI, , Musculoskeletal, skin, Neurological) was performed and found to be negative except as described in this note.          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  He may benefit from an increased dose of this medication before considering other medications such as Carbamezapine or oxcarbazepine.  Given that he has some [place] heritage, he may also need to have screening for HLAB27 to see if he may have increased risk for developing SJS.  The patient will contact me in 2 weeks to go over his symptoms while on increased dose of gabapentin.     Plan:  HLA testing next week  Gabapentin 300/300/300      Follow up in Neurology clinic in 6 months or earlier as needed should new concerns arise.    SHA Andrade D.O.   of Neurology    Greater than 50% of the total time (15 min) in this patient encounter was spent on counseling and/or coordination of care. We reviewed diagnostic results, impressions, and discussed other possible tests if symptoms do not  improve. We discussed the implications of the diagnosis, as well as risks and benefits of management options. We reviewed treatment instructions and our scheduled follow-up as specified in the discharge plan. We also discussed the importance of compliance with the chosen course of treatment. The patient is in agreement with this plan and has no further questions.

## 2020-07-19 NOTE — TELEPHONE ENCOUNTER
Verbal auth from Kina Rivera CNP given to place lab orders as placed. Thanks!     Steffanie Avitia RN

## 2020-07-21 DIAGNOSIS — J45.30 MILD PERSISTENT ASTHMA IN ADULT WITHOUT COMPLICATION: ICD-10-CM

## 2020-07-21 NOTE — TELEPHONE ENCOUNTER
Daniel Rivera CNP  Ok to wait til you are back  Pt would like a lab test for inflammation as well  Wasn't sure if you wanted crp or sed rate? Or something else    Thanks!     Steffanie Avitia RN

## 2020-07-22 NOTE — TELEPHONE ENCOUNTER
With these complex requests, Ty also needs to have virtual appointment with me.  Have Ty schedule the week of July 28th, video visit preferred.

## 2020-07-25 RX ORDER — DEXAMETHASONE 4 MG/1
TABLET ORAL
Qty: 36 G | Refills: 1 | Status: SHIPPED | OUTPATIENT
Start: 2020-07-25 | End: 2021-01-22

## 2020-07-28 ENCOUNTER — MYC MEDICAL ADVICE (OUTPATIENT)
Dept: FAMILY MEDICINE | Facility: CLINIC | Age: 25
End: 2020-07-28

## 2020-07-28 NOTE — TELEPHONE ENCOUNTER
ACT Total Scores 7/22/2019 11/5/2019 7/28/2020   ACT TOTAL SCORE (Goal Greater than or Equal to 20) 23 22 23   In the past 12 months, how many times did you visit the emergency room for your asthma without being admitted to the hospital? 0 0 0   In the past 12 months, how many times were you hospitalized overnight because of your asthma? 0 0 0     Emma Hollins MA on 7/28/2020 at 7:03 AM

## 2020-07-29 ASSESSMENT — ASTHMA QUESTIONNAIRES: ACT_TOTALSCORE: 23

## 2020-08-07 ENCOUNTER — TELEPHONE (OUTPATIENT)
Dept: NEUROLOGY | Facility: CLINIC | Age: 25
End: 2020-08-07

## 2020-08-07 DIAGNOSIS — M79.2 NEUROPATHIC PAIN: Primary | ICD-10-CM

## 2020-08-07 RX ORDER — GABAPENTIN 300 MG/1
CAPSULE ORAL
Qty: 42 CAPSULE | Refills: 0 | Status: SHIPPED | OUTPATIENT
Start: 2020-08-07 | End: 2020-08-18

## 2020-08-08 NOTE — TELEPHONE ENCOUNTER
Neurology cross cover    Received phone call from patient, who indicates that Dr. Andrade recently increased their gabapentin dose to 300 mg TID (confirmed by chart review). They indicate that they attempted to refill 100 mg dosage with pharmacy but were told that it was too soon to refill (initial prescription was for 100 mg TID, three month supply, used all in one month given increased dose).    I will prescribe two weeks of gabapentin 300 mg capsules, follow up with Dr. Andrade's clinic regarding long term prescription.

## 2020-08-10 ENCOUNTER — TRANSFERRED RECORDS (OUTPATIENT)
Dept: HEALTH INFORMATION MANAGEMENT | Facility: CLINIC | Age: 25
End: 2020-08-10

## 2020-08-10 LAB
CREAT SERPL-MCNC: 0.99 MG/DL (ref 0.7–1.3)
GFR SERPL CREATININE-BSD FRML MDRD: >60 ML/MIN/1.73M2
GLUCOSE SERPL-MCNC: 115 MG/DL (ref 70–125)
POTASSIUM SERPL-SCNC: 3.8 MMOL/L (ref 3.5–5)

## 2020-08-18 ENCOUNTER — MYC REFILL (OUTPATIENT)
Dept: NEUROLOGY | Facility: CLINIC | Age: 25
End: 2020-08-18

## 2020-08-18 DIAGNOSIS — M79.2 NEUROPATHIC PAIN: ICD-10-CM

## 2020-08-19 RX ORDER — GABAPENTIN 300 MG/1
CAPSULE ORAL
Qty: 42 CAPSULE | Refills: 0 | Status: SHIPPED | OUTPATIENT
Start: 2020-08-19 | End: 2020-09-29

## 2020-08-25 ENCOUNTER — VIRTUAL VISIT (OUTPATIENT)
Dept: FAMILY MEDICINE | Facility: CLINIC | Age: 25
End: 2020-08-25
Payer: COMMERCIAL

## 2020-08-25 DIAGNOSIS — K64.4 EXTERNAL HEMORRHOIDS: ICD-10-CM

## 2020-08-25 DIAGNOSIS — R53.83 FATIGUE, UNSPECIFIED TYPE: Primary | ICD-10-CM

## 2020-08-25 PROCEDURE — 99214 OFFICE O/P EST MOD 30 MIN: CPT | Mod: 95 | Performed by: NURSE PRACTITIONER

## 2020-08-25 RX ORDER — MECLIZINE HYDROCHLORIDE 25 MG/1
25 TABLET ORAL PRN
COMMUNITY
Start: 2020-08-10 | End: 2021-01-18

## 2020-08-25 NOTE — PROGRESS NOTES
"Manoj Castellanos is a 25 year old adult who is being evaluated via a billable telephone visit.      The patient has been notified of following:     \"This telephone visit will be conducted via a call between you and your physician/provider. We have found that certain health care needs can be provided without the need for a physical exam.  This service lets us provide the care you need with a short phone conversation.  If a prescription is necessary we can send it directly to your pharmacy.  If lab work is needed we can place an order for that and you can then stop by our lab to have the test done at a later time.    Telephone visits are billed at different rates depending on your insurance coverage. During this emergency period, for some insurers they may be billed the same as an in-person visit.  Please reach out to your insurance provider with any questions.    If during the course of the call the physician/provider feels a telephone visit is not appropriate, you will not be charged for this service.\"    Patient has given verbal consent for Telephone visit?  Yes    What phone number would you like to be contacted at? [phone]    How would you like to obtain your AVS? MyChart    Subjective     Manoj Castellanos is a 25 year old adult who presents via phone visit today for the following health issues:    HPI    Chief Complaint   Patient presents with     Fatigue     Rectal Problem       1. Fatigue.  Started a few years ago.  It has gotten worse.  \"nonstop.\"  Family history of vitamin D deficiency and chronic fatigue.  August 10, vertigo attack and Ty went to the ER.  Labs were normal at that time.     Ty is requesting labs to be done now    Ty is eating and drinking normally.  Manoj is getting his fluids and \"getting a reasonable amount of nutrients in diet.\"  \"I am doing what I can and I don't go to bed hungry.\"  Manoj does not take a MVI.       2. Rectal problem:  \"I think I have hemorrhoids.\"  Manoj has pronounced veins around the " "rectal area.  No blood in stool, constipation.  \" It looks nasty.\"  Ty is trying not to strain with a bowel movement.  Ty is wondering what else can be done at this time.    Review of Systems   Constitutional, HEENT, cardiovascular, pulmonary, GI, , musculoskeletal, neuro, skin, endocrine and psych systems are negative, except as otherwise noted.       Objective        Vitals:  No vitals were obtained today due to virtual visit.    healthy, alert and no distress  PSYCH: Alert and oriented times 3; coherent speech, normal   rate and volume, able to articulate logical thoughts, able   to abstract reason, no tangential thoughts, no hallucinations   or delusions  Manoj Castellanos's affect is normal  RESP: No cough, no audible wheezing, able to talk in full sentences  Remainder of exam unable to be completed due to telephone visits    Diagnostics:  Future labs are currently in the order que for Ty.   Ty is to come into the clinic as soon as possible for labs.        Assessment/Plan:    (R53.83) Fatigue, unspecified type  (primary encounter diagnosis)  Comment: Uncertain  Plan: I did tell Ty that there are orders in the chart for thyroid, vitamin D, and vitamin B.  Ties to come into the clinic at the earliest convenience for lab and go.  I will then let Ty know the results when I see them.  In the meantime, I encouraged a healthy diet, add a multivitamin without iron, get a varied diet with fruits and veggies, adequate water etc.  Extra sleep is okay and encouraged at this time.  If any of the lab test results are abnormal, treatment will be recommended.  If all labs are normal follow-up if symptoms linger.    (K64.4) External hemorrhoids  Comment: Uncertain  Plan: COLORECTAL SURGERY REFERRAL        For today, I encouraged a high-fiber diet, drink extra water, prevent straining with a bowel movement or any rectal strain.  For any additional issues, follow-up with colorectal.    Ty agrees.    Patient Instructions "     Fatigue:  Blood tests:  Please call the clinic, [phone] to schedule a lab only appointment.  I have ordered the vitamin D, vitamin B, and thyroid blood tests.    Consider adding a multivitamin every day, eating a healthy diet, drinking fluids, getting extra rest, etc.        Possible hemorroids:  Eat a high fiber diet, prevent constipation, drink extra water, etc to help prevent straining with a bowel movement.  Follow up with a colorectal specialist with any additional concerns.    Patient Education     Fibromyalgia/Fatigue  Fibromyalgia is a chronic condition. It causes pain and tenderness in connective tissues. This causes muscle pain. Often, there are also many tender areas throughout the body. Symptoms may also include stiffness and feelings of numbness and tingling. Symptoms may be worse upon waking up. They may increase with poor sleep, heavy activity, cold or damp weather, anxiety, or stress.  People with fibromyalgia often feel tired. They may have trouble sleeping. Other symptoms include morning stiffness, headaches, and painful menstrual periods. Some people have problems with thinking clearly and changes in memory.  The cause of fibromyalgia is not known. Symptoms are similar to that of other diseases. These include rheumatoid arthritis, low thyroid, chronic fatigue syndrome, and Lyme disease. In some cases, these diseases may occur together.  Fibromyalgia is often treated with medicines. You and your healthcare provider can discuss the medicine that may work best for you. You may have to try more than one medicine or combination of medicines before you find what works for you.  Home care    If your healthcare provider has prescribed or recommended medicines, take them as directed.    Rest as needed. Try to get enough sleep. If you have trouble sleeping, discuss this with your healthcare provider.     Be active. Regular exercise can help manage symptoms. Some options include walking, swimming,  and biking. Strengthening exercises may also be helpful. Talk to your healthcare provider about the best ways to be active.    Follow a healthy diet. Limit caffeine and alcohol. If you smoke, ask your healthcare provider for help to stop.    Notice how your body reacts to stress. Learn to listen to your body signals. This will help you take action before the stress becomes severe.    Learn relaxation techniques. Also consider joining a stress reduction program or class.    Talk to your healthcare provider about trying complementary treatments. These include acupuncture, hypnosis, and biofeedback. Yoga and clark chi may be helpful.    Ask your healthcare provider about cognitive behavioral therapy (CBT). This type of counseling can help people with fibromyalgia cope better with their illness.  Follow-up care  Follow up with your healthcare provider or as advised by our staff. In many cases, fibromyalgia is best treated with a team approach.  This may involve your primary care provider, a rheumatologist, a physical therapist, and a mental health professional.   For more information: National [place] of Arthritis and Musculoskeletal and Skin Diseases (NIAMS)  www.niams.nih.gov 782-537-4627  When to seek medical advice  Contact your healthcare provider right away if any of these occur:    Symptoms getting worse or new symptoms developing    You feel hopeless, helpless, or lose interest in day-to-day life  Date Last Reviewed: 3/1/2017    4064-1843 [employer]. [address] [place] [address], [place] [address]. All rights reserved. This information is not intended as a substitute for professional medical care. Always follow your healthcare professional's instructions.         Patient Education     Hemorrhoids    Hemorrhoids are swollen and inflamed veins inside the rectum and near the anus. The rectum is the last several inches of the colon. The anus is the passage between the rectum and the outside of the  body.  Causes  The veins can become swollen due to increased pressure in them. This is most often caused by:    Chronic constipation or diarrhea    Straining when having a bowel movement    Sitting too long on the toilet    A low-fiber diet    Pregnancy  Symptoms    Bleeding from the rectum (this may be noticeable after bowel movements)    Lump near the anus    Itching around the anus    Pain around the anus  There are different types of hemorrhoids. Depending on the type you have and the severity, you may be able to treat yourself at home. In some cases, a procedure may be the best treatment option. Your healthcare provider can tell you more about this, if needed.  Home care  General care    To get relief from pain or itching, try:  ? Medicines. Your healthcare provider may recommend stool softeners, suppositories, or laxatives to help manage constipation. Use these exactly as directed.  ? Sitz baths. A sitz bath involves sitting in a few inches of warm bath water. Be careful not to make the water so hot that you burn yourself--test it before sitting in it. Soak for about 10 to 15 minutes a few times a day. This may help relieve pain.  ? Topical products. Your healthcare provider may prescribe or recommend creams, ointments, or pads that can be applied to the hemorrhoid. Use these exactly as directed.  Tips to help prevent hemorrhoids    Eat more fiber. Fiber adds bulk to stool and absorbs water as it moves through your colon. This makes stool softer and easier to pass.  ? Increase the fiber in your diet with more fiber-rich foods. These include fresh fruit, vegetables, and whole grains.  ? Take a fiber supplement or bulking agent, if advised by your healthcare provider. These include products such as psyllium or methylcellulose.    Drink more water. Your healthcare provider may direct you to drink plenty of water. This can help keep stool soft.    Be more active. Frequent exercise aids digestion and helps prevent  constipation. It may also help make bowel movements more regular.    Don t strain during bowel movements. This can make hemorrhoids more likely. Also, don t sit on the toilet for long periods of time.  Follow-up care  Follow up with your healthcare provider as advised. If a culture or imaging tests were done, someone will let you know the results when they are ready. This may take a few days or longer. If your healthcare provider recommends a procedure for your hemorrhoids, these options can be discussed. Options may include surgery and outpatient office treatments.  When to seek medical advice  Call your healthcare provider right away if any of these occur:    Increased bleeding from the rectum    Increased pain around the rectum or anus    Weakness or dizziness  Call 911  Call 911 if any of these occur:    Trouble breathing or swallowing    Fainting or loss of consciousness    Unusually fast heart rate    Vomiting blood    Large amounts of blood in stool or black, tarry stools  Date Last Reviewed: 9/1/2017 2000-2019 [employer]. [address] [place] [address]. All rights reserved. This information is not intended as a substitute for professional medical care. Always follow your healthcare professional's instructions.                 Phone call duration:  13 minutes

## 2020-08-25 NOTE — PATIENT INSTRUCTIONS
Fatigue:  Blood tests:  Please call the clinic, [phone] to schedule a lab only appointment.  I have ordered the vitamin D, vitamin B, and thyroid blood tests.    Consider adding a multivitamin every day, eating a healthy diet, drinking fluids, getting extra rest, etc.        Possible hemorroids:  Eat a high fiber diet, prevent constipation, drink extra water, etc to help prevent straining with a bowel movement.  Follow up with a colorectal specialist with any additional concerns.    Patient Education     Fibromyalgia/Fatigue  Fibromyalgia is a chronic condition. It causes pain and tenderness in connective tissues. This causes muscle pain. Often, there are also many tender areas throughout the body. Symptoms may also include stiffness and feelings of numbness and tingling. Symptoms may be worse upon waking up. They may increase with poor sleep, heavy activity, cold or damp weather, anxiety, or stress.  People with fibromyalgia often feel tired. They may have trouble sleeping. Other symptoms include morning stiffness, headaches, and painful menstrual periods. Some people have problems with thinking clearly and changes in memory.  The cause of fibromyalgia is not known. Symptoms are similar to that of other diseases. These include rheumatoid arthritis, low thyroid, chronic fatigue syndrome, and Lyme disease. In some cases, these diseases may occur together.  Fibromyalgia is often treated with medicines. You and your healthcare provider can discuss the medicine that may work best for you. You may have to try more than one medicine or combination of medicines before you find what works for you.  Home care    If your healthcare provider has prescribed or recommended medicines, take them as directed.    Rest as needed. Try to get enough sleep. If you have trouble sleeping, discuss this with your healthcare provider.     Be active. Regular exercise can help manage symptoms. Some options include walking, swimming, and  biking. Strengthening exercises may also be helpful. Talk to your healthcare provider about the best ways to be active.    Follow a healthy diet. Limit caffeine and alcohol. If you smoke, ask your healthcare provider for help to stop.    Notice how your body reacts to stress. Learn to listen to your body signals. This will help you take action before the stress becomes severe.    Learn relaxation techniques. Also consider joining a stress reduction program or class.    Talk to your healthcare provider about trying complementary treatments. These include acupuncture, hypnosis, and biofeedback. Yoga and clark chi may be helpful.    Ask your healthcare provider about cognitive behavioral therapy (CBT). This type of counseling can help people with fibromyalgia cope better with their illness.  Follow-up care  Follow up with your healthcare provider or as advised by our staff. In many cases, fibromyalgia is best treated with a team approach.  This may involve your primary care provider, a rheumatologist, a physical therapist, and a mental health professional.   For more information: National [place] of Arthritis and Musculoskeletal and Skin Diseases (NIAMS)  www.niams.nih.gov 223-702-1485  When to seek medical advice  Contact your healthcare provider right away if any of these occur:    Symptoms getting worse or new symptoms developing    You feel hopeless, helpless, or lose interest in day-to-day life  Date Last Reviewed: 3/1/2017    1369-0951 [employer]. [address] [place] [address], [place] [address]. All rights reserved. This information is not intended as a substitute for professional medical care. Always follow your healthcare professional's instructions.         Patient Education     Hemorrhoids    Hemorrhoids are swollen and inflamed veins inside the rectum and near the anus. The rectum is the last several inches of the colon. The anus is the passage between the rectum and the outside of the  body.  Causes  The veins can become swollen due to increased pressure in them. This is most often caused by:    Chronic constipation or diarrhea    Straining when having a bowel movement    Sitting too long on the toilet    A low-fiber diet    Pregnancy  Symptoms    Bleeding from the rectum (this may be noticeable after bowel movements)    Lump near the anus    Itching around the anus    Pain around the anus  There are different types of hemorrhoids. Depending on the type you have and the severity, you may be able to treat yourself at home. In some cases, a procedure may be the best treatment option. Your healthcare provider can tell you more about this, if needed.  Home care  General care    To get relief from pain or itching, try:  ? Medicines. Your healthcare provider may recommend stool softeners, suppositories, or laxatives to help manage constipation. Use these exactly as directed.  ? Sitz baths. A sitz bath involves sitting in a few inches of warm bath water. Be careful not to make the water so hot that you burn yourself--test it before sitting in it. Soak for about 10 to 15 minutes a few times a day. This may help relieve pain.  ? Topical products. Your healthcare provider may prescribe or recommend creams, ointments, or pads that can be applied to the hemorrhoid. Use these exactly as directed.  Tips to help prevent hemorrhoids    Eat more fiber. Fiber adds bulk to stool and absorbs water as it moves through your colon. This makes stool softer and easier to pass.  ? Increase the fiber in your diet with more fiber-rich foods. These include fresh fruit, vegetables, and whole grains.  ? Take a fiber supplement or bulking agent, if advised by your healthcare provider. These include products such as psyllium or methylcellulose.    Drink more water. Your healthcare provider may direct you to drink plenty of water. This can help keep stool soft.    Be more active. Frequent exercise aids digestion and helps prevent  constipation. It may also help make bowel movements more regular.    Don t strain during bowel movements. This can make hemorrhoids more likely. Also, don t sit on the toilet for long periods of time.  Follow-up care  Follow up with your healthcare provider as advised. If a culture or imaging tests were done, someone will let you know the results when they are ready. This may take a few days or longer. If your healthcare provider recommends a procedure for your hemorrhoids, these options can be discussed. Options may include surgery and outpatient office treatments.  When to seek medical advice  Call your healthcare provider right away if any of these occur:    Increased bleeding from the rectum    Increased pain around the rectum or anus    Weakness or dizziness  Call 911  Call 911 if any of these occur:    Trouble breathing or swallowing    Fainting or loss of consciousness    Unusually fast heart rate    Vomiting blood    Large amounts of blood in stool or black, tarry stools  Date Last Reviewed: 9/1/2017 2000-2019 [employer]. [address] [place] [address], [place] [address]. All rights reserved. This information is not intended as a substitute for professional medical care. Always follow your healthcare professional's instructions.

## 2020-08-28 DIAGNOSIS — G50.0 TRIGEMINAL NEURALGIA: ICD-10-CM

## 2020-08-28 DIAGNOSIS — R53.83 FATIGUE: ICD-10-CM

## 2020-08-28 LAB
BASOPHILS # BLD AUTO: 0 10E9/L (ref 0–0.2)
BASOPHILS NFR BLD AUTO: 0.4 %
DIFFERENTIAL METHOD BLD: NORMAL
EOSINOPHIL # BLD AUTO: 0.2 10E9/L (ref 0–0.7)
EOSINOPHIL NFR BLD AUTO: 2.8 %
ERYTHROCYTE [DISTWIDTH] IN BLOOD BY AUTOMATED COUNT: 14 % (ref 10–15)
HCT VFR BLD AUTO: 47.5 % (ref 40–53)
HGB BLD-MCNC: 15.7 G/DL (ref 13.3–17.7)
LYMPHOCYTES # BLD AUTO: 1.4 10E9/L (ref 0.8–5.3)
LYMPHOCYTES NFR BLD AUTO: 23.8 %
MCH RBC QN AUTO: 27.9 PG (ref 26.5–33)
MCHC RBC AUTO-ENTMCNC: 33.1 G/DL (ref 31.5–36.5)
MCV RBC AUTO: 84 FL (ref 78–100)
MONOCYTES # BLD AUTO: 0.5 10E9/L (ref 0–1.3)
MONOCYTES NFR BLD AUTO: 8.9 %
NEUTROPHILS # BLD AUTO: 3.7 10E9/L (ref 1.6–8.3)
NEUTROPHILS NFR BLD AUTO: 64.1 %
PLATELET # BLD AUTO: 235 10E9/L (ref 150–450)
RBC # BLD AUTO: 5.63 10E12/L (ref 4.4–5.9)
VIT B12 SERPL-MCNC: 349 PG/ML (ref 193–986)
WBC # BLD AUTO: 5.7 10E9/L (ref 4–11)

## 2020-08-28 PROCEDURE — 84443 ASSAY THYROID STIM HORMONE: CPT | Performed by: NURSE PRACTITIONER

## 2020-08-28 PROCEDURE — 85025 COMPLETE CBC W/AUTO DIFF WBC: CPT | Performed by: NURSE PRACTITIONER

## 2020-08-28 PROCEDURE — 82607 VITAMIN B-12: CPT | Performed by: NURSE PRACTITIONER

## 2020-08-28 PROCEDURE — 82306 VITAMIN D 25 HYDROXY: CPT | Performed by: NURSE PRACTITIONER

## 2020-08-28 PROCEDURE — 81374 HLA I TYPING 1 ANTIGEN LR: CPT | Performed by: FAMILY MEDICINE

## 2020-08-28 PROCEDURE — 80048 BASIC METABOLIC PNL TOTAL CA: CPT | Performed by: NURSE PRACTITIONER

## 2020-08-28 PROCEDURE — 36415 COLL VENOUS BLD VENIPUNCTURE: CPT | Performed by: NURSE PRACTITIONER

## 2020-08-29 LAB
ANION GAP SERPL CALCULATED.3IONS-SCNC: 5 MMOL/L (ref 3–14)
BUN SERPL-MCNC: 7 MG/DL (ref 7–30)
CALCIUM SERPL-MCNC: 9 MG/DL (ref 8.5–10.1)
CHLORIDE SERPL-SCNC: 105 MMOL/L (ref 94–109)
CO2 SERPL-SCNC: 27 MMOL/L (ref 20–32)
CREAT SERPL-MCNC: 0.8 MG/DL (ref 0.66–1.25)
GFR SERPL CREATININE-BSD FRML MDRD: >90 ML/MIN/{1.73_M2}
GLUCOSE SERPL-MCNC: 73 MG/DL (ref 70–99)
POTASSIUM SERPL-SCNC: 3.8 MMOL/L (ref 3.4–5.3)
SODIUM SERPL-SCNC: 137 MMOL/L (ref 133–144)
TSH SERPL DL<=0.005 MIU/L-ACNC: 1.55 MU/L (ref 0.4–4)

## 2020-08-31 LAB — DEPRECATED CALCIDIOL+CALCIFEROL SERPL-MC: 8 UG/L (ref 20–75)

## 2020-09-02 LAB
B LOCUS: NORMAL
B27TEST METHOD: NORMAL

## 2020-09-08 ENCOUNTER — TELEPHONE (OUTPATIENT)
Dept: NEUROLOGY | Facility: CLINIC | Age: 25
End: 2020-09-08

## 2020-09-08 DIAGNOSIS — G62.9 NEUROPATHY: ICD-10-CM

## 2020-09-08 RX ORDER — GABAPENTIN 100 MG/1
100 CAPSULE ORAL 3 TIMES DAILY
Qty: 180 CAPSULE | Refills: 3 | Status: SHIPPED | OUTPATIENT
Start: 2020-09-08 | End: 2021-04-22

## 2020-09-08 NOTE — TELEPHONE ENCOUNTER
Called by patient regarding gabapentin script. Was told by Concha that script had . I see that Dr. Andrade ordered a three month supply on 2020 but I will resend the script now.     Jane Montemayor, DO on 2020 at 3:29 PM

## 2020-09-22 DIAGNOSIS — G50.0 TRIGEMINAL NEURALGIA: Primary | ICD-10-CM

## 2020-09-22 RX ORDER — OXCARBAZEPINE 150 MG/1
TABLET, FILM COATED ORAL
Qty: 96 TABLET | Refills: 0 | Status: SHIPPED | OUTPATIENT
Start: 2020-09-22 | End: 2020-10-28

## 2020-09-22 NOTE — PROGRESS NOTES
Start oxcarbazepine for facial sensory changes. Titration as follows communicated with patient over direct messaging.    Oxcarbazepine 150 mg BID for 6 days, then  Oxcarbazepine 300 mg BID for 21 days    SHA Andrade D.O.  [facility]

## 2020-09-28 DIAGNOSIS — M79.2 NEUROPATHIC PAIN: ICD-10-CM

## 2020-09-29 RX ORDER — GABAPENTIN 300 MG/1
CAPSULE ORAL
Qty: 90 CAPSULE | Refills: 1 | Status: SHIPPED | OUTPATIENT
Start: 2020-09-29 | End: 2021-01-07

## 2020-10-08 ENCOUNTER — TELEPHONE (OUTPATIENT)
Dept: NEUROLOGY | Facility: CLINIC | Age: 25
End: 2020-10-08

## 2020-10-08 NOTE — TELEPHONE ENCOUNTER
I left pt a message to schedule a video/phone follow up with Dr. Andrade in  2 weeks. We have lots of openings on 10/22 and 10/23. I asked he call or send a mychart to schedule this visit.     Guerline ROYAL

## 2020-10-22 ENCOUNTER — MYC MEDICAL ADVICE (OUTPATIENT)
Dept: FAMILY MEDICINE | Facility: CLINIC | Age: 25
End: 2020-10-22

## 2020-10-22 DIAGNOSIS — E55.9 VITAMIN D DEFICIENCY: Primary | ICD-10-CM

## 2020-10-23 RX ORDER — CHOLECALCIFEROL (VITAMIN D3) 50 MCG
1 TABLET ORAL DAILY
Qty: 90 TABLET | Refills: 3 | Status: SHIPPED | OUTPATIENT
Start: 2020-10-23 | End: 2021-10-28

## 2020-10-28 DIAGNOSIS — G50.0 TRIGEMINAL NEURALGIA: ICD-10-CM

## 2020-10-28 RX ORDER — OXCARBAZEPINE 150 MG/1
TABLET, FILM COATED ORAL
Qty: 120 TABLET | Refills: 1 | Status: SHIPPED | OUTPATIENT
Start: 2020-10-28 | End: 2021-01-01

## 2020-11-09 ENCOUNTER — OFFICE VISIT (OUTPATIENT)
Dept: FAMILY MEDICINE | Facility: CLINIC | Age: 25
End: 2020-11-09
Payer: COMMERCIAL

## 2020-11-09 VITALS
WEIGHT: 136.6 LBS | HEART RATE: 95 BPM | SYSTOLIC BLOOD PRESSURE: 123 MMHG | DIASTOLIC BLOOD PRESSURE: 86 MMHG | HEIGHT: 69 IN | RESPIRATION RATE: 16 BRPM | BODY MASS INDEX: 20.23 KG/M2 | OXYGEN SATURATION: 97 % | TEMPERATURE: 97.9 F

## 2020-11-09 DIAGNOSIS — H61.22 IMPACTED CERUMEN OF LEFT EAR: Primary | ICD-10-CM

## 2020-11-09 PROCEDURE — 69210 REMOVE IMPACTED EAR WAX UNI: CPT | Mod: LT | Performed by: FAMILY MEDICINE

## 2020-11-09 PROCEDURE — 99213 OFFICE O/P EST LOW 20 MIN: CPT | Mod: 25 | Performed by: FAMILY MEDICINE

## 2020-11-09 ASSESSMENT — MIFFLIN-ST. JEOR: SCORE: 1609.75

## 2020-11-09 NOTE — PATIENT INSTRUCTIONS
Patient Education       Earwax, Home Treatment    Everyone produces earwax from the lining of the ear canal. It serves to lubricate and protect the ear. The wax that forms in the canal naturally moves toward the outside of the ear and falls out. Sometimes the ear canal may contain too much wax. This can cause a blockage and loss of hearing. Directions are given below for home treatment.  Home care  If your doctor has advised you to remove a wax blockage yourself, follow these directions:    Unless a medicine was prescribed, you may use an over-the-counter product made for clearing earwax. These contain carbamide peroxide. Lie down with the blocked ear facing upward. Apply one dropper full of medicine and wait a few minutes. Grasp the outer ear and wiggle it to help the solution enter the canal.    Lean over a sink or basin with the blocked ear facing downward. Use a bulb syringe filled with warm (not hot or cold) water to rinse the ear several times. Use gentle pressure only.    If you are having trouble draining the water out of your ear canal, put a few drops of rubbing alcohol (isopropyl alcohol) into the ear canal. This will help remove the remaining water.    Repeat this procedure once a day for up to three days, or until your hearing is back to normal. Do not use this treatment for more than three days in a row.  Don ts    Don t use cold water to rinse the ear. This will make you dizzy.    Don t perform this procedure if you have an ear infection.    Don t perform this procedure if you have a ruptured eardrum.    Don t use cotton swabs, matches, hairpins, keys, or other objects to  clean  the ear canal. This can cause infection of the ear canal or rupture the eardrum. Because of their size and shape, cotton swabs can push earwax deeper into the ear canal instead of removing it.  Follow-up care  Follow up with your health care provider if you are not improving after three cleaning attempts, or as advised.  When  to seek medical advice  Call your health care provider right away if any of these occur:    Worsening ear pain    Fever of 101 F (38.3 C) or higher, or as directed by your health care provider    Hearing does not return to normal after three days of treatment    Fluid drainage or bleeding from the ear canal    Swelling, redness, or tenderness of the outer ear    Headache, neck pain, or stiff neck    6558-9935 [employer]. [address] [place] [address], [place] [address]. All rights reserved. This information is not intended as a substitute for professional medical care. Always follow your healthcare professional's instructions.

## 2020-11-09 NOTE — PROGRESS NOTES
"Subjective     Manoj Castellanos is a 25 year old adult who presents to clinic today for the following health issues:    HPI       Sharp left ear pain. Started yesterday. Started with ear fullness, sharp pain at night. Cleaning with hydrogen peroxide and water. Cleaned twice yesterday. Does not use cotton swab. Hearing normal. Pain radiates down to throat. Denies fever, cough, chills. No history ear infection as an adult. No sick contacts.     Concern - Left ear pain   Onset: x 1 day   Description:   Intensity: moderate  Progression of Symptoms:  same  Accompanying Signs & Symptoms: feeling fulness and pain   Previous history of similar problem: yes   Alleviating factors:        Improved by: None   Therapies tried and outcome: peroxide and water       Review of Systems   CONSTITUTIONAL: NEGATIVE for fever, chills, change in weight  INTEGUMENTARY/SKIN: NEGATIVE for worrisome rashes, moles or lesions  EYES: NEGATIVE for vision changes or irritation  ENT/MOUTH: NEGATIVE for ear, mouth and throat problems  RESP: NEGATIVE for significant cough or SOB  BREAST: NEGATIVE for masses, tenderness or discharge  CV: NEGATIVE for chest pain, palpitations or peripheral edema  NEURO: NEGATIVE for weakness, dizziness or paresthesias    Objective    /86 (BP Location: Left arm, Patient Position: Sitting, Cuff Size: Adult Regular)   Pulse 95   Temp 97.9  F (36.6  C) (Tympanic)   Resp 16   Ht 1.76 m (5' 9.3\")   Wt 62 kg (136 lb 9.6 oz)   SpO2 97%   BMI 20.00 kg/m    Body mass index is 20 kg/m .  Physical Exam   GENERAL: healthy, alert and no distress  EYES: Eyes grossly normal to inspection, conjunctivae and sclerae normal  HENT: Cerumen blocking TM bilateral ear canals. Left TM visual after irrigation, appears normal. Nose and mouth without ulcers or lesions  NECK: no adenopathy, no asymmetry, masses, or scars and thyroid normal to palpation  RESP: lungs clear to auscultation - no rales, rhonchi or wheezes  CV: regular rate and " rhythm, normal S1 S2, no S3 or S4, no murmur, click or rub, no peripheral edema and peripheral pulses strong  SKIN: no suspicious lesions or rashes  PSYCH: mentation appears normal, affect normal/bright      Assessment & Plan     Ty was seen today for ear problem.    Diagnoses and all orders for this visit:    Impacted cerumen of left ear  -     REMOVE IMPACTED CERUMEN  - Irrigate ear as needed at home with hydrogen peroxide and water mixture  - Return as needed.      George Dillon DO  [facility]

## 2020-11-11 ENCOUNTER — TELEPHONE (OUTPATIENT)
Dept: NEUROLOGY | Facility: CLINIC | Age: 25
End: 2020-11-11

## 2020-11-21 DIAGNOSIS — L64.9 MALE PATTERN ALOPECIA: ICD-10-CM

## 2020-11-22 RX ORDER — FINASTERIDE 5 MG/1
TABLET, FILM COATED ORAL
Qty: 90 TABLET | Refills: 1 | Status: SHIPPED | OUTPATIENT
Start: 2020-11-22 | End: 2021-12-09

## 2020-11-22 NOTE — TELEPHONE ENCOUNTER
"Requested Prescriptions   Pending Prescriptions Disp Refills     finasteride (PROSCAR) 5 MG tablet [Pharmacy Med Name: FINASTERIDE 5MG TABLETS] 90 tablet 3     Sig: TAKE 1/4 TABLET BY MOUTH ONCE DAILY       BPH Agents Passed - 11/21/2020  4:11 AM        Passed - Recent (12 mo) or future (30 days) visit within the authorizing provider's department     Patient has had an office visit with the authorizing provider or a provider within the authorizing providers department within the previous 12 mos or has a future within next 30 days. See \"Patient Info\" tab in inbasket, or \"Choose Columns\" in Meds & Orders section of the refill encounter.              Passed - Medication is active on med list        Passed - Patient is 18 years of age or older           Signed Prescriptions:                        Disp   Refills    finasteride (PROSCAR) 5 MG tablet          90 tab*1        Sig: TAKE 1/4 TABLET BY MOUTH ONCE DAILY  Authorizing Provider: DIOMEDES COHEN  Ordering User: MARILYNN LANGE            "

## 2020-12-12 ENCOUNTER — HEALTH MAINTENANCE LETTER (OUTPATIENT)
Age: 25
End: 2020-12-12

## 2020-12-19 ENCOUNTER — OFFICE VISIT (OUTPATIENT)
Dept: URGENT CARE | Facility: URGENT CARE | Age: 25
End: 2020-12-19
Payer: COMMERCIAL

## 2020-12-19 ENCOUNTER — ANCILLARY PROCEDURE (OUTPATIENT)
Dept: GENERAL RADIOLOGY | Facility: CLINIC | Age: 25
End: 2020-12-19
Attending: PHYSICIAN ASSISTANT
Payer: COMMERCIAL

## 2020-12-19 VITALS
HEART RATE: 94 BPM | WEIGHT: 136 LBS | SYSTOLIC BLOOD PRESSURE: 128 MMHG | BODY MASS INDEX: 19.91 KG/M2 | TEMPERATURE: 97 F | DIASTOLIC BLOOD PRESSURE: 85 MMHG | OXYGEN SATURATION: 98 %

## 2020-12-19 DIAGNOSIS — R07.89 ATYPICAL CHEST PAIN: Primary | ICD-10-CM

## 2020-12-19 DIAGNOSIS — R07.89 ATYPICAL CHEST PAIN: ICD-10-CM

## 2020-12-19 DIAGNOSIS — R94.31 ABNORMAL ELECTROCARDIOGRAM: ICD-10-CM

## 2020-12-19 LAB
BASOPHILS # BLD AUTO: 0 10E9/L (ref 0–0.2)
BASOPHILS NFR BLD AUTO: 0.4 %
DIFFERENTIAL METHOD BLD: ABNORMAL
EOSINOPHIL # BLD AUTO: 0.1 10E9/L (ref 0–0.7)
EOSINOPHIL NFR BLD AUTO: 1.5 %
ERYTHROCYTE [DISTWIDTH] IN BLOOD BY AUTOMATED COUNT: 14.4 % (ref 10–15)
HCT VFR BLD AUTO: 51.4 % (ref 40–53)
HGB BLD-MCNC: 17.1 G/DL (ref 13.3–17.7)
LYMPHOCYTES # BLD AUTO: 1.2 10E9/L (ref 0.8–5.3)
LYMPHOCYTES NFR BLD AUTO: 25.1 %
MCH RBC QN AUTO: 28.3 PG (ref 26.5–33)
MCHC RBC AUTO-ENTMCNC: 33.3 G/DL (ref 31.5–36.5)
MCV RBC AUTO: 85 FL (ref 78–100)
MONOCYTES # BLD AUTO: 0.6 10E9/L (ref 0–1.3)
MONOCYTES NFR BLD AUTO: 12.6 %
NEUTROPHILS # BLD AUTO: 2.9 10E9/L (ref 1.6–8.3)
NEUTROPHILS NFR BLD AUTO: 60.4 %
PLATELET # BLD AUTO: 256 10E9/L (ref 150–450)
RBC # BLD AUTO: 6.05 10E12/L (ref 4.4–5.9)
WBC # BLD AUTO: 4.8 10E9/L (ref 4–11)

## 2020-12-19 PROCEDURE — 71046 X-RAY EXAM CHEST 2 VIEWS: CPT | Performed by: RADIOLOGY

## 2020-12-19 PROCEDURE — 99214 OFFICE O/P EST MOD 30 MIN: CPT | Performed by: PHYSICIAN ASSISTANT

## 2020-12-19 PROCEDURE — 36415 COLL VENOUS BLD VENIPUNCTURE: CPT | Performed by: PHYSICIAN ASSISTANT

## 2020-12-19 PROCEDURE — 93000 ELECTROCARDIOGRAM COMPLETE: CPT | Performed by: PHYSICIAN ASSISTANT

## 2020-12-19 PROCEDURE — 85025 COMPLETE CBC W/AUTO DIFF WBC: CPT | Performed by: PHYSICIAN ASSISTANT

## 2020-12-19 NOTE — Clinical Note
Ty wanted me to make sure you were aware of his EKG this weekend. No acute changes warranting intervention.

## 2020-12-20 NOTE — PROGRESS NOTES
SUBJECTIVE  HPI:  Manoj Castellanos is a 25 year old adult who presents with the CC of abdominal/chest pain.  Radiating into left arm, left side of neck.     No cardiac history.  Has had Holter, negative.    Father with coronary artery dissection in mid fifties.    Currently 1/10.  At its worst 8/10.      No GERD symptoms.          Past Medical History:   Diagnosis Date     Depressive disorder 2014    it s chill now tho I m doing fine     Uncomplicated asthma early 2000s     Current Outpatient Medications   Medication Sig Dispense Refill     albuterol (PROAIR HFA/PROVENTIL HFA/VENTOLIN HFA) 108 (90 Base) MCG/ACT inhaler Inhale 2 puffs into the lungs every 4 hours as needed for shortness of breath / dyspnea or wheezing 1 Inhaler 2     cetirizine (ZYRTEC) 10 MG tablet Take 1 tablet (10 mg) by mouth 2 times daily 60 tablet 11     famotidine (PEPCID) 20 MG tablet Take 20 mg by mouth as needed       finasteride (PROSCAR) 5 MG tablet TAKE 1/4 TABLET BY MOUTH ONCE DAILY 90 tablet 1     FLOVENT  MCG/ACT inhaler INHALE 2 PUFFS INTO THE LUNGS TWICE DAILY 36 g 1     fluocinonide (LIDEX) 0.05 % cream Apply topically 2 times daily       gabapentin (NEURONTIN) 100 MG capsule Take 1 capsule (100 mg) by mouth 3 times daily 180 capsule 3     gabapentin (NEURONTIN) 300 MG capsule TAKE 1 CAPSULE BY MOUTH THREE TIMES DAILY (Patient taking differently: TAKE 1 CAPSULE BY MOUTH TWICE TIMES DAILY) 90 capsule 1     IBUPROFEN        meclizine (ANTIVERT) 25 MG tablet Take 25 mg by mouth as needed       minoxidil 5 % SOLN Externally apply topically 2 times daily       omeprazole (PRILOSEC OTC) 20 MG EC tablet Take 1 tablet (20 mg) by mouth daily 30 tablet 0     ondansetron (ZOFRAN-ODT) 4 MG ODT tab Take 1 tablet (4 mg) by mouth every 8 hours as needed for nausea 20 tablet 0     order for DME Equipment being ordered: SAD light 1 Units 0     OXcarbazepine (TRILEPTAL) 150 MG tablet Take two tablets (300 mg ) two times daily 120 tablet 1      vitamin D3 (CHOLECALCIFEROL) 50 mcg (2000 units) tablet Take 1 tablet (50 mcg) by mouth daily 90 tablet 3     Social History     Tobacco Use     Smoking status: Never Smoker     Smokeless tobacco: Never Used   Substance Use Topics     Alcohol use: Yes     Comment: infrequent at worst       ROS:  10 point ROS negative except as listed above      OBJECTIVE:  /85   Pulse 94   Temp 97  F (36.1  C) (Tympanic)   Wt 61.7 kg (136 lb)   SpO2 98%   BMI 19.91 kg/m    GENERAL APPEARANCE: healthy, alert and no distress  EYES: conjunctiva clear  RESP: lungs clear to auscultation - no rales, rhonchi or wheezes  CV: regular rates and rhythm, normal S1 S2, no murmur noted  ABDOMEN:  soft, nontender, no HSM or masses and bowel sounds normal  NEURO: Normal strength and tone, sensory exam grossly normal,  normal speech and mentation  SKIN: no suspicious lesions or rashes      Results for orders placed or performed in visit on 12/19/20   XR Chest 2 Views     Status: None    Narrative    XR CHEST 2 VW 12/19/2020 8:01 PM    HISTORY: Atypical chest pain    COMPARISON: 11/23/2019      Impression    IMPRESSION: Negative chest. No significant interval change.    ARTURO OSORIO MD   Results for orders placed or performed in visit on 12/19/20   CBC with platelets and differential     Status: Abnormal   Result Value Ref Range    WBC 4.8 4.0 - 11.0 10e9/L    RBC Count 6.05 (H) 4.4 - 5.9 10e12/L    Hemoglobin 17.1 13.3 - 17.7 g/dL    Hematocrit 51.4 40.0 - 53.0 %    MCV 85 78 - 100 fl    MCH 28.3 26.5 - 33.0 pg    MCHC 33.3 31.5 - 36.5 g/dL    RDW 14.4 10.0 - 15.0 %    Platelet Count 256 150 - 450 10e9/L    % Neutrophils 60.4 %    % Lymphocytes 25.1 %    % Monocytes 12.6 %    % Eosinophils 1.5 %    % Basophils 0.4 %    Absolute Neutrophil 2.9 1.6 - 8.3 10e9/L    Absolute Lymphocytes 1.2 0.8 - 5.3 10e9/L    Absolute Monocytes 0.6 0.0 - 1.3 10e9/L    Absolute Eosinophils 0.1 0.0 - 0.7 10e9/L    Absolute Basophils 0.0 0.0 - 0.2 10e9/L     Diff Method Automated Method      EKG: sinus rhythm, atrial enlargement changes since last EKG      ASSESSMENT:  (R07.89) Atypical chest pain  (primary encounter diagnosis)  Comment: improved with GI cocktail--likely GI in nature  Plan: XR Chest 2 Views, EKG 12-lead complete w/read -        Clinics, lidocaine (XYLOCAINE) 2 % 15 mL, alum         & mag hydroxide-simethicone (MAALOX) 15 mL GI         Cocktail, CBC with platelets and differential     Famotidine twice daily for 2 weeks. Follow up if worsening or not improving    (R94.31) Abnormal electrocardiogram  Comment: no evidence of acute event  Plan: Follow up with PCP regarding any additional cardiac testing she may feel is warranted    There are no Patient Instructions on file for this visit.

## 2021-01-01 ENCOUNTER — TELEPHONE (OUTPATIENT)
Dept: NEUROLOGY | Facility: CLINIC | Age: 26
End: 2021-01-01

## 2021-01-01 DIAGNOSIS — G50.0 TRIGEMINAL NEURALGIA: ICD-10-CM

## 2021-01-01 RX ORDER — OXCARBAZEPINE 150 MG/1
TABLET, FILM COATED ORAL
Qty: 120 TABLET | Refills: 3 | Status: SHIPPED | OUTPATIENT
Start: 2021-01-01 | End: 2021-05-04

## 2021-01-07 DIAGNOSIS — M79.2 NEUROPATHIC PAIN: ICD-10-CM

## 2021-01-07 RX ORDER — GABAPENTIN 300 MG/1
CAPSULE ORAL
Qty: 90 CAPSULE | Refills: 1 | Status: CANCELLED | OUTPATIENT
Start: 2021-01-07

## 2021-01-07 RX ORDER — GABAPENTIN 300 MG/1
CAPSULE ORAL
Qty: 120 CAPSULE | Refills: 9 | Status: SHIPPED | OUTPATIENT
Start: 2021-01-07 | End: 2022-06-22

## 2021-01-15 ENCOUNTER — MYC MEDICAL ADVICE (OUTPATIENT)
Dept: FAMILY MEDICINE | Facility: CLINIC | Age: 26
End: 2021-01-15

## 2021-01-15 DIAGNOSIS — R42 DIZZINESS: Primary | ICD-10-CM

## 2021-01-18 NOTE — TELEPHONE ENCOUNTER
Routing refill request to provider for review/approval because:  Medication is reported/historical    Last visit: 11/9/2020

## 2021-01-19 RX ORDER — MECLIZINE HYDROCHLORIDE 25 MG/1
25 TABLET ORAL 2 TIMES DAILY PRN
Qty: 20 TABLET | Refills: 0 | Status: SHIPPED | OUTPATIENT
Start: 2021-01-19 | End: 2024-05-09

## 2021-01-23 ENCOUNTER — MYC MEDICAL ADVICE (OUTPATIENT)
Dept: FAMILY MEDICINE | Facility: CLINIC | Age: 26
End: 2021-01-23

## 2021-01-25 NOTE — PROGRESS NOTES
MDO for dc planning    Pt was admitted via ED with reports of aggression toward wife. Neuro consulted. Per Dr Asad Mccall no further medical work up anticipated. CM spoke with Arin Waters [phone] from [address] [place] [address] unit re dc planning.     Per Per Dr. Rivera, patient to have 14 day Cardi-Net monitor placed.  Diagnosis: Palpitations  Monitor placed: Yes  Patient Instructed: Yes  Patient verbalized understanding: Yes  Holter # [id number]  Kit:[id number]

## 2021-04-22 ENCOUNTER — VIRTUAL VISIT (OUTPATIENT)
Dept: ALLERGY | Facility: CLINIC | Age: 26
End: 2021-04-22
Payer: COMMERCIAL

## 2021-04-22 VITALS — WEIGHT: 128.2 LBS | BODY MASS INDEX: 18.77 KG/M2

## 2021-04-22 DIAGNOSIS — J30.1 SEASONAL ALLERGIC RHINITIS DUE TO POLLEN: ICD-10-CM

## 2021-04-22 DIAGNOSIS — J30.81 ALLERGIC RHINITIS DUE TO ANIMALS: ICD-10-CM

## 2021-04-22 DIAGNOSIS — J45.30 MILD PERSISTENT ASTHMA WITHOUT COMPLICATION: Primary | ICD-10-CM

## 2021-04-22 DIAGNOSIS — J30.89 ALLERGIC RHINITIS DUE TO DUST MITE: ICD-10-CM

## 2021-04-22 PROCEDURE — 99214 OFFICE O/P EST MOD 30 MIN: CPT | Mod: 95 | Performed by: ALLERGY & IMMUNOLOGY

## 2021-04-22 RX ORDER — ALBUTEROL SULFATE 90 UG/1
2 AEROSOL, METERED RESPIRATORY (INHALATION) EVERY 4 HOURS PRN
Qty: 18 G | Refills: 1 | Status: SHIPPED | OUTPATIENT
Start: 2021-04-22

## 2021-04-22 RX ORDER — FLUTICASONE PROPIONATE 50 MCG
2 SPRAY, SUSPENSION (ML) NASAL DAILY
Qty: 16 G | Refills: 11 | Status: SHIPPED | OUTPATIENT
Start: 2021-04-22 | End: 2022-08-11

## 2021-04-22 RX ORDER — AZELASTINE 1 MG/ML
1-2 SPRAY, METERED NASAL 2 TIMES DAILY
Qty: 30 ML | Refills: 3 | Status: SHIPPED | OUTPATIENT
Start: 2021-04-22 | End: 2021-09-13

## 2021-04-22 ASSESSMENT — ASTHMA QUESTIONNAIRES
QUESTION_1 LAST FOUR WEEKS HOW MUCH OF THE TIME DID YOUR ASTHMA KEEP YOU FROM GETTING AS MUCH DONE AT WORK, SCHOOL OR AT HOME: SOME OF THE TIME
ACT_TOTALSCORE: 16
QUESTION_4 LAST FOUR WEEKS HOW OFTEN HAVE YOU USED YOUR RESCUE INHALER OR NEBULIZER MEDICATION (SUCH AS ALBUTEROL): TWO OR THREE TIMES PER WEEK
QUESTION_3 LAST FOUR WEEKS HOW OFTEN DID YOUR ASTHMA SYMPTOMS (WHEEZING, COUGHING, SHORTNESS OF BREATH, CHEST TIGHTNESS OR PAIN) WAKE YOU UP AT NIGHT OR EARLIER THAN USUAL IN THE MORNING: NOT AT ALL
QUESTION_2 LAST FOUR WEEKS HOW OFTEN HAVE YOU HAD SHORTNESS OF BREATH: THREE TO SIX TIMES A WEEK
QUESTION_5 LAST FOUR WEEKS HOW WOULD YOU RATE YOUR ASTHMA CONTROL: POORLY CONTROLLED

## 2021-04-22 NOTE — PROGRESS NOTES
"Manoj is a 25 year old who is being evaluated via a billable video visit.      How would you like to obtain your AVS? MyChart  If the video visit is dropped, the invitation should be resent by: Text to cell phone: [phone]  Will anyone else be joining your video visit? No      Manoj Castellanos was seen in the Allergy Clinic at [facility].      Manoj Castellanos is a 25 year old American adult who is seen today for follow-up of asthma and allergies.  Manoj was last seen in the allergy clinic in 2019.  Over this period of time Manoj has been doing well.  They report that in general her asthma has been well controlled.  They continue to take Flovent twice daily.  Once stopping the Flovent for a few days asthma symptoms have returned and they have needed to use her albuterol inhaler.  Over the last few months Manoj has begun exercising again.  Initially when exercising they were experiencing some shortness of breath and would take frequent breaks but were able to continue exercising for a period of time.  As the spring began they have had more and more difficulty and have not been able to recover after 3 to 5 minutes of cardiovascular exercise.  Manoj believes this is due to their allergy symptoms not being well controlled.  They describe the feeling as having's to take small \"fluttering\" breaths until things seem to \"unclench.\"  It feels as though the lungs cannot expand and are only able to get a small amount of air in and out.  Resting in different positions including bending over and raising arms above the head has not made things any easier.  They do not necessarily feel like there is anything stuck in their throat or the airways but rather that they just cannot get any more air into their lungs.  The symptoms are happening despite premedicating with albuterol prior to cardiovascular exercise.    Manoj continues to take 2 puffs of Flovent twice daily.  Recently they have increased their cetirizine dose to twice daily as " well.  They are not using any nasal sprays or eyedrops.  Previous allergy testing in 2018 was notable for sensitization to cat, dog, dust mite, and pollens.    Ty reports that they currently have some cold symptoms above and beyond what has been occurring with their allergies and asthma.  They are starting to feel better in the last 24 hours.      Past Medical History:   Diagnosis Date     Depressive disorder 2014    it s chill now tho I m doing fine     Uncomplicated asthma early 2000s     Family History   Problem Relation Age of Onset     Bipolar Disorder Mother      Substance Abuse Father      Asthma Father      Pancreatic Cancer Maternal Grandfather      Other Cancer Maternal Grandfather      Prostate Cancer Paternal Grandfather      Depression Other      Anxiety Disorder Other      Mental Illness Other      Depression Sister      Anxiety Disorder Sister      Attention Deficit Disorder Sister      Social History     Tobacco Use     Smoking status: Never Smoker     Smokeless tobacco: Never Used   Substance Use Topics     Alcohol use: Yes     Comment: infrequent at worst     Drug use: No     Social History     Social History Narrative     Not on file       Past medical, family, and social history were reviewed.    REVIEW OF SYSTEMS:  General: negative for weight gain. negative for weight loss. negative for changes in sleep.   Eyes: negative for itching. negative for redness. negative for tearing/watering. negative for vision changes  Ears: negative for fullness. negative for hearing loss. negative for dizziness.   Nose: negative for snoring.negative for changes in smell. positive  for drainage.   Throat: negative for hoarseness. negative for sore throat. negative for trouble swallowing.   Lungs: positive  for cough. negative for shortness of breath.negative for wheezing. negative for sputum production.   Cardiovascular: negative for chest pain. negative for swelling of ankles. negative for fast or irregular  heartbeat.   Gastrointestinal: negative for nausea. negative for heartburn. negative for acid reflux.   Musculoskeletal: negative for joint pain. negative for joint stiffness. negative for joint swelling.   Neurologic: negative for seizures. negative for fainting. negative for weakness.   Psychiatric: negative for changes in mood. negative for anxiety.   Endocrine: negative for cold intolerance. negative for heat intolerance. negative for tremors.   Hematologic: negative for easy bruising. negative for easy bleeding.  Integumentary: negative for rash. negative for scaling. negative for nail changes.       Current Outpatient Medications:      albuterol (PROAIR HFA/PROVENTIL HFA/VENTOLIN HFA) 108 (90 Base) MCG/ACT inhaler, Inhale 2 puffs into the lungs every 4 hours as needed for shortness of breath / dyspnea or wheezing, Disp: 1 Inhaler, Rfl: 2     cetirizine (ZYRTEC) 10 MG tablet, Take 1 tablet (10 mg) by mouth 2 times daily, Disp: 60 tablet, Rfl: 11     famotidine (PEPCID) 20 MG tablet, Take 20 mg by mouth as needed, Disp: , Rfl:      finasteride (PROSCAR) 5 MG tablet, TAKE 1/4 TABLET BY MOUTH ONCE DAILY, Disp: 90 tablet, Rfl: 1     FLOVENT  MCG/ACT inhaler, INHALE 2 PUFFS INTO THE LUNGS TWICE DAILY, Disp: 36 g, Rfl: 1     IBUPROFEN, , Disp: , Rfl:      meclizine (ANTIVERT) 25 MG tablet, Take 1 tablet (25 mg) by mouth 2 times daily as needed for dizziness . Appointment required for additional refills., Disp: 20 tablet, Rfl: 0     ondansetron (ZOFRAN-ODT) 4 MG ODT tab, Take 1 tablet (4 mg) by mouth every 8 hours as needed for nausea, Disp: 20 tablet, Rfl: 0     OXcarbazepine (TRILEPTAL) 150 MG tablet, Take two tablets (300 mg ) two times daily, Disp: 120 tablet, Rfl: 3     vitamin D3 (CHOLECALCIFEROL) 50 mcg (2000 units) tablet, Take 1 tablet (50 mcg) by mouth daily, Disp: 90 tablet, Rfl: 3     fluocinonide (LIDEX) 0.05 % cream, Apply topically 2 times daily, Disp: , Rfl:      gabapentin (NEURONTIN) 100 MG  capsule, Take 1 capsule (100 mg) by mouth 3 times daily, Disp: 180 capsule, Rfl: 3     gabapentin (NEURONTIN) 300 MG capsule, TAKE 1 CAPSULE BY MOUTH THREE TIMES DAILY, Disp: 120 capsule, Rfl: 9     minoxidil 5 % SOLN, Externally apply topically 2 times daily, Disp: , Rfl:      omeprazole (PRILOSEC OTC) 20 MG EC tablet, Take 1 tablet (20 mg) by mouth daily, Disp: 30 tablet, Rfl: 0     order for DME, Equipment being ordered: SAD light, Disp: 1 Units, Rfl: 0  Allergies   Allergen Reactions     Amoxicillin Swelling     Lip swelling      Cefuroxime      Swelling      Minocycline Other (See Comments)     Throat felt like it was on fire       EXAM:   Wt 58.2 kg (128 lb 3.2 oz)   BMI 18.77 kg/m    GENERAL APPEARANCE: alert, cooperative and not in distress  SKIN: no rashes, no lesions  HEAD: atraumatic, normocephalic  EYES: lids and lashes normal, EOM full and intact  ENT: normal external ears and nose, no nasal drainage  NECK: no asymmetry, masses, or scars  LUNGS: unlabored respirations, no accessory muscle use, speaking comfortably in full sentences, occasional cough, no audible wheezing  MUSCULOSKELETAL: no musculoskeletal defects are noted  NEURO: no focal deficits noted  PSYCH: does not appear depressed or anxious      WORKUP:  None    ASSESSMENT/PLAN:  Manoj Castellanos is a 25 year old adult seen for follow-up of asthma and allergies.    1. Mild persistent asthma without complication - Manoj reports worsening exercise intolerance over the last 1-2 months. Initially this was attributed to deconditioning however they now feel that the symptoms are worsening due to poorly controlled allergies. We discussed that while the current symptoms may be due to underlying asthma and allergies, additional considerations include vocal cord dysfunction. We discussed step-up therapy to ICS/LABA vs speech therapy/vocal cord exercises vs more aggressive management of allergic rhinitis. As Manoj's asthma is well controlled outside of exercise we  decided to pursue other treatment options before adjusting maintenance medications.    - continue Flovent 110mcg, 2 puffs twice daily  - albuterol (PROAIR HFA/PROVENTIL HFA/VENTOLIN HFA) 108 (90 Base) MCG/ACT inhaler; Inhale 2 puffs into the lungs every 4 hours as needed for shortness of breath / dyspnea or wheezing  Dispense: 18 g; Refill: 1  - additional resources provided for exercises to manage vocal cord dysfunction    2. Seasonal allergic rhinitis due to pollen - Ty reports that their allergy symptoms have been more noticeable this year. They have increased their cetirizine dose to twice daily. We discussed adding intranasal medications as well as pursuing allergen immunotherapy treatment.    - continue taking 10mg of cetirizine twice daily  - fluticasone (FLONASE) 50 MCG/ACT nasal spray; Spray 2 sprays into both nostrils daily  Dispense: 16 g; Refill: 11  - azelastine (ASTELIN) 0.1 % nasal spray; Spray 1-2 sprays into both nostrils 2 times daily  Dispense: 30 mL; Refill: 3  - recommend consideration of allergen immunotherapy treatment for persistent symptoms    3. Allergic rhinitis due to animals - see above    - fluticasone (FLONASE) 50 MCG/ACT nasal spray; Spray 2 sprays into both nostrils daily  Dispense: 16 g; Refill: 11  - azelastine (ASTELIN) 0.1 % nasal spray; Spray 1-2 sprays into both nostrils 2 times daily  Dispense: 30 mL; Refill: 3    4. Allergic rhinitis due to dust mite - see above    - fluticasone (FLONASE) 50 MCG/ACT nasal spray; Spray 2 sprays into both nostrils daily  Dispense: 16 g; Refill: 11  - azelastine (ASTELIN) 0.1 % nasal spray; Spray 1-2 sprays into both nostrils 2 times daily  Dispense: 30 mL; Refill: 3      Follow-up in 4 weeks, sooner if needed      Thank you for allowing me to participate in the care of Manoj Castellanos.      Adriana Fraser MD, FAAAAI  Allergy/Immunology  [facility]      Chart documentation  done in part with Dragon Voice Recognition Software. Although reviewed after completion, some word and grammatical errors may remain.      Video Start Time: 2:58 PM    Video-Visit Details    Type of service:  Video Visit    Video End Time: 3:17 PM    Originating Location (pt. Location): Home    Distant Location (provider location):  [facility]     Platform used for Video Visit: [employer]

## 2021-04-22 NOTE — PATIENT INSTRUCTIONS
If you have any questions regarding your allergies, asthma, or what we discussed during your visit today please call the allergy clinic or contact us via [email or web address].    [facility] Allergy RN Line: [phone]  [facility] Scheduling Line: [phone]  [facility] Scheduling Line: [phone]    Clinic Schedule:   [place] - Monday, Tuesday, and Thursday  [facility] - Wednesday      Exercises for vocal cord dysfunction:    [email or web address]      Follow-up in 4 weeks - sooner if no improvement

## 2021-04-22 NOTE — LETTER
"    4/22/2021         RE: Manoj Castellanos  [address]  [place] [address]        Dear Colleague,    Thank you for referring your patient, Manoj Castellanos, to the [facility]. Please see a copy of my visit note below.    Manoj is a 25 year old who is being evaluated via a billable video visit.      How would you like to obtain your AVS? MyChart  If the video visit is dropped, the invitation should be resent by: Text to cell phone: [phone]  Will anyone else be joining your video visit? No      Manoj Castellanos was seen in the Allergy Clinic at [facility].      Manoj Castellanos is a 25 year old American adult who is seen today for follow-up of asthma and allergies.  Manoj was last seen in the allergy clinic in 2019.  Over this period of time Manoj has been doing well.  They report that in general her asthma has been well controlled.  They continue to take Flovent twice daily.  Once stopping the Flovent for a few days asthma symptoms have returned and they have needed to use her albuterol inhaler.  Over the last few months Manoj has begun exercising again.  Initially when exercising they were experiencing some shortness of breath and would take frequent breaks but were able to continue exercising for a period of time.  As the spring began they have had more and more difficulty and have not been able to recover after 3 to 5 minutes of cardiovascular exercise.  Manoj believes this is due to their allergy symptoms not being well controlled.  They describe the feeling as having's to take small \"fluttering\" breaths until things seem to \"unclench.\"  It feels as though the lungs cannot expand and are only able to get a small amount of air in and out.  Resting in different positions including bending over and raising arms above the head has not made things any easier.  They do not necessarily feel like there is anything stuck in their throat or the airways but rather that they just cannot get any more air into " their lungs.  The symptoms are happening despite premedicating with albuterol prior to cardiovascular exercise.    Ty continues to take 2 puffs of Flovent twice daily.  Recently they have increased their cetirizine dose to twice daily as well.  They are not using any nasal sprays or eyedrops.  Previous allergy testing in 2018 was notable for sensitization to cat, dog, dust mite, and pollens.    Ty reports that they currently have some cold symptoms above and beyond what has been occurring with their allergies and asthma.  They are starting to feel better in the last 24 hours.      Past Medical History:   Diagnosis Date     Depressive disorder 2014    it s chill now tho I m doing fine     Uncomplicated asthma early 2000s     Family History   Problem Relation Age of Onset     Bipolar Disorder Mother      Substance Abuse Father      Asthma Father      Pancreatic Cancer Maternal Grandfather      Other Cancer Maternal Grandfather      Prostate Cancer Paternal Grandfather      Depression Other      Anxiety Disorder Other      Mental Illness Other      Depression Sister      Anxiety Disorder Sister      Attention Deficit Disorder Sister      Social History     Tobacco Use     Smoking status: Never Smoker     Smokeless tobacco: Never Used   Substance Use Topics     Alcohol use: Yes     Comment: infrequent at worst     Drug use: No     Social History     Social History Narrative     Not on file       Past medical, family, and social history were reviewed.    REVIEW OF SYSTEMS:  General: negative for weight gain. negative for weight loss. negative for changes in sleep.   Eyes: negative for itching. negative for redness. negative for tearing/watering. negative for vision changes  Ears: negative for fullness. negative for hearing loss. negative for dizziness.   Nose: negative for snoring.negative for changes in smell. positive  for drainage.   Throat: negative for hoarseness. negative for sore throat. negative for trouble  swallowing.   Lungs: positive  for cough. negative for shortness of breath.negative for wheezing. negative for sputum production.   Cardiovascular: negative for chest pain. negative for swelling of ankles. negative for fast or irregular heartbeat.   Gastrointestinal: negative for nausea. negative for heartburn. negative for acid reflux.   Musculoskeletal: negative for joint pain. negative for joint stiffness. negative for joint swelling.   Neurologic: negative for seizures. negative for fainting. negative for weakness.   Psychiatric: negative for changes in mood. negative for anxiety.   Endocrine: negative for cold intolerance. negative for heat intolerance. negative for tremors.   Hematologic: negative for easy bruising. negative for easy bleeding.  Integumentary: negative for rash. negative for scaling. negative for nail changes.       Current Outpatient Medications:      albuterol (PROAIR HFA/PROVENTIL HFA/VENTOLIN HFA) 108 (90 Base) MCG/ACT inhaler, Inhale 2 puffs into the lungs every 4 hours as needed for shortness of breath / dyspnea or wheezing, Disp: 1 Inhaler, Rfl: 2     cetirizine (ZYRTEC) 10 MG tablet, Take 1 tablet (10 mg) by mouth 2 times daily, Disp: 60 tablet, Rfl: 11     famotidine (PEPCID) 20 MG tablet, Take 20 mg by mouth as needed, Disp: , Rfl:      finasteride (PROSCAR) 5 MG tablet, TAKE 1/4 TABLET BY MOUTH ONCE DAILY, Disp: 90 tablet, Rfl: 1     FLOVENT  MCG/ACT inhaler, INHALE 2 PUFFS INTO THE LUNGS TWICE DAILY, Disp: 36 g, Rfl: 1     IBUPROFEN, , Disp: , Rfl:      meclizine (ANTIVERT) 25 MG tablet, Take 1 tablet (25 mg) by mouth 2 times daily as needed for dizziness . Appointment required for additional refills., Disp: 20 tablet, Rfl: 0     ondansetron (ZOFRAN-ODT) 4 MG ODT tab, Take 1 tablet (4 mg) by mouth every 8 hours as needed for nausea, Disp: 20 tablet, Rfl: 0     OXcarbazepine (TRILEPTAL) 150 MG tablet, Take two tablets (300 mg ) two times daily, Disp: 120 tablet, Rfl: 3      vitamin D3 (CHOLECALCIFEROL) 50 mcg (2000 units) tablet, Take 1 tablet (50 mcg) by mouth daily, Disp: 90 tablet, Rfl: 3     fluocinonide (LIDEX) 0.05 % cream, Apply topically 2 times daily, Disp: , Rfl:      gabapentin (NEURONTIN) 100 MG capsule, Take 1 capsule (100 mg) by mouth 3 times daily, Disp: 180 capsule, Rfl: 3     gabapentin (NEURONTIN) 300 MG capsule, TAKE 1 CAPSULE BY MOUTH THREE TIMES DAILY, Disp: 120 capsule, Rfl: 9     minoxidil 5 % SOLN, Externally apply topically 2 times daily, Disp: , Rfl:      omeprazole (PRILOSEC OTC) 20 MG EC tablet, Take 1 tablet (20 mg) by mouth daily, Disp: 30 tablet, Rfl: 0     order for DME, Equipment being ordered: SAD light, Disp: 1 Units, Rfl: 0  Allergies   Allergen Reactions     Amoxicillin Swelling     Lip swelling      Cefuroxime      Swelling      Minocycline Other (See Comments)     Throat felt like it was on fire       EXAM:   Wt 58.2 kg (128 lb 3.2 oz)   BMI 18.77 kg/m    GENERAL APPEARANCE: alert, cooperative and not in distress  SKIN: no rashes, no lesions  HEAD: atraumatic, normocephalic  EYES: lids and lashes normal, EOM full and intact  ENT: normal external ears and nose, no nasal drainage  NECK: no asymmetry, masses, or scars  LUNGS: unlabored respirations, no accessory muscle use, speaking comfortably in full sentences, occasional cough, no audible wheezing  MUSCULOSKELETAL: no musculoskeletal defects are noted  NEURO: no focal deficits noted  PSYCH: does not appear depressed or anxious      WORKUP:  None    ASSESSMENT/PLAN:  Manoj Castellanos is a 25 year old adult seen for follow-up of asthma and allergies.    1. Mild persistent asthma without complication - Manoj reports worsening exercise intolerance over the last 1-2 months. Initially this was attributed to deconditioning however they now feel that the symptoms are worsening due to poorly controlled allergies. We discussed that while the current symptoms may be due to underlying asthma and allergies, additional  considerations include vocal cord dysfunction. We discussed step-up therapy to ICS/LABA vs speech therapy/vocal cord exercises vs more aggressive management of allergic rhinitis. As Ty's asthma is well controlled outside of exercise we decided to pursue other treatment options before adjusting maintenance medications.    - continue Flovent 110mcg, 2 puffs twice daily  - albuterol (PROAIR HFA/PROVENTIL HFA/VENTOLIN HFA) 108 (90 Base) MCG/ACT inhaler; Inhale 2 puffs into the lungs every 4 hours as needed for shortness of breath / dyspnea or wheezing  Dispense: 18 g; Refill: 1  - additional resources provided for exercises to manage vocal cord dysfunction    2. Seasonal allergic rhinitis due to pollen - Ty reports that their allergy symptoms have been more noticeable this year. They have increased their cetirizine dose to twice daily. We discussed adding intranasal medications as well as pursuing allergen immunotherapy treatment.    - continue taking 10mg of cetirizine twice daily  - fluticasone (FLONASE) 50 MCG/ACT nasal spray; Spray 2 sprays into both nostrils daily  Dispense: 16 g; Refill: 11  - azelastine (ASTELIN) 0.1 % nasal spray; Spray 1-2 sprays into both nostrils 2 times daily  Dispense: 30 mL; Refill: 3  - recommend consideration of allergen immunotherapy treatment for persistent symptoms    3. Allergic rhinitis due to animals - see above    - fluticasone (FLONASE) 50 MCG/ACT nasal spray; Spray 2 sprays into both nostrils daily  Dispense: 16 g; Refill: 11  - azelastine (ASTELIN) 0.1 % nasal spray; Spray 1-2 sprays into both nostrils 2 times daily  Dispense: 30 mL; Refill: 3    4. Allergic rhinitis due to dust mite - see above    - fluticasone (FLONASE) 50 MCG/ACT nasal spray; Spray 2 sprays into both nostrils daily  Dispense: 16 g; Refill: 11  - azelastine (ASTELIN) 0.1 % nasal spray; Spray 1-2 sprays into both nostrils 2 times daily  Dispense: 30 mL; Refill: 3      Follow-up in 4 weeks, sooner if  needed      Thank you for allowing me to participate in the care of Manoj Castellanos.      Adriana Fraser MD, FAAAAI  Allergy/Immunology  [facility]      Chart documentation done in part with Dragon Voice Recognition Software. Although reviewed after completion, some word and grammatical errors may remain.      Video Start Time: 2:58 PM    Video-Visit Details    Type of service:  Video Visit    Video End Time: 3:17 PM    Originating Location (pt. Location): Home    Distant Location (provider location):  [facility]     Platform used for Video Visit: Naa          Again, thank you for allowing me to participate in the care of your patient.        Sincerely,        Adriana Fraser MD

## 2021-04-23 ASSESSMENT — ASTHMA QUESTIONNAIRES: ACT_TOTALSCORE: 16

## 2021-04-26 ENCOUNTER — TELEPHONE (OUTPATIENT)
Dept: ALLERGY | Facility: CLINIC | Age: 26
End: 2021-04-26

## 2021-04-26 DIAGNOSIS — J30.89 ALLERGIC RHINITIS DUE TO DUST MITE: ICD-10-CM

## 2021-04-26 DIAGNOSIS — J30.1 CHRONIC SEASONAL ALLERGIC RHINITIS DUE TO POLLEN: ICD-10-CM

## 2021-04-26 DIAGNOSIS — J30.81 CHRONIC ALLERGIC RHINITIS DUE TO ANIMAL HAIR AND DANDER: ICD-10-CM

## 2021-04-26 RX ORDER — CETIRIZINE HYDROCHLORIDE 10 MG/1
10 TABLET ORAL 2 TIMES DAILY
Qty: 60 TABLET | Refills: 5 | Status: SHIPPED | OUTPATIENT
Start: 2021-04-26 | End: 2021-12-02

## 2021-04-26 NOTE — TELEPHONE ENCOUNTER
"Requested Prescriptions   Signed Prescriptions Disp Refills    cetirizine (ZYRTEC) 10 MG tablet 60 tablet 5     Sig: Take 1 tablet (10 mg) by mouth 2 times daily       Antihistamines Protocol Passed - 4/26/2021  2:02 PM        Passed - Patient is 3-64 years of age     Apply weight-based dosing for peds patients age 3 - 12 years of age.    Forward request to provider for patients under the age of 3 or over the age of 64.          Passed - Recent (12 mo) or future (30 days) visit within the authorizing provider's specialty     Patient has had an office visit with the authorizing provider or a provider within the authorizing providers department within the previous 12 mos or has a future within next 30 days. See \"Patient Info\" tab in inbasket, or \"Choose Columns\" in Meds & Orders section of the refill encounter.              Passed - Medication is active on med list           Medication filled per [facility] protocol.     Roxanne Garibay RN  "

## 2021-04-28 NOTE — TELEPHONE ENCOUNTER
Please submit prior authorization with patient's insurance for cetirizine 10 mg twice daily.    Carla DAVID MA

## 2021-04-28 NOTE — TELEPHONE ENCOUNTER
No pa needed- The pharmacy needs to call the pharmacy help desk to get an override for 2 tablets per day. Informed the pharmacy staff.

## 2021-05-04 DIAGNOSIS — G50.0 TRIGEMINAL NEURALGIA: ICD-10-CM

## 2021-05-04 RX ORDER — OXCARBAZEPINE 150 MG/1
TABLET, FILM COATED ORAL
Qty: 120 TABLET | Refills: 3 | Status: SHIPPED | OUTPATIENT
Start: 2021-05-04 | End: 2021-09-05

## 2021-05-04 NOTE — TELEPHONE ENCOUNTER
Rx Authorization:    Requested Medication/ Dose OXcarbazepine (TRILEPTAL) 150 MG tablet    Date last refill ordered: 1/1/21    Quantity ordered: 120    # refills: 3    Date of last clinic visit with ordering provider: 7/17/20    Date of next clinic visit with ordering provider:     All pertinent protocol data (lab date/result):     Include pertinent information from patients message:

## 2021-07-16 DIAGNOSIS — J45.30 MILD PERSISTENT ASTHMA IN ADULT WITHOUT COMPLICATION: ICD-10-CM

## 2021-07-21 RX ORDER — DEXAMETHASONE 4 MG/1
TABLET ORAL
Qty: 36 G | Refills: 0 | Status: SHIPPED | OUTPATIENT
Start: 2021-07-21 | End: 2021-10-25

## 2021-07-21 NOTE — TELEPHONE ENCOUNTER
1 refill approved is due in for a follow up visit and to establish care with a new provider. Please call and schedule.    Thank you

## 2021-09-05 ENCOUNTER — TELEPHONE (OUTPATIENT)
Dept: NEUROLOGY | Facility: CLINIC | Age: 26
End: 2021-09-05

## 2021-09-05 DIAGNOSIS — G50.0 TRIGEMINAL NEURALGIA: ICD-10-CM

## 2021-09-05 RX ORDER — OXCARBAZEPINE 150 MG/1
TABLET, FILM COATED ORAL
Qty: 120 TABLET | Refills: 3 | Status: SHIPPED | OUTPATIENT
Start: 2021-09-05 | End: 2022-01-04

## 2021-09-05 NOTE — CONFIDENTIAL NOTE
Patient calls stating he's out of [facility] for [place]. I will do a refill x 1 month but he does need to follow up with Dr. Andrade.     Jane Montemayor DO

## 2021-09-10 DIAGNOSIS — J30.81 ALLERGIC RHINITIS DUE TO ANIMALS: ICD-10-CM

## 2021-09-10 DIAGNOSIS — J30.89 ALLERGIC RHINITIS DUE TO DUST MITE: ICD-10-CM

## 2021-09-10 DIAGNOSIS — J30.1 SEASONAL ALLERGIC RHINITIS DUE TO POLLEN: ICD-10-CM

## 2021-09-13 ENCOUNTER — TRANSFERRED RECORDS (OUTPATIENT)
Dept: HEALTH INFORMATION MANAGEMENT | Facility: CLINIC | Age: 26
End: 2021-09-13

## 2021-09-13 LAB
CREATININE (EXTERNAL): 0.83 MG/DL (ref 0.72–1.25)
GFR ESTIMATED (EXTERNAL): >60 ML/MIN/1.73M2
GFR ESTIMATED (IF AFRICAN AMERICAN) (EXTERNAL): >60 ML/MIN/1.73M2
GLUCOSE (EXTERNAL): 125 MG/DL (ref 65–100)
POTASSIUM (EXTERNAL): 3.5 MMOL/L (ref 3.5–5)

## 2021-09-13 RX ORDER — AZELASTINE 1 MG/ML
1-2 SPRAY, METERED NASAL 2 TIMES DAILY
Qty: 30 ML | Refills: 0 | Status: SHIPPED | OUTPATIENT
Start: 2021-09-13 | End: 2023-03-24

## 2021-09-13 NOTE — TELEPHONE ENCOUNTER
"RN refilled medication per [facility] Refill Protocol.     Gabbie TANNER RN    LOV: 04-, advised patient needs to be seen       Requested Prescriptions   Pending Prescriptions Disp Refills     azelastine (ASTELIN) 0.1 % nasal spray 30 mL 3     Sig: Spray 1-2 sprays into both nostrils 2 times daily       Antihistamines Protocol Passed - 9/10/2021  4:31 PM        Passed - Patient is 3-64 years of age     Apply weight-based dosing for peds patients age 3 - 12 years of age.    Forward request to provider for patients under the age of 3 or over the age of 64.          Passed - Recent (12 mo) or future (30 days) visit within the authorizing provider's specialty     Patient has had an office visit with the authorizing provider or a provider within the authorizing providers department within the previous 12 mos or has a future within next 30 days. See \"Patient Info\" tab in inbasket, or \"Choose Columns\" in Meds & Orders section of the refill encounter.              Passed - Medication is active on med list       Nasal Allergy Protocol Passed - 9/10/2021  4:31 PM        Passed - Patient is age 12 or older        Passed - Recent (12 mo) or future (30 days) visit within the authorizing provider's specialty     Patient has had an office visit with the authorizing provider or a provider within the authorizing providers department within the previous 12 mos or has a future within next 30 days. See \"Patient Info\" tab in inbasket, or \"Choose Columns\" in Meds & Orders section of the refill encounter.              Passed - Medication is active on med list             "

## 2021-09-26 ENCOUNTER — HEALTH MAINTENANCE LETTER (OUTPATIENT)
Age: 26
End: 2021-09-26

## 2021-10-21 DIAGNOSIS — J45.30 MILD PERSISTENT ASTHMA IN ADULT WITHOUT COMPLICATION: ICD-10-CM

## 2021-10-22 NOTE — TELEPHONE ENCOUNTER
Routing refill request to provider for review/approval because:  ACT less than 20    ACT Total Scores 4/22/2021   ACT TOTAL SCORE (Goal Greater than or Equal to 20) 16   In the past 12 months, how many times did you visit the emergency room for your asthma without being admitted to the hospital? 0   In the past 12 months, how many times were you hospitalized overnight because of your asthma? 0     JOSI Vo RN  [facility]

## 2021-10-25 RX ORDER — DEXAMETHASONE 4 MG/1
TABLET ORAL
Qty: 36 G | Refills: 0 | Status: SHIPPED | OUTPATIENT
Start: 2021-10-25 | End: 2022-01-21

## 2021-11-05 ENCOUNTER — OFFICE VISIT (OUTPATIENT)
Dept: URGENT CARE | Facility: URGENT CARE | Age: 26
End: 2021-11-05
Payer: COMMERCIAL

## 2021-11-05 VITALS
RESPIRATION RATE: 20 BRPM | HEART RATE: 112 BPM | SYSTOLIC BLOOD PRESSURE: 136 MMHG | DIASTOLIC BLOOD PRESSURE: 90 MMHG | TEMPERATURE: 98.6 F | WEIGHT: 127 LBS | OXYGEN SATURATION: 96 % | BODY MASS INDEX: 18.81 KG/M2 | HEIGHT: 69 IN

## 2021-11-05 DIAGNOSIS — R10.32 ABDOMINAL PAIN, LEFT LOWER QUADRANT: Primary | ICD-10-CM

## 2021-11-05 LAB
BASOPHILS # BLD AUTO: 0 10E3/UL (ref 0–0.2)
BASOPHILS NFR BLD AUTO: 1 %
EOSINOPHIL # BLD AUTO: 0.1 10E3/UL (ref 0–0.7)
EOSINOPHIL NFR BLD AUTO: 1 %
ERYTHROCYTE [DISTWIDTH] IN BLOOD BY AUTOMATED COUNT: 13 % (ref 10–15)
HCT VFR BLD AUTO: 51 % (ref 35–53)
HGB BLD-MCNC: 17 G/DL (ref 11.7–17.7)
IMM GRANULOCYTES # BLD: 0 10E3/UL
IMM GRANULOCYTES NFR BLD: 1 %
LYMPHOCYTES # BLD AUTO: 1 10E3/UL (ref 0.8–5.3)
LYMPHOCYTES NFR BLD AUTO: 21 %
MCH RBC QN AUTO: 28.2 PG (ref 26.5–33)
MCHC RBC AUTO-ENTMCNC: 33.3 G/DL (ref 31.5–36.5)
MCV RBC AUTO: 85 FL (ref 78–100)
MONOCYTES # BLD AUTO: 0.4 10E3/UL (ref 0–1.3)
MONOCYTES NFR BLD AUTO: 9 %
NEUTROPHILS # BLD AUTO: 3.2 10E3/UL (ref 1.6–8.3)
NEUTROPHILS NFR BLD AUTO: 68 %
PLATELET # BLD AUTO: 240 10E3/UL (ref 150–450)
RBC # BLD AUTO: 6.03 10E6/UL (ref 3.8–5.9)
WBC # BLD AUTO: 4.8 10E3/UL (ref 4–11)

## 2021-11-05 PROCEDURE — 36415 COLL VENOUS BLD VENIPUNCTURE: CPT | Performed by: FAMILY MEDICINE

## 2021-11-05 PROCEDURE — 85025 COMPLETE CBC W/AUTO DIFF WBC: CPT | Performed by: FAMILY MEDICINE

## 2021-11-05 PROCEDURE — 99213 OFFICE O/P EST LOW 20 MIN: CPT | Performed by: FAMILY MEDICINE

## 2021-11-05 ASSESSMENT — MIFFLIN-ST. JEOR: SCORE: 1556.45

## 2021-11-05 NOTE — PROGRESS NOTES
"  Assessment & Plan     Abdominal pain, left lower quadrant    - CBC with platelets and differential; Future  - CBC with platelets and differential    Reassured with normal CBC and exam today.  Suspect possible gas pain or musculoskeletal strain.  With normal CBC- less likely acute diverticulitis or an atypical presentation of appendicitis.  Recommend increased fluids, blander diet.  Close Follow-up if any change or worsening sx.    Sunshine Barr MD  [facility]    Subjective   Ty is a 26 year old who presents for the following health issues     HPI     Notes intermittent stomach pain in LLQ just lateral to umbilicus.  Has not noticed a pattern but states has been when hungry-- and improves when eats.  Appetite stable.  No fever, no nausea/vomiting.  Normal BMs- no melena, no BRBPR.  No constipation.  No dysuria.      Review of Systems   Constitutional, HEENT, cardiovascular, pulmonary, GI, , musculoskeletal, neuro, skin, endocrine and psych systems are negative, except as otherwise noted.      Objective    BP (!) 136/90   Pulse 112   Temp 98.6  F (37  C) (Oral)   Resp 20   Ht 1.753 m (5' 9\")   Wt 57.6 kg (127 lb)   SpO2 96%   BMI 18.75 kg/m    Body mass index is 18.75 kg/m .  Physical Exam   GENERAL: healthy, alert and no distress  EYES: Eyes grossly normal to inspection, PERRL and conjunctivae and sclerae normal  ABDOMEN: soft, nontender, no hepatosplenomegaly, no masses and bowel sounds normal  MS: no gross musculoskeletal defects noted, no edema  PSYCH: mentation appears normal, affect normal/bright    Results for orders placed or performed in visit on 11/05/21 (from the past 24 hour(s))   CBC with platelets and differential    Narrative    The following orders were created for panel order CBC with platelets and differential.  Procedure                               Abnormality         Status                     ---------                               -----------  "        ------                     CBC with platelets and d...[[id number]]  Abnormal            Final result                 Please view results for these tests on the individual orders.   CBC with platelets and differential   Result Value Ref Range    WBC Count 4.8 4.0 - 11.0 10e3/uL    RBC Count 6.03 (H) 3.80 - 5.90 10e6/uL    Hemoglobin 17.0 11.7 - 17.7 g/dL    Hematocrit 51.0 35.0 - 53.0 %    MCV 85 78 - 100 fL    MCH 28.2 26.5 - 33.0 pg    MCHC 33.3 31.5 - 36.5 g/dL    RDW 13.0 10.0 - 15.0 %    Platelet Count 240 150 - 450 10e3/uL    % Neutrophils 68 %    % Lymphocytes 21 %    % Monocytes 9 %    % Eosinophils 1 %    % Basophils 1 %    % Immature Granulocytes 1 %    Absolute Neutrophils 3.2 1.6 - 8.3 10e3/uL    Absolute Lymphocytes 1.0 0.8 - 5.3 10e3/uL    Absolute Monocytes 0.4 0.0 - 1.3 10e3/uL    Absolute Eosinophils 0.1 0.0 - 0.7 10e3/uL    Absolute Basophils 0.0 0.0 - 0.2 10e3/uL    Absolute Immature Granulocytes 0.0 <=0.0 10e3/uL    Narrative    The sex of this patient cannot be reliably determined based on discrepancies in demographics (legal sex, sex assigned at birth, gender identity).  Both male and female reference ranges are provided where applicable.  Careful evaluation of the patient s results as compared to the gender specific reference intervals is required in this setting.

## 2021-11-26 ENCOUNTER — MYC MEDICAL ADVICE (OUTPATIENT)
Dept: FAMILY MEDICINE | Facility: CLINIC | Age: 26
End: 2021-11-26
Payer: COMMERCIAL

## 2021-11-29 ENCOUNTER — MYC MEDICAL ADVICE (OUTPATIENT)
Dept: FAMILY MEDICINE | Facility: CLINIC | Age: 26
End: 2021-11-29
Payer: COMMERCIAL

## 2021-11-30 ENCOUNTER — NURSE TRIAGE (OUTPATIENT)
Dept: FAMILY MEDICINE | Facility: CLINIC | Age: 26
End: 2021-11-30
Payer: COMMERCIAL

## 2021-11-30 NOTE — TELEPHONE ENCOUNTER
See RN response to patient's mychart message re:local reaction to vaccine and now c/o fatigue and slurring of words. This RN will call him on phone    KEIRY PringleN RN  [facility]

## 2021-11-30 NOTE — TELEPHONE ENCOUNTER
See 11/30/21 kymberly as pt wrote again complaining of fatigue      This encounter will be closed    Mayra Caputo RN

## 2021-11-30 NOTE — TELEPHONE ENCOUNTER
Pt wrote mychart on 11/26/21 for c/o likely local reaction to covid vaccine  Pt had booster with Pfizer on 11/22/21    Then on 11/29 he wrote mychart that he had some slurred speech and fatigue. Pt wrote back speech ok now and swelling down in arm    Attempted to reach to more carefully triage, unable. Left message  to call back but clinic now closed for incoming calls    Message wrote back to pt on mychart to initiate a virtual visit for these sx .      Mayra Caputo RN

## 2021-11-30 NOTE — TELEPHONE ENCOUNTER
Attempted to reach by phone unable. Left message  to call back.    See last mychart message written to pt      Mayra Caputo RN

## 2021-12-01 NOTE — PROGRESS NOTES
Manoj is a 26 year old who is being evaluated via a billable video visit.      How would you like to obtain your AVS?   Video Start Time: 12:03    Assessment & Plan     Side effects of vaccination, initial encounter  Recently received his mother booster COVID vaccination and was having significant fatigue, malaise and on Wednesday had a 4-hour period in which he was so tired that he noticed his speech was slurred.  The fatigue has greatly improved, the malaise has resolved and he did not have any further episodes during which his speech was slurred.  His arm was also initially quite swollen and that resolved.  He does report that he has some level of fatigue on a chronic basis.     Reassurance given.    Chronic allergic rhinitis due to animal hair and dander  He ran out of cetirizine sometime back and would like a refill.  Discussed sometimes allergies can also contribute to a sense of fatigue and I did send refills of his cetirizine.  - cetirizine (ZYRTEC) 10 MG tablet; Take 1 tablet (10 mg) by mouth 2 times daily    Fatigue  He reports that he always has some level of fatigue which he acknowledges may be related to depression and inactivity.  Discussed considering follow-up with his PCP if fatigue persists.               No follow-ups on file.    Roxanne Mcclellan MD, MD  [facility]    Subjective   Manoj is a 26 year old who presents for the following health issues      History of Present Illness       Manoj Castellanos eats 0-1 servings of fruits and vegetables daily.Manoj Castellanos consumes 0 sweetened beverage(s) daily.Manoj Castellanos exercises with enough effort to increase Manoj Castellanos's heart rate 10 to 19 minutes per day.  Manoj Castellanos exercises with enough effort to increase Manoj Castellanos's heart rate 3 or less days per week.   Manoj Castellanos is taking medications regularly.  He reports that he had significant fatigue, malaise and    Arm swelling following his mother and a Covid booster.  He notes that he had 4 days of  similar symptoms when he had second maternal dose.  This time his symptoms lasted for 7 days and now he is noticing improvements.  Specifically, the malaise has resolved, the swelling has resolved and the fatigue is much better.  He reports that he always has some level of fatigue, which he thinks is likely related to depression and inactivity.  He did have an episode in which he was so tired that he felt his speech was slurred for about a 4-hour period of time soon after he received the vaccine, but that resolved and has not returned.  He denies any new weakness, numbness, tingling, vision changes, confusion or recurrent speech changes..         Answers for HPI/ROS submitted by the patient on 12/2/2021  General Symptoms: Yes  Skin Symptoms: Yes  HENT Symptoms: Yes  EYE SYMPTOMS: No  HEART SYMPTOMS: Yes  LUNG SYMPTOMS: No  INTESTINAL SYMPTOMS: Yes  URINARY SYMPTOMS: No  GYNECOLOGIC SYMPTOMS: No  REPRODUCTIVE SYMPTOMS: No  BREAST SYMPTOMS: No  SKELETAL SYMPTOMS: No  BLOOD SYMPTOMS: No  NERVOUS SYSTEM SYMPTOMS: Yes  MENTAL HEALTH SYMPTOMS: Yes  Congestion: Yes   Voice hoarseness: No  Tooth pain: No  Gum tenderness: No  Bleeding gums: No  Change in taste: No  Change in sense of smell: No  Dry mouth: No  Hearing aid used: No  Neck lump: No  Loss of appetite: No  Weight loss: No  Weight gain: No  Night sweats: No  Increased stress: Yes  Feeling hot or cold when others believe the temperature is normal: No  Loss of height: No  Post-operative complications: No  Surgical site pain: No  Change in or Loss of Energy: Yes  Hyperactivity: No  Confusion: No  Changes in hair: No  Changes in moles/birth marks: No  Itching: No  Changes in nails: No  Acne: Yes  Hair in places you don't want it: No  Change in facial hair: No  Warts: No  Non-healing sores: No  Scarring: No  Flaking of skin: No  Color changes of hands/feet in cold : No  Sun sensitivity: No  Skin thickening: No  Pain in legs with walking: No  Trouble breathing while  lying down: No  Fingers or toes appear blue: No  High blood pressure: Yes  Low blood pressure: No  Fainting: No  Murmurs: No  Pacemaker: No  Varicose veins: No  Edema or swelling: Yes  Wake up at night with shortness of breath: No  Exercise intolerance: No  Heart burn or indigestion: No  Bloating: Yes  Black stools: No  Fecal incontinence: No  Yellowing of skin or eyes: No  Vomit with blood: No  Change in stools: No  Trouble with coordination: No  Fainting or black-out spells: No  Memory loss: No  Speech problems: Yes  Tingling: Yes  Difficulty walking: No  Paralysis: No  Depression: Yes  Trouble sleeping: Yes  Mood changes: No  Panic attacks: No          PHQ 4/30/2020 11/22/2021   PHQ-9 Total Score 10 2   Q9: Thoughts of better off dead/self-harm past 2 weeks Not at all Not at all         Review of Systems   Constitutional: Positive for fatigue. Negative for chills and fever.   HENT: Positive for nosebleeds and sore throat. Negative for ear discharge, ear pain, hearing loss, mouth sores, sinus pain, tinnitus and trouble swallowing.    Cardiovascular: Negative for chest pain and palpitations.   Gastrointestinal: Positive for abdominal pain. Negative for blood in stool, constipation, diarrhea, nausea, rectal pain and vomiting.   Endocrine: Negative for polydipsia and polyphagia.   Skin: Negative for rash.   Neurological: Positive for dizziness and headaches. Negative for tremors, seizures, weakness, light-headedness and numbness.   Psychiatric/Behavioral: Negative for decreased concentration and hallucinations. The patient is nervous/anxious.             Objective           Vitals:  No vitals were obtained today due to virtual visit.    Physical Exam   GENERAL: Healthy, alert and no distress  EYES: Eyes grossly normal to inspection.  No discharge or erythema, or obvious scleral/conjunctival abnormalities.  RESP: No audible wheeze, cough, or visible cyanosis.  No visible retractions or increased work of breathing.     SKIN: Visible skin clear. No significant rash, abnormal pigmentation or lesions.  NEURO: Cranial nerves grossly intact.  Mentation and speech appropriate for age.  PSYCH: Mentation appears normal, affect normal/bright, judgement and insight intact, normal speech and appearance well-groomed.                Video-Visit Details    Type of service:  Video Visit    Video End Time:12:14 PM    Originating Location (pt. Location): Home    Distant Location (provider location):  [facility]     Platform used for Video Visit: [employer]

## 2021-12-02 ENCOUNTER — VIRTUAL VISIT (OUTPATIENT)
Dept: FAMILY MEDICINE | Facility: CLINIC | Age: 26
End: 2021-12-02
Payer: COMMERCIAL

## 2021-12-02 DIAGNOSIS — R53.83 FATIGUE, UNSPECIFIED TYPE: ICD-10-CM

## 2021-12-02 DIAGNOSIS — T50.Z95A SIDE EFFECTS OF VACCINATION, INITIAL ENCOUNTER: Primary | ICD-10-CM

## 2021-12-02 DIAGNOSIS — F33.1 MAJOR DEPRESSIVE DISORDER, RECURRENT EPISODE, MODERATE WITH ANXIOUS DISTRESS (H): ICD-10-CM

## 2021-12-02 DIAGNOSIS — J30.1 CHRONIC SEASONAL ALLERGIC RHINITIS DUE TO POLLEN: ICD-10-CM

## 2021-12-02 DIAGNOSIS — J30.81 CHRONIC ALLERGIC RHINITIS DUE TO ANIMAL HAIR AND DANDER: ICD-10-CM

## 2021-12-02 PROCEDURE — 99213 OFFICE O/P EST LOW 20 MIN: CPT | Mod: 95 | Performed by: FAMILY MEDICINE

## 2021-12-02 RX ORDER — CETIRIZINE HYDROCHLORIDE 10 MG/1
10 TABLET ORAL 2 TIMES DAILY
Qty: 180 TABLET | Refills: 3 | Status: SHIPPED | OUTPATIENT
Start: 2021-12-02 | End: 2022-12-05

## 2021-12-02 ASSESSMENT — ENCOUNTER SYMPTOMS
HEARTBURN: 0
NIGHT SWEATS: 0
BLOOD IN STOOL: 0
HYPOTENSION: 0
SINUS CONGESTION: 1
TASTE DISTURBANCE: 0
SEIZURES: 0
DISTURBANCES IN COORDINATION: 0
POOR WOUND HEALING: 0
DIARRHEA: 0
INSOMNIA: 1
POLYDIPSIA: 0
LOSS OF CONSCIOUSNESS: 0
MEMORY LOSS: 0
HYPERTENSION: 1
DIZZINESS: 1
TREMORS: 0
SINUS PAIN: 0
TINGLING: 1
ORTHOPNEA: 0
NAIL CHANGES: 0
JAUNDICE: 0
SORE THROAT: 1
PARALYSIS: 0
SMELL DISTURBANCE: 0
HALLUCINATIONS: 0
EXERCISE INTOLERANCE: 0
NERVOUS/ANXIOUS: 1
LEG PAIN: 0
DECREASED CONCENTRATION: 0
SYNCOPE: 0
TROUBLE SWALLOWING: 0
ALTERED TEMPERATURE REGULATION: 0
CONSTIPATION: 0
BLOATING: 1
NAUSEA: 0
HEADACHES: 1
PALPITATIONS: 0
LIGHT-HEADEDNESS: 0
DEPRESSION: 1
BOWEL INCONTINENCE: 0
SKIN CHANGES: 0
INCREASED ENERGY: 1
SLEEP DISTURBANCES DUE TO BREATHING: 0
FEVER: 0
DECREASED APPETITE: 0
ABDOMINAL PAIN: 1
WEIGHT GAIN: 0
NUMBNESS: 0
NECK MASS: 0
RECTAL PAIN: 0
POLYPHAGIA: 0
WEIGHT LOSS: 0
SPEECH CHANGE: 1
PANIC: 0
FATIGUE: 1
HOARSE VOICE: 0
CHILLS: 0
WEAKNESS: 0
VOMITING: 0

## 2021-12-03 NOTE — TELEPHONE ENCOUNTER
Pt had a virtual visit 12/2/21.  Encounter closed.    Kasie Davis RN  [facility]

## 2021-12-07 DIAGNOSIS — L64.9 MALE PATTERN ALOPECIA: ICD-10-CM

## 2021-12-09 RX ORDER — FINASTERIDE 5 MG/1
TABLET, FILM COATED ORAL
Qty: 90 TABLET | Refills: 1 | Status: SHIPPED | OUTPATIENT
Start: 2021-12-09 | End: 2022-12-21

## 2021-12-09 NOTE — TELEPHONE ENCOUNTER
---Prescription approved per [facility] Refill Protocol.       Jen Carroll RN BSN     [facility]          --Last visit:  12/2/2021 House.

## 2022-01-04 ENCOUNTER — TELEPHONE (OUTPATIENT)
Dept: NEUROLOGY | Facility: CLINIC | Age: 27
End: 2022-01-04
Payer: COMMERCIAL

## 2022-01-04 DIAGNOSIS — G50.0 TRIGEMINAL NEURALGIA: ICD-10-CM

## 2022-01-04 RX ORDER — OXCARBAZEPINE 150 MG/1
TABLET, FILM COATED ORAL
Qty: 120 TABLET | Refills: 3 | Status: SHIPPED | OUTPATIENT
Start: 2022-01-04 | End: 2022-04-29

## 2022-01-04 NOTE — TELEPHONE ENCOUNTER
Rx Authorization:    Requested Medication/ Dose:Oxcarbazepine 150Mg tabs    Date last refill ordered: 9/5/21    Quantity ordered:120 tabs    # refills: 3    Date of last clinic visit with ordering provider: 7/17/20    Date of next clinic visit with ordering provider: F/U 6 month    All pertinent protocol data (lab date/result):     Include pertinent information from patients message:

## 2022-01-04 NOTE — TELEPHONE ENCOUNTER
Health Call Center    Phone Message    May a detailed message be left on voicemail: yes     Reason for Call: Medication Refill Request    Has the patient contacted the pharmacy for the refill? Yes   Name of medication being requested: OXcarbazepine (TRILEPTAL) 150 MG tablet  Provider who prescribed the medication: Dr. Andrade  Pharmacy: [facility] #[id number]  Date medication is needed: 1/6/22     Ty calling to express concern as to why they are having so much troubles getting the Oxcarbazepine refilled.    Also, would like to have the gabapentin 300 MG prescription adjusted to 2 times a day instead of 3 due to having quite a supply.    Action Taken: Message routed to:  [facility]    Travel Screening: Not Applicable

## 2022-03-11 ENCOUNTER — TELEPHONE (OUTPATIENT)
Dept: FAMILY MEDICINE | Facility: CLINIC | Age: 27
End: 2022-03-11

## 2022-03-11 NOTE — TELEPHONE ENCOUNTER
Prior Authorization Retail Medication Request    Medication/Dose: cetirizine (ZYRTEC) 10 MG tablet  ICD code (if different than what is on RX):  Previously Tried and Failed:  Rationale:    Insurance Name:    Insurance ID: [id number]    Pharmacy Information (if different than what is on RX)  Name:  Phone:    Please include previous medications tried and failed.  Please ask insurance for medications on formulary.

## 2022-03-17 NOTE — TELEPHONE ENCOUNTER
PRIOR AUTHORIZATION DENIED    Medication: cetirizine (ZYRTEC) 10 MG-DENIED    Denial Date: 3/17/2022    Denial Rational: INSURANCE ONLY COVER ONCE DAILY DOSING.      Appeal Information: N/A

## 2022-03-17 NOTE — TELEPHONE ENCOUNTER
PA was denied. Please order alternative med with complete SIG or begin appeal process.     If you would like to appeal:   Create letter of medical necessity or    Compile supporting clinical documentation in EPIC Telephone encounter (TE).    Route TE to: [facility].       no

## 2022-03-17 NOTE — TELEPHONE ENCOUNTER
Central Prior Authorization Team   Phone: [phone]    PA Initiation    Medication: cetirizine (ZYRTEC) 10 MG tablet, rec 3-10-22  Insurance Company: [employer] - Phone [phone] Fax [phone]  Pharmacy Filling the Rx: [facility] #[id number] [address]  Filling Pharmacy Phone: [phone]  Filling Pharmacy Fax: [phone]  Start Date: 3/17/2022

## 2022-03-23 ENCOUNTER — OFFICE VISIT (OUTPATIENT)
Dept: FAMILY MEDICINE | Facility: CLINIC | Age: 27
End: 2022-03-23
Payer: COMMERCIAL

## 2022-03-23 VITALS
RESPIRATION RATE: 16 BRPM | SYSTOLIC BLOOD PRESSURE: 139 MMHG | HEART RATE: 92 BPM | DIASTOLIC BLOOD PRESSURE: 95 MMHG | BODY MASS INDEX: 19.49 KG/M2 | WEIGHT: 132 LBS | TEMPERATURE: 97.5 F | OXYGEN SATURATION: 97 %

## 2022-03-23 DIAGNOSIS — R03.0 ELEVATED BLOOD PRESSURE READING WITHOUT DIAGNOSIS OF HYPERTENSION: Primary | ICD-10-CM

## 2022-03-23 DIAGNOSIS — H61.23 BILATERAL IMPACTED CERUMEN: ICD-10-CM

## 2022-03-23 PROCEDURE — 99214 OFFICE O/P EST MOD 30 MIN: CPT | Mod: GC

## 2022-03-23 NOTE — PATIENT INSTRUCTIONS
Thank you for trusting us with your care.     Here's a summary of the visit today:   1. You have hard ear wax build up in both ears.    2. I have sent debrox to your pharmacy  3. Please follow up in 7 days for ear irrigation   4. Continue to record blood pressures at home and bring record to your PCP  5. Discussed low salt diet        Thank you.     Dr. Sullivan

## 2022-03-23 NOTE — PROGRESS NOTES
"[facility] Visit    Assessment & Plan     Bilateral impacted cerumen  Reported L ear tinitis and ear wax build up for the last 5 days, described as an \"annoying\" sensation, not worsening. Has history of cerumen impaction and has had ear irrigation in the past at [facility]. VS notable for elevated blood pressure as below. Physical exam remarkable for dry, hard cerumen in bilateral external ear canal obstructing view of tympanic membranes. Discussed with the patient the risk of dislodging the cerumen further into the ear canal if irrigated today. recommend softening with debrox for 5-7 days and having patient return for irrigation. Patient is amenable to this plan.   -carbamide peroxide (DEBROX) 6.5 % otic solution  -follow up in 7 days for ear irrigation     H/o intermittent nausea/vertigo - takes zofran and meclizine PRN     Elevated blood pressure reading without diagnosis of hypertension  Blood pressure elevated to systolic 138. No record of HTN diagnosis and follows routinely with  clinic.Family h/o of hypertension  Reports not as active as usual. Works as a PCA and feels stressed.   -discussed keeping home blood pressure records.   -follow up in 7 days with BP records  -discussed lifestyle modifications including exercise and low salt diet,       Options for treatment and follow-up care were reviewed with the patient who was engaged and actively involved in the decision making process, verbalized understanding of the options discussed, and satisfied with the final plan.    Patient was staffed with supervising physician, Dr. Peña, DO Alicia Sullivan DO, PGY1  [facility]    Subjective   Manoj Castellanos is a 26 year old adult with a history including SAD, mild persistent asthma, seasonal allergic rhinitis who presents for ear wax removal    Patient reports left ear plugged for the last 5 days. Reports \"ear tube dysfunction due to East  descent\". States he was " "evaluated by ENT and told ear canal \"weird\". Has had ear wax issues since the age of 19yo. Reports tinitis and decreased hearing on the left. Can \"feel the ear wax build up\". Was using debrox but started using hydrogen peroxide but haven't noticed any symptom improvement. \"I have tinitis because im an adult\".     Denies fever, chills, URI symptoms, ear pain, nausea, vomiting, cough, headache.     Social: Manoj Castellanos reports that Manoj Castellanos has never smoked. Manoj Castellanos has never used smokeless tobacco. Manoj Castellanos reports current alcohol use. Manoj Castellanos reports that Manoj Castellanos does not use drugs.    There are no exam notes on file for this visit.    Objective     Vitals:    03/23/22 1041 03/23/22 1048   BP: (!) 136/94 (!) 139/95   BP Location: Left arm Left arm   Patient Position: Sitting Sitting   Cuff Size: Adult Regular Adult Regular   Pulse: 92    Resp: 16    Temp: 97.5  F (36.4  C)    TempSrc: Oral    SpO2: 97%    Weight: 59.9 kg (132 lb)      Body mass index is 19.49 kg/m .    GEN: NAD, healthy, alert  Constitutional: Awake, alert, cooperative, no acute distress, and appears stated age.  HEENT: NC/AT, EOMI, normal conjunctivae/sclerae, clear oropharynx, MMM  Eyes: EOMI, sclera clear, conjunctiva normal.  ENT: dry, hard cerumen in bilateral external ear canals obstructing view of tympanic membranes. Tonsils nonerythematous and without exudates or trismus.  Neck: Supple, symmetrical, trachea midline. No submandibular LAD.  Back: Symmetric, no curvature  Lungs: No increased WOB. CTABL, no crackles or wheezing appreciated.  Cardiovascular: Appears well perfused. RRR, normal S1 and S2, no S3 or S4, and no murmur appreciated.  Abdomen: Normal BS, soft, non-distended, non-tender, no masses palpated  Musculoskeletal: No redness, warmth, or swelling of the joints. Tone is normal.  Neurologic: A&Ox3.  Cranial nerves II-XII are grossly intact.  Sensory is intact and gait is normal.  Neuropsychiatric: Normal affect, mood, " orientation, memory and insight.  Skin: No visible rashes, erythema, pallor, petechia or purpura.

## 2022-03-23 NOTE — PROGRESS NOTES
Preceptor Attestation:    I discussed the patient with the resident and evaluated the patient in person. I have verified the content of the note, which accurately reflects my assessment of the patient and the plan of care.   Supervising Physician:  Jono Peña DO.

## 2022-03-30 ENCOUNTER — OFFICE VISIT (OUTPATIENT)
Dept: FAMILY MEDICINE | Facility: CLINIC | Age: 27
End: 2022-03-30
Payer: COMMERCIAL

## 2022-03-30 VITALS
DIASTOLIC BLOOD PRESSURE: 89 MMHG | OXYGEN SATURATION: 98 % | HEART RATE: 75 BPM | SYSTOLIC BLOOD PRESSURE: 136 MMHG | TEMPERATURE: 97.7 F | WEIGHT: 133 LBS | RESPIRATION RATE: 18 BRPM | BODY MASS INDEX: 19.64 KG/M2

## 2022-03-30 DIAGNOSIS — H61.23 BILATERAL IMPACTED CERUMEN: Primary | ICD-10-CM

## 2022-03-30 PROCEDURE — 69210 REMOVE IMPACTED EAR WAX UNI: CPT | Mod: 50

## 2022-03-30 PROCEDURE — 99214 OFFICE O/P EST MOD 30 MIN: CPT | Mod: 25

## 2022-03-30 NOTE — PROGRESS NOTES
Preceptor Attestation:    I discussed the patient with the resident and evaluated the patient in person. I have verified the content of the note, which accurately reflects my assessment of the patient and the plan of care.   Supervising Physician:  Reji Cadena MD.

## 2022-03-30 NOTE — PROGRESS NOTES
SUBJECTIVE:  Manoj Castellanos is a 26 year old adult who presents complaining of fullness in bilateral ear(s).  Feels as if sounds are muffled and hearing is decreased.  There has been no pain or ear drainage.    OBJECTIVE:  /89   Pulse 75   Temp 97.7  F (36.5  C) (Oral)   Resp 18   Wt 60.3 kg (133 lb)   SpO2 98%   BMI 19.64 kg/m    General appearance: alert, cooperative, appears stated age and no distress  Head: Normocephalic, without obvious abnormality, atraumatic  Eyes: conjunctivae/corneas clear. PERRL, EOM's intact.   Ears: hearing grossly intact. bilateral ear(s) are occluded with cerumen.  The canal are irrigated with good results, and thereafter are clear, with the exception of slight erythema and edema.  The tympanic membrane(s) are benign in appeareance.  Nose: nares normal, septum midline, mucosa normal, no drainage  Throat: lips, mucosa, and tongue normal; teeth and gums normal  Lungs: clear to auscultation bilaterally, respirations unlabored  Chest wall: no tenderness to palpation  Heart: regular rate and rhythm, S1, S2 normal, no murmur, click, rub or gallop  Abdomen: soft, non-tender; bowel sounds normal; no masses, no organomegaly  Extremities: extremities normal, atraumatic, no cyanosis or edema  Pulses: 2+ and symmetric  Skin: Skin color, texture, turgor normal. No rashes or lesions, non-diaphoretic  Neurologic: CNII-XII intact, normal strength, sensation and reflexes throughout    ASSESSMENT:  Bilateral impacted cerumen    PLAN:    Cerumenosis is noted.  Moderate Wax is removed by syringing and manual debridement. However, significant cerumen residue deeper into the ear canal. This requires specialized equipment unavailable in clinic.Discussed ENT referral. Instructions for home care to prevent wax buildup are given.  -REMOVE IMPACTED CERUMEN  -Otolaryngology Referral    Follow-up if recurs, or as needed.

## 2022-03-30 NOTE — PATIENT INSTRUCTIONS
Thank you for trusting us with your care.     Here's a summary of the visit today:   1. There is some hard residual ear wax deeper in the ear that requires specialized equipment to remove  2. I have placed an ENT referral   3. Please call the number below to schedule  4. Follow up in 2-3 weeks for blood pressure       Thank you.     Dr. Sullivan

## 2022-04-06 ENCOUNTER — MYC MEDICAL ADVICE (OUTPATIENT)
Dept: FAMILY MEDICINE | Facility: CLINIC | Age: 27
End: 2022-04-06
Payer: COMMERCIAL

## 2022-04-06 NOTE — TELEPHONE ENCOUNTER
Writer responded via [employer].    KEIRY HolmanN, RN  [facility]

## 2022-04-08 ENCOUNTER — OFFICE VISIT (OUTPATIENT)
Dept: OTOLARYNGOLOGY | Facility: CLINIC | Age: 27
End: 2022-04-08
Attending: FAMILY MEDICINE
Payer: COMMERCIAL

## 2022-04-08 DIAGNOSIS — H61.23 BILATERAL IMPACTED CERUMEN: ICD-10-CM

## 2022-04-08 PROCEDURE — 69210 REMOVE IMPACTED EAR WAX UNI: CPT | Mod: 50 | Performed by: OTOLARYNGOLOGY

## 2022-04-08 NOTE — LETTER
4/8/2022         RE: Manoj Castellanos  [address] [facility] [address]        Dear Colleague,    Thank you for referring your patient, Manoj Castellanos, to the [facility]. Please see a copy of my visit note below.    HPI: This patient presents to clinic today for cerumen removal. Has noticed some fullness of the ears and muffled hearing, but denies otalgia, otorrhea, vertigo, change in true hearing or tinnitus. Denies other symptoms.    Past medical history, surgical history, family history, social history, medications, and allergies have been reviewed and are documented above.     Review of Systems: a 10-system review was performed. Symptoms are noted in the HPI and on a scanned document in the computer chart.    Physical Examination:   GEN: no acute distress, alert and oriented  EYES: extraocular movements intact, pupils equal and round. Sclera clear.   EARS: bilateral cerumen impactions cleared under binocular microscopy using suction/loop/forceps. The tympanic membranes are noted to be intact and clear with no signs of infections, effusions, perforations, or retractions.  PULM: breathing comfortably on room air with no stertor or stridor. Chest expansion symmetric.  CARDS: no cyanosis or clubbing, normal carotid pulses    MEDICAL DECISION-MAKING: cerumen impactions cleared under binocular microscopy without difficulty. Hearing restored to baseline. Follow-up PRN.        Again, thank you for allowing me to participate in the care of your patient.        Sincerely,        Rosita Robertson MD

## 2022-04-22 ENCOUNTER — TRANSFERRED RECORDS (OUTPATIENT)
Dept: HEALTH INFORMATION MANAGEMENT | Facility: CLINIC | Age: 27
End: 2022-04-22
Payer: COMMERCIAL

## 2022-04-28 ENCOUNTER — TELEPHONE (OUTPATIENT)
Dept: NEUROLOGY | Facility: CLINIC | Age: 27
End: 2022-04-28
Payer: COMMERCIAL

## 2022-04-28 DIAGNOSIS — G50.0 TRIGEMINAL NEURALGIA: ICD-10-CM

## 2022-04-28 NOTE — TELEPHONE ENCOUNTER
[facility] Call Center    Phone Message    May a detailed message be left on voicemail: yes     Reason for Call: Medication Refill Request    Has the patient contacted the pharmacy for the refill? Yes   Name of medication being requested: Oxcarbazepine 150Mg tabs  Provider who prescribed the medication: Dr. Andrade  Pharmacy: [facility] #[id number]  Date medication is needed: ASAP    Pt scheduled a follow up appt with  on 09/07/22 but will need medication before then. Please call back with further information.    Action Taken: Message routed to:  [facility]    Travel Screening: Not Applicable

## 2022-04-29 DIAGNOSIS — G50.0 TRIGEMINAL NEURALGIA: ICD-10-CM

## 2022-04-29 RX ORDER — OXCARBAZEPINE 150 MG/1
TABLET, FILM COATED ORAL
Qty: 120 TABLET | Refills: 3 | Status: SHIPPED | OUTPATIENT
Start: 2022-04-29 | End: 2022-09-07

## 2022-05-07 ENCOUNTER — MYC MEDICAL ADVICE (OUTPATIENT)
Dept: FAMILY MEDICINE | Facility: CLINIC | Age: 27
End: 2022-05-07
Payer: COMMERCIAL

## 2022-05-09 ENCOUNTER — OFFICE VISIT (OUTPATIENT)
Dept: URGENT CARE | Facility: URGENT CARE | Age: 27
End: 2022-05-09
Payer: COMMERCIAL

## 2022-05-09 VITALS
HEIGHT: 69 IN | OXYGEN SATURATION: 100 % | SYSTOLIC BLOOD PRESSURE: 145 MMHG | TEMPERATURE: 98.6 F | BODY MASS INDEX: 18.96 KG/M2 | WEIGHT: 128 LBS | HEART RATE: 115 BPM | DIASTOLIC BLOOD PRESSURE: 97 MMHG

## 2022-05-09 DIAGNOSIS — R53.83 FATIGUE, UNSPECIFIED TYPE: Primary | ICD-10-CM

## 2022-05-09 LAB
BASOPHILS # BLD AUTO: 0 10E3/UL (ref 0–0.2)
BASOPHILS NFR BLD AUTO: 1 %
CRP SERPL-MCNC: <2.9 MG/L (ref 0–8)
EOSINOPHIL # BLD AUTO: 0.1 10E3/UL (ref 0–0.7)
EOSINOPHIL NFR BLD AUTO: 1 %
ERYTHROCYTE [DISTWIDTH] IN BLOOD BY AUTOMATED COUNT: 13 % (ref 10–15)
ERYTHROCYTE [SEDIMENTATION RATE] IN BLOOD BY WESTERGREN METHOD: 3 MM/HR (ref 0–20)
HCT VFR BLD AUTO: 50.6 % (ref 35–53)
HGB BLD-MCNC: 17 G/DL (ref 11.7–17.7)
IMM GRANULOCYTES # BLD: 0 10E3/UL
IMM GRANULOCYTES NFR BLD: 0 %
LYMPHOCYTES # BLD AUTO: 1 10E3/UL (ref 0.8–5.3)
LYMPHOCYTES NFR BLD AUTO: 18 %
MCH RBC QN AUTO: 28.1 PG (ref 26.5–33)
MCHC RBC AUTO-ENTMCNC: 33.6 G/DL (ref 31.5–36.5)
MCV RBC AUTO: 84 FL (ref 78–100)
MONOCYTES # BLD AUTO: 0.3 10E3/UL (ref 0–1.3)
MONOCYTES NFR BLD AUTO: 5 %
NEUTROPHILS # BLD AUTO: 4.3 10E3/UL (ref 1.6–8.3)
NEUTROPHILS NFR BLD AUTO: 76 %
PLATELET # BLD AUTO: 251 10E3/UL (ref 150–450)
RBC # BLD AUTO: 6.04 10E6/UL (ref 3.8–5.9)
VIT B12 SERPL-MCNC: 281 PG/ML (ref 193–986)
WBC # BLD AUTO: 5.7 10E3/UL (ref 4–11)

## 2022-05-09 PROCEDURE — 36415 COLL VENOUS BLD VENIPUNCTURE: CPT | Performed by: PHYSICIAN ASSISTANT

## 2022-05-09 PROCEDURE — 85652 RBC SED RATE AUTOMATED: CPT | Performed by: PHYSICIAN ASSISTANT

## 2022-05-09 PROCEDURE — 99213 OFFICE O/P EST LOW 20 MIN: CPT | Performed by: PHYSICIAN ASSISTANT

## 2022-05-09 PROCEDURE — 80050 GENERAL HEALTH PANEL: CPT | Performed by: PHYSICIAN ASSISTANT

## 2022-05-09 PROCEDURE — 82306 VITAMIN D 25 HYDROXY: CPT | Performed by: PHYSICIAN ASSISTANT

## 2022-05-09 PROCEDURE — 86140 C-REACTIVE PROTEIN: CPT | Performed by: PHYSICIAN ASSISTANT

## 2022-05-09 PROCEDURE — 82607 VITAMIN B-12: CPT | Performed by: PHYSICIAN ASSISTANT

## 2022-05-09 NOTE — PROGRESS NOTES
Fatigue, unspecified type  - CBC with platelets and differential; Future  - Comprehensive metabolic panel (BMP + Alb, Alk Phos, ALT, AST, Total. Bili, TP); Future  - Vitamin B12; Future  - TSH with free T4 reflex; Future  - Vitamin D Deficiency; Future  - CRP, inflammation; Future  - ESR: Erythrocyte sedimentation rate; Future    Patient was advised to return to clinic if symptoms do not improve in the amount of time specified in the AVS or if symptoms worsen. Patient educated on red flag symptoms and asked to go directly to the ED if symptoms present themselves.     Pedro Ruggiero PA-C  [facility]    Subjective   26 year old who presents to clinic today for the following health issues:    Urgent Care       HPI     Acute Illness  Acute illness concerns: Pt in clinic to have eval for fatigue, brain fog, cough, SOB and headaches for 12 days.  Onset/Duration: 12 days  Symptoms:  Fever: no  Chills/Sweats: no  Headache (location?): YES  Sinus Pressure: no  Conjunctivitis:  no  Ear Pain: no  Rhinorrhea: no  Congestion: no  Sore Throat: no  Cough: YES-non-productive  Wheeze: no- Mild shortness of breath  Decreased Appetite: no  Nausea: no  Vomiting: no  Diarrhea: no  Dysuria/Freq.: no  Dysuria or Hematuria: no  Fatigue/Achiness: Fatigued but no aches   Sick/Strep Exposure: no  Therapies tried and outcome: Ibuprofen      Home test for covid-19 was negative.     Review of Systems   Review of Systems   See HPI     Objective    Temp: 98.6  F (37  C) Temp src: Temporal BP: (!) 145/97 Pulse: 115     SpO2: 100 %       Physical Exam   Physical Exam  Constitutional:       General: Manoj Castellanos is not in acute distress.     Appearance: Normal appearance. Manoj Castellanos is normal weight. Manoj Castellanos is not ill-appearing, toxic-appearing or diaphoretic.   HENT:      Head: Normocephalic and atraumatic.      Right Ear: Tympanic membrane, ear canal and external ear normal. There is no impacted cerumen.      Left Ear:  Tympanic membrane, ear canal and external ear normal. There is no impacted cerumen.      Nose: Nose normal. No congestion or rhinorrhea.      Mouth/Throat:      Mouth: Mucous membranes are moist.      Pharynx: Oropharynx is clear. No oropharyngeal exudate or posterior oropharyngeal erythema.   Cardiovascular:      Rate and Rhythm: Normal rate and regular rhythm.      Pulses: Normal pulses.      Heart sounds: Normal heart sounds. No murmur heard.    No friction rub. No gallop.   Pulmonary:      Effort: Pulmonary effort is normal. No respiratory distress.      Breath sounds: Normal breath sounds. No stridor. No wheezing, rhonchi or rales.   Chest:      Chest wall: No tenderness.   Abdominal:      General: Abdomen is flat. Bowel sounds are normal. There is no distension.      Palpations: Abdomen is soft. There is no mass.      Tenderness: There is no abdominal tenderness. There is no right CVA tenderness, left CVA tenderness, guarding or rebound.      Hernia: No hernia is present.   Neurological:      General: No focal deficit present.      Mental Status: Manoj Castellanos is alert and oriented to person, place, and time. Mental status is at baseline.      Gait: Gait normal.   Psychiatric:         Mood and Affect: Mood normal.         Behavior: Behavior normal.         Thought Content: Thought content normal.         Judgment: Judgment normal.          Results for orders placed or performed in visit on 05/09/22 (from the past 24 hour(s))   CBC with platelets and differential    Narrative    The following orders were created for panel order CBC with platelets and differential.  Procedure                               Abnormality         Status                     ---------                               -----------         ------                     CBC with platelets and d...[[id number]]                                                   Please view results for these tests on the individual orders.

## 2022-05-10 ENCOUNTER — TELEPHONE (OUTPATIENT)
Dept: FAMILY MEDICINE | Facility: CLINIC | Age: 27
End: 2022-05-10
Payer: COMMERCIAL

## 2022-05-10 LAB
ALBUMIN SERPL-MCNC: 4.1 G/DL (ref 3.4–5)
ALP SERPL-CCNC: 52 U/L (ref 40–150)
ALT SERPL W P-5'-P-CCNC: 38 U/L (ref 0–70)
ANION GAP SERPL CALCULATED.3IONS-SCNC: 1 MMOL/L (ref 3–14)
AST SERPL W P-5'-P-CCNC: 25 U/L (ref 0–45)
BILIRUB SERPL-MCNC: 0.5 MG/DL (ref 0.2–1.3)
BUN SERPL-MCNC: 8 MG/DL (ref 7–30)
CALCIUM SERPL-MCNC: 9.2 MG/DL (ref 8.5–10.1)
CHLORIDE BLD-SCNC: 104 MMOL/L (ref 94–109)
CO2 SERPL-SCNC: 31 MMOL/L (ref 20–32)
CREAT SERPL-MCNC: 0.82 MG/DL (ref 0.52–1.25)
DEPRECATED CALCIDIOL+CALCIFEROL SERPL-MC: 45 UG/L (ref 20–75)
GFR SERPL CREATININE-BSD FRML MDRD: >90 ML/MIN/1.73M2
GLUCOSE BLD-MCNC: 130 MG/DL (ref 70–99)
POTASSIUM BLD-SCNC: 3.7 MMOL/L (ref 3.4–5.3)
PROT SERPL-MCNC: 7.7 G/DL (ref 6.8–8.8)
SODIUM SERPL-SCNC: 136 MMOL/L (ref 133–144)
TSH SERPL DL<=0.005 MIU/L-ACNC: 2.86 MU/L (ref 0.4–4)

## 2022-05-10 NOTE — TELEPHONE ENCOUNTER
Was seen in  5/9/2022 and is questioning the low result of anion gap.   Anion Gap 3 - 14 mmol/L 1 Low       He is wondering if he should be concerned about this? And if he needs labs re-done or if he needs other labs done.     Thank you

## 2022-05-11 ENCOUNTER — MYC MEDICAL ADVICE (OUTPATIENT)
Dept: URGENT CARE | Facility: URGENT CARE | Age: 27
End: 2022-05-11
Payer: COMMERCIAL

## 2022-05-11 NOTE — TELEPHONE ENCOUNTER
Writer responded via [employer].    ALBERTO Davis RN  [facility]    
Adequate: hears normal conversation without difficulty

## 2022-05-12 ENCOUNTER — OFFICE VISIT (OUTPATIENT)
Dept: URGENT CARE | Facility: URGENT CARE | Age: 27
End: 2022-05-12
Payer: COMMERCIAL

## 2022-05-12 VITALS
SYSTOLIC BLOOD PRESSURE: 153 MMHG | TEMPERATURE: 98 F | DIASTOLIC BLOOD PRESSURE: 98 MMHG | OXYGEN SATURATION: 98 % | RESPIRATION RATE: 20 BRPM | HEART RATE: 78 BPM

## 2022-05-12 DIAGNOSIS — R05.9 COUGH: Primary | ICD-10-CM

## 2022-05-12 DIAGNOSIS — R79.89 LOW VITAMIN B12 LEVEL: ICD-10-CM

## 2022-05-12 DIAGNOSIS — R53.83 FATIGUE, UNSPECIFIED TYPE: ICD-10-CM

## 2022-05-12 PROCEDURE — 99213 OFFICE O/P EST LOW 20 MIN: CPT | Mod: CS | Performed by: FAMILY MEDICINE

## 2022-05-12 PROCEDURE — U0003 INFECTIOUS AGENT DETECTION BY NUCLEIC ACID (DNA OR RNA); SEVERE ACUTE RESPIRATORY SYNDROME CORONAVIRUS 2 (SARS-COV-2) (CORONAVIRUS DISEASE [COVID-19]), AMPLIFIED PROBE TECHNIQUE, MAKING USE OF HIGH THROUGHPUT TECHNOLOGIES AS DESCRIBED BY CMS-2020-01-R: HCPCS | Performed by: FAMILY MEDICINE

## 2022-05-12 PROCEDURE — U0005 INFEC AGEN DETEC AMPLI PROBE: HCPCS | Performed by: FAMILY MEDICINE

## 2022-05-12 NOTE — PROGRESS NOTES
Clinical Decision Making:    At the end of the encounter, I discussed results, diagnosis, medications. Discussed red flags for immediate return to clinic/ER, as well as indications for follow up if no improvement. Patient understood and agreed to plan. Patient was stable for discharge.      ICD-10-CM    1. Cough  R05.9 Symptomatic; Unknown COVID-19 Virus (Coronavirus) by PCR Nose   2. Low vitamin B12 level  E53.8 vitamin B complex with vitamin C (STRESS TAB) tablet   3. Fatigue, unspecified type  R53.83 Symptomatic; Unknown COVID-19 Virus (Coronavirus) by PCR Nose     Discussed with the patient that his symptoms could be consistent with long COVID so I would like to do COVID PCR testing on him today.  He has only had 1 negative home test with this illness.  If he does come back positive for COVID I would recommend putting in a referral to the lung COVID clinic.    His vitamin B12 level is below ideal although it does gland in the normal range.  I prescribed a B complex vitamin with vitamin C for him to take once daily.    Recommend follow-up with primary care physician for further evaluation if his symptoms do not improve.  Patient verbalized understanding.      There are no Patient Instructions on file for this visit.   Return in about 2 weeks (around 5/26/2022) for with primary provider.      chief complaint    HPI:  Manoj Castellanos is a 26 year old adult who presents today complaining of being sick for about 2 and half weeks.  He thinks it started around April 20 something when he was trying to listen to a lecture and take notes and he had low concentration and could not focus.  He felt tired.  He took a break and for the next 2 days still felt that way.  He has an intermittent cough and some shortness of breath.  He also has developed headaches.  His symptoms have been persistent for the last 2 and half weeks.  He is having trouble finishing up his finals at school.  He took a home COVID test that was negative.  He  does have asthma but the shortness of breath has not felt like his usual wheezing and he has not used his albuterol inhaler.  He denies any recent head injury or concussion  He also is occasionally having some dizziness.  He chronically suffers from some benign positional vertigo and takes meclizine as needed.  He is fully vaccinated to COVID plus having a booster    He was seen in urgent care on May 9, 3 days ago.  He had multiple labs done and we reviewed those results today.  Normal sed rate and CRP  Vitamin D normal at 45  TSH normal at 2.86  Vitamin B12 at 281 which is within normal limits but ideal levels are over 500  CMP within normal limits except for a glucose of 130 but patient was not fasting and  CBC with differential is within normal limits    Patient notes a family history of thyroid disorder in his grandmother and multiple autoimmune diseases in his mother    History obtained from chart review and the patient.    Problem List:  2019-03: Palpitations  2018-03: Mild persistent asthma  2018-03: Chronic allergic rhinitis due to animal hair and dander  2018-03: Allergic rhinitis due to dust mite  2018-03: Chronic seasonal allergic rhinitis due to pollen  2018-03: Allergic conjunctivitis of both eyes  2017-10: Abnormal sinus finding on MRI  2017-08: Pain in both wrists  2017-08: Bilateral thumb pain  2014-01: Seasonal affective disorder (H)  2012-12: Right hip pain  2012-09: Patellofemoral pain syndrome      Past Medical History:   Diagnosis Date     Depressive disorder 2014    it s chill now tho I m doing fine     Uncomplicated asthma early 2000s       Social History     Tobacco Use     Smoking status: Never Smoker     Smokeless tobacco: Never Used   Substance Use Topics     Alcohol use: Yes     Comment: infrequent at worst/ occa        Review of systems  negative except listed in HPI    Vitals:    05/12/22 1359   BP: (!) 153/98   Pulse: 78   Resp: 20   Temp: 98  F (36.7  C)   SpO2: 98%       Physical  Exam  Vitals noted and within normal limits.  Patient is alert, oriented, and in no acute distress.  Eyes: Conjunctive not injected.  Ears: Canals patent, TMs intact, no erythema and no bulging.  Mouth: Mucous membranes pink and moist.  Pharynx is not erythematous.  Neck supple with no cervical lymphadenopathy.  Heart has a regular rate and rhythm with no murmurs.  Lungs are clear to auscultation bilaterally with good air entry.  No wheezes, rales, rhonchi.

## 2022-05-13 LAB — SARS-COV-2 RNA RESP QL NAA+PROBE: NEGATIVE

## 2022-05-24 ENCOUNTER — NURSE TRIAGE (OUTPATIENT)
Dept: NURSING | Facility: CLINIC | Age: 27
End: 2022-05-24

## 2022-05-24 ENCOUNTER — VIRTUAL VISIT (OUTPATIENT)
Dept: PEDIATRICS | Facility: CLINIC | Age: 27
End: 2022-05-24
Payer: COMMERCIAL

## 2022-05-24 DIAGNOSIS — F33.1 MAJOR DEPRESSIVE DISORDER, RECURRENT EPISODE, MODERATE WITH ANXIOUS DISTRESS (H): ICD-10-CM

## 2022-05-24 DIAGNOSIS — G47.19 EXCESSIVE DAYTIME SLEEPINESS: ICD-10-CM

## 2022-05-24 DIAGNOSIS — R53.83 OTHER FATIGUE: Primary | ICD-10-CM

## 2022-05-24 PROCEDURE — 99214 OFFICE O/P EST MOD 30 MIN: CPT | Mod: 95 | Performed by: INTERNAL MEDICINE

## 2022-05-24 ASSESSMENT — ASTHMA QUESTIONNAIRES: ACT_TOTALSCORE: 22

## 2022-05-24 ASSESSMENT — PATIENT HEALTH QUESTIONNAIRE - PHQ9: SUM OF ALL RESPONSES TO PHQ QUESTIONS 1-9: 7

## 2022-05-24 NOTE — TELEPHONE ENCOUNTER
"Nurse Triage SBAR    Is this a 2nd Level Triage? NO    Situation: Patient calling.  Consent: not needed    Background: Pt has been trying to get seen in Long Covid Clinic - he feels he \"probably has long covid\".  Has had many blood tests and feels the \"symptomology lines up very neatly\".      Assessment:  Declines triage - only wants to schedule appt.  States his symptoms are not urgent.      Protocol Recommended Disposition:   Pt wants to make an appt.     Recommendation: Advised patient to make an appointment. Transferred to scheduling. . Reviewed concerning symptoms and when to call back.     Nadia Zamora RN [facility] 5/24/2022 1:03 P      COVID 19 Nurse Triage Plan/Patient Instructions    Please be aware that novel coronavirus (COVID-19) may be circulating in the community. If you develop symptoms such as fever, cough, or SOB or if you have concerns about the presence of another infection including coronavirus (COVID-19), please contact your health care provider or visit [email or web address].     Disposition/Instructions    In-Person Visit with provider recommended. Reference Visit Selection Guide.    Thank you for taking steps to prevent the spread of this virus.  o Limit your contact with others.  o Wear a simple mask to cover your cough.  o Wash your hands well and often.    Resources    [facility]: About COVID-19: [email or web address]    CDC: What to Do If You're Sick: www.cdc.gov/coronavirus/2019-ncov/about/steps-when-sick.html    CDC: Ending Home Isolation: www.cdc.gov/coronavirus/2019-ncov/hcp/disposition-in-home-patients.html     CDC: Caring for Someone: www.cdc.gov/coronavirus/2019-ncov/if-you-are-sick/care-for-someone.html     [facility]: Interim Guidance for Hospital Discharge to Home: www.health.[facility].[place].us/diseases/coronavirus/hcp/hospdischarge.pdf    [facility] clinical trials (COVID-19 research studies): " clinicalaffairs.[facility]-clinical-trials     Below are the COVID-19 hotlines at the [place] Department of Health ([facility]). Interpreters are available.   o For health questions: Call 403-155-5675 or 1-531.970.3683 (7 a.m. to 7 p.m.)  o For questions about schools and childcare: Call 704-674-3384 or 1-894.212.3552 (7 a.m. to 7 p.m.)                         Additional Information    Information only question and nurse able to answer    Protocols used: INFORMATION ONLY CALL - NO TRIAGE-A-[place]

## 2022-05-24 NOTE — PATIENT INSTRUCTIONS
It was good talking with you earlier today.  Here's a summary of what we discussed:    1)  Let's have you get a COVID antibody test today.    2)  If negative, follow up with the sleep center.    3) COnsider tapering down on either gabapentin or the trileptal, with the neurologist's guidance, as this can cause fatigue.     Elmo Coleman MD  Internal Medicine and Pediatrics

## 2022-05-24 NOTE — PROGRESS NOTES
"Manoj is a 26 year old who is being evaluated via a billable video visit.      How would you like to obtain your AVS? MyChart  If the video visit is dropped, the invitation should be resent by: Text to cell phone: [phone]  Will anyone else be joining your video visit? No      Video Start Time: 4:25 PM    Assessment & Plan     Other fatigue  No documented COVID.  IF ab is positive, will refer to long haul covid clinic.   - COVID-19 Martinez RBD Eda & Titer Reflex; Future    Excessive daytime sleepiness  May be related to sleep disorder or narcolepsy.  Will have sleep evaluation.    - Adult Sleep Eval & Management  Referral; Future    Major depressive disorder, recurrent episode, moderate with anxious distress (H)  If negative, could consider either improving his depression (states he is not significantly depressed) or tapering off one or both of his seizure meds, with guidance of his neurologist.                See Patient Instructions    No follow-ups on file.    Elmo Coleman MD  [facility]    Subjective   Manoj is a 26 year old who presents for the following health issues     HPI     ED/UC Followup:    Facility:  [facility]  Date of visit: 05/12/2022  Reason for visit: Cough, fatigue   Current Status: same, no improvement        April 27; doing school work; not able to concentrate.  Fatigued.  Tried to go back to school work, but unable to focus. \"Awful fatigue.\"    Usually has \"chronic fatigue\" (not sure if sleep related, depression, diet, exercise, etc), but this was worse.       A few days later, develop cough and headache intermittently.  \"Nasty tension headaches.\"    Went to urgent care on 5/9, and labs were within normal limits.   COvid test 5/12 within normal limits.   Thought that it was possible had \"long COVID\".    Today, symptoms are:  Fatigue.  Sleeps now about 1:30 AM to 8.  Naps occasionally during the day.  No weight loss.  Headache.  Brain fog.  INtermittent cough. "             Review of Systems   CONSTITUTIONAL: NEGATIVE for fever, chills, change in weight  INTEGUMENTARY/SKIN: NEGATIVE for worrisome rashes, moles or lesions  EYES: NEGATIVE for vision changes or irritation  ENT/MOUTH: NEGATIVE for ear, mouth and throat problems  RESP: NEGATIVE for significant cough or SOB  BREAST: NEGATIVE for masses, tenderness or discharge  CV: NEGATIVE for chest pain, palpitations or peripheral edema  GI: NEGATIVE for nausea, abdominal pain, heartburn, or change in bowel habits  : NEGATIVE for frequency, dysuria, or hematuria  MUSCULOSKELETAL: NEGATIVE for significant arthralgias or myalgia  NEURO: NEGATIVE for weakness, dizziness or paresthesias  ENDOCRINE: NEGATIVE for temperature intolerance, skin/hair changes  HEME: NEGATIVE for bleeding problems  PSYCHIATRIC: NEGATIVE for changes in mood or affect      Objective           Vitals:  No vitals were obtained today due to virtual visit.    Physical Exam   GENERAL: Healthy, alert and no distress  EYES: Eyes grossly normal to inspection.  No discharge or erythema, or obvious scleral/conjunctival abnormalities.  RESP: No audible wheeze, cough, or visible cyanosis.  No visible retractions or increased work of breathing.    SKIN: Visible skin clear. No significant rash, abnormal pigmentation or lesions.  NEURO: Cranial nerves grossly intact.  Mentation and speech appropriate for age.  PSYCH: Mentation appears normal, affect normal/bright, judgement and insight intact, normal speech and appearance well-groomed.                Video-Visit Details    Type of service:  Video Visit    Video End Time:4:42 PM    Originating Location (pt. Location): Home    Distant Location (provider location):  [facility]     Platform used for Video Visit: [employer]

## 2022-05-25 ENCOUNTER — LAB (OUTPATIENT)
Dept: LAB | Facility: CLINIC | Age: 27
End: 2022-05-25
Payer: COMMERCIAL

## 2022-05-25 DIAGNOSIS — R53.83 OTHER FATIGUE: ICD-10-CM

## 2022-05-25 PROCEDURE — 86769 SARS-COV-2 COVID-19 ANTIBODY: CPT | Mod: 90

## 2022-05-25 PROCEDURE — 99000 SPECIMEN HANDLING OFFICE-LAB: CPT

## 2022-05-25 PROCEDURE — 36415 COLL VENOUS BLD VENIPUNCTURE: CPT

## 2022-05-26 LAB
SARS-COV-2 AB SERPL IA-ACNC: >250 U/ML
SARS-COV-2 AB SERPL QL IA: POSITIVE

## 2022-06-17 ENCOUNTER — VIRTUAL VISIT (OUTPATIENT)
Dept: FAMILY MEDICINE | Facility: CLINIC | Age: 27
End: 2022-06-17
Payer: COMMERCIAL

## 2022-06-17 DIAGNOSIS — M25.561 ARTHRALGIA OF BOTH KNEES: Primary | ICD-10-CM

## 2022-06-17 DIAGNOSIS — M22.2X1 PATELLOFEMORAL PAIN SYNDROME OF BOTH KNEES: ICD-10-CM

## 2022-06-17 DIAGNOSIS — M22.2X2 PATELLOFEMORAL PAIN SYNDROME OF BOTH KNEES: ICD-10-CM

## 2022-06-17 DIAGNOSIS — M25.562 ARTHRALGIA OF BOTH KNEES: Primary | ICD-10-CM

## 2022-06-17 PROCEDURE — 99213 OFFICE O/P EST LOW 20 MIN: CPT | Mod: 95 | Performed by: INTERNAL MEDICINE

## 2022-06-20 ENCOUNTER — ANCILLARY PROCEDURE (OUTPATIENT)
Dept: GENERAL RADIOLOGY | Facility: CLINIC | Age: 27
End: 2022-06-20
Payer: COMMERCIAL

## 2022-06-20 DIAGNOSIS — M25.562 ARTHRALGIA OF BOTH KNEES: ICD-10-CM

## 2022-06-20 DIAGNOSIS — M25.561 ARTHRALGIA OF BOTH KNEES: ICD-10-CM

## 2022-06-20 PROCEDURE — 73560 X-RAY EXAM OF KNEE 1 OR 2: CPT | Mod: TC | Performed by: RADIOLOGY

## 2022-06-22 ENCOUNTER — OFFICE VISIT (OUTPATIENT)
Dept: URGENT CARE | Facility: URGENT CARE | Age: 27
End: 2022-06-22
Payer: COMMERCIAL

## 2022-06-22 VITALS
BODY MASS INDEX: 18.9 KG/M2 | WEIGHT: 128 LBS | SYSTOLIC BLOOD PRESSURE: 119 MMHG | DIASTOLIC BLOOD PRESSURE: 77 MMHG | OXYGEN SATURATION: 97 % | HEART RATE: 95 BPM | TEMPERATURE: 98.7 F

## 2022-06-22 DIAGNOSIS — R10.13 ABDOMINAL PAIN, EPIGASTRIC: Primary | ICD-10-CM

## 2022-06-22 DIAGNOSIS — M79.2 NEUROPATHIC PAIN: ICD-10-CM

## 2022-06-22 LAB
ERYTHROCYTE [DISTWIDTH] IN BLOOD BY AUTOMATED COUNT: 13.1 % (ref 10–15)
HCT VFR BLD AUTO: 50.1 % (ref 35–53)
HGB BLD-MCNC: 16.7 G/DL (ref 11.7–17.7)
MCH RBC QN AUTO: 27.8 PG (ref 26.5–33)
MCHC RBC AUTO-ENTMCNC: 33.3 G/DL (ref 31.5–36.5)
MCV RBC AUTO: 83 FL (ref 78–100)
PLATELET # BLD AUTO: 251 10E3/UL (ref 150–450)
RBC # BLD AUTO: 6.01 10E6/UL (ref 3.8–5.9)
WBC # BLD AUTO: 5.4 10E3/UL (ref 4–11)

## 2022-06-22 PROCEDURE — 80053 COMPREHEN METABOLIC PANEL: CPT | Performed by: PHYSICIAN ASSISTANT

## 2022-06-22 PROCEDURE — 83690 ASSAY OF LIPASE: CPT | Performed by: PHYSICIAN ASSISTANT

## 2022-06-22 PROCEDURE — 36415 COLL VENOUS BLD VENIPUNCTURE: CPT | Performed by: PHYSICIAN ASSISTANT

## 2022-06-22 PROCEDURE — 85027 COMPLETE CBC AUTOMATED: CPT | Performed by: PHYSICIAN ASSISTANT

## 2022-06-22 PROCEDURE — 99214 OFFICE O/P EST MOD 30 MIN: CPT | Performed by: PHYSICIAN ASSISTANT

## 2022-06-22 PROCEDURE — 82150 ASSAY OF AMYLASE: CPT | Performed by: PHYSICIAN ASSISTANT

## 2022-06-22 RX ORDER — GABAPENTIN 300 MG/1
300 CAPSULE ORAL 2 TIMES DAILY
Qty: 180 CAPSULE | Refills: 3 | Status: SHIPPED | OUTPATIENT
Start: 2022-06-22 | End: 2022-09-07

## 2022-06-22 NOTE — PROGRESS NOTES
Assessment & Plan     Abdominal pain, epigastric  DDx: gerd, gastritis, PUD, pancreatitis, gallstones, etc....    Acute problem.  On exam patient is in no acute distress.  Vitals are stable.  CBC is unremarkable.  CMP, lipase and amylase are pending at this time.  Patient will be notified if any abnormal results.  We discussed ultimately follow-up with primary care provider, might need abdominal ultrasound for further evaluation if symptoms persistent and labs are all normal.  In the interim, I have recommended increasing the dose of his Pepcid to 40 mg daily.  Tylenol as needed for pain.  Patient agrees with the plan.  - Comprehensive metabolic panel (BMP + Alb, Alk Phos, ALT, AST, Total. Bili, TP)  - Lipase  - CBC with platelets  - Amylase      30 minutes spent on the date of the encounter doing chart review, history and exam, patient education, documentation, and further activities per the note.    Return in about 1 week (around 6/29/2022) for Symptoms failing to improve.    Yvette Soriano PA-C  [facility]     Ty is a 26 year old adult who presents to clinic today for the following health issues:  Chief Complaint   Patient presents with     Urgent Care     Abdominal Pain     C/O abdominal pain for 2 days     HPI      Abdominal Pain    Location: epigastric   Radiation: None.    Pain character: aching  Duration: 2 day(s)   Course of Illness: same.  Exacerbated by: nothing  Relieved by: nothing.  Denies: nausea, vomiting, diarrhea, bloating, melena, hematochezia, dysuria, frequency, hematuria, fever and chills.  Surgical History: none  Patient reports BM's are normal.   Treatment tried: Famotidine, simethicone  Medical hx significant for Gerd.      Review of Systems  Constitutional, HEENT, cardiovascular, pulmonary, GI, , musculoskeletal, neuro, skin, endocrine and psych systems are negative, except as otherwise noted.      Objective    /77   Pulse 95   Temp  98.7  F (37.1  C) (Tympanic)   Wt 58.1 kg (128 lb)   SpO2 97%   BMI 18.90 kg/m    Physical Exam   GENERAL: healthy, alert and no distress  RESP: lungs clear to auscultation - no rales, rhonchi or wheezes  CV: regular rate and rhythm, normal S1 S2  ABDOMEN: soft, TTP epigastric region, no hepatosplenomegaly, no masses and bowel sounds normal  MS: no gross musculoskeletal defects noted, no edema    Results for orders placed or performed in visit on 06/22/22 (from the past 24 hour(s))   CBC with platelets   Result Value Ref Range    WBC Count 5.4 4.0 - 11.0 10e3/uL    RBC Count 6.01 (H) 3.80 - 5.90 10e6/uL    Hemoglobin 16.7 11.7 - 17.7 g/dL    Hematocrit 50.1 35.0 - 53.0 %    MCV 83 78 - 100 fL    MCH 27.8 26.5 - 33.0 pg    MCHC 33.3 31.5 - 36.5 g/dL    RDW 13.1 10.0 - 15.0 %    Platelet Count 251 150 - 450 10e3/uL    Narrative    The sex of this patient cannot be reliably determined based on discrepancies in demographics (legal sex, sex assigned at birth, gender identity).  Both male and female reference ranges are provided where applicable.  Careful evaluation of the patient's results as compared to the gender specific reference intervals is required in this setting.

## 2022-06-23 LAB — LIPASE SERPL-CCNC: 22 U/L (ref 13–60)

## 2022-06-24 NOTE — LETTER
7/17/2020       RE: Manoj Castellanos  [address]  [place] [address]     Dear Colleague,    Thank you for referring your patient, Manoj Castellanos, to the [facility] NEUROLOGY at [facility]. Please see a copy of my visit note below.    [facility] Neurology Follow Up Visit - Telephone    Manoj Castellanos MRN# [id number]   Age: 24 year old YOB: 1995     Brief history of symptoms: The patient was initially seen in neurologic consultation on 1012/2017, and for follow up on 4/6/2020 for evaluation of intermittent facial numbness. Please see the comprehensive neurologic consultation note from that date in the Epic records for details. 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 No major findings were seen on cervical MRI.  The patient was started on gabapentin and was to be evaluated with neurosurgery.    Interval history: The patient reports that he did discuss his treatment options with neurosurgery, and he will continue with medical management of his symptoms.  His massive attacks with facial numbness has not gone away as of yet, but his foot numbness has improved with gabapentin.  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  The attacks don't necessarily cause pain, but it does make his lips stiffer and harder to control.      Medications:     Current Outpatient Medications   Medication Sig     albuterol (PROAIR HFA/PROVENTIL HFA/VENTOLIN HFA) 108 (90 Base) MCG/ACT inhaler Inhale 2 puffs into the lungs every 4 hours as needed for shortness of breath / dyspnea or wheezing     cetirizine (ZYRTEC) 10 MG tablet Take 1 tablet (10 mg) by mouth 2 times  Call patient with positive COVID results.  Previously discussed CDC guidelines recommendations for COVID.    Follow-up as needed.  Go to ER for severe chest pain or sudden shortness of breath.    Jamaica Waggoner, APRN     daily     famotidine (PEPCID) 20 MG tablet Take 20 mg by mouth as needed     finasteride (PROSCAR) 5 MG tablet Take 1.25 mg by mouth daily     fluocinonide (LIDEX) 0.05 % cream Apply topically 2 times daily     fluticasone (FLOVENT HFA) 110 MCG/ACT inhaler Inhale 2 puffs into the lungs 2 times daily     gabapentin (NEURONTIN) 100 MG capsule Take 1 capsule (100 mg) by mouth 3 times daily     IBUPROFEN      minoxidil 5 % SOLN Externally apply topically 2 times daily     omeprazole (PRILOSEC OTC) 20 MG EC tablet Take 1 tablet (20 mg) by mouth daily     ondansetron (ZOFRAN-ODT) 4 MG ODT tab Take 1 tablet (4 mg) by mouth every 8 hours as needed for nausea     order for DME Equipment being ordered: SAD light      Allergies:     Allergies   Allergen Reactions     Amoxicillin Swelling     Lip swelling      Cefuroxime      Swelling      Minocycline Other (See Comments)     Throat felt like it was on fire        Review of Systems:   A comprehensive 10 point review of systems (constitutional, ENT, cardiac, peripheral vascular, lymphatic, respiratory, GI, , Musculoskeletal, skin, Neurological) was performed and found to be negative except as described in this note.          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  He may benefit from an increased dose of this medication before considering other medications such as Carbamezapine or oxcarbazepine.  Given that he has some [place] heritage, he may also need to have screening for HLAB27 to see if he may have increased risk for developing SJS.  The patient will contact me in 2 weeks to go over his symptoms while on increased dose of gabapentin.     Plan:  HLA testing next week  Gabapentin 300/300/300      Follow up in Neurology clinic in 6 months or earlier as needed should new concerns arise.    SHA Andrade,  ALLYSON   of Neurology    Greater than 50% of the total time (15 min) in this patient encounter was spent on counseling and/or coordination of care. We reviewed diagnostic results, impressions, and discussed other possible tests if symptoms do not improve. We discussed the implications of the diagnosis, as well as risks and benefits of management options. We reviewed treatment instructions and our scheduled follow-up as specified in the discharge plan. We also discussed the importance of compliance with the chosen course of treatment. The patient is in agreement with this plan and has no further questions.      Manoj Castellanos is a 24 year old adult who is being evaluated via a billable telephone visit.        Again, thank you for allowing me to participate in the care of your patient.      Sincerely,    Noe Andrade, DO

## 2022-06-25 LAB
ALBUMIN SERPL-MCNC: 4.4 G/DL (ref 3.4–5)
ALP SERPL-CCNC: 43 U/L (ref 40–150)
ALT SERPL W P-5'-P-CCNC: 48 U/L (ref 0–70)
AMYLASE SERPL-CCNC: 54 U/L (ref 30–110)
ANION GAP SERPL CALCULATED.3IONS-SCNC: 6 MMOL/L (ref 3–14)
AST SERPL W P-5'-P-CCNC: 30 U/L (ref 0–45)
BILIRUB SERPL-MCNC: 0.4 MG/DL (ref 0.2–1.3)
BUN SERPL-MCNC: 12 MG/DL (ref 7–30)
CALCIUM SERPL-MCNC: 9.3 MG/DL (ref 8.5–10.1)
CHLORIDE BLD-SCNC: 104 MMOL/L (ref 94–109)
CO2 SERPL-SCNC: 29 MMOL/L (ref 20–32)
CREAT SERPL-MCNC: 0.89 MG/DL (ref 0.52–1.25)
GFR SERPL CREATININE-BSD FRML MDRD: >90 ML/MIN/1.73M2
GLUCOSE BLD-MCNC: 88 MG/DL (ref 70–99)
POTASSIUM BLD-SCNC: 3.6 MMOL/L (ref 3.4–5.3)
PROT SERPL-MCNC: 7.9 G/DL (ref 6.8–8.8)
SODIUM SERPL-SCNC: 139 MMOL/L (ref 133–144)

## 2022-06-30 ENCOUNTER — OFFICE VISIT (OUTPATIENT)
Dept: ORTHOPEDICS | Facility: CLINIC | Age: 27
End: 2022-06-30
Payer: COMMERCIAL

## 2022-06-30 VITALS — SYSTOLIC BLOOD PRESSURE: 122 MMHG | DIASTOLIC BLOOD PRESSURE: 74 MMHG

## 2022-06-30 DIAGNOSIS — M25.561 ARTHRALGIA OF BOTH KNEES: ICD-10-CM

## 2022-06-30 DIAGNOSIS — M22.8X1 MALTRACKING OF RIGHT PATELLA: Primary | ICD-10-CM

## 2022-06-30 DIAGNOSIS — M22.8X2 MALTRACKING OF LEFT PATELLA: ICD-10-CM

## 2022-06-30 DIAGNOSIS — M25.562 ARTHRALGIA OF BOTH KNEES: ICD-10-CM

## 2022-06-30 PROCEDURE — 99203 OFFICE O/P NEW LOW 30 MIN: CPT | Performed by: ORTHOPAEDIC SURGERY

## 2022-06-30 NOTE — LETTER
6/30/2022         RE: Manoj Castellanos  [address] [address]        Dear Colleague,    Thank you for referring your patient, Manoj Castellanos, to the [facility] [place]. Please see a copy of my visit note below.    HISTORY OF PRESENT ILLNESS:    Manoj Castellanos is a 26 year old adult who is seen in consultation at the request of Dr. Lynn for bilateral patella mal tracking.     Present symptoms: bilateral patella mal tracking. They report clicking and grinding sensation with flexion-extension motions..   Very minimal pain at this time. Increased soreness with lack of activity. No swelling.  No history of subluxation or dislocation.   Treatments tried to this point: taping, Physical Therapy  Orthopedic PMH: history of wrist tendinitis, bilateral patella mal tracking.     Past Medical History:   Diagnosis Date     Depressive disorder 2014    it s chill now tho I m doing fine     Uncomplicated asthma early 2000s       Past Surgical History:   Procedure Laterality Date     HC TOOTH EXTRACTION W/FORCEP  2016    4 teeth        Family History   Problem Relation Age of Onset     Bipolar Disorder Mother      Substance Abuse Father      Asthma Father      Pancreatic Cancer Maternal Grandfather      Other Cancer Maternal Grandfather      Prostate Cancer Paternal Grandfather      Depression Other      Anxiety Disorder Other      Mental Illness Other      Depression Sister      Anxiety Disorder Sister      Attention Deficit Disorder Sister        Social History     Socioeconomic History     Marital status: Single     Spouse name: Not on file     Number of children: Not on file     Years of education: Not on file     Highest education level: Not on file   Occupational History     Not on file   Tobacco Use     Smoking status: Never Smoker     Smokeless tobacco: Never Used   Substance and Sexual Activity     Alcohol use: Yes     Comment: infrequent at worst/ occa      Drug use: No     Sexual  activity: Not Currently     Birth control/protection: Condom   Other Topics Concern     Parent/sibling w/ CABG, MI or angioplasty before 65F 55M? No   Social History Narrative     Not on file     Social Determinants of Health     Financial Resource Strain: Not on file   Food Insecurity: Not on file   Transportation Needs: Not on file   Physical Activity: Not on file   Stress: Not on file   Social Connections: Not on file   Intimate Partner Violence: Not on file   Housing Stability: Not on file       Current Outpatient Medications   Medication Sig Dispense Refill     albuterol (PROAIR HFA/PROVENTIL HFA/VENTOLIN HFA) 108 (90 Base) MCG/ACT inhaler Inhale 2 puffs into the lungs every 4 hours as needed for shortness of breath / dyspnea or wheezing 18 g 1     azelastine (ASTELIN) 0.1 % nasal spray Spray 1-2 sprays into both nostrils 2 times daily You are overdue for an appointment with Dr. Fraser.  Please call to schedule. 30 mL 0     carbamide peroxide (DEBROX) 6.5 % otic solution Place 5 drops into both ears 2 times daily 15 mL 0     cetirizine (ZYRTEC) 10 MG tablet Take 1 tablet (10 mg) by mouth 2 times daily 180 tablet 3     famotidine (PEPCID) 20 MG tablet Take 20 mg by mouth as needed       finasteride (PROSCAR) 5 MG tablet TAKE 1/4 TABLET BY MOUTH ONCE DAILY 90 tablet 1     FLOVENT  MCG/ACT inhaler INHALE 2 PUFFS INTO THE LUNGS TWICE DAILY 36 g 1     fluticasone (FLONASE) 50 MCG/ACT nasal spray Spray 2 sprays into both nostrils daily 16 g 11     gabapentin (NEURONTIN) 300 MG capsule Take 1 capsule (300 mg) by mouth 2 times daily PATIENT STATES HE TAKING 300 MG TWICE DAILY 180 capsule 3     IBUPROFEN        meclizine (ANTIVERT) 25 MG tablet Take 1 tablet (25 mg) by mouth 2 times daily as needed for dizziness . Appointment required for additional refills. 20 tablet 0     ondansetron (ZOFRAN-ODT) 4 MG ODT tab Take 1 tablet (4 mg) by mouth every 8 hours as needed for nausea 20 tablet 1     OXcarbazepine  (TRILEPTAL) 150 MG tablet Take two tablets (300 mg ) two times daily 120 tablet 3     vitamin B complex with vitamin C (STRESS TAB) tablet Take 1 tablet by mouth daily 90 tablet 0     Vitamin D3 (VITAMIN D3) 25 mcg (1000 units) tablet Take 2 tablets (50 mcg) by mouth daily 180 tablet 0       Allergies   Allergen Reactions     Amoxicillin Swelling     Lip swelling      Cefuroxime      Swelling      Minocycline Other (See Comments)     Throat felt like it was on fire       REVIEW OF SYSTEMS:  CONSTITUTIONAL:  NEGATIVE for fever, chills, change in weight  INTEGUMENTARY/SKIN:  NEGATIVE for worrisome rashes, moles or lesions  EYES:  NEGATIVE for vision changes or irritation  ENT/MOUTH:  NEGATIVE for ear, mouth and throat problems  RESP:  SOB  BREAST:  NEGATIVE for masses, tenderness or discharge  CV:  Irregular heartbeats  GI:  abdominal pain, reflux  :  Negative   MUSCULOSKELETAL:  See HPI above  NEURO: history of dizziness, and paresthesias   ENDOCRINE:  NEGATIVE for temperature intolerance, skin/hair changes  HEME/ALLERGY/IMMUNE:  NEGATIVE for bleeding problems  PSYCHIATRIC:  NEGATIVE for changes in mood or affect      PHYSICAL EXAM:  There were no vitals taken for this visit.  There is no height or weight on file to calculate BMI.   GENERAL APPEARANCE: healthy, alert and no distress   HEENT: No apparent thyroid megaly. Clear sclera with normal ocular movement  RESPIRATORY: No labored breathing  SKIN: no suspicious lesions or rashes  NEURO: Normal strength and tone, mentation intact and speech normal  VASCULAR: Good pulses, and capillary refill   LYMPH: no lymphadenopathy   PSYCH:  mentation appears normal and affect normal/bright    MUSCULOSKELETAL:  Not in acute distress  No difficulty getting up from sitting  Able to walk without limp    Poorly developed quadriceps including VMO  No knee effusion    On clinical examination, there appears to be slight patella oswald  Mild patellofemoral compression related  sensitivity/tenderness, bilateral    Medial and lateral joint lines not tender    Ligaments intact throughout  Shawn's is negative  Collateral ligaments are not tender    Slightly increased Q angle, bilateral with lateral position of the tibial tubercle     ASSESSMENT:  Chronic patellofemoral pain with slightly increased Q angle and weak quadriceps      PLAN:  As a teenager, he had a similar symptoms for which he underwent physical therapy and did well.  With understanding of the situation, he wants to try another course of physical therapy which is very reasonable.  A referral to physical therapy was made for VMO strengthening.    Otherwise, follow-up as needed.    Imaging Interpretation:     Recent Results (from the past 744 hour(s))   XR Knee Bilateral 1/2 Views    Narrative    EXAM: XR KNEE BILATERAL 1/2 VW  LOCATION: [facility]  DATE/TIME: 6/20/2022 1:34 PM    INDICATION: chronic knee pains  COMPARISON: None.      Impression    IMPRESSION:   RIGHT KNEE: Normal joint spaces and alignment. No fracture or joint effusion.    LEFT KNEE: Normal joint spaces and alignment. No fracture or joint effusion.           Manuelito Guerrier MD  Department of Orthopedic Surgery        Disclaimer: This note consists of symbols derived from keyboarding, dictation and/or voice recognition software. As a result, there may be errors in the script that have gone undetected. Please consider this when interpreting information found in this chart.        Again, thank you for allowing me to participate in the care of your patient.        Sincerely,        Manuelito Guerrier MD

## 2022-06-30 NOTE — PATIENT INSTRUCTIONS
1. Maltracking of right patella    2. Arthralgia of both knees    3. Maltracking of left patella      Physical Therapy orders have been placed for VMO strengthening, and patellar taping. Follow up if symptoms do not improve after 6-8 weeks of consistent therapy and home exercises.   Physical Therapy orders have been placed with [facility].  You can call [phone]  to schedule at your convenience.       Call my office with any questions or concerns, [phone].

## 2022-06-30 NOTE — PROGRESS NOTES
HISTORY OF PRESENT ILLNESS:    Manoj Castellanos is a 26 year old adult who is seen in consultation at the request of Dr. Lynn for bilateral patella mal tracking.     Present symptoms: bilateral patella mal tracking. They report clicking and grinding sensation with flexion-extension motions..   Very minimal pain at this time. Increased soreness with lack of activity. No swelling.  No history of subluxation or dislocation.   Treatments tried to this point: taping, Physical Therapy  Orthopedic PMH: history of wrist tendinitis, bilateral patella mal tracking.     Past Medical History:   Diagnosis Date     Depressive disorder 2014    it s chill now tho I m doing fine     Uncomplicated asthma early 2000s       Past Surgical History:   Procedure Laterality Date     HC TOOTH EXTRACTION W/FORCEP  2016    4 teeth        Family History   Problem Relation Age of Onset     Bipolar Disorder Mother      Substance Abuse Father      Asthma Father      Pancreatic Cancer Maternal Grandfather      Other Cancer Maternal Grandfather      Prostate Cancer Paternal Grandfather      Depression Other      Anxiety Disorder Other      Mental Illness Other      Depression Sister      Anxiety Disorder Sister      Attention Deficit Disorder Sister        Social History     Socioeconomic History     Marital status: Single     Spouse name: Not on file     Number of children: Not on file     Years of education: Not on file     Highest education level: Not on file   Occupational History     Not on file   Tobacco Use     Smoking status: Never Smoker     Smokeless tobacco: Never Used   Substance and Sexual Activity     Alcohol use: Yes     Comment: infrequent at worst/ occa      Drug use: No     Sexual activity: Not Currently     Birth control/protection: Condom   Other Topics Concern     Parent/sibling w/ CABG, MI or angioplasty before 65F 55M? No   Social History Narrative     Not on file     Social Determinants of Health     Financial Resource Strain: Not on  file   Food Insecurity: Not on file   Transportation Needs: Not on file   Physical Activity: Not on file   Stress: Not on file   Social Connections: Not on file   Intimate Partner Violence: Not on file   Housing Stability: Not on file       Current Outpatient Medications   Medication Sig Dispense Refill     albuterol (PROAIR HFA/PROVENTIL HFA/VENTOLIN HFA) 108 (90 Base) MCG/ACT inhaler Inhale 2 puffs into the lungs every 4 hours as needed for shortness of breath / dyspnea or wheezing 18 g 1     azelastine (ASTELIN) 0.1 % nasal spray Spray 1-2 sprays into both nostrils 2 times daily You are overdue for an appointment with Dr. Fraser.  Please call to schedule. 30 mL 0     carbamide peroxide (DEBROX) 6.5 % otic solution Place 5 drops into both ears 2 times daily 15 mL 0     cetirizine (ZYRTEC) 10 MG tablet Take 1 tablet (10 mg) by mouth 2 times daily 180 tablet 3     famotidine (PEPCID) 20 MG tablet Take 20 mg by mouth as needed       finasteride (PROSCAR) 5 MG tablet TAKE 1/4 TABLET BY MOUTH ONCE DAILY 90 tablet 1     FLOVENT  MCG/ACT inhaler INHALE 2 PUFFS INTO THE LUNGS TWICE DAILY 36 g 1     fluticasone (FLONASE) 50 MCG/ACT nasal spray Spray 2 sprays into both nostrils daily 16 g 11     gabapentin (NEURONTIN) 300 MG capsule Take 1 capsule (300 mg) by mouth 2 times daily PATIENT STATES HE TAKING 300 MG TWICE DAILY 180 capsule 3     IBUPROFEN        meclizine (ANTIVERT) 25 MG tablet Take 1 tablet (25 mg) by mouth 2 times daily as needed for dizziness . Appointment required for additional refills. 20 tablet 0     ondansetron (ZOFRAN-ODT) 4 MG ODT tab Take 1 tablet (4 mg) by mouth every 8 hours as needed for nausea 20 tablet 1     OXcarbazepine (TRILEPTAL) 150 MG tablet Take two tablets (300 mg ) two times daily 120 tablet 3     vitamin B complex with vitamin C (STRESS TAB) tablet Take 1 tablet by mouth daily 90 tablet 0     Vitamin D3 (VITAMIN D3) 25 mcg (1000 units) tablet Take 2 tablets (50 mcg) by mouth  daily 180 tablet 0       Allergies   Allergen Reactions     Amoxicillin Swelling     Lip swelling      Cefuroxime      Swelling      Minocycline Other (See Comments)     Throat felt like it was on fire       REVIEW OF SYSTEMS:  CONSTITUTIONAL:  NEGATIVE for fever, chills, change in weight  INTEGUMENTARY/SKIN:  NEGATIVE for worrisome rashes, moles or lesions  EYES:  NEGATIVE for vision changes or irritation  ENT/MOUTH:  NEGATIVE for ear, mouth and throat problems  RESP:  SOB  BREAST:  NEGATIVE for masses, tenderness or discharge  CV:  Irregular heartbeats  GI:  abdominal pain, reflux  :  Negative   MUSCULOSKELETAL:  See HPI above  NEURO: history of dizziness, and paresthesias   ENDOCRINE:  NEGATIVE for temperature intolerance, skin/hair changes  HEME/ALLERGY/IMMUNE:  NEGATIVE for bleeding problems  PSYCHIATRIC:  NEGATIVE for changes in mood or affect      PHYSICAL EXAM:  There were no vitals taken for this visit.  There is no height or weight on file to calculate BMI.   GENERAL APPEARANCE: healthy, alert and no distress   HEENT: No apparent thyroid megaly. Clear sclera with normal ocular movement  RESPIRATORY: No labored breathing  SKIN: no suspicious lesions or rashes  NEURO: Normal strength and tone, mentation intact and speech normal  VASCULAR: Good pulses, and capillary refill   LYMPH: no lymphadenopathy   PSYCH:  mentation appears normal and affect normal/bright    MUSCULOSKELETAL:  Not in acute distress  No difficulty getting up from sitting  Able to walk without limp    Poorly developed quadriceps including VMO  No knee effusion    On clinical examination, there appears to be slight patella oswald  Mild patellofemoral compression related sensitivity/tenderness, bilateral    Medial and lateral joint lines not tender    Ligaments intact throughout  Shawn's is negative  Collateral ligaments are not tender    Slightly increased Q angle, bilateral with lateral position of the tibial tubercle      ASSESSMENT:  Chronic patellofemoral pain with slightly increased Q angle and weak quadriceps      PLAN:  As a teenager, he had a similar symptoms for which he underwent physical therapy and did well.  With understanding of the situation, he wants to try another course of physical therapy which is very reasonable.  A referral to physical therapy was made for VMO strengthening.    Otherwise, follow-up as needed.    Imaging Interpretation:     Recent Results (from the past 744 hour(s))   XR Knee Bilateral 1/2 Views    Narrative    EXAM: XR KNEE BILATERAL 1/2 VW  LOCATION: [facility]  DATE/TIME: 6/20/2022 1:34 PM    INDICATION: chronic knee pains  COMPARISON: None.      Impression    IMPRESSION:   RIGHT KNEE: Normal joint spaces and alignment. No fracture or joint effusion.    LEFT KNEE: Normal joint spaces and alignment. No fracture or joint effusion.           Manuelito Guerrier MD  Department of Orthopedic Surgery        Disclaimer: This note consists of symbols derived from keyboarding, dictation and/or voice recognition software. As a result, there may be errors in the script that have gone undetected. Please consider this when interpreting information found in this chart.

## 2022-07-12 NOTE — PROGRESS NOTES
[facility] Initial Evaluation    Subjective:  Manoj Castellanos is a 26 year old adult with a bilateral knee condition. Mechanism of injury: Chronic bilateral knee patellar maltracking and discomfort starting for unknown reasons. Where: (home, work, MVA, community, recreation/sport, unknown, other): NA. Onset of symptoms: 2/1/22, PT order date: 6/30/22. Location of symptoms: anterior, PFJ. Pain level on number scale: 0/10. Biggest concern is poor tracking of patella. Quality of pain: No pain. Associated symptoms: giving way. Pain frequency (constant/intermittent): NA. Symptoms are exacerbated by: walking, squatting. Symptoms are relieved by: avoiding provoking movement. Progression of symptoms since onset (same/better/worse): same. Special tests (x-ray, MRI, CT scan, EMG, bone scan): x-ray. Previous treatment: None. Improvement with previous treatment: NA. General health as reported by patient is good. Pertinent medical history includes:  see Epic. Medical allergies includes: see Epic. Surgical history includes: see Epic. Current medications include: see Epic. Occupation: PCA. Patient is (working in normal job without restrictions/working in normal job with restrictions/working in an alternate job/not working due to present treatment problem): working in normal job. Primary job tasks: computer work, lifting, carrying, prolonged sitting, repetitive tasks. Barriers at home/work: None reported by patient. Red flags: None reported by patient.    Objective  Gait:  Bilateral trendelenburg, decreased right terminal knee extension    Bilateral knee AROM WNL    Knee Strength (* = pain) Right Left   FL 4/5 4+/5   EXT 5-/5 5/5   Quad Contraction (Good/Fair/Poor) fair fair   Hip ABD NT/5 NT/5     Palpation Tenderness:  None    Single leg squat: poor mechanics, weak quad and hip control, anterior knee excursion  SLS, firm, EC:  Unsteady bilaterally, < 3 seconds each    Assessment/Plan:    Patient is a 26  year old adult with both sides knee complaints.    Patient has the following significant findings with corresponding treatment plan.                Diagnosis 1:  Bilateral maltracking patella  Decreased strength - therapeutic exercise and therapeutic activities  Impaired balance - neuro re-education and therapeutic activities  Decreased proprioception - neuro re-education and therapeutic activities  Impaired gait - gait training  Impaired muscle performance - neuro re-education  Decreased function - therapeutic activities    Previous and current functional limitations:  (See Goal Flow Sheet for this information)    Short term and Long term goals: (See Goal Flow Sheet for this information)     Communication ability:  Patient appears to be able to clearly communicate and understand verbal and written communication and follow directions correctly.  Treatment Explanation - The following has been discussed with the patient:   RX ordered/plan of care  Anticipated outcomes  Possible risks and side effects  This patient would benefit from PT intervention to resume normal activities.   Rehab potential is good.    Frequency:  1 X week, once daily  Duration:  for 8 weeks  Discharge Plan:  Achieve all LTG.  Independent in home treatment program.  Reach maximal therapeutic benefit.    Please refer to the daily flowsheet for treatment today, total treatment time and time spent performing 1:1 timed codes.

## 2022-07-14 ENCOUNTER — THERAPY VISIT (OUTPATIENT)
Dept: PHYSICAL THERAPY | Facility: CLINIC | Age: 27
End: 2022-07-14
Attending: ORTHOPAEDIC SURGERY
Payer: COMMERCIAL

## 2022-07-14 DIAGNOSIS — M22.8X2 MALTRACKING OF LEFT PATELLA: ICD-10-CM

## 2022-07-14 DIAGNOSIS — M22.8X1 MALTRACKING OF RIGHT PATELLA: ICD-10-CM

## 2022-07-14 PROCEDURE — 97110 THERAPEUTIC EXERCISES: CPT | Mod: GP | Performed by: PHYSICAL THERAPIST

## 2022-07-14 PROCEDURE — 97161 PT EVAL LOW COMPLEX 20 MIN: CPT | Mod: GP | Performed by: PHYSICAL THERAPIST

## 2022-07-14 ASSESSMENT — ACTIVITIES OF DAILY LIVING (ADL)
SWELLING: I DO NOT HAVE THE SYMPTOM
SIT WITH YOUR KNEE BENT: ACTIVITY IS NOT DIFFICULT
PAIN: I DO NOT HAVE THE SYMPTOM
HOW_WOULD_YOU_RATE_THE_CURRENT_FUNCTION_OF_YOUR_KNEE_DURING_YOUR_USUAL_DAILY_ACTIVITIES_ON_A_SCALE_FROM_0_TO_100_WITH_100_BEING_YOUR_LEVEL_OF_KNEE_FUNCTION_PRIOR_TO_YOUR_INJURY_AND_0_BEING_THE_INABILITY_TO_PERFORM_ANY_OF_YOUR_USUAL_DAILY_ACTIVITIES?: 85
SQUAT: ACTIVITY IS MINIMALLY DIFFICULT
STIFFNESS: I DO NOT HAVE THE SYMPTOM
RAW_SCORE: 66
WALK: ACTIVITY IS NOT DIFFICULT
HOW_WOULD_YOU_RATE_THE_OVERALL_FUNCTION_OF_YOUR_KNEE_DURING_YOUR_USUAL_DAILY_ACTIVITIES?: ABNORMAL
KNEE_ACTIVITY_OF_DAILY_LIVING_SCORE: 94.29
WEAKNESS: I DO NOT HAVE THE SYMPTOM
RISE FROM A CHAIR: ACTIVITY IS NOT DIFFICULT
KNEEL ON THE FRONT OF YOUR KNEE: ACTIVITY IS NOT DIFFICULT
LIMPING: I DO NOT HAVE THE SYMPTOM
GIVING WAY, BUCKLING OR SHIFTING OF KNEE: THE SYMPTOM AFFECTS MY ACTIVITY MODERATELY
STAND: ACTIVITY IS NOT DIFFICULT
AS_A_RESULT_OF_YOUR_KNEE_INJURY,_HOW_WOULD_YOU_RATE_YOUR_CURRENT_LEVEL_OF_DAILY_ACTIVITY?: NORMAL
GO UP STAIRS: ACTIVITY IS NOT DIFFICULT
GO DOWN STAIRS: ACTIVITY IS NOT DIFFICULT
KNEE_ACTIVITY_OF_DAILY_LIVING_SUM: 66

## 2022-07-14 NOTE — PROGRESS NOTES
[facility]    OUTPATIENT Physical Therapy ORTHOPEDIC EVALUATION  PLAN OF TREATMENT FOR OUTPATIENT REHABILITATION  (COMPLETE FOR INITIAL CLAIMS ONLY)  Patient's Last Name, First Name, M.I.  YOB: 1995  Manoj Castellanos    Provider s Name:  [facility]   Medical Record No.  [id number]   Start of Care Date:  07/14/22   Onset Date:   02/01/22   Type:     _X__PT   ___OT Medical Diagnosis:    Encounter Diagnoses   Name Primary?    Maltracking of right patella     Maltracking of left patella         Treatment Diagnosis:  bilateral maltracking patella        Goals:     07/14/22 0500   Body Part   Goals listed below are for bilateral maltracking patella   Goal #1   Goal #1 ambulation   Previous Functional Level No restrictions   Current Functional Level Minutes patient can walk   Performance Level <30   STG Target Performance Minutes patient will be able to walk   Performance Level 15   Rationale for safe community ambulation   Due Date 08/25/22    LTG Target Performance Minutes patient will be able to  walk   Performance Level 30   Rationale for safe community ambulation   Due Date 10/06/22       Therapy Frequency:  1x per week  Predicted Duration of Therapy Intervention:  8 weeks    Mohamud Ralph PT                 I CERTIFY THE NEED FOR THESE SERVICES FURNISHED UNDER        THIS PLAN OF TREATMENT AND WHILE UNDER MY CARE     (Physician attestation of this document indicates review and certification of the therapy plan).                     Certification Date From:  07/14/22   Certification Date To:  10/11/22    Referring Provider:  Manuelito Guerrier    Initial Assessment        See Epic Evaluation SOC Date: 07/14/22

## 2022-08-10 ENCOUNTER — THERAPY VISIT (OUTPATIENT)
Dept: PHYSICAL THERAPY | Facility: CLINIC | Age: 27
End: 2022-08-10
Payer: COMMERCIAL

## 2022-08-10 DIAGNOSIS — M22.8X1 MALTRACKING OF RIGHT PATELLA: ICD-10-CM

## 2022-08-10 DIAGNOSIS — M22.8X2 MALTRACKING OF LEFT PATELLA: Primary | ICD-10-CM

## 2022-08-10 PROCEDURE — 97112 NEUROMUSCULAR REEDUCATION: CPT | Mod: GP | Performed by: PHYSICAL THERAPIST

## 2022-08-10 PROCEDURE — 97110 THERAPEUTIC EXERCISES: CPT | Mod: GP | Performed by: PHYSICAL THERAPIST

## 2022-08-11 DIAGNOSIS — J30.81 ALLERGIC RHINITIS DUE TO ANIMALS: ICD-10-CM

## 2022-08-11 DIAGNOSIS — J30.89 ALLERGIC RHINITIS DUE TO DUST MITE: ICD-10-CM

## 2022-08-11 DIAGNOSIS — J30.1 SEASONAL ALLERGIC RHINITIS DUE TO POLLEN: ICD-10-CM

## 2022-08-11 RX ORDER — FLUTICASONE PROPIONATE 50 MCG
2 SPRAY, SUSPENSION (ML) NASAL DAILY
Qty: 16 G | Refills: 0 | Status: SHIPPED | OUTPATIENT
Start: 2022-08-11 | End: 2023-03-24

## 2022-08-11 NOTE — TELEPHONE ENCOUNTER
"Routing refill request to provider for review/approval because:  Patient needs to be seen because it has been more than 1 year since last office visit.    Virtual visit completed on 4- with Dr. Fraser.    Gabbie TANNER RN 8/11/2022 12:08 PM      Requested Prescriptions   Pending Prescriptions Disp Refills     fluticasone (FLONASE) 50 MCG/ACT nasal spray 16 g 11     Sig: Spray 2 sprays into both nostrils daily       Nasal Allergy Protocol Failed - 8/11/2022 11:32 AM        Failed - Recent (12 mo) or future (30 days) visit within the authorizing provider's specialty     Patient has had an office visit with the authorizing provider or a provider within the authorizing providers department within the previous 12 mos or has a future within next 30 days. See \"Patient Info\" tab in inbasket, or \"Choose Columns\" in Meds & Orders section of the refill encounter.              Passed - Patient is age 12 or older        Passed - Medication is active on med list             "

## 2022-08-11 NOTE — TELEPHONE ENCOUNTER
My chart message sent to patient advising of Dr. Fraser's message below.    Gabbie TANNER RN 8/11/2022 2:49 PM

## 2022-08-17 ENCOUNTER — THERAPY VISIT (OUTPATIENT)
Dept: PHYSICAL THERAPY | Facility: CLINIC | Age: 27
End: 2022-08-17
Payer: COMMERCIAL

## 2022-08-17 DIAGNOSIS — M22.8X1 MALTRACKING OF RIGHT PATELLA: ICD-10-CM

## 2022-08-17 DIAGNOSIS — M22.8X2 MALTRACKING OF LEFT PATELLA: Primary | ICD-10-CM

## 2022-08-17 PROCEDURE — 97112 NEUROMUSCULAR REEDUCATION: CPT | Mod: GP | Performed by: PHYSICAL THERAPIST

## 2022-08-17 PROCEDURE — 97110 THERAPEUTIC EXERCISES: CPT | Mod: GP | Performed by: PHYSICAL THERAPIST

## 2022-08-24 ENCOUNTER — THERAPY VISIT (OUTPATIENT)
Dept: PHYSICAL THERAPY | Facility: CLINIC | Age: 27
End: 2022-08-24
Payer: COMMERCIAL

## 2022-08-24 DIAGNOSIS — M22.8X1 MALTRACKING OF RIGHT PATELLA: ICD-10-CM

## 2022-08-24 DIAGNOSIS — M22.8X2 MALTRACKING OF LEFT PATELLA: Primary | ICD-10-CM

## 2022-08-24 PROCEDURE — 97110 THERAPEUTIC EXERCISES: CPT | Mod: GP | Performed by: PHYSICAL THERAPIST

## 2022-08-24 PROCEDURE — 97112 NEUROMUSCULAR REEDUCATION: CPT | Mod: GP | Performed by: PHYSICAL THERAPIST

## 2022-08-25 ENCOUNTER — OFFICE VISIT (OUTPATIENT)
Dept: URGENT CARE | Facility: URGENT CARE | Age: 27
End: 2022-08-25
Payer: COMMERCIAL

## 2022-08-25 VITALS
SYSTOLIC BLOOD PRESSURE: 120 MMHG | RESPIRATION RATE: 15 BRPM | WEIGHT: 127 LBS | BODY MASS INDEX: 18.18 KG/M2 | HEART RATE: 83 BPM | HEIGHT: 70 IN | OXYGEN SATURATION: 98 % | TEMPERATURE: 98.2 F | DIASTOLIC BLOOD PRESSURE: 84 MMHG

## 2022-08-25 DIAGNOSIS — M25.552 HIP PAIN, LEFT: Primary | ICD-10-CM

## 2022-08-25 PROCEDURE — 99213 OFFICE O/P EST LOW 20 MIN: CPT | Performed by: PHYSICIAN ASSISTANT

## 2022-08-26 NOTE — PROGRESS NOTES
Hip pain, left  - Orthopedic  Referral; Future    Patient's pain is puzzling.  It does not seem to be flared up by any movements and is not tender to touch.  It comes on randomly and only lasts for short while before disappearing completely.  This seems to be more of a nerve pain.  I would like the patient to be evaluated by orthopedics first.  If they are unable to find any specific cause, then I would like them to follow-up with their neurologist for further work-up.    Pedro Ruggiero PA-C  [facility]    Subjective   27 year old who presents to clinic today for the following health issues:    Urgent Care and Musculoskeletal Problem     HPI     Pain History:  When did you first notice your pain? - Acute Pain   Where in your body do you have pain? Musculoskeletal problem/pain  Onset/Duration: 3 days of left sided hip   Description  Location: Left sided hip pain   Joint Swelling: No  Redness: No  Pain: YES  Warmth: No  Intensity:  Severe when the pain is happening but no pain in between flares of pain. Spikes of pain last for a minute and then vanish.   Progression of Symptoms:  Seems to be transient and intermittent  Accompanying signs and symptoms:   Fevers: No  Numbness/tingling/weakness: No  History  Trauma to the area: No  Recent illness:  No  Previous similar problem: No  Previous evaluation:  No  Precipitating or alleviating factors:  Aggravating factors include: none  Therapies tried and outcome: None      Patient takes 600 mg of gabapentin daily.  Patient also takes Trileptal.     Review of Systems   Review of Systems     See HPI     Objective    Temp: 98.2  F (36.8  C) Temp src: Temporal BP: 120/84 Pulse: 83   Resp: 15 SpO2: 98 %       Physical Exam   Physical Exam  Constitutional:       General: Manoj Castellanos is not in acute distress.     Appearance: Normal appearance. Manoj Castellanos is normal weight. Manoj Castellanos is not ill-appearing, toxic-appearing or diaphoretic.   HENT:      Head:  Normocephalic and atraumatic.   Cardiovascular:      Rate and Rhythm: Normal rate.      Pulses: Normal pulses.   Pulmonary:      Effort: Pulmonary effort is normal. No respiratory distress.   Neurological:      Mental Status: Manoj Castellanos is alert.   Psychiatric:         Mood and Affect: Mood normal.         Behavior: Behavior normal.         Thought Content: Thought content normal.         Judgment: Judgment normal.          No results found for this or any previous visit (from the past 24 hour(s)).

## 2022-09-07 ENCOUNTER — OFFICE VISIT (OUTPATIENT)
Dept: NEUROLOGY | Facility: CLINIC | Age: 27
End: 2022-09-07
Payer: COMMERCIAL

## 2022-09-07 ENCOUNTER — LAB (OUTPATIENT)
Dept: LAB | Facility: CLINIC | Age: 27
End: 2022-09-07

## 2022-09-07 VITALS
BODY MASS INDEX: 18.22 KG/M2 | SYSTOLIC BLOOD PRESSURE: 137 MMHG | RESPIRATION RATE: 16 BRPM | WEIGHT: 127 LBS | DIASTOLIC BLOOD PRESSURE: 84 MMHG | OXYGEN SATURATION: 97 % | HEART RATE: 102 BPM

## 2022-09-07 DIAGNOSIS — G50.0 TRIGEMINAL NEURALGIA: ICD-10-CM

## 2022-09-07 DIAGNOSIS — M79.2 NEUROPATHIC PAIN: ICD-10-CM

## 2022-09-07 LAB
ALBUMIN SERPL BCG-MCNC: 4.6 G/DL (ref 3.5–5.2)
ALP SERPL-CCNC: 46 U/L (ref 35–129)
ALT SERPL W P-5'-P-CCNC: 19 U/L (ref 10–50)
ANION GAP SERPL CALCULATED.3IONS-SCNC: 12 MMOL/L (ref 7–15)
AST SERPL W P-5'-P-CCNC: 28 U/L (ref 10–50)
BASOPHILS # BLD AUTO: 0 10E3/UL (ref 0–0.2)
BASOPHILS NFR BLD AUTO: 1 %
BILIRUB SERPL-MCNC: 0.5 MG/DL
BUN SERPL-MCNC: 15.1 MG/DL (ref 6–20)
CALCIUM SERPL-MCNC: 9.5 MG/DL (ref 8.6–10)
CHLORIDE SERPL-SCNC: 100 MMOL/L (ref 98–107)
CREAT SERPL-MCNC: 0.89 MG/DL (ref 0.51–1.17)
DEPRECATED HCO3 PLAS-SCNC: 28 MMOL/L (ref 22–29)
EOSINOPHIL # BLD AUTO: 0.2 10E3/UL (ref 0–0.7)
EOSINOPHIL NFR BLD AUTO: 2 %
ERYTHROCYTE [DISTWIDTH] IN BLOOD BY AUTOMATED COUNT: 13.1 % (ref 10–15)
GFR SERPL CREATININE-BSD FRML MDRD: >90 ML/MIN/1.73M2
GLUCOSE SERPL-MCNC: 81 MG/DL (ref 70–99)
HCT VFR BLD AUTO: 49.2 % (ref 35–53)
HGB BLD-MCNC: 16.2 G/DL (ref 11.7–17.7)
IMM GRANULOCYTES # BLD: 0 10E3/UL
IMM GRANULOCYTES NFR BLD: 1 %
LYMPHOCYTES # BLD AUTO: 1.3 10E3/UL (ref 0.8–5.3)
LYMPHOCYTES NFR BLD AUTO: 20 %
MCH RBC QN AUTO: 28.1 PG (ref 26.5–33)
MCHC RBC AUTO-ENTMCNC: 32.9 G/DL (ref 31.5–36.5)
MCV RBC AUTO: 85 FL (ref 78–100)
MONOCYTES # BLD AUTO: 0.5 10E3/UL (ref 0–1.3)
MONOCYTES NFR BLD AUTO: 8 %
NEUTROPHILS # BLD AUTO: 4.3 10E3/UL (ref 1.6–8.3)
NEUTROPHILS NFR BLD AUTO: 68 %
NRBC # BLD AUTO: 0 10E3/UL
NRBC BLD AUTO-RTO: 0 /100
PLATELET # BLD AUTO: 243 10E3/UL (ref 150–450)
POTASSIUM SERPL-SCNC: 3.9 MMOL/L (ref 3.4–5.3)
PROT SERPL-MCNC: 7.4 G/DL (ref 6.4–8.3)
RBC # BLD AUTO: 5.76 10E6/UL (ref 3.8–5.9)
SODIUM SERPL-SCNC: 140 MMOL/L (ref 136–145)
WBC # BLD AUTO: 6.3 10E3/UL (ref 4–11)

## 2022-09-07 PROCEDURE — 99214 OFFICE O/P EST MOD 30 MIN: CPT | Performed by: PSYCHIATRY & NEUROLOGY

## 2022-09-07 PROCEDURE — 85025 COMPLETE CBC W/AUTO DIFF WBC: CPT | Performed by: PATHOLOGY

## 2022-09-07 PROCEDURE — 80053 COMPREHEN METABOLIC PANEL: CPT | Performed by: PATHOLOGY

## 2022-09-07 PROCEDURE — 36415 COLL VENOUS BLD VENIPUNCTURE: CPT | Performed by: PATHOLOGY

## 2022-09-07 RX ORDER — GABAPENTIN 300 MG/1
300 CAPSULE ORAL 2 TIMES DAILY
Qty: 180 CAPSULE | Refills: 3 | Status: SHIPPED | OUTPATIENT
Start: 2022-09-07 | End: 2023-06-16

## 2022-09-07 RX ORDER — OXCARBAZEPINE 150 MG/1
300 TABLET, FILM COATED ORAL 2 TIMES DAILY
Qty: 120 TABLET | Refills: 11 | Status: SHIPPED | OUTPATIENT
Start: 2022-09-07 | End: 2023-10-09

## 2022-09-07 ASSESSMENT — PAIN SCALES - GENERAL: PAINLEVEL: MILD PAIN (3)

## 2022-09-07 NOTE — LETTER
9/7/2022     RE: Manoj Castellanos  [address]  [facility] [address]     Dear Colleague,    Thank you for referring your patient, Manoj Castellanos, to the [facility] NEUROLOGY CLINIC [facility]. Please see a copy of my visit note below.    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 Please see the comprehensive neurologic consultation notes from those dates in the Epic records for details.     Interval history:   - Gabapentin 300 mg TID was being used to manage symptoms.    - Oxcarbazepine 300 mg BID was being used to manage symptoms    Today, the patient can still have occasional numbness on the left side of their cheek.  The pain can come back intensely if they miss two consecutive dosing of either gabapentin or oxcarbazepine.  They have had some symptoms start to occur on the other side of their face intermittently.      Physical Exam:   Vitals: /84   Pulse 102   Resp 16   Wt 57.6 kg (127 lb)   SpO2 97%   BMI 18.22 kg/m     General: Seated comfortably in no acute distress.  Thin set. Excitable and affable in nature.  HEENT: Neck supple with normal range of motion.   Neurologic:     Mental Status: Fully alert, attentive and oriented. Speech clear and fluent, no paraphasic errors.     Cranial Nerves: EOMI with normal smooth pursuit. Facial movements symmetric. Hearing not formally tested but intact to conversation.  No dysarthria.     Motor: No tremors or other abnormal movements observed.      Coordination: Finger-nose-finger without dysmetria.     Gait: using walker with wheels, can stands quickly.          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  There is occasional breakthroughs with mediation being missed, but otherwise no major progression is noted.  Given the occasional symptoms on the opposite side of their face, I think repeating imaging could be done to look for pontine compression.     Plan:  - CBC, CMP  - MRI brain cranial nerve protocol  - Gabapentin 300 mg BID  - Trileptal 300 mg BID    Follow up in Neurology clinic in one year, or earlier as needed should new concerns arise.    SHA Andrade D.O.   of Neurology    Total time today (31 min) in this patient encounter was spent on pre-charting, counseling and/or coordination of care.

## 2022-09-07 NOTE — PROGRESS NOTES
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 Please see the comprehensive neurologic consultation notes from those dates in the Epic records for details.     Interval history:   - Gabapentin 300 mg TID was being used to manage symptoms.    - Oxcarbazepine 300 mg BID was being used to manage symptoms    Today, the patient can still have occasional numbness on the left side of their cheek.  The pain can come back intensely if they miss two consecutive dosing of either gabapentin or oxcarbazepine.  They have had some symptoms start to occur on the other side of their face intermittently.      Physical Exam:   Vitals: /84   Pulse 102   Resp 16   Wt 57.6 kg (127 lb)   SpO2 97%   BMI 18.22 kg/m     General: Seated comfortably in no acute distress.  Thin set. Excitable and affable in nature.  HEENT: Neck supple with normal range of motion.   Neurologic:     Mental Status: Fully alert, attentive and oriented. Speech clear and fluent, no paraphasic errors.     Cranial Nerves: EOMI with normal smooth pursuit. Facial movements symmetric. Hearing not formally tested but intact to conversation.  No dysarthria.     Motor: No tremors or other abnormal movements observed.      Coordination: Finger-nose-finger without dysmetria.     Gait: using walker with wheels, can stands quickly.          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  There is occasional breakthroughs with mediation being missed, but otherwise no major progression is noted.  Given the occasional symptoms on the opposite side of their face,  I think repeating imaging could be done to look for pontine compression.     Plan:  - CBC, CMP  - MRI brain cranial nerve protocol  - Gabapentin 300 mg BID  - Trileptal 300 mg BID    Follow up in Neurology clinic in one year, or earlier as needed should new concerns arise.    SHA Andrade D.O.   of Neurology    Total time today (31 min) in this patient encounter was spent on pre-charting, counseling and/or coordination of care.

## 2022-09-07 NOTE — PATIENT INSTRUCTIONS
You do have a defined trigeminal neuralgia on the right, but I think it is well controlled with medication at this time. Given your full body symptoms I would like you to obtain a new MRI.  Otherwise, I would want you to obtain a CBC/CMP to look for complications that can arise with oxcarbazepine use over a long time.

## 2022-09-12 DIAGNOSIS — J30.89 ALLERGIC RHINITIS DUE TO DUST MITE: ICD-10-CM

## 2022-09-12 DIAGNOSIS — J30.81 ALLERGIC RHINITIS DUE TO ANIMALS: ICD-10-CM

## 2022-09-12 DIAGNOSIS — J30.1 SEASONAL ALLERGIC RHINITIS DUE TO POLLEN: ICD-10-CM

## 2022-09-12 RX ORDER — FLUTICASONE PROPIONATE 50 MCG
2 SPRAY, SUSPENSION (ML) NASAL DAILY
Qty: 16 G | Refills: 0 | OUTPATIENT
Start: 2022-09-12

## 2022-09-12 NOTE — TELEPHONE ENCOUNTER
fluticasone (FLONASE) 50 MCG/ACT nasal spray    Adriana Fraser MD    8/11/22 12:24 PM  Note     Rx filled x 30 day supply. Patient has not been seen in over 1 year and will need a follow-up visit for additional refills.        Refill denied.    Gabbie TANNER RN

## 2022-09-13 DIAGNOSIS — J30.89 ALLERGIC RHINITIS DUE TO DUST MITE: ICD-10-CM

## 2022-09-13 DIAGNOSIS — J30.81 ALLERGIC RHINITIS DUE TO ANIMALS: ICD-10-CM

## 2022-09-13 DIAGNOSIS — J30.1 SEASONAL ALLERGIC RHINITIS DUE TO POLLEN: ICD-10-CM

## 2022-09-13 RX ORDER — FLUTICASONE PROPIONATE 50 MCG
2 SPRAY, SUSPENSION (ML) NASAL DAILY
Qty: 16 G | Refills: 0 | Status: CANCELLED | OUTPATIENT
Start: 2022-09-13

## 2022-09-15 ENCOUNTER — OFFICE VISIT (OUTPATIENT)
Dept: INTERNAL MEDICINE | Facility: CLINIC | Age: 27
End: 2022-09-15
Payer: COMMERCIAL

## 2022-09-15 VITALS
HEART RATE: 88 BPM | WEIGHT: 125 LBS | DIASTOLIC BLOOD PRESSURE: 60 MMHG | HEIGHT: 70 IN | OXYGEN SATURATION: 98 % | SYSTOLIC BLOOD PRESSURE: 120 MMHG | TEMPERATURE: 97.9 F | BODY MASS INDEX: 17.9 KG/M2

## 2022-09-15 DIAGNOSIS — H61.23 BILATERAL IMPACTED CERUMEN: Primary | ICD-10-CM

## 2022-09-15 PROCEDURE — 69210 REMOVE IMPACTED EAR WAX UNI: CPT | Performed by: INTERNAL MEDICINE

## 2022-09-15 PROCEDURE — 99212 OFFICE O/P EST SF 10 MIN: CPT | Mod: 25 | Performed by: INTERNAL MEDICINE

## 2022-09-15 ASSESSMENT — ASTHMA QUESTIONNAIRES
QUESTION_1 LAST FOUR WEEKS HOW MUCH OF THE TIME DID YOUR ASTHMA KEEP YOU FROM GETTING AS MUCH DONE AT WORK, SCHOOL OR AT HOME: A LITTLE OF THE TIME
ACT_TOTALSCORE: 19
QUESTION_2 LAST FOUR WEEKS HOW OFTEN HAVE YOU HAD SHORTNESS OF BREATH: THREE TO SIX TIMES A WEEK
QUESTION_3 LAST FOUR WEEKS HOW OFTEN DID YOUR ASTHMA SYMPTOMS (WHEEZING, COUGHING, SHORTNESS OF BREATH, CHEST TIGHTNESS OR PAIN) WAKE YOU UP AT NIGHT OR EARLIER THAN USUAL IN THE MORNING: NOT AT ALL
ACT_TOTALSCORE: 19
QUESTION_5 LAST FOUR WEEKS HOW WOULD YOU RATE YOUR ASTHMA CONTROL: WELL CONTROLLED
QUESTION_4 LAST FOUR WEEKS HOW OFTEN HAVE YOU USED YOUR RESCUE INHALER OR NEBULIZER MEDICATION (SUCH AS ALBUTEROL): TWO OR THREE TIMES PER WEEK

## 2022-09-15 ASSESSMENT — PATIENT HEALTH QUESTIONNAIRE - PHQ9
10. IF YOU CHECKED OFF ANY PROBLEMS, HOW DIFFICULT HAVE THESE PROBLEMS MADE IT FOR YOU TO DO YOUR WORK, TAKE CARE OF THINGS AT HOME, OR GET ALONG WITH OTHER PEOPLE: SOMEWHAT DIFFICULT
SUM OF ALL RESPONSES TO PHQ QUESTIONS 1-9: 6
SUM OF ALL RESPONSES TO PHQ QUESTIONS 1-9: 6

## 2022-09-15 NOTE — PROGRESS NOTES
"  Assessment & Plan     1. Bilateral impacted cerumen  Ears will be irrigated today.  We discussed preventative measures they can take for future.  Follow-up with primary care provider before end of year for physical.  - REMOVE IMPACTED CERUMEN                 Return in about 3 months (around 12/15/2022) for Routine preventive, in person, with PCP only.    AJITH RM MD  [facility]    Subjective   Manoj is a 27 year old, presenting for the following health issues:  Ear Problem (Bilateral ear fullness )      History of Present Illness       Reason for visit:  Earwax buildup affecting hearing    Manoj Castellanos eats 0-1 servings of fruits and vegetables daily.Manoj Castellanos consumes 0 sweetened beverage(s) daily.Ty E Gale exercises with enough effort to increase Ty GENO Castellanos's heart rate 10 to 19 minutes per day.  Ty GENO Gale exercises with enough effort to increase Ty GENO Castellanos's heart rate 5 days per week.   Manoj Castellanos is taking medications regularly.    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  They have recurrent issues with this.  Usually use some Debrox but they have been unable to remove the wax.    Review of Systems         Objective    /60 (BP Location: Left arm, Patient Position: Sitting, Cuff Size: Adult Regular)   Pulse 88   Temp 97.9  F (36.6  C)   Ht 1.778 m (5' 10\")   Wt 56.7 kg (125 lb)   SpO2 98%   BMI 17.94 kg/m    Body mass index is 17.94 kg/m .  Physical Exam       Pleasant.  No distress.  Bilateral cerumen impaction.                "

## 2022-10-07 ENCOUNTER — ANCILLARY PROCEDURE (OUTPATIENT)
Dept: MRI IMAGING | Facility: CLINIC | Age: 27
End: 2022-10-07
Attending: PSYCHIATRY & NEUROLOGY
Payer: COMMERCIAL

## 2022-10-07 DIAGNOSIS — G50.0 TRIGEMINAL NEURALGIA: ICD-10-CM

## 2022-10-07 DIAGNOSIS — E55.9 VITAMIN D DEFICIENCY: ICD-10-CM

## 2022-10-07 DIAGNOSIS — M79.2 NEUROPATHIC PAIN: ICD-10-CM

## 2022-10-07 PROCEDURE — A9585 GADOBUTROL INJECTION: HCPCS | Performed by: RADIOLOGY

## 2022-10-07 PROCEDURE — 70553 MRI BRAIN STEM W/O & W/DYE: CPT | Performed by: RADIOLOGY

## 2022-10-07 RX ORDER — GADOBUTROL 604.72 MG/ML
7.5 INJECTION INTRAVENOUS ONCE
Status: COMPLETED | OUTPATIENT
Start: 2022-10-07 | End: 2022-10-07

## 2022-10-07 RX ORDER — CHOLECALCIFEROL (VITAMIN D3) 25 MCG
TABLET ORAL
Qty: 180 TABLET | Refills: 0 | Status: SHIPPED | OUTPATIENT
Start: 2022-10-07 | End: 2023-01-03

## 2022-10-07 RX ADMIN — GADOBUTROL 5.5 ML: 604.72 INJECTION INTRAVENOUS at 16:42

## 2022-10-07 NOTE — TELEPHONE ENCOUNTER
Prescription approved per [facility] Refill Protocol.  ALBERTO Davis RN  [facility]

## 2022-10-07 NOTE — DISCHARGE INSTRUCTIONS
MRI Contrast Discharge Instructions    The IV contrast you received today will pass out of your body in your  urine. This will happen in the next 24 hours. You will not feel this process.  Your urine will not change color.    Drink at least 4 extra glasses of water or juice today (unless your doctor  has restricted your fluids). This reduces the stress on your kidneys.  You may take your regular medicines.    If you are on dialysis: It is best to have dialysis today.    If you have a reaction: Most reactions happen right away. If you have  any new symptoms after leaving the hospital (such as hives or swelling),  call your hospital at the correct number below. Or call your family doctor.  If you have breathing distress or wheezing, call 911.    Special instructions: ***    I have read and understand the above information.    Signature:______________________________________ Date:___________    Staff:__________________________________________ Date:___________     Time:__________    [facility] Departments:    ___[place]: [phone]  ___[facility]: [phone]  ___[place]: [phone] ___[facility]: [phone]  ___[facility]: [phone]  ___[facility]: [phone]  ___[phone]  ___[facility]: [phone]  ___[phone]

## 2022-10-11 PROBLEM — M22.8X1 MALTRACKING OF RIGHT PATELLA: Status: RESOLVED | Noted: 2022-07-14 | Resolved: 2022-10-11

## 2022-10-11 PROBLEM — M22.8X2 MALTRACKING OF LEFT PATELLA: Status: RESOLVED | Noted: 2022-07-14 | Resolved: 2022-10-11

## 2022-10-11 NOTE — PROGRESS NOTES
Discharge Note    Progress reporting period is from initial evaluation date (please see noted date below) to Aug 24, 2022.  Linked Episodes   Type: Episode: Status: Noted: Resolved: Last update: Updated by:   PHYSICAL THERAPY bilateral knee pain Active 7/14/2022 8/24/2022  3:29 PM Mohamud Ralph PT      Comments:       Ty failed to follow up and current status is unknown.  Please see information below for last relevant information on current status.  Patient seen for 4 visits.    SUBJECTIVE  Subjective changes noted by patient:  Patient reports less consistency the past week with exercises. Same reasons why - difficulty with starting routines and changing behaviors. Patient finds it difficult without structure to days. School will start September 5 and hopefully it will help with developing better routines. (patient 7' late)  .  Current pain level is 0/10.     Previous pain level was  0/10.   Changes in function:  Yes (See Goal flowsheet attached for changes in current functional level)  Adverse reaction to treatment or activity: None    OBJECTIVE  Changes noted in objective findings: bilateral knee AROM WNL; tight bilateral hamstrings, weak bilateral glut med; decreased weight shift left noted during squat     ASSESSMENT/PLAN  Diagnosis: bilateral maltracking patella   Updated problem list and treatment plan:     STG/LTGs have been met or progress has been made towards goals:  Yes, please see goal flowsheet for most current information  Assessment of Progress: current status is unknown.    Last current status: Pt is progressing as expected   Self Management Plans:  HEP  I have re-evaluated this patient and find that the nature, scope, duration and intensity of the therapy is appropriate for the medical condition of the patient.  Ty continues to require the following intervention to meet STG and LTG's:  HEP.    Recommendations:  Discharge with current home program.  Patient to follow up with MD as  needed.    Please refer to the daily flowsheet for treatment today, total treatment time and time spent performing 1:1 timed codes.

## 2022-10-12 DIAGNOSIS — J30.89 ALLERGIC RHINITIS DUE TO DUST MITE: ICD-10-CM

## 2022-10-12 DIAGNOSIS — J30.81 ALLERGIC RHINITIS DUE TO ANIMALS: ICD-10-CM

## 2022-10-12 DIAGNOSIS — J30.1 SEASONAL ALLERGIC RHINITIS DUE TO POLLEN: ICD-10-CM

## 2022-10-12 DIAGNOSIS — J45.30 MILD PERSISTENT ASTHMA IN ADULT WITHOUT COMPLICATION: ICD-10-CM

## 2022-10-13 RX ORDER — AZELASTINE 1 MG/ML
1-2 SPRAY, METERED NASAL 2 TIMES DAILY
Qty: 30 ML | Refills: 0 | OUTPATIENT
Start: 2022-10-13

## 2022-10-14 NOTE — TELEPHONE ENCOUNTER
Routing refill request to provider for review/approval because:  --ACT < 20.  --Last visit:  12/2/21 virtual with House.  --Last physical 11/22/21 with Yomaira.  --Future Visit: none with FP.        --Last Written Prescription Date:     Disp Refills Start End ELVIA   FLOVENT  MCG/ACT inhaler 36 g 1 4/26/2022  No   Sig: INHALE 2 PUFFS INTO THE LUNGS TWICE DAILY         ACT Total Scores 11/22/2021 5/24/2022 9/15/2022   ACT TOTAL SCORE (Goal Greater than or Equal to 20) 24 22 19   In the past 12 months, how many times did you visit the emergency room for your asthma without being admitted to the hospital? 0 0 0   In the past 12 months, how many times were you hospitalized overnight because of your asthma? 0 0 0

## 2022-10-19 RX ORDER — FLUTICASONE PROPIONATE 110 UG/1
2 AEROSOL, METERED RESPIRATORY (INHALATION) 2 TIMES DAILY
Qty: 36 G | Refills: 0 | Status: SHIPPED | OUTPATIENT
Start: 2022-10-19 | End: 2023-02-17

## 2022-11-08 ENCOUNTER — VIRTUAL VISIT (OUTPATIENT)
Dept: FAMILY MEDICINE | Facility: CLINIC | Age: 27
End: 2022-11-08

## 2022-11-08 DIAGNOSIS — Z91.199 NO-SHOW FOR APPOINTMENT: Primary | ICD-10-CM

## 2022-11-09 ENCOUNTER — OFFICE VISIT (OUTPATIENT)
Dept: URGENT CARE | Facility: URGENT CARE | Age: 27
End: 2022-11-09
Payer: COMMERCIAL

## 2022-11-09 VITALS
HEART RATE: 88 BPM | DIASTOLIC BLOOD PRESSURE: 98 MMHG | OXYGEN SATURATION: 100 % | TEMPERATURE: 97.8 F | SYSTOLIC BLOOD PRESSURE: 138 MMHG | BODY MASS INDEX: 18.62 KG/M2 | WEIGHT: 129.8 LBS | RESPIRATION RATE: 16 BRPM

## 2022-11-09 DIAGNOSIS — R07.0 THROAT PAIN: Primary | ICD-10-CM

## 2022-11-09 DIAGNOSIS — J20.9 ACUTE BRONCHITIS WITH SYMPTOMS > 10 DAYS: ICD-10-CM

## 2022-11-09 DIAGNOSIS — B37.0 THRUSH: ICD-10-CM

## 2022-11-09 LAB
DEPRECATED S PYO AG THROAT QL EIA: NEGATIVE
GROUP A STREP BY PCR: NOT DETECTED

## 2022-11-09 PROCEDURE — 99214 OFFICE O/P EST MOD 30 MIN: CPT | Performed by: PHYSICIAN ASSISTANT

## 2022-11-09 PROCEDURE — 87651 STREP A DNA AMP PROBE: CPT | Performed by: PHYSICIAN ASSISTANT

## 2022-11-09 RX ORDER — CLOTRIMAZOLE 10 MG/1
10 LOZENGE ORAL
Qty: 35 LOZENGE | Refills: 0 | Status: SHIPPED | OUTPATIENT
Start: 2022-11-09 | End: 2022-11-16

## 2022-11-09 RX ORDER — AZITHROMYCIN 250 MG/1
TABLET, FILM COATED ORAL
Qty: 6 TABLET | Refills: 0 | Status: SHIPPED | OUTPATIENT
Start: 2022-11-09 | End: 2022-12-07

## 2022-11-09 NOTE — PROGRESS NOTES
Assessment & Plan     1. Throat pain  - Streptococcus A Rapid Screen w/Reflex to PCR  - Group A Streptococcus PCR Throat Swab    2. Thrush  Oral exam is consistent with thrush with white patches noted throughout buccal mucosa. He does not have signs of esophagitis, low suspicion for esophageal candidiasis.   Clotrimazole as prescribed, wash mouth out after flovent  Follow-up with PCP if symptoms not improving as expected  - clotrimazole (MYCELEX) 10 MG lozenge; Place 1 lozenge (10 mg) inside cheek 5 times daily for 7 days  Dispense: 35 lozenge; Refill: 0    3. Acute bronchitis with symptoms > 10 days  Treatment as below  - azithromycin (ZITHROMAX) 250 MG tablet; Two tablets first day, then one tablet daily for four days.  Dispense: 6 tablet; Refill: 0      Diagnosis and treatment plan was reviewed with patient and/or family.   We went over any labs or imaging. Discussed worsening symptoms or little to no relief despite treatment plan to follow-up with PCP or return to clinic.  Patient verbalizes understanding. All questions were addressed and answered.     RANDY Mancia [facility] URGENT CARE COMFORT    CHIEF COMPLAINT:   Chief Complaint   Patient presents with     Swelling     Inside mouth x1 week       Pharyngitis     X2 weeks       Cough     X2 weeks       Dysphagia     X2 weeks       Subjective     Ty is a 27 year old adult who presents to clinic today for evaluation.  Patient has had mouth inflammation and pain along with sore throat for the past 2 weeks. Takes Flovent.   Additionally, has had a cough. No wheezing. HX of asthma. No fever, chest pain or SOB.       Past Medical History:   Diagnosis Date     Depressive disorder 2014    it s chill now tho I m doing fine     Uncomplicated asthma early 2000s     Past Surgical History:   Procedure Laterality Date     HC TOOTH EXTRACTION W/FORCEP  2016    4 teeth      Social History     Tobacco Use     Smoking status: Never     Smokeless tobacco: Never    Substance Use Topics     Alcohol use: Yes     Comment: infrequent at worst/ occa      Current Outpatient Medications   Medication     albuterol (PROAIR HFA/PROVENTIL HFA/VENTOLIN HFA) 108 (90 Base) MCG/ACT inhaler     azithromycin (ZITHROMAX) 250 MG tablet     carbamide peroxide (DEBROX) 6.5 % otic solution     cetirizine (ZYRTEC) 10 MG tablet     clotrimazole (MYCELEX) 10 MG lozenge     famotidine (PEPCID) 20 MG tablet     finasteride (PROSCAR) 5 MG tablet     fluticasone (FLOVENT HFA) 110 MCG/ACT inhaler     gabapentin (NEURONTIN) 300 MG capsule     IBUPROFEN     meclizine (ANTIVERT) 25 MG tablet     ondansetron (ZOFRAN-ODT) 4 MG ODT tab     OXcarbazepine (TRILEPTAL) 150 MG tablet     VITAMIN D3 25 MCG (1000 UT) tablet     azelastine (ASTELIN) 0.1 % nasal spray     fluticasone (FLONASE) 50 MCG/ACT nasal spray     No current facility-administered medications for this visit.     Allergies   Allergen Reactions     Amoxicillin Swelling     Lip swelling      Cefuroxime      Swelling        10 point ROS of systems were all negative except for pertinent positives noted in my HPI.      Exam:   BP (!) 138/98 (BP Location: Left arm, Patient Position: Sitting, Cuff Size: Adult Regular)   Pulse 88   Temp 97.8  F (36.6  C) (Oral)   Resp 16   Wt 58.9 kg (129 lb 12.8 oz)   SpO2 100%   BMI 18.62 kg/m    Constitutional: healthy, alert and no distress  Head: Normocephalic, atraumatic.  Eyes: conjunctiva clear, no drainage  ENT: TMs clear and shiny joshua, nasal mucosa pink and moist, throat without tonsillar hypertrophy or erythema.  White patches with fissuring noted on buccal mucosa  Neck: neck is supple, no cervical lymphadenopathy or nuchal rigidity  Cardiovascular: RRR  Respiratory: CTA bilaterally, no rhonchi or rales  Gastrointestinal: soft and nontender  Skin: no rashes  Neurologic: Speech clear, gait normal. Moves all extremities.    Results for orders placed or performed in visit on 11/09/22   Streptococcus A Rapid  Screen w/Reflex to PCR     Status: Normal    Specimen: Throat; Swab   Result Value Ref Range    Group A Strep antigen Negative Negative

## 2022-11-09 NOTE — PATIENT INSTRUCTIONS
Start taking clotrimazole troches for thrush  Make sure to wash your mouth out with water after using Flovent  Azithromycin for bronchitis  Fluids and rest   Mucinex DM for cough  Humidified air at night

## 2022-11-10 DIAGNOSIS — R42 VERTIGO: ICD-10-CM

## 2022-11-11 RX ORDER — ONDANSETRON 4 MG/1
TABLET, ORALLY DISINTEGRATING ORAL
Qty: 20 TABLET | Refills: 0 | Status: SHIPPED | OUTPATIENT
Start: 2022-11-11 | End: 2023-02-20

## 2022-11-11 NOTE — TELEPHONE ENCOUNTER
Refill given x1.  Patient needs to be seen for further refills.  Please call to schedule.  Maureen Cunningham RN  [facility]

## 2022-11-21 ENCOUNTER — VIRTUAL VISIT (OUTPATIENT)
Dept: FAMILY MEDICINE | Facility: CLINIC | Age: 27
End: 2022-11-21
Payer: COMMERCIAL

## 2022-11-21 DIAGNOSIS — R14.0 ABDOMINAL BLOATING: ICD-10-CM

## 2022-11-21 DIAGNOSIS — R11.0 NAUSEA: ICD-10-CM

## 2022-11-21 DIAGNOSIS — B37.0 ORAL CANDIDA: ICD-10-CM

## 2022-11-21 DIAGNOSIS — R10.84 ABDOMINAL PAIN, GENERALIZED: Primary | ICD-10-CM

## 2022-11-21 PROCEDURE — 99214 OFFICE O/P EST MOD 30 MIN: CPT | Mod: 95 | Performed by: FAMILY MEDICINE

## 2022-11-21 ASSESSMENT — ASTHMA QUESTIONNAIRES
QUESTION_5 LAST FOUR WEEKS HOW WOULD YOU RATE YOUR ASTHMA CONTROL: COMPLETELY CONTROLLED
QUESTION_3 LAST FOUR WEEKS HOW OFTEN DID YOUR ASTHMA SYMPTOMS (WHEEZING, COUGHING, SHORTNESS OF BREATH, CHEST TIGHTNESS OR PAIN) WAKE YOU UP AT NIGHT OR EARLIER THAN USUAL IN THE MORNING: NOT AT ALL
ACT_TOTALSCORE: 25
QUESTION_2 LAST FOUR WEEKS HOW OFTEN HAVE YOU HAD SHORTNESS OF BREATH: NOT AT ALL
ACT_TOTALSCORE: 25
QUESTION_1 LAST FOUR WEEKS HOW MUCH OF THE TIME DID YOUR ASTHMA KEEP YOU FROM GETTING AS MUCH DONE AT WORK, SCHOOL OR AT HOME: NONE OF THE TIME
QUESTION_4 LAST FOUR WEEKS HOW OFTEN HAVE YOU USED YOUR RESCUE INHALER OR NEBULIZER MEDICATION (SUCH AS ALBUTEROL): NOT AT ALL

## 2022-11-21 ASSESSMENT — PATIENT HEALTH QUESTIONNAIRE - PHQ9
SUM OF ALL RESPONSES TO PHQ QUESTIONS 1-9: 6
10. IF YOU CHECKED OFF ANY PROBLEMS, HOW DIFFICULT HAVE THESE PROBLEMS MADE IT FOR YOU TO DO YOUR WORK, TAKE CARE OF THINGS AT HOME, OR GET ALONG WITH OTHER PEOPLE: VERY DIFFICULT
SUM OF ALL RESPONSES TO PHQ QUESTIONS 1-9: 6

## 2022-11-21 NOTE — PATIENT INSTRUCTIONS
At [facility], we strive to deliver an exceptional experience to you, every time we see you. If you receive a survey, please complete it as we do value your feedback.  If you have MyChart, you can expect to receive results automatically within 24 hours of their completion.  Your provider will send a note interpreting your results as well.   If you do not have MyChart, you should receive your results in about a week by mail.    Your care team:                            Family Medicine Internal Medicine   MD Sukhdev Kauffman MD Shantel Branch-Fleming, MD Srinivasa Vaka, MD Katya Belousova, PAKEYANNA Telles CNP, MD ([facility]   Claudio Braxton, MD Megan Morrison MD Amelia Massimini APRN CASSIDY Tyler APRN MD Yas Lutz MD          Clinic hours: Monday - Thursday 7 am-6 pm; Fridays 7 am-5 pm.   Urgent care: Monday - Friday 10 am- 8 pm; Saturday and Sunday 9 am-5 pm.    Clinic: [phone]       [facility]: Monday - Thursday 8 am - 7 pm; Friday 8 am - 6 pm  [facility]: [phone]

## 2022-11-22 RX ORDER — DICYCLOMINE HYDROCHLORIDE 10 MG/1
10 CAPSULE ORAL 2 TIMES DAILY PRN
Qty: 20 CAPSULE | Refills: 0 | Status: SHIPPED | OUTPATIENT
Start: 2022-11-22 | End: 2022-12-07

## 2022-11-23 ENCOUNTER — LAB (OUTPATIENT)
Dept: LAB | Facility: CLINIC | Age: 27
End: 2022-11-23
Payer: COMMERCIAL

## 2022-11-23 DIAGNOSIS — R10.84 ABDOMINAL PAIN, GENERALIZED: ICD-10-CM

## 2022-11-23 LAB
ALBUMIN SERPL BCG-MCNC: 3.6 G/DL (ref 3.5–5.2)
ALP SERPL-CCNC: 74 U/L (ref 35–129)
ALT SERPL W P-5'-P-CCNC: 17 U/L (ref 10–50)
ANION GAP SERPL CALCULATED.3IONS-SCNC: 8 MMOL/L (ref 7–15)
AST SERPL W P-5'-P-CCNC: 22 U/L (ref 10–50)
BASOPHILS # BLD AUTO: 0.1 10E3/UL (ref 0–0.2)
BASOPHILS NFR BLD AUTO: 1 %
BILIRUB SERPL-MCNC: 0.2 MG/DL
BUN SERPL-MCNC: 12.3 MG/DL (ref 6–20)
CALCIUM SERPL-MCNC: 8.7 MG/DL (ref 8.6–10)
CHLORIDE SERPL-SCNC: 105 MMOL/L (ref 98–107)
CREAT SERPL-MCNC: 0.94 MG/DL (ref 0.51–1.17)
DEPRECATED HCO3 PLAS-SCNC: 30 MMOL/L (ref 22–29)
EOSINOPHIL # BLD AUTO: 1.6 10E3/UL (ref 0–0.7)
EOSINOPHIL NFR BLD AUTO: 26 %
ERYTHROCYTE [DISTWIDTH] IN BLOOD BY AUTOMATED COUNT: 12.7 % (ref 10–15)
GFR SERPL CREATININE-BSD FRML MDRD: >90 ML/MIN/1.73M2
GLUCOSE SERPL-MCNC: 118 MG/DL (ref 70–99)
HCT VFR BLD AUTO: 52.3 % (ref 35–53)
HGB BLD-MCNC: 17.3 G/DL (ref 11.7–17.7)
IMM GRANULOCYTES # BLD: 0 10E3/UL
IMM GRANULOCYTES NFR BLD: 0 %
LIPASE SERPL-CCNC: 34 U/L (ref 13–60)
LYMPHOCYTES # BLD AUTO: 1.2 10E3/UL (ref 0.8–5.3)
LYMPHOCYTES NFR BLD AUTO: 20 %
MCH RBC QN AUTO: 28.1 PG (ref 26.5–33)
MCHC RBC AUTO-ENTMCNC: 33.1 G/DL (ref 31.5–36.5)
MCV RBC AUTO: 85 FL (ref 78–100)
MONOCYTES # BLD AUTO: 0.4 10E3/UL (ref 0–1.3)
MONOCYTES NFR BLD AUTO: 7 %
NEUTROPHILS # BLD AUTO: 2.7 10E3/UL (ref 1.6–8.3)
NEUTROPHILS NFR BLD AUTO: 46 %
NRBC # BLD AUTO: 0 10E3/UL
NRBC BLD AUTO-RTO: 0 /100
PLATELET # BLD AUTO: 291 10E3/UL (ref 150–450)
POTASSIUM SERPL-SCNC: 4.3 MMOL/L (ref 3.4–5.3)
PROT SERPL-MCNC: 5.7 G/DL (ref 6.4–8.3)
RBC # BLD AUTO: 6.15 10E6/UL (ref 3.8–5.9)
SODIUM SERPL-SCNC: 143 MMOL/L (ref 136–145)
WBC # BLD AUTO: 6 10E3/UL (ref 4–11)

## 2022-11-23 PROCEDURE — 36415 COLL VENOUS BLD VENIPUNCTURE: CPT

## 2022-11-23 PROCEDURE — 80053 COMPREHEN METABOLIC PANEL: CPT

## 2022-11-23 PROCEDURE — 83690 ASSAY OF LIPASE: CPT

## 2022-11-23 PROCEDURE — 85025 COMPLETE CBC W/AUTO DIFF WBC: CPT

## 2022-11-29 DIAGNOSIS — R10.84 ABDOMINAL PAIN, GENERALIZED: Primary | ICD-10-CM

## 2022-11-29 DIAGNOSIS — R14.0 ABDOMINAL BLOATING: ICD-10-CM

## 2022-12-02 ENCOUNTER — TELEPHONE (OUTPATIENT)
Dept: GASTROENTEROLOGY | Facility: CLINIC | Age: 27
End: 2022-12-02

## 2022-12-02 ENCOUNTER — ANCILLARY PROCEDURE (OUTPATIENT)
Dept: CT IMAGING | Facility: CLINIC | Age: 27
End: 2022-12-02
Attending: FAMILY MEDICINE
Payer: COMMERCIAL

## 2022-12-02 DIAGNOSIS — R14.0 ABDOMINAL BLOATING: ICD-10-CM

## 2022-12-02 DIAGNOSIS — J30.1 CHRONIC SEASONAL ALLERGIC RHINITIS DUE TO POLLEN: ICD-10-CM

## 2022-12-02 DIAGNOSIS — R10.84 ABDOMINAL PAIN, GENERALIZED: ICD-10-CM

## 2022-12-02 DIAGNOSIS — J30.81 CHRONIC ALLERGIC RHINITIS DUE TO ANIMAL HAIR AND DANDER: ICD-10-CM

## 2022-12-02 PROCEDURE — 74176 CT ABD & PELVIS W/O CONTRAST: CPT | Performed by: RADIOLOGY

## 2022-12-02 NOTE — TELEPHONE ENCOUNTER
[facility] Call Center    Phone Message    May a detailed message be left on voicemail: yes     Reason for Call: Appointment Intake    Referring Provider Name: Ysa Young MD  Diagnosis and/or Symptoms:   Abdominal pain, generalized [R10.84]       Abdominal bloating [R14.0]         Per triage notes, patient is to be seen as a GI Urgent, but is currently scheduled on 01/19/2023 with Trever Workman PA-C. Current appointment is outside requested time frame. Please review for further follow up per scheduling guidelines. Thanks!     Action Taken: Message routed to:  [facility] GI    Travel Screening: Not Applicable

## 2022-12-05 RX ORDER — CETIRIZINE HYDROCHLORIDE 10 MG/1
TABLET ORAL
Qty: 180 TABLET | Refills: 3 | Status: SHIPPED | OUTPATIENT
Start: 2022-12-05 | End: 2023-12-14

## 2022-12-05 NOTE — TELEPHONE ENCOUNTER
Diagnosis, Referred by & from: Abdominal pain, generalized [R10.84] Abdominal bloating [R14.0]     Appt date: 12/7/2022   NOTES STATUS DETAILS   OFFICE NOTE from referring provider Internal 11/21/2022 Manisha [facility]   OFFICE [facility] other specialist [facility]   DISCHARGE SUMMARY from hospital N/A    DISCHARGE REPORT from the ER Care Everywhere 9/13/2021 [facility] ED   OPERATIVE REPORT N/A    MEDICATION LIST N/A    LABS     ANAL PAP/CEA N/A    BIOPSIES/PATHOLOGY RELATED TO DIAGNOSIS N/A    DIAGNOSTIC PROCEDURES     PFC TESTING (from the Pelvic floor center includes Manometry, PDNL, EMG, etc.) N/A    COLONOSCOPY N/A    UPPER ENDOSCOPY (EGD) N/A    FLEX SIGMOIDOSCOPY N/A    ERCP N/A    IMAGING (DISC & REPORT)      CT Internal 12/02/2022 Abdomen Pelvis - [facility]   MRI N/A    XRAY N/A    ULTRASOUND  (ENDOANAL/ENDORECTAL) N/A      Records Requested  12/05/22    Facility       Outcome

## 2022-12-07 ENCOUNTER — PRE VISIT (OUTPATIENT)
Dept: GASTROENTEROLOGY | Facility: CLINIC | Age: 27
End: 2022-12-07

## 2022-12-07 ENCOUNTER — VIRTUAL VISIT (OUTPATIENT)
Dept: GASTROENTEROLOGY | Facility: CLINIC | Age: 27
End: 2022-12-07
Attending: FAMILY MEDICINE
Payer: COMMERCIAL

## 2022-12-07 VITALS — HEIGHT: 69 IN | WEIGHT: 121 LBS | BODY MASS INDEX: 17.92 KG/M2

## 2022-12-07 DIAGNOSIS — R14.0 ABDOMINAL BLOATING: ICD-10-CM

## 2022-12-07 DIAGNOSIS — R19.7 DIARRHEA, UNSPECIFIED TYPE: ICD-10-CM

## 2022-12-07 DIAGNOSIS — K21.9 GASTROESOPHAGEAL REFLUX DISEASE, UNSPECIFIED WHETHER ESOPHAGITIS PRESENT: ICD-10-CM

## 2022-12-07 DIAGNOSIS — R10.84 ABDOMINAL PAIN, GENERALIZED: ICD-10-CM

## 2022-12-07 DIAGNOSIS — R10.10 UPPER ABDOMINAL PAIN: Primary | ICD-10-CM

## 2022-12-07 DIAGNOSIS — R10.30 LOWER ABDOMINAL PAIN: ICD-10-CM

## 2022-12-07 PROCEDURE — 99205 OFFICE O/P NEW HI 60 MIN: CPT | Mod: 95 | Performed by: PHYSICIAN ASSISTANT

## 2022-12-07 RX ORDER — PANTOPRAZOLE SODIUM 40 MG/1
40 TABLET, DELAYED RELEASE ORAL EVERY MORNING
Qty: 30 TABLET | Refills: 1 | Status: SHIPPED | OUTPATIENT
Start: 2022-12-07 | End: 2023-01-02

## 2022-12-07 ASSESSMENT — PAIN SCALES - GENERAL: PAINLEVEL: NO PAIN (0)

## 2022-12-07 NOTE — PROGRESS NOTES
Manoj is a 27 year old Adult who is being evaluated via a billable video visit.      How would you like to obtain your AVS? MyChart  If the video visit is dropped, the invitation should be resent by: Text to cell phone: [phone]  Will anyone else be joining your video visit? No          GASTROENTEROLOGY NEW PATIENT VIDEO VISIT     Video Start Time: 9:03 AM    CC/REFERRING MD:    Parrish Burnette Y  Yas Young    REASON FOR CONSULTATION:   Referred by Yas Young for Video Visit      HISTORY OF PRESENT ILLNESS:    Manoj Castellanos is 27 year old adult with pmhx of GERD, allergies and asthma who presents for evaluation of abdominal pain that developed last month in late October/early November.  Reports having upper respiratory symptoms of coughing and sneezing.  Ty also developed thrush which was thought to be related to inhaler.  Treatment included Z-Cristopher for upper respiratory infection and clotrimazole for thrush.  Shortly after starting medications stomach pains developed. Pain is mostly in the upper abdomen.  Reports having continued stomach pains daily since then.  Pain can occur multiple times a day.  Sometimes it feels like hunger cramps.  Pain can also occur after eating.  Certain foods seem to set it off so sticking to a blander diet currently.  Also notes worsening symptoms with dairy and gluten items.  There is a lack of appetite and a weight loss of about 8 -10 lbs in the last few weeks.  There is nausea but denies vomiting.  There was dysphagia with thrush but reports that this is significantly improved since treatment.    Does have history of GERD and uses famotidine as needed.  Has only had heartburn once or twice since onset of the symptoms.    Uses ibuprofen 400 mg once or twice a week for headaches.  At most takes 600 mg dose when headache is really bad but has not done this for several months.    Manoj occasionally has a lower abdominal pain or cramping that occurs  with need to defecate. This resolves after BM. There has been looser stool on occasion during this timeframe.  Stools are otherwise infrequent right now which attributes to decreased appetite.    Work-up through primary care includes CBC, CMP and CT of the abdomen pelvis.  He did have a slightly elevated eosinophil level on blood count and a decreased protein level.  CT noted free fluid in the pelvis but was otherwise unremarkable.    Was given dicyclomine but reports that this was not helpful and in fact made them feel very bad.    Family hx includes mom with gluten sensitivity and sister with hx of esophageal ulcers.       Manoj  has a past medical history of Depressive disorder (2014) and Uncomplicated asthma (early 2000s).    Manoj Castellanos  has a past surgical history that includes TOOTH EXTRACTION W/FORCEP (2016).    Manoj Castellanos  reports that Manoj Castellanos has never smoked. Manoj Castellanos has never used smokeless tobacco. Manoj Castellanos reports current alcohol use. Manoj Castellanos reports that Manoj Castellanos does not use drugs.    Manoj Castellanos's family history includes Anxiety Disorder in Manoj Castellanos's sister and another family member; Asthma in Manoj Castellanos's father; Attention Deficit Disorder in Manoj Castellanos's sister; Bipolar Disorder in Manoj Castellanos's mother; Depression in Manoj Castellanos's sister and another family member; Mental Illness in an other family member; Other Cancer in Manoj Castellanos's maternal grandfather; Pancreatic Cancer in Manoj Castellanos's maternal grandfather; Prostate Cancer in Manoj Castellanos's paternal grandfather; Substance Abuse in Manoj Castellanos's father.    ALLERGIES:  Amoxicillin and Cefuroxime      PERTINENT MEDICATIONS:    Current Outpatient Medications:      albuterol (PROAIR HFA/PROVENTIL HFA/VENTOLIN HFA) 108 (90 Base) MCG/ACT inhaler, Inhale 2 puffs into the lungs every 4 hours as needed for shortness of breath / dyspnea or wheezing, Disp: 18 g, Rfl: 1     carbamide peroxide (DEBROX) 6.5 % otic solution, Place 5 drops into both ears 2  times daily, Disp: 15 mL, Rfl: 0     cetirizine (ZYRTEC) 10 MG tablet, TAKE 1 TABLET(10 MG) BY MOUTH TWICE DAILY, Disp: 180 tablet, Rfl: 3     famotidine (PEPCID) 20 MG tablet, Take 20 mg by mouth as needed, Disp: , Rfl:      finasteride (PROSCAR) 5 MG tablet, TAKE 1/4 TABLET BY MOUTH ONCE DAILY, Disp: 90 tablet, Rfl: 1     gabapentin (NEURONTIN) 300 MG capsule, Take 1 capsule (300 mg) by mouth 2 times daily PATIENT STATES HE TAKING 300 MG TWICE DAILY, Disp: 180 capsule, Rfl: 3     IBUPROFEN, Take 200 tablets by mouth daily as needed, Disp: , Rfl:      meclizine (ANTIVERT) 25 MG tablet, Take 1 tablet (25 mg) by mouth 2 times daily as needed for dizziness . Appointment required for additional refills., Disp: 20 tablet, Rfl: 0     ondansetron (ZOFRAN ODT) 4 MG ODT tab, DISSOLVE 1 TABLET(4 MG) ON THE TONGUE EVERY 8 HOURS AS NEEDED FOR NAUSEA, Disp: 20 tablet, Rfl: 0     OXcarbazepine (TRILEPTAL) 150 MG tablet, Take 2 tablets (300 mg) by mouth 2 times daily, Disp: 120 tablet, Rfl: 11     VITAMIN D3 25 MCG (1000 UT) tablet, TAKE 2 TABLETS BY MOUTH DAILY, Disp: 180 tablet, Rfl: 0     azelastine (ASTELIN) 0.1 % nasal spray, Spray 1-2 sprays into both nostrils 2 times daily You are overdue for an appointment with Dr. Fraser.  Please call to schedule. (Patient not taking: Reported on 11/9/2022), Disp: 30 mL, Rfl: 0     azithromycin (ZITHROMAX) 250 MG tablet, Two tablets first day, then one tablet daily for four days., Disp: 6 tablet, Rfl: 0     dicyclomine (BENTYL) 10 MG capsule, Take 1 capsule (10 mg) by mouth 2 times daily as needed (abdominal cramps) (Patient not taking: Reported on 12/7/2022), Disp: 20 capsule, Rfl: 0     fluticasone (FLONASE) 50 MCG/ACT nasal spray, Spray 2 sprays into both nostrils daily Needs appointment for additional refills (Patient not taking: Reported on 11/9/2022), Disp: 16 g, Rfl: 0     fluticasone (FLOVENT HFA) 110 MCG/ACT inhaler, Inhale 2 puffs into the lungs 2 times daily (Patient not  taking: Reported on 12/7/2022), Disp: 36 g, Rfl: 0        PHYSICAL EXAMINATION:  Constitutional: aaox3, cooperative, pleasant, not dyspneic/diaphoretic, no acute distress      GENERAL: Healthy, alert and no distress  EYES: Eyes grossly normal to inspection.  No discharge or erythema, or obvious scleral/conjunctival abnormalities.  RESP: No audible wheeze, cough, or visible cyanosis.  No visible retractions or increased work of breathing.    SKIN: Visible skin clear. No significant rash, abnormal pigmentation or lesions.  NEURO: Cranial nerves grossly intact.  Mentation and speech appropriate for age.  PSYCH: Mentation appears normal, affect normal/bright, judgement and insight intact, normal speech and appearance well-groomed.          PERTINENT STUDIES: (I personally reviewed these laboratory studies today)  Most recent CBC:   Recent Labs   Lab Test 11/23/22  1201 09/07/22  1524   WBC 6.0 6.3   HGB 17.3 16.2   HCT 52.3 49.2    243     Most recent hepatic panel:  Recent Labs   Lab Test 11/23/22  1203 09/07/22  1524   ALT 17 19   AST 22 28     Most recent creatinine:  Recent Labs   Lab Test 11/23/22  1203 09/07/22  1524   CR 0.94 0.89       TSH   Date Value Ref Range Status   05/09/2022 2.86 0.40 - 4.00 mU/L Final   08/28/2020 1.55 0.40 - 4.00 mU/L Final         RADIOLOGY:      Abdomen pelvis CT without contrast     INDICATION: Generalized abdominal pain. Bloating.     COMPARISON: None     FINDINGS: No contrast. The included lower chest shows mild respiratory  motion artifact. No dominant nodule, consolidation or effusion.  Noncontrast appearance of the abdomen and pelvis shows normal  appearance of the liver. No splenic mass. Craniocaudal length of the  spleen is 11.5 cm which is normal. No definitive pancreatic  abnormalities. No definitive renal or adrenal abnormalities. No  enlarged mesenteric or retroperitoneal lymph nodes. There is a small  amount of dependent pelvic free fluid.. No enlarged inguinal,  iliac  chain or other deep pelvic lymphadenopathy. No small bowel distention.  Urinary bladder appears unremarkable. No free air. No definitive  inflammatory changes are otherwise evident.  Bone detail shows small sclerotic bone islands in the femoral head  there is no suspicious lesion. Mineralization appears appropriate.                                                                      IMPRESSION: Small amount of pelvic free fluid of uncertain etiology  and significance.     RODRIGO WALLACE MD            ASSESSMENT/PLAN:    1. Abdominal pain, generalized  - Adult GI  Referral - Consult Only  - CRP inflammation; Future  - Erythrocyte sedimentation rate auto; Future    2. Abdominal bloating  - Adult GI  Referral - Consult Only  - Tissue transglutaminase tommy IgA and IgG; Future  - IgA; Future    3. Upper abdominal pain  - Adult GI  Referral - Procedure Only; Future    4. Lower abdominal pain    5. Gastroesophageal reflux disease, unspecified whether esophagitis present  - pantoprazole (PROTONIX) 40 MG EC tablet; Take 1 tablet (40 mg) by mouth every morning  Dispense: 30 tablet; Refill: 1    6. Diarrhea, unspecified type  - Calprotectin Feces; Future  - C. difficile Toxin B PCR with reflex to C. difficile Antigen and Toxins A/B EIA; Future        Ty GENO Castellanos is a 27 year old adult with pmhx of GERD, asthma and allergies who presents for evaluation of abdominal pain. Pain is mostly in the upper abdomen. Symptoms are concerning for gastritis vs pud. Recommended starting Protonix 40mg daily and will further eval with an EGD. Advised to avoid even occ NSAID use. Can take tylenol as needed for pain instead. Will also check labs to include celiac screening tests.     There is also some lower abdominal cramping with occ bowel changes. Suspect this is related to an irritable bowel after abx for URI last month. Unremarkable CT scan is reassuring. Will check serum and stool inflammatory markers  as well as c. Diff stool study to ensure no underlying concerns. Can consider colonoscopy if labs/stools are abn.     Also recommended nutritional protein drink/shake such as ensure or boost since protein level is low related to decreased intake.     Further recommendations after work up.       Thank you for this consultation.  It was a pleasure to participate in the care of this patient; please contact us with any further questions.        This note was created with voice recognition software, and while reviewed for accuracy, typos may remain.       65 minutes spent on the date of the encounter doing chart review, history and exam, documentation and further activities per the note      Simi Nicholson PA-C  Division of Gastroenterology, Hepatology and Nutrition   [facility]            Video-Visit Details    Video Start Time: 9:03 AM    Type of service:  Video Visit    Video End Time:9:51 AM    Originating Location (pt. Location): Home    Distant Location (provider location):  Off-site    Platform used for Video Visit: Wavii

## 2022-12-07 NOTE — PATIENT INSTRUCTIONS
It was a pleasure taking care of you today.  I've included a brief summary of our discussion and care plan from today's visit below.  Please review this information with your primary care provider.  _______________________________________________________________________     My recommendations are summarized as follows:     - start taking Protonix (pantoprazole) every morning on an empty stomach   - start a nutritional supplement such as ensure or boost until your appetite and diet return to your norm   - schedule a lab appointment for blood work and stool studies   - schedule an upper endoscopy for further evaluation of your symptoms  - avoid NSAID's, you can instead take tylenol for pain as needed   ______________________________________________________________________     How do I schedule labs, imaging studies, or procedures that were ordered in clinic today?      Labs: To schedule lab appointment you can contact your local [facility] or call [phone] to schedule at any convenient [facility] location.     Procedures: If a colonoscopy, upper endoscopy, breath test, esophageal manometry, or pH impedence was ordered today, our endoscopy team will call you to schedule this. If you have not heard from our endoscopy team within a week, please call [phone] to schedule.      Imaging Studies: If you were scheduled for a CT scan, X-ray, MRI, ultrasound, HIDA scan or other imaging study, please call [phone] to have this scheduled.      Referral: If a referral to another specialty was ordered, expect a phone call or follow instructions above. If you have not heard from anyone regarding your referral in a week, please call our clinic to check the status.      Who do I call with any questions after my visit?  Please be in touch if there are any further questions that arise following today's visit.  There are multiple ways to contact your gastroenterology care team.       During business hours,  you may reach a Gastroenterology nurse at [phone]     To schedule or reschedule an appointment, please call [phone].      You can always send a secure message through Sanswire.  Sanswire messages are answered by your nurse or doctor typically within 24 hours.  Please allow extra time on weekends and holidays.       For urgent/emergent questions after business hours, you may reach the on-call GI Fellow by contacting the [facility]  at [phone].     How will I get the results of any tests ordered?    You will receive all of your results.  If you have signed up for [facility], any tests ordered at your visit will be available to you after your physician reviews them.  Typically this takes 1-2 weeks.  If there are urgent results that require a change in your care plan, your physician or nurse will call you to discuss the next steps.       What is Sanswire?  Sanswire is a secure way for you to access all of your healthcare records from the [facility].  It is a web based computer program, so you can sign on to it from any location.  It also allows you to send secure messages to your care team.  I recommend signing up for Sanswire access if you have not already done so and are comfortable with using a computer.       How to I schedule a follow-up visit?  If you did not schedule a follow-up visit today, please call [phone] to schedule a follow-up office visit.      Simi Nicholson PA-C  Division of Gastroenterology, Hepatology and Nutrition   [facility]

## 2022-12-09 ENCOUNTER — TELEPHONE (OUTPATIENT)
Dept: GASTROENTEROLOGY | Facility: CLINIC | Age: 27
End: 2022-12-09

## 2022-12-09 NOTE — TELEPHONE ENCOUNTER
Screening Questions  BLUE  KIND OF PREP RED  LOCATION [review exclusion criteria] GREEN  SEDATION TYPE        Y Are you active on mychart?       Simi Nicholson PA-C in  GI Ordering/Referring Provider?        [employer] What type of coverage do you have?      N Have you had a positive covid test in the last 14 days?     17.2 1. BMI  [BMI 40+ - review exclusion criteria]    Y  2. Are you able to give consent for your medical care? [IF NO,RN REVIEW]        N  3. Are you taking any prescription pain medications on a routine schedule?        3a. EXTENDED PREP What kind of prescription?     N 4. Do you have any chemical dependencies such as alcohol, street drugs, or methadone?    Y - anxiety 5. Do you have any history of post-traumatic stress syndrome, severe anxiety or history of psychosis?      **If yes 3- 5 , please schedule with MAC sedation.**          IF YES TO ANY 6 - 10 - HOSPITAL SETTING ONLY.     N 6.   Do you need assistance transferring?     N 7.   Have you had a heart or lung transplant?    N 8.   Are you currently on dialysis?   N 9.   Do you use daily home oxygen?   N 10. Do you take nitroglycerin?   10a.  If yes, how often?     11. [FEMALES]   Are you currently pregnant?    11a.  If yes, how many weeks? [ Greater than 12 weeks, OR NEEDED]    N 12. Do you have Pulmonary Hypertension? *NEED PAC APPT AT [facility]*     N 13. [review exclusion criteria]  Do you have any implantable devices in your body (pacemaker, defib, LVAD)?    N 14. In the past 6 months, have you had any heart related issues including cardiomyopathy or heart attack?     14a.  If yes, did it require cardiac stenting if so when?     N 15. Have you had a stroke or Transient ischemic attack (TIA - aka  mini stroke ) within 6 months?      N 16. Do you have mod to severe Obstructive Sleep Apnea?  [Hospital only - Ok at [facility]]    N 17. Do you have SEVERE AND UNCONTROLLED asthma? *NEED PAC APPT AT [facility]*     N 18. Are you currently taking any  "blood thinners?     18a. If yes, inform patient to \"follow up w/ ordering provider for bridging instructions.\"    N 19. Do you take the medication Phentermine?    19a. If yes, \"Hold for 7 days before procedure.  Please consult your prescribing provider if you have questions about holding this medication.\"     N  20. Do you have chronic kidney disease?      N  21. Do you have a diagnosis of diabetes?       22. On a regular basis do you go 3-5 days between bowel movements?      23. Preferred LOCAL Pharmacy for Pre Prescription    [ LIST ONLY ONE PHARMACY]        [id number] [place], [place] - [address] [place] [address] & [facility]  WRITTEN PRESCRIPTION REQUESTED        - CLOSING REMINDERS -    Informed patient they will need an adult    Cannot take any type of public or medical transportation alone    Conscious Sedation- Needs  for 6 hours after the procedure       MAC/General-Needs  for 24 hours after procedure    Pre-Procedure Covid test to be completed [[facility] PCR Testing Required]    Confirmed Nurse will call to complete assessment       - SCHEDULING DETAILS -  No Hospital Setting Required? If yes, what is the exclusion?: n/a   Itzel  Surgeon    01/02/23  Date of Procedure  Upper Endoscopy [EGD]  Type of Procedure Scheduled  [facility]   Which Colonoscopy Prep was Sent?     MAC - per order, anxiety Sedation Type     N PAC / Pre-op Required                 "

## 2022-12-12 ENCOUNTER — LAB (OUTPATIENT)
Dept: LAB | Facility: CLINIC | Age: 27
End: 2022-12-12
Payer: COMMERCIAL

## 2022-12-12 DIAGNOSIS — R19.7 DIARRHEA, UNSPECIFIED TYPE: ICD-10-CM

## 2022-12-12 DIAGNOSIS — R10.84 ABDOMINAL PAIN, GENERALIZED: ICD-10-CM

## 2022-12-12 DIAGNOSIS — R14.0 ABDOMINAL BLOATING: ICD-10-CM

## 2022-12-12 LAB
CRP SERPL-MCNC: <3 MG/L
ERYTHROCYTE [SEDIMENTATION RATE] IN BLOOD BY WESTERGREN METHOD: 4 MM/HR (ref 0–20)

## 2022-12-12 PROCEDURE — 85652 RBC SED RATE AUTOMATED: CPT

## 2022-12-12 PROCEDURE — 82784 ASSAY IGA/IGD/IGG/IGM EACH: CPT

## 2022-12-12 PROCEDURE — 36415 COLL VENOUS BLD VENIPUNCTURE: CPT

## 2022-12-12 PROCEDURE — 86140 C-REACTIVE PROTEIN: CPT

## 2022-12-12 PROCEDURE — 86364 TISS TRNSGLTMNASE EA IG CLAS: CPT

## 2022-12-13 LAB
IGA SERPL-MCNC: 109 MG/DL (ref 84–499)
TTG IGA SER-ACNC: 0.5 U/ML
TTG IGG SER-ACNC: <0.6 U/ML

## 2022-12-26 ENCOUNTER — IMMUNIZATION (OUTPATIENT)
Dept: NURSING | Facility: CLINIC | Age: 27
End: 2022-12-26
Payer: COMMERCIAL

## 2022-12-26 PROCEDURE — 91313 COVID-19 VACCINE BIVALENT BOOSTER 18+ (MODERNA): CPT

## 2022-12-26 PROCEDURE — 0134A COVID-19 VACCINE BIVALENT BOOSTER 18+ (MODERNA): CPT

## 2022-12-30 ENCOUNTER — ANESTHESIA EVENT (OUTPATIENT)
Dept: SURGERY | Facility: AMBULATORY SURGERY CENTER | Age: 27
End: 2022-12-30
Payer: COMMERCIAL

## 2022-12-30 NOTE — ANESTHESIA PREPROCEDURE EVALUATION
Anesthesia Pre-Procedure Evaluation    Patient: Manoj Castellanos   MRN: [id number] : 1995        Procedure : Procedure(s):  ESOPHAGOGASTRODUODENOSCOPY, WITH CO2 INSUFFLATION          Past Medical History:   Diagnosis Date     Depressive disorder 2014    it s chill now tho I m doing fine     Uncomplicated asthma early       Past Surgical History:   Procedure Laterality Date     HC TOOTH EXTRACTION W/FORCEP  2016    4 teeth       Allergies   Allergen Reactions     Amoxicillin Swelling     Lip swelling      Cefuroxime      Swelling       Social History     Tobacco Use     Smoking status: Never     Smokeless tobacco: Never   Substance Use Topics     Alcohol use: Yes     Comment: infrequent at worst/ occa       Wt Readings from Last 1 Encounters:   22 55.3 kg (122 lb)        Anesthesia Evaluation   Pt has had prior anesthetic. Type: MAC.        ROS/MED HX  ENT/Pulmonary:     (+) asthma     Neurologic:  - neg neurologic ROS     Cardiovascular:  - neg cardiovascular ROS     METS/Exercise Tolerance:     Hematologic:  - neg hematologic  ROS     Musculoskeletal:  - neg musculoskeletal ROS     GI/Hepatic:  - neg GI/hepatic ROS     Renal/Genitourinary:  - neg Renal ROS     Endo:  - neg endo ROS     Psychiatric/Substance Use:     (+) psychiatric history depression     Infectious Disease:  - neg infectious disease ROS     Malignancy:  - neg malignancy ROS     Other:  - neg other ROS          Physical Exam    Airway  airway exam normal           Respiratory Devices and Support         Dental  no notable dental history         Cardiovascular   cardiovascular exam normal          Pulmonary   pulmonary exam normal                OUTSIDE LABS:  CBC:   Lab Results   Component Value Date    WBC 6.0 2022    WBC 6.3 2022    HGB 17.3 2022    HGB 16.2 2022    HCT 52.3 2022    HCT 49.2 2022     2022     2022     BMP:   Lab Results   Component Value Date      11/23/2022     09/07/2022    POTASSIUM 4.3 11/23/2022    POTASSIUM 3.9 09/07/2022    CHLORIDE 105 11/23/2022    CHLORIDE 100 09/07/2022    CO2 30 (H) 11/23/2022    CO2 28 09/07/2022    BUN 12.3 11/23/2022    BUN 15.1 09/07/2022    CR 0.94 11/23/2022    CR 0.89 09/07/2022     (H) 11/23/2022    GLC 81 09/07/2022     COAGS: No results found for: PTT, INR, FIBR  POC: No results found for: BGM, HCG, HCGS  HEPATIC:   Lab Results   Component Value Date    ALBUMIN 3.6 11/23/2022    PROTTOTAL 5.7 (L) 11/23/2022    ALT 17 11/23/2022    AST 22 11/23/2022    ALKPHOS 74 11/23/2022    BILITOTAL 0.2 11/23/2022     OTHER:   Lab Results   Component Value Date    IOANA 8.7 11/23/2022    LIPASE 34 11/23/2022    AMYLASE 54 06/22/2022    TSH 2.86 05/09/2022    CRP <3.00 12/12/2022    SED 4 12/12/2022       Anesthesia Plan    ASA Status:  2      Anesthesia Type: MAC.     - Reason for MAC: straight local not clinically adequate   Induction: Intravenous, Propofol.   Maintenance: TIVA.        Consents    Anesthesia Plan(s) and associated risks, benefits, and realistic alternatives discussed. Questions answered and patient/representative(s) expressed understanding.     - Discussed: Risks, Benefits and Alternatives for BOTH SEDATION and the PROCEDURE were discussed     - Discussed with:  Patient      - Extended Intubation/Ventilatory Support Discussed: No.      - Patient is DNR/DNI Status: No    Use of blood products discussed: No .     Postoperative Care            Comments:           H&P reviewed: Unable to attach H&P to encounter due to EHR limitations. H&P Update: appropriate H&P reviewed, patient examined. No interval changes since H&P (within 30 days).         Manuel Melendrez MD

## 2023-01-01 DIAGNOSIS — E55.9 VITAMIN D DEFICIENCY: ICD-10-CM

## 2023-01-02 ENCOUNTER — ANESTHESIA (OUTPATIENT)
Dept: SURGERY | Facility: AMBULATORY SURGERY CENTER | Age: 28
End: 2023-01-02
Payer: COMMERCIAL

## 2023-01-02 ENCOUNTER — TELEPHONE (OUTPATIENT)
Dept: GASTROENTEROLOGY | Facility: CLINIC | Age: 28
End: 2023-01-02

## 2023-01-02 ENCOUNTER — HOSPITAL ENCOUNTER (OUTPATIENT)
Facility: AMBULATORY SURGERY CENTER | Age: 28
Discharge: HOME | End: 2023-01-02
Attending: INTERNAL MEDICINE
Payer: COMMERCIAL

## 2023-01-02 VITALS — HEART RATE: 92 BPM

## 2023-01-02 VITALS
SYSTOLIC BLOOD PRESSURE: 113 MMHG | OXYGEN SATURATION: 99 % | HEIGHT: 69 IN | BODY MASS INDEX: 18.07 KG/M2 | TEMPERATURE: 98 F | WEIGHT: 122 LBS | RESPIRATION RATE: 18 BRPM | DIASTOLIC BLOOD PRESSURE: 81 MMHG

## 2023-01-02 DIAGNOSIS — K20.0 EOSINOPHILIC ESOPHAGITIS: Primary | ICD-10-CM

## 2023-01-02 DIAGNOSIS — K21.9 GASTROESOPHAGEAL REFLUX DISEASE, UNSPECIFIED WHETHER ESOPHAGITIS PRESENT: ICD-10-CM

## 2023-01-02 LAB — UPPER GI ENDOSCOPY: NORMAL

## 2023-01-02 PROCEDURE — 88312 SPECIAL STAINS GROUP 1: CPT | Performed by: PATHOLOGY

## 2023-01-02 PROCEDURE — G8907 PT DOC NO EVENTS ON DISCHARG: HCPCS

## 2023-01-02 PROCEDURE — 88305 TISSUE EXAM BY PATHOLOGIST: CPT | Performed by: PATHOLOGY

## 2023-01-02 PROCEDURE — 43239 EGD BIOPSY SINGLE/MULTIPLE: CPT | Mod: 74

## 2023-01-02 PROCEDURE — G8918 PT W/O PREOP ORDER IV AB PRO: HCPCS

## 2023-01-02 RX ORDER — PROPOFOL 10 MG/ML
INJECTION, EMULSION INTRAVENOUS PRN
Status: DISCONTINUED | OUTPATIENT
Start: 2023-01-02 | End: 2023-01-02

## 2023-01-02 RX ORDER — NALOXONE HYDROCHLORIDE 0.4 MG/ML
0.2 INJECTION, SOLUTION INTRAMUSCULAR; INTRAVENOUS; SUBCUTANEOUS
Status: DISCONTINUED | OUTPATIENT
Start: 2023-01-02 | End: 2023-01-03 | Stop reason: HOSPADM

## 2023-01-02 RX ORDER — PROCHLORPERAZINE MALEATE 10 MG
10 TABLET ORAL EVERY 6 HOURS PRN
Status: DISCONTINUED | OUTPATIENT
Start: 2023-01-02 | End: 2023-01-03 | Stop reason: HOSPADM

## 2023-01-02 RX ORDER — ONDANSETRON 2 MG/ML
4 INJECTION INTRAMUSCULAR; INTRAVENOUS EVERY 6 HOURS PRN
Status: DISCONTINUED | OUTPATIENT
Start: 2023-01-02 | End: 2023-01-03 | Stop reason: HOSPADM

## 2023-01-02 RX ORDER — PROPOFOL 10 MG/ML
INJECTION, EMULSION INTRAVENOUS CONTINUOUS PRN
Status: DISCONTINUED | OUTPATIENT
Start: 2023-01-02 | End: 2023-01-02

## 2023-01-02 RX ORDER — LIDOCAINE 40 MG/G
CREAM TOPICAL
Status: DISCONTINUED | OUTPATIENT
Start: 2023-01-02 | End: 2023-01-03 | Stop reason: HOSPADM

## 2023-01-02 RX ORDER — SODIUM CHLORIDE, SODIUM LACTATE, POTASSIUM CHLORIDE, CALCIUM CHLORIDE 600; 310; 30; 20 MG/100ML; MG/100ML; MG/100ML; MG/100ML
INJECTION, SOLUTION INTRAVENOUS CONTINUOUS PRN
Status: DISCONTINUED | OUTPATIENT
Start: 2023-01-02 | End: 2023-01-02

## 2023-01-02 RX ORDER — NALOXONE HYDROCHLORIDE 0.4 MG/ML
0.4 INJECTION, SOLUTION INTRAMUSCULAR; INTRAVENOUS; SUBCUTANEOUS
Status: DISCONTINUED | OUTPATIENT
Start: 2023-01-02 | End: 2023-01-03 | Stop reason: HOSPADM

## 2023-01-02 RX ORDER — FLUMAZENIL 0.1 MG/ML
0.2 INJECTION, SOLUTION INTRAVENOUS
Status: DISCONTINUED | OUTPATIENT
Start: 2023-01-02 | End: 2023-01-03 | Stop reason: HOSPADM

## 2023-01-02 RX ORDER — LIDOCAINE HYDROCHLORIDE 20 MG/ML
INJECTION, SOLUTION INFILTRATION; PERINEURAL PRN
Status: DISCONTINUED | OUTPATIENT
Start: 2023-01-02 | End: 2023-01-02

## 2023-01-02 RX ORDER — PANTOPRAZOLE SODIUM 40 MG/1
40 TABLET, DELAYED RELEASE ORAL 2 TIMES DAILY
Qty: 30 TABLET | Refills: 1 | Status: SHIPPED | OUTPATIENT
Start: 2023-01-02 | End: 2023-02-13

## 2023-01-02 RX ORDER — ONDANSETRON 2 MG/ML
4 INJECTION INTRAMUSCULAR; INTRAVENOUS
Status: DISCONTINUED | OUTPATIENT
Start: 2023-01-02 | End: 2023-01-03 | Stop reason: HOSPADM

## 2023-01-02 RX ORDER — ONDANSETRON 4 MG/1
4 TABLET, ORALLY DISINTEGRATING ORAL EVERY 6 HOURS PRN
Status: DISCONTINUED | OUTPATIENT
Start: 2023-01-02 | End: 2023-01-03 | Stop reason: HOSPADM

## 2023-01-02 RX ADMIN — SODIUM CHLORIDE, SODIUM LACTATE, POTASSIUM CHLORIDE, CALCIUM CHLORIDE: 600; 310; 30; 20 INJECTION, SOLUTION INTRAVENOUS at 13:18

## 2023-01-02 RX ADMIN — PROPOFOL 50 MG: 10 INJECTION, EMULSION INTRAVENOUS at 13:21

## 2023-01-02 RX ADMIN — PROPOFOL 50 MG: 10 INJECTION, EMULSION INTRAVENOUS at 13:29

## 2023-01-02 RX ADMIN — LIDOCAINE HYDROCHLORIDE 100 MG: 20 INJECTION, SOLUTION INFILTRATION; PERINEURAL at 13:19

## 2023-01-02 RX ADMIN — PROPOFOL 50 MG: 10 INJECTION, EMULSION INTRAVENOUS at 13:25

## 2023-01-02 RX ADMIN — PROPOFOL 50 MG: 10 INJECTION, EMULSION INTRAVENOUS at 13:22

## 2023-01-02 RX ADMIN — PROPOFOL 30 MG: 10 INJECTION, EMULSION INTRAVENOUS at 13:27

## 2023-01-02 RX ADMIN — PROPOFOL 50 MG: 10 INJECTION, EMULSION INTRAVENOUS at 13:24

## 2023-01-02 RX ADMIN — PROPOFOL 200 MCG/KG/MIN: 10 INJECTION, EMULSION INTRAVENOUS at 13:23

## 2023-01-02 NOTE — TELEPHONE ENCOUNTER
ADDENDUM: 1/2/23 at 3:17 PM, Pili from [facility] called wanting to add some information to previous call/message. She needs to verify quantity and concentration of medication Viscous budesonide. Please call Pili back at [phone]. Thank you.                    [facility]    Phone Message    May a detailed message be left on voicemail: yes     Reason for Call: Medication Question or concern regarding medication   Prescription Clarification  Name of Medication: COMPOUNDED NON-CONTROLLED SUBSTANCE (CMPD RX) - PHARMACY TO MIX COMPOUNDED MEDICATION  Prescribing Provider: Zahraa Robbins DO   Ph[facility]acy[facility] [place] [place] [facility] [address] [place] [address] [facility]   What on the order needs clarification? Pharmacy needs to know what strength the provider wants for the medication. They are requesting a call back at [phone] to discuss, thank you!          Action Taken: Message routed to:  Adult Clinics: Gastroenterology (GI) p [id number]    Travel Screening: Not Applicable

## 2023-01-02 NOTE — TELEPHONE ENCOUNTER
Call to pharmacy to update that writer is waiting on clarification from provider.    Carli Kitchen RN

## 2023-01-02 NOTE — ANESTHESIA CARE TRANSFER NOTE
Patient: Manoj Castellanos    Procedure: Procedure(s):  ESOPHAGOGASTRODUODENOSCOPY, WITH CO2 INSUFFLATION  ESOPHAGOGASTRODUODENOSCOPY, WITH BIOPSY       Diagnosis: Upper abdominal pain [R10.10]  Diagnosis Additional Information: No value filed.    Anesthesia Type:   MAC     Note:      Level of Consciousness: awake  Oxygen Supplementation: room air    Independent Airway: airway patency satisfactory and stable  Dentition: dentition unchanged  Vital Signs Stable: post-procedure vital signs reviewed and stable  Report to RN Given: handoff report given  Patient transferred to: Phase II    Handoff Report: Identifed the Patient, Identified the Reponsible Provider, Reviewed the pertinent medical history, Discussed the surgical course, Reviewed Intra-OP anesthesia mangement and issues during anesthesia, Set expectations for post-procedure period and Allowed opportunity for questions and acknowledgement of understanding      Vitals:  Vitals Value Taken Time   BP 99/66    Temp 98    Pulse 92 01/02/23 1341   Resp 14    SpO2 97        Electronically Signed By: KEYANNA Cartagena CRNA  January 2, 2023  1:47 PM

## 2023-01-02 NOTE — H&P
[facility] Anesthesia Pre-op History and Physical    Ty GENO Castellanos MRN# [id number]   Age: 27 year old YOB: 1995            Date of Exam 1/2/2023           Primary care provider: Parrish Burnette         Chief Complaint and/or Reason for Procedure:   Epigastric pain         Active problem list:     Patient Active Problem List    Diagnosis Date Noted     Palpitations 03/28/2019     Priority: Medium     Mild persistent asthma 03/06/2018     Priority: Medium     Chronic allergic rhinitis due to animal hair and dander 03/06/2018     Priority: Medium     Skin prick testing 3/6/18 positive for cat       Allergic rhinitis due to dust mite 03/06/2018     Priority: Medium     Chronic seasonal allergic rhinitis due to pollen 03/06/2018     Priority: Medium     Skin prick testing 3/6/18 positive for walnut tree, birch, oak, willow, demetris, cocklebur, sagebrush/mugwort, and sorrel       Allergic conjunctivitis of both eyes 03/06/2018     Priority: Medium     Abnormal sinus finding on MRI 10/19/2017     Priority: Medium     Seasonal affective disorder (H) 01/01/2014     Priority: Medium     it's chill now tho I'm doing fine              Medications (include herbals and vitamins):   Any Plavix use in the last 7 days? No     Current Outpatient Medications   Medication Sig     albuterol (PROAIR HFA/PROVENTIL HFA/VENTOLIN HFA) 108 (90 Base) MCG/ACT inhaler Inhale 2 puffs into the lungs every 4 hours as needed for shortness of breath / dyspnea or wheezing     azelastine (ASTELIN) 0.1 % nasal spray Spray 1-2 sprays into both nostrils 2 times daily You are overdue for an appointment with Dr. Fraser.  Please call to schedule.     carbamide peroxide (DEBROX) 6.5 % otic solution Place 5 drops into both ears 2 times daily     cetirizine (ZYRTEC) 10 MG tablet TAKE 1 TABLET(10 MG) BY MOUTH TWICE DAILY     finasteride (PROSCAR) 5 MG tablet TAKE 1/4 TABLET BY MOUTH ONCE DAILY     fluticasone (FLONASE) 50 MCG/ACT nasal spray Spray  2 sprays into both nostrils daily Needs appointment for additional refills     gabapentin (NEURONTIN) 300 MG capsule Take 1 capsule (300 mg) by mouth 2 times daily PATIENT STATES HE TAKING 300 MG TWICE DAILY     IBUPROFEN Take 200 tablets by mouth daily as needed     meclizine (ANTIVERT) 25 MG tablet Take 1 tablet (25 mg) by mouth 2 times daily as needed for dizziness . Appointment required for additional refills.     ondansetron (ZOFRAN ODT) 4 MG ODT tab DISSOLVE 1 TABLET(4 MG) ON THE TONGUE EVERY 8 HOURS AS NEEDED FOR NAUSEA     OXcarbazepine (TRILEPTAL) 150 MG tablet Take 2 tablets (300 mg) by mouth 2 times daily     pantoprazole (PROTONIX) 40 MG EC tablet Take 1 tablet (40 mg) by mouth every morning     VITAMIN D3 25 MCG (1000 UT) tablet TAKE 2 TABLETS BY MOUTH DAILY     famotidine (PEPCID) 20 MG tablet Take 20 mg by mouth as needed     fluticasone (FLOVENT HFA) 110 MCG/ACT inhaler Inhale 2 puffs into the lungs 2 times daily (Patient not taking: Reported on 12/7/2022)     Current Facility-Administered Medications   Medication     lidocaine (LMX4) kit     lidocaine 1 % 0.1-1 mL     ondansetron (ZOFRAN) injection 4 mg     sodium chloride (PF) 0.9% PF flush 3 mL     sodium chloride (PF) 0.9% PF flush 3 mL             Allergies:      Allergies   Allergen Reactions     Amoxicillin Swelling     Lip swelling      Cefuroxime      Swelling      Allergy to Latex? No  Allergy to tape?   No  Intolerances:             Physical Exam:   All vitals have been reviewed  Patient Vitals for the past 8 hrs:   BP Temp Temp src Resp SpO2   01/02/23 1250 (!) 124/99 98.5  F (36.9  C) Temporal 16 99 %     No intake/output data recorded.  Lungs:   No increased work of breathing, good air exchange, clear to auscultation bilaterally, no crackles or wheezing     Cardiovascular:   normal S1 and S2             Lab / Radiology Results:            Anesthetic risk and/or ASA classification:     1  Zahraa Robbins DO

## 2023-01-02 NOTE — ANESTHESIA POSTPROCEDURE EVALUATION
Patient: Manoj Castellanos    Procedure: Procedure(s):  ESOPHAGOGASTRODUODENOSCOPY, WITH CO2 INSUFFLATION  ESOPHAGOGASTRODUODENOSCOPY, WITH BIOPSY       Anesthesia Type:  MAC    Note:  Disposition: Outpatient (Was unable to complete proceedure due to significant stricture. Patient vary apprehensive abnd require high doses of Propofol during proceedure. May consider GETA for repeat proceedure. Discuss [facility] GI Physician.)   Postop Pain Control: Uneventful            Sign Out: Well controlled pain   PONV: No   Neuro/Psych: Uneventful            Sign Out: Acceptable/Baseline neuro status   Airway/Respiratory: Uneventful            Sign Out: Acceptable/Baseline resp. status   CV/Hemodynamics: Uneventful            Sign Out: Acceptable CV status; No obvious hypovolemia; No obvious fluid overload   Other NRE: NONE   DID A NON-ROUTINE EVENT OCCUR? No           Last vitals:  Vitals Value Taken Time   BP 99/66 01/02/23 1348   Temp 98  F (36.7  C) 01/02/23 1344   Pulse     Resp 18 01/02/23 1344   SpO2 99 % 01/02/23 1344       Electronically Signed By: Manuel Melendrez MD  January 2, 2023  1:53 PM

## 2023-01-03 RX ORDER — CHOLECALCIFEROL (VITAMIN D3) 25 MCG
TABLET ORAL
Qty: 180 TABLET | Refills: 0 | Status: SHIPPED | OUTPATIENT
Start: 2023-01-03 | End: 2023-03-30

## 2023-01-03 NOTE — TELEPHONE ENCOUNTER
Zahraa Robbins, DO  You 1 hour ago (6:56 AM)     KM  2 mg daily - whatever strength they normally make - I think its usually mixed to a volume of ~ 8ml   Thanks      Spoke to Marlen Jacob, pharmacist who provided clarification following the above clarification from provider. Writer then called [facility] pharmacy and spoke to Eric, pharmacist, gave verbal orders for viscous budesonide. Take 2mg (8mls) daily. Dispense 336mls which is a 6 week supply, no refills at this time.    Carli Kitchen RN

## 2023-01-03 NOTE — TELEPHONE ENCOUNTER
Pt due for yearly exam, this note on rx refill for Vit D  He does have virtual visit with PCP this month    Mayra Caputo RN, BSN  [facility]

## 2023-01-05 ENCOUNTER — TELEPHONE (OUTPATIENT)
Dept: GASTROENTEROLOGY | Facility: CLINIC | Age: 28
End: 2023-01-05
Payer: COMMERCIAL

## 2023-01-05 NOTE — TELEPHONE ENCOUNTER
Screening Questions  BLUE  KIND OF PREP RED  LOCATION [review exclusion criteria] GREEN  SEDATION TYPE        Y Are you active on mychart?     Zahraa Robbins, DO   Ordering/Referring Provider?        [employer] What type of coverage do you have?      N Have you had a positive covid test in the last 14 days?     18.0 1. BMI  [BMI 40+ - review exclusion criteria]    Y  2. Are you able to give consent for your medical care? [IF NO,RN REVIEW]        N  3. Are you taking any prescription pain medications on a routine schedule?      NA  3a. EXTENDED PREP What kind of prescription?     N 4. Do you have any chemical dependencies such as alcohol, street drugs, or methadone?    N (Per Pt, does not think he has any of these) 5. Do you have any history of post-traumatic stress syndrome, severe anxiety or history of psychosis?      **If yes 3- 5 , please schedule with MAC sedation.**          IF YES TO ANY 6 - 10 - HOSPITAL SETTING ONLY.     N 6.   Do you need assistance transferring?     N 7.   Have you had a heart or lung transplant?    N 8.   Are you currently on dialysis?   N 9.   Do you use daily home oxygen?   N 10. Do you take nitroglycerin?   10a. NA If yes, how often?     11. [FEMALES]  NA Are you currently pregnant?    11a. NA If yes, how many weeks? [ Greater than 12 weeks, OR NEEDED]    N 12. Do you have Pulmonary Hypertension? *NEED PAC APPT AT [facility]*     N 13. [review exclusion criteria]  Do you have any implantable devices in your body (pacemaker, defib, LVAD)?    N 14. In the past 6 months, have you had any heart related issues including cardiomyopathy or heart attack?     14a. NA If yes, did it require cardiac stenting if so when?     N 15. Have you had a stroke or Transient ischemic attack (TIA - aka  mini stroke ) within 6 months?      N 16. Do you have mod to severe Obstructive Sleep Apnea?  [Hospital only]    N 17. Do you have SEVERE AND UNCONTROLLED asthma? *NEED PAC APPT AT [facility]*     N 18. Are you  "currently taking any blood thinners?     18a. If yes, inform patient to \"follow up w/ ordering provider for bridging instructions.\"    N 19. Do you take the medication Phentermine?    19a. If yes, \"Hold for 7 days before procedure.  Please consult your prescribing provider if you have questions about holding this medication.\"     N  20. Do you have chronic kidney disease?      N  21. Do you have a diagnosis of diabetes?     N  22. On a regular basis do you go 3-5 days between bowel movements?      23. Preferred LOCAL Pharmacy for Pre Prescription    [ LIST ONLY ONE PHARMACY]        [id number] [address] AT [facility]      - CLOSING REMINDERS -    Informed patient they will need an adult    Cannot take any type of public or medical transportation alone    Conscious Sedation- Needs  for 6 hours after the procedure       MAC/General-Needs  for 24 hours after procedure    Pre-Procedure Covid test to be completed [[facility] PCR Testing Required]    Confirmed Nurse will call to complete assessment      Waiting for MAC schedule for March with either Dr. Mcdonald or Dr. Boyer.    Case is built and is in the depot.             "

## 2023-01-06 LAB
PATH REPORT.COMMENTS IMP SPEC: NORMAL
PATH REPORT.COMMENTS IMP SPEC: NORMAL
PATH REPORT.FINAL DX SPEC: NORMAL
PATH REPORT.GROSS SPEC: NORMAL
PATH REPORT.MICROSCOPIC SPEC OTHER STN: NORMAL
PATH REPORT.RELEVANT HX SPEC: NORMAL
PHOTO IMAGE: NORMAL

## 2023-01-08 ENCOUNTER — HEALTH MAINTENANCE LETTER (OUTPATIENT)
Age: 28
End: 2023-01-08

## 2023-01-09 ENCOUNTER — TELEPHONE (OUTPATIENT)
Dept: GASTROENTEROLOGY | Facility: CLINIC | Age: 28
End: 2023-01-09

## 2023-01-09 NOTE — TELEPHONE ENCOUNTER
----- Message from Zahraa Robbins DO sent at 1/9/2023 11:52 AM CST -----  Hey -   Can we initiate a prior auth for this patient's viscous budesonide?  Thanks  Zahraa   ----- Message -----  From: Chago Maguire MA  Sent: 1/6/2023   2:22 PM CST  To: Zahraa Robbins DO    Pt informed of his results. Pt said he went to  the budesonide yesterday at the pharmacy, unfortunately, the medication is not covered and was not able to get it. Patient is wondering if there is an alternative medication you can prescribe for him. Otherwise, PA needed for medication

## 2023-01-09 NOTE — TELEPHONE ENCOUNTER
Prior Authorization Specialty Medication Request    Medication/Dose: Viscous budesonide 2 mg (8 mL) by mouth daily  ICD code (if different than what is on RX):    Previously Tried and Failed:      Important Lab Values:   Rationale:     Insurance Name:   Insurance ID:   Insurance Phone Number:     Pharmacy Information (if different than what is on RX)  Name:    Phone:

## 2023-01-09 NOTE — TELEPHONE ENCOUNTER
PA for viscous budesonide initiated with PA team. Provider updated of this as well.    Carli Kitchen RN

## 2023-01-11 DIAGNOSIS — K20.0 EOSINOPHILIC ESOPHAGITIS: Primary | ICD-10-CM

## 2023-01-11 RX ORDER — BUDESONIDE 1 MG/2ML
1 INHALANT ORAL 2 TIMES DAILY
Qty: 60 ML | Refills: 3 | Status: SHIPPED | OUTPATIENT
Start: 2023-01-11 | End: 2023-05-16

## 2023-01-11 NOTE — TELEPHONE ENCOUNTER
Insurance stated they can't process a PA because two NDC's / ingredients in the compound are not in their system and can't be added.          Do you want to change the medication?

## 2023-01-11 NOTE — TELEPHONE ENCOUNTER
Zahraa Robbins DO Heckt, Angie, RN  Caller: Unspecified (2 days ago, 12:50 PM)  I put in a prescription for the pulmicort - it wouldn't let me delete the sig to take as a nebulized solution but I wrote in what I wanted.    Routed provider response to PA team.    Carli Kitchen RN

## 2023-01-11 NOTE — TELEPHONE ENCOUNTER
Spoke to Eric, pharmacist with [facility] pharmacy who reports that [employer] never covers the sweeteners in viscous budesonide. Eric suggest that provider re write the script for Pulmicort neb solution to be taken orally with directions to add honey or chocolate syrup. Pulmicort comes in 0.5mg or 1mg vials.    Routed to provider for review.    Carli Kitchen RN

## 2023-01-13 NOTE — TELEPHONE ENCOUNTER
[facility] Call Center    Phone Message    May a detailed message be left on voicemail: yes     Reason for Call: Other: Patient was wondering if they could get a [employer] message with an update on their prior authorization for their Budesonide prescription. They stated they haven't heard anything in awhile and would like an update. Thank you!     Action Taken: Message routed to:  Adult Clinics: Gastroenterology (GI) p [id number]    Travel Screening: Not Applicable

## 2023-01-13 NOTE — TELEPHONE ENCOUNTER
Writer called [place] [facility] to ensure that they had received this script and that there were not any issues with this. Per pharmacy this med can be filled when the patient calls [phone] to have it filled. Writer sent a [employer] message to patient to update on this info.    Carli Kitchen RN

## 2023-01-23 NOTE — TELEPHONE ENCOUNTER
Scheduling Details    Ty Caller:   (Please ask for phone number if not scheduled by patient)    Type of Procedure Scheduled: Upper Endoscopy [EGD]       Which Colonoscopy Prep was Sent:   [facility] CF PATIENTS & GRO[facility]'S PATIENTS NEEDS EXTENDED PREP    3/28 Date of Procedure:   JULIA Surgeon:    Location:     MAC Sedation Type:     Conscious Sedation- Needs  for 6 hours after the procedure  MAC/General-Needs  for 24 hours after procedure    Y Informed patient they will need an adult          Cannot take any type of public or medical transportation alone    Y Confirmed Nurse will call to complete assessment      Pre-Procedure Covid test to be completed [[facility] PCR Testing Required]    DOUBLE CHECK BMI AND BOB  NEEDS ADULT -CANNOT TAKE PUBILC OR MEDICAL TRANSPOTATION  COVID TEST FOR [facility] 3-4 DAYS  COIVD TEST FOR [facility] CAN BE HOME     Additional comments:

## 2023-02-08 ENCOUNTER — TELEPHONE (OUTPATIENT)
Dept: GASTROENTEROLOGY | Facility: CLINIC | Age: 28
End: 2023-02-08
Payer: COMMERCIAL

## 2023-02-08 NOTE — TELEPHONE ENCOUNTER
Call back to patient. Patient is inquiring if he should continue the budesonide, writer instructed patient to continue with medication as prescribed. Patient verbalized understanding.    Carli Kitchen RN

## 2023-02-08 NOTE — TELEPHONE ENCOUNTER
Health Call Center    Phone Message    May a detailed message be left on voicemail: yes     Reason for Call: Medication Question or concern regarding medication   Prescription Clarification  Name of Medication: budesonide (PULMICORT) 1 MG/2ML neb solution  Prescribing Provider: Dr. Zahraa Robbins   Pharmacy: [id number]  ([facility] filled thru  [facility])   What on the order needs clarification? Patient wants to know if he should continue on this medication until the EGD.  If so, a refill would be needed.  Please follow up with patient.      Action Taken: Message routed to:  [facility]    Travel Screening: Not Applicable

## 2023-02-20 ENCOUNTER — OFFICE VISIT (OUTPATIENT)
Dept: FAMILY MEDICINE | Facility: CLINIC | Age: 28
End: 2023-02-20
Payer: COMMERCIAL

## 2023-02-20 ENCOUNTER — TELEPHONE (OUTPATIENT)
Dept: PLASTIC SURGERY | Facility: CLINIC | Age: 28
End: 2023-02-20

## 2023-02-20 VITALS
HEART RATE: 119 BPM | HEIGHT: 70 IN | RESPIRATION RATE: 20 BRPM | WEIGHT: 125.2 LBS | OXYGEN SATURATION: 100 % | SYSTOLIC BLOOD PRESSURE: 120 MMHG | DIASTOLIC BLOOD PRESSURE: 70 MMHG | TEMPERATURE: 97.8 F | BODY MASS INDEX: 17.92 KG/M2

## 2023-02-20 DIAGNOSIS — F33.1 MAJOR DEPRESSIVE DISORDER, RECURRENT EPISODE, MODERATE WITH ANXIOUS DISTRESS (H): ICD-10-CM

## 2023-02-20 DIAGNOSIS — F64.0 GENDER DYSPHORIA IN ADULT: Primary | ICD-10-CM

## 2023-02-20 DIAGNOSIS — L30.9 ECZEMA, UNSPECIFIED TYPE: ICD-10-CM

## 2023-02-20 DIAGNOSIS — Z00.00 ROUTINE GENERAL MEDICAL EXAMINATION AT A HEALTH CARE FACILITY: Primary | ICD-10-CM

## 2023-02-20 DIAGNOSIS — R42 VERTIGO: ICD-10-CM

## 2023-02-20 DIAGNOSIS — L64.9 MALE PATTERN ALOPECIA: ICD-10-CM

## 2023-02-20 DIAGNOSIS — Z23 NEED FOR VACCINATION: ICD-10-CM

## 2023-02-20 PROCEDURE — 90677 PCV20 VACCINE IM: CPT | Performed by: FAMILY MEDICINE

## 2023-02-20 PROCEDURE — 99395 PREV VISIT EST AGE 18-39: CPT | Mod: 25 | Performed by: FAMILY MEDICINE

## 2023-02-20 PROCEDURE — 90682 RIV4 VACC RECOMBINANT DNA IM: CPT | Performed by: FAMILY MEDICINE

## 2023-02-20 PROCEDURE — 90471 IMMUNIZATION ADMIN: CPT | Performed by: FAMILY MEDICINE

## 2023-02-20 PROCEDURE — 99213 OFFICE O/P EST LOW 20 MIN: CPT | Mod: 25 | Performed by: FAMILY MEDICINE

## 2023-02-20 PROCEDURE — 96127 BRIEF EMOTIONAL/BEHAV ASSMT: CPT | Performed by: FAMILY MEDICINE

## 2023-02-20 PROCEDURE — 90472 IMMUNIZATION ADMIN EACH ADD: CPT | Performed by: FAMILY MEDICINE

## 2023-02-20 RX ORDER — ONDANSETRON 4 MG/1
TABLET, ORALLY DISINTEGRATING ORAL
Qty: 20 TABLET | Refills: 0 | Status: SHIPPED | OUTPATIENT
Start: 2023-02-20 | End: 2024-05-09

## 2023-02-20 RX ORDER — FINASTERIDE 5 MG/1
TABLET, FILM COATED ORAL
Qty: 23 TABLET | Refills: 2 | Status: SHIPPED | OUTPATIENT
Start: 2023-02-20 | End: 2023-11-27

## 2023-02-20 ASSESSMENT — ENCOUNTER SYMPTOMS
NAUSEA: 0
CHILLS: 0
HEMATURIA: 0
ABDOMINAL PAIN: 0
DIZZINESS: 0
HEADACHES: 0
COUGH: 0
DYSURIA: 0
MYALGIAS: 0
HEMATOCHEZIA: 0
WEAKNESS: 0
NERVOUS/ANXIOUS: 0
FEVER: 0
DIARRHEA: 0
ARTHRALGIAS: 0
FREQUENCY: 0
BREAST MASS: 0
JOINT SWELLING: 0
SHORTNESS OF BREATH: 0
PARESTHESIAS: 0
CONSTIPATION: 0
PALPITATIONS: 0
SORE THROAT: 0
HEARTBURN: 0
EYE PAIN: 0

## 2023-02-20 NOTE — NURSING NOTE
Prior to immunization administration, verified patients identity using patient s name and date of birth. Please see Immunization Activity for additional information.     Screening Questionnaire for Adult Immunization    Are you sick today?   No   Do you have allergies to medications, food, a vaccine component or latex?   Yes   Have you ever had a serious reaction after receiving a vaccination?   No   Do you have a long-term health problem with heart, lung, kidney, or metabolic disease (e.g., diabetes), asthma, a blood disorder, no spleen, complement component deficiency, a cochlear implant, or a spinal fluid leak?  Are you on long-term aspirin therapy?   No   Do you have cancer, leukemia, HIV/AIDS, or any other immune system problem?   No   Do you have a parent, brother, or sister with an immune system problem?   No   In the past 3 months, have you taken medications that affect  your immune system, such as prednisone, other steroids, or anticancer drugs; drugs for the treatment of rheumatoid arthritis, Crohn s disease, or psoriasis; or have you had radiation treatments?   No   Have you had a seizure, or a brain or other nervous system problem?   Yes   During the past year, have you received a transfusion of blood or blood    products, or been given immune (gamma) globulin or antiviral drug?   No   For women: Are you pregnant or is there a chance you could become       pregnant during the next month?   No   Have you received any vaccinations in the past 4 weeks?   No     Immunization questionnaire was positive for at least one answer.  Notified DR. Burnette.        Per orders of Dr. Burnette , injection of FluBlock & PCV 20 given by Nafisa Cm MA. Patient instructed to remain in clinic for 15 minutes afterwards, and to report any adverse reaction to me immediately.       Screening performed by Nafisa Cm MA on 2/20/2023 at 1:35 PM.

## 2023-02-20 NOTE — PROGRESS NOTES
SUBJECTIVE:   CC: Manoj is an 27 year old who presents for preventative health visit.   Patient has been advised of split billing requirements and indicates understanding: Yes  Healthy Habits:     Getting at least 3 servings of Calcium per day:  NO    Bi-annual eye exam:  NO    Dental care twice a year:  Yes    Sleep apnea or symptoms of sleep apnea:  Daytime drowsiness    Diet:  Regular (no restrictions) and Other    Frequency of exercise:  None    Taking medications regularly:  Yes    Medication side effects:  None    PHQ-2 Total Score: 0    Additional concerns today:  Yes    Weird skin stuff having a couple of spots dry and erythematous. Also weird rash in genitalia.    Manoj has been sicker way longer with URI/flu symptoms. [place] will stay sick twice as long as other family members. Mom has MS and rheumatoid arthritis, sister has fibromyalgia.     Today's PHQ-2 Score:   PHQ-2 ( 1999 Pfizer) 2/20/2023   Q1: Little interest or pleasure in doing things 0   Q2: Feeling down, depressed or hopeless 0   PHQ-2 Score 0   PHQ-2 Total Score (12-17 Years)- Positive if 3 or more points; Administer PHQ-A if positive -   Q1: Little interest or pleasure in doing things Not at all   Q2: Feeling down, depressed or hopeless Not at all   PHQ-2 Score 0           Social History     Tobacco Use     Smoking status: Never     Smokeless tobacco: Never   Substance Use Topics     Alcohol use: Yes     Comment: infrequent at worst/ occa      If you drink alcohol do you typically have >3 drinks per day or >7 drinks per week? No    Alcohol Use 2/20/2023   Prescreen: >3 drinks/day or >7 drinks/week? No   Prescreen: >3 drinks/day or >7 drinks/week? -     Last PSA: No results found for: PSA    Reviewed orders with patient. Reviewed health maintenance and updated orders accordingly - Yes      Reviewed and updated as needed this visit by clinical staff                  Reviewed and updated as needed this visit by Provider                     Review of  "Systems   Constitutional: Negative for chills and fever.   HENT: Negative for congestion, ear pain, hearing loss and sore throat.    Eyes: Negative for pain and visual disturbance.   Respiratory: Negative for cough and shortness of breath.    Cardiovascular: Negative for chest pain and palpitations.   Gastrointestinal: Negative for abdominal pain, constipation, diarrhea and nausea.   Genitourinary: Negative for dysuria, frequency, genital sores, hematuria and urgency.   Musculoskeletal: Negative for arthralgias, joint swelling and myalgias.   Skin: Negative for rash.   Neurological: Negative for dizziness, weakness and headaches.   Psychiatric/Behavioral: The patient is not nervous/anxious.      OBJECTIVE:   Vitals: /70 (BP Location: Right arm, Patient Position: Sitting, Cuff Size: Adult Regular)   Pulse 119   Temp 97.8  F (36.6  C) (Temporal)   Resp 20   Ht 1.77 m (5' 9.69\")   Wt 56.8 kg (125 lb 3.2 oz)   SpO2 100%   BMI 18.13 kg/m    BMI= Body mass index is 18.13 kg/m .  Physical Exam  GENERAL: healthy, alert and no distress  EYES: Eyes grossly normal to inspection, conjunctivae and sclerae normal  HENT: ear canals and TM's normal  NECK: no adenopathy  RESP: lungs clear to auscultation - no rales, rhonchi or wheezes  CV: regular rate and rhythm, normal S1 S2  ABDOMEN: soft, nontender, no hepatosplenomegaly, no masses and bowel sounds normal  MS: no gross musculoskeletal defects noted, no edema  SKIN: skin with dry erythematous patches   NEURO: Normal strength and tone, mentation intact and speech normal  PSYCH: mentation appears normal, affect normal/    Diagnostic Test Results:  Labs reviewed in Epic    ASSESSMENT/PLAN:     Routine general medical examination at a health care facility  - advised to follow up with allergist/immunologist regarding immunity concerns     Major depressive disorder, recurrent episode, moderate with anxious distress (H)  - followed by therapist     Male pattern alopecia  - " finasteride (PROSCAR) 5 MG tablet; TAKE 1/4 TABLET BY MOUTH ONCE DAILY  Dispense: 23 tablet; Refill: 2    Vertigo  - ondansetron (ZOFRAN ODT) 4 MG ODT tab; DISSOLVE 1 TABLET(4 MG) ON THE TONGUE EVERY 8 HOURS AS NEEDED FOR NAUSEA  Dispense: 20 tablet; Refill: 0    Eczema, unspecified type  - advised to use OTC hydrocortisone BID X 5-7 days  - advised to keep skin moisturized     Need for vaccination  - PNEUMOCOCCAL 20 VALENT CONJUGATE (PREVNAR 20)  - INFLUENZA VACCINE >6 MONTHS (AFLURIA/FLUZONE)    Addendum -   FluZone was ordered and medical assistant accidentally gave Ty FluBlock. Marlena Aceves clinic manager aware and will contact patient.     Followed by therapy, not pyschiari  Patient has been advised of split billing requirements and indicates understanding: Yes      COUNSELING:   Reviewed preventive health counseling, as reflected in patient instructions        Manoj Castellanos reports that Manoj Castellanos has never smoked. Manoj Castellanos has never used smokeless tobacco.            Parrish Burnette MD  [facility]  Answers for HPI/ROS submitted by the patient on 2/20/2023  If you checked off any problems, how difficult have these problems made it for you to do your work, take care of things at home, or get along with other people?: Somewhat difficult  PHQ9 TOTAL SCORE: 6  Blood in stool: No  heartburn: No  peripheral edema: No  mood changes: No  Skin sensation changes: No  pelvic pain: No  vaginal bleeding: No  vaginal discharge: No  tenderness: No  breast mass: No  breast discharge: No  impotence: No  penile discharge: No

## 2023-02-20 NOTE — CONFIDENTIAL NOTE
[facility] :  Care Coordination Note     SITUATION   Manoj Castellanos (they/them) is a 27 year old adult who is receiving support for:  Care Team  .    BACKGROUND     Pt is scheduled for a gender affirming breast augmentation consult on 6/7/23.     Ty has not been on HRT and does not desire to be. They would also like to discuss body sculpting, if possible.     ASSESSMENT     Surgery              [facility] Assessment  [facility] ([facility] Enrollment: Enrolled  Patient has a therapist: Yes  Name of therapist: Kristy Doe @ [facility] for psychology  Letter of support #1: Requested  Surgery being considered: Yes  Augmentation: Yes  Hormones at least 12mo: No    Pt reports:   No nicotine or other gender affirming surgeries      PLAN          Nursing Interventions:       Follow-up plan:  1. Pt reports having LOS. Upload LOS to [facility]

## 2023-02-21 ENCOUNTER — TELEPHONE (OUTPATIENT)
Dept: FAMILY MEDICINE | Facility: CLINIC | Age: 28
End: 2023-02-21
Payer: COMMERCIAL

## 2023-02-21 NOTE — TELEPHONE ENCOUNTER
FUTURE VISIT INFORMATION      FUTURE VISIT INFORMATION:    Date: 6/7/23    Time: 3:15pm    Location: [facility]  REFERRAL INFORMATION:    Reason for visit/diagnosis  gender affirming breast augmentation consult    RECORDS REQUESTED FROM:       No recs to collect

## 2023-02-21 NOTE — TELEPHONE ENCOUNTER
This Rx for fluticasone (FLONASE) 50 MCG/ACT nasal spray cannot be ordered.  Drug not covered by plan.  Please put in a new Rx for an alternative.  Suggested alternative/s is/are: Flovent HFA, Flovent Diskus, Asmanex HFA.

## 2023-02-22 NOTE — TELEPHONE ENCOUNTER
Dr. Burnette-Please review message from pharmacy.  Should patient be directed to purchase Flonase nasal spray over-the-counter?  The alternatives suggested by insurance/pharmacy appear to be inhalers-writer unsure if those alternatives are appropriate?    Thank you!  KEIRY HolmanN, RN-[facility]

## 2023-02-23 ENCOUNTER — MYC MEDICAL ADVICE (OUTPATIENT)
Dept: FAMILY MEDICINE | Facility: CLINIC | Age: 28
End: 2023-02-23
Payer: COMMERCIAL

## 2023-03-13 ENCOUNTER — TELEPHONE (OUTPATIENT)
Dept: GASTROENTEROLOGY | Facility: CLINIC | Age: 28
End: 2023-03-13
Payer: COMMERCIAL

## 2023-03-13 NOTE — TELEPHONE ENCOUNTER
Attempted to contact patient regarding upcoming Upper endoscopy (EGD) procedure on 3.28.2023 for pre assessment questions. No answer.     Left message to return call to [phone] #4    Discuss Covid policy and designated  policy.    Pre op exam? N/A    Arrival time: 1050. Procedure time: 1135    Facility location: [facility]; [address], [place] [address]    Sedation type: MAC    Anticoagulants: No    Electronic implanted devices? No    Diabetic? No    Indication for procedure: follow-up EoE    Prep instructions sent via [employer].       Rose Mary Khan RN  Endoscopy Procedure Pre Assessment RN

## 2023-03-17 NOTE — TELEPHONE ENCOUNTER
Pt returned call.     Pre assessment questions completed for upcoming Upper endoscopy (EGD) procedure scheduled on 3/28/23    COVID policy reviewed.     Reviewed procedural arrival time 1050, procedure time 1135 and facility location [facility]; [address], [place] [address]    Designated  policy reviewed. Instructed to have someone stay 24 hours post procedure.     Reviewed procedure prep instructions.     Patient verbalized understanding and had no questions or concerns at this time.      Desi Spencer RN  Endoscopy Procedure Pre Assessment RN

## 2023-03-24 ENCOUNTER — OFFICE VISIT (OUTPATIENT)
Dept: NEUROLOGY | Facility: CLINIC | Age: 28
End: 2023-03-24
Payer: COMMERCIAL

## 2023-03-24 ENCOUNTER — LAB (OUTPATIENT)
Dept: LAB | Facility: CLINIC | Age: 28
End: 2023-03-24
Payer: COMMERCIAL

## 2023-03-24 VITALS
OXYGEN SATURATION: 97 % | HEART RATE: 85 BPM | SYSTOLIC BLOOD PRESSURE: 129 MMHG | DIASTOLIC BLOOD PRESSURE: 93 MMHG | RESPIRATION RATE: 16 BRPM

## 2023-03-24 DIAGNOSIS — R20.0 NUMBNESS AND TINGLING: ICD-10-CM

## 2023-03-24 DIAGNOSIS — R20.2 NUMBNESS AND TINGLING: Primary | ICD-10-CM

## 2023-03-24 DIAGNOSIS — R20.0 NUMBNESS AND TINGLING: Primary | ICD-10-CM

## 2023-03-24 DIAGNOSIS — H53.2 DOUBLE VISION: ICD-10-CM

## 2023-03-24 DIAGNOSIS — R20.2 NUMBNESS AND TINGLING: ICD-10-CM

## 2023-03-24 LAB
ALBUMIN SERPL BCG-MCNC: 4.6 G/DL (ref 3.5–5.2)
ALP SERPL-CCNC: 79 U/L (ref 35–129)
ALT SERPL W P-5'-P-CCNC: 12 U/L (ref 10–50)
ANION GAP SERPL CALCULATED.3IONS-SCNC: 8 MMOL/L (ref 7–15)
AST SERPL W P-5'-P-CCNC: 19 U/L (ref 10–50)
BASOPHILS # BLD AUTO: 0 10E3/UL (ref 0–0.2)
BASOPHILS NFR BLD AUTO: 1 %
BILIRUB SERPL-MCNC: 0.2 MG/DL
BUN SERPL-MCNC: 11.2 MG/DL (ref 6–20)
CALCIUM SERPL-MCNC: 9.5 MG/DL (ref 8.6–10)
CHLORIDE SERPL-SCNC: 103 MMOL/L (ref 98–107)
CREAT SERPL-MCNC: 0.85 MG/DL (ref 0.51–1.17)
DEPRECATED HCO3 PLAS-SCNC: 31 MMOL/L (ref 22–29)
EOSINOPHIL # BLD AUTO: 0.5 10E3/UL (ref 0–0.7)
EOSINOPHIL NFR BLD AUTO: 9 %
ERYTHROCYTE [DISTWIDTH] IN BLOOD BY AUTOMATED COUNT: 13.1 % (ref 10–15)
GFR SERPL CREATININE-BSD FRML MDRD: >90 ML/MIN/1.73M2
GLUCOSE SERPL-MCNC: 92 MG/DL (ref 70–99)
HCT VFR BLD AUTO: 47.5 % (ref 35–53)
HGB BLD-MCNC: 15.7 G/DL (ref 11.7–17.7)
IMM GRANULOCYTES # BLD: 0 10E3/UL
IMM GRANULOCYTES NFR BLD: 0 %
LYMPHOCYTES # BLD AUTO: 0.9 10E3/UL (ref 0.8–5.3)
LYMPHOCYTES NFR BLD AUTO: 16 %
MCH RBC QN AUTO: 27 PG (ref 26.5–33)
MCHC RBC AUTO-ENTMCNC: 33.1 G/DL (ref 31.5–36.5)
MCV RBC AUTO: 82 FL (ref 78–100)
MONOCYTES # BLD AUTO: 0.4 10E3/UL (ref 0–1.3)
MONOCYTES NFR BLD AUTO: 7 %
NEUTROPHILS # BLD AUTO: 3.6 10E3/UL (ref 1.6–8.3)
NEUTROPHILS NFR BLD AUTO: 67 %
NRBC # BLD AUTO: 0 10E3/UL
NRBC BLD AUTO-RTO: 0 /100
PLATELET # BLD AUTO: 236 10E3/UL (ref 150–450)
POTASSIUM SERPL-SCNC: 4 MMOL/L (ref 3.4–5.3)
PROT SERPL-MCNC: 7.3 G/DL (ref 6.4–8.3)
RBC # BLD AUTO: 5.81 10E6/UL (ref 3.8–5.9)
SODIUM SERPL-SCNC: 142 MMOL/L (ref 136–145)
WBC # BLD AUTO: 5.4 10E3/UL (ref 4–11)

## 2023-03-24 PROCEDURE — 36415 COLL VENOUS BLD VENIPUNCTURE: CPT | Performed by: PATHOLOGY

## 2023-03-24 PROCEDURE — 99215 OFFICE O/P EST HI 40 MIN: CPT | Performed by: INTERNAL MEDICINE

## 2023-03-24 PROCEDURE — 80183 DRUG SCRN QUANT OXCARBAZEPIN: CPT | Mod: 90 | Performed by: PATHOLOGY

## 2023-03-24 PROCEDURE — 80053 COMPREHEN METABOLIC PANEL: CPT | Performed by: PATHOLOGY

## 2023-03-24 PROCEDURE — 83516 IMMUNOASSAY NONANTIBODY: CPT | Mod: 90 | Performed by: PATHOLOGY

## 2023-03-24 PROCEDURE — 85025 COMPLETE CBC W/AUTO DIFF WBC: CPT | Performed by: PATHOLOGY

## 2023-03-24 PROCEDURE — 83519 RIA NONANTIBODY: CPT | Mod: 90 | Performed by: PATHOLOGY

## 2023-03-24 PROCEDURE — 99000 SPECIMEN HANDLING OFFICE-LAB: CPT | Performed by: PATHOLOGY

## 2023-03-24 ASSESSMENT — PAIN SCALES - GENERAL: PAINLEVEL: NO PAIN (0)

## 2023-03-24 NOTE — PROGRESS NOTES
[facility] Neurology Consultation    Manoj Castellanos MRN# [id number]   Age: 27 year old YOB: 1995     Requesting physician: Parrish Gaxiola     Reason for Consultation: numbness/tingling      History of Presenting Symptoms:   Manoj Castellanos is a 27 year old adult who presents today for evaluation of numbness/tingling.     Patient has had a numb patch on the right side of his face since a child after a dental procedure. This seemed to spread out on the right side with time.     In his early 20s he started getting these attacks of numbness in the face. Numbness eventually crossed over to the left side of the face during the time of COVID. Eventually he also developed numbness in the left arm, collar bone / chest areas bilaterally.     He was started on gabapentin / oxcarbazepine and this seemed to help his symptoms. This seems to control symptoms about 90%. When he has gone off of oxcarbazepine the numb seems to worsened.     He denies any history of unilateral facial electrical pains.    Several years ago he developed horizontal double vision of unclear cause. He was seen my ophthalmology and given prism glasses, which seemed to fix the problem.       Past Medical History:     Patient Active Problem List   Diagnosis     Abnormal sinus finding on MRI     Mild persistent asthma     Chronic allergic rhinitis due to animal hair and dander     Allergic rhinitis due to dust mite     Chronic seasonal allergic rhinitis due to pollen     Allergic conjunctivitis of both eyes     Palpitations     Seasonal affective disorder (H)     Past Medical History:   Diagnosis Date     Depressive disorder 2014    it s chill now tho I m doing fine     Uncomplicated asthma early 2000s        Past Surgical History:     Past Surgical History:   Procedure Laterality Date     COMBINED ESOPHAGOSCOPY, GASTROSCOPY, DUODENOSCOPY (EGD) WITH CO2 INSUFFLATION N/A 1/2/2023    Procedure: ESOPHAGOGASTRODUODENOSCOPY, WITH CO2 INSUFFLATION;   Surgeon: Zahraa Robbins DO;  Location: [facility] OR     ESOPHAGOSCOPY, GASTROSCOPY, DUODENOSCOPY (EGD), COMBINED N/A 1/2/2023    Procedure: ESOPHAGOGASTRODUODENOSCOPY, WITH BIOPSY;  Surgeon: Zahraa Robbins DO;  Location: [facility] OR     HC TOOTH EXTRACTION W/FORCEP  2016    4 teeth         Social History:     Social History     Tobacco Use     Smoking status: Never     Smokeless tobacco: Never   Substance Use Topics     Alcohol use: Yes     Comment: infrequent at worst/ occa      Drug use: No        Family History:     Family History   Problem Relation Age of Onset     Bipolar Disorder Mother      Substance Abuse Father      Asthma Father      Pancreatic Cancer Maternal Grandfather      Other Cancer Maternal Grandfather      Prostate Cancer Paternal Grandfather      Depression Other      Anxiety Disorder Other      Mental Illness Other      Depression Sister      Anxiety Disorder Sister      Attention Deficit Disorder Sister         Medications:     Current Outpatient Medications   Medication Sig     FLOVENT  MCG/ACT inhaler INHALE 2 PUFFS INTO THE LUNGS TWICE DAILY (Patient not taking: Reported on 2/20/2023)     albuterol (PROAIR HFA/PROVENTIL HFA/VENTOLIN HFA) 108 (90 Base) MCG/ACT inhaler Inhale 2 puffs into the lungs every 4 hours as needed for shortness of breath / dyspnea or wheezing     azelastine (ASTELIN) 0.1 % nasal spray Spray 1-2 sprays into both nostrils 2 times daily You are overdue for an appointment with Dr. Fraser.  Please call to schedule.     budesonide (PULMICORT) 1 MG/2ML neb solution Take 2 mLs (1 mg) by nebulization 2 times daily Mix with 3ml of honey or chocolate syrup and swallow (do not inhale). Rinse mouth after use but do not eat or drink for 1 hour after taking.     carbamide peroxide (DEBROX) 6.5 % otic solution Place 5 drops into both ears 2 times daily     cetirizine (ZYRTEC) 10 MG tablet TAKE 1 TABLET(10 MG) BY MOUTH TWICE DAILY     COMPOUNDED NON-CONTROLLED SUBSTANCE (CMPD RX) -  PHARMACY TO MIX COMPOUNDED MEDICATION Take two milligrams daily     famotidine (PEPCID) 20 MG tablet Take 20 mg by mouth as needed     finasteride (PROSCAR) 5 MG tablet TAKE 1/4 TABLET BY MOUTH ONCE DAILY     fluticasone (FLONASE) 50 MCG/ACT nasal spray Spray 2 sprays into both nostrils daily Needs appointment for additional refills (Patient not taking: Reported on 2/20/2023)     gabapentin (NEURONTIN) 300 MG capsule Take 1 capsule (300 mg) by mouth 2 times daily PATIENT STATES HE TAKING 300 MG TWICE DAILY     IBUPROFEN Take 200 tablets by mouth daily as needed (Patient not taking: Reported on 2/20/2023)     meclizine (ANTIVERT) 25 MG tablet Take 1 tablet (25 mg) by mouth 2 times daily as needed for dizziness . Appointment required for additional refills.     ondansetron (ZOFRAN ODT) 4 MG ODT tab DISSOLVE 1 TABLET(4 MG) ON THE TONGUE EVERY 8 HOURS AS NEEDED FOR NAUSEA     OXcarbazepine (TRILEPTAL) 150 MG tablet Take 2 tablets (300 mg) by mouth 2 times daily     pantoprazole (PROTONIX) 40 MG EC tablet Take 1 tablet (40 mg) by mouth 2 times daily     VITAMIN D3 25 MCG (1000 UT) tablet TAKE 2 TABLETS BY MOUTH DAILY     No current facility-administered medications for this visit.        Allergies:     Allergies   Allergen Reactions     Amoxicillin Swelling     Lip swelling      Cefuroxime      Swelling         Review of Systems:   As above     Physical Exam:   Vitals: BP (!) 129/93   Pulse 85   Resp 16   SpO2 97%    General: Seated comfortably in no acute distress.  HEENT: Optic discs sharp on funduscopic exam.   Lungs: breathing comfortably  Extremities: no edema  Skin: No rashes  Neurologic:     Mental Status: Fully alert, attentive. Normal memory and fund of knowledge. Language normal, speech clear and fluent, no paraphasic errors.      Cranial Nerves: Visual fields intact. PERRL. EOMI with normal smooth pursuit. Mild decreased sensation to light touch in the V2/V3 regions, otherwise normal. Facial movements  symmetric. Hearing not formally tested but intact to conversation. Palate elevation symmetric, uvula midline. No dysarthria. Shoulder shrug strong bilaterally. Tongue protrusion midline.     Motor: No tremors or other abnormal movements observed. Muscle tone normal throughout. Normal/symmetric rapid finger tapping. Strength 5/5 throughout upper and lower extremities.      Right Left   Shoulder abduction        5 5   Elbow extension 5 5   Elbow flexion 5 5   Wrist extension         5 5   Finger extension 5 5   ADM 5 5   FDI 5 5   APB 5 5   Hip flexion 5 5   Knee flexion 5 5   Knee extension 5 5   Dorsiflexion 5 5   Plantar flexion 5 5        Deep Tendon Reflexes: 2+/symmetric throughout upper and lower extremities. No clonus. Toes downgoing bilaterally.     Sensory: Intact/symmetric to light touch, pinprick, temperature, vibration and proprioception throughout upper and lower extremities. Negative Romberg.      Coordination: Finger-nose-finger and heel-shin intact without dysmetria. Rapid alternating movements intact/symmetric with normal speed and rhythm.     Gait: Normal, steady casual gait. Able to walk on toes, heels and tandem without difficulty.         Data: Pertinent prior to visit   Imaging:  MRI brain 10/2022  Findings:  No definite mass is noted at the skull base. The flow-voids  of the major upper cervical and intracranial vascular structures at  the skull base appear patent. No definite adenopathy within the  visualized upper cervical soft tissues is noted. There is a normal  appearance of the 5th cranial nerves along their course bilaterally. A  vascular structure, likely a vein abuts the left trigeminal nerve near  the root entry zone. The right anterior-inferior cerebellar artery  approaches and may abut the right trigeminal nerve within the mid  cisternal segment. Findings are not significantly changed since  4/29/2020.  Impression:   1. Unchanged questionable neurovascular conflict bilaterally.  2.  No abnormal signal or enhancement along the course of the  trigeminal nerves bilaterally.  3. Normal MRI of the brain.    MRI cervical 2020  Impression:   1. No abnormal enhancement in the spinal cord, thecal sac or cervical  vertebrae.  2. No significant spinal canal or neural foraminal narrowing.  3. Non-enlarged cervical lymph nodes, likely reactive.    MRI brain 2017  Impression: Complete opacification of right frontal sinus. No abnormal  intracranial finding.    CBC, CMP unremarkable (9/2022)         Assessment and Plan:   Assessment:  Manoj Castellanos is a 27 year old adult who presents today for evaluation of numbness/tingling. Patient reports chronic right facial numbness, which started in childhood. Several years ago the same sensation spread to the rest of his body. Numbness/tingling is largely controlled with combination of gabapentin and oxcarbazepine. MRI brain and cervical spine previously did not show any etiology for symptoms. We discussed pursuing EMG for polyradiculopathy/polyneuropathy screen.     Patient has binocular double vision of unclear etiology, which is corrected with prism glasses. Myasthenia gravis antibodies were ordered today.      Plan:  - Continue gabapentin 300 mg BID and oxcarbazepine 300 mg BID  - CBC, CMP, oxcarbazepine level  - EMG (left arm and leg)  - Acetylcholine binding, blocking, modulating Abs    Follow up in Neurology clinic in 1 year or earlier as needed should new concerns arise.    Gopi Espinal MD   of Neurology  [facility]      The total time of this encounter today amounted to 48 minutes. This time included time spent with the patient, prep work, ordering tests, and performing post visit documentation.

## 2023-03-26 LAB — 10OH-CARBAZEPINE SERPL-MCNC: 18 UG/ML

## 2023-03-27 LAB — ACHR MOD AB/ACHR TOTAL SFR SER: 0 %

## 2023-03-28 ENCOUNTER — HOSPITAL ENCOUNTER (OUTPATIENT)
Facility: AMBULATORY SURGERY CENTER | Age: 28
Discharge: HOME | End: 2023-03-28
Attending: INTERNAL MEDICINE
Payer: COMMERCIAL

## 2023-03-28 ENCOUNTER — ANESTHESIA EVENT (OUTPATIENT)
Dept: SURGERY | Facility: AMBULATORY SURGERY CENTER | Age: 28
End: 2023-03-28
Payer: COMMERCIAL

## 2023-03-28 ENCOUNTER — ANESTHESIA (OUTPATIENT)
Dept: SURGERY | Facility: AMBULATORY SURGERY CENTER | Age: 28
End: 2023-03-28
Payer: COMMERCIAL

## 2023-03-28 VITALS
RESPIRATION RATE: 14 BRPM | TEMPERATURE: 99.4 F | DIASTOLIC BLOOD PRESSURE: 72 MMHG | OXYGEN SATURATION: 99 % | SYSTOLIC BLOOD PRESSURE: 109 MMHG | HEART RATE: 93 BPM

## 2023-03-28 VITALS — HEART RATE: 90 BPM

## 2023-03-28 LAB
ACHR BIND AB SER-SCNC: 0 NMOL/L
ACHR BLOCK AB/ACHR TOTAL SFR SER: 0 %
UPPER GI ENDOSCOPY: NORMAL

## 2023-03-28 PROCEDURE — 88305 TISSUE EXAM BY PATHOLOGIST: CPT | Performed by: PATHOLOGY

## 2023-03-28 PROCEDURE — G8907 PT DOC NO EVENTS ON DISCHARG: HCPCS

## 2023-03-28 PROCEDURE — 43239 EGD BIOPSY SINGLE/MULTIPLE: CPT

## 2023-03-28 PROCEDURE — G8918 PT W/O PREOP ORDER IV AB PRO: HCPCS

## 2023-03-28 RX ORDER — PROCHLORPERAZINE MALEATE 10 MG
10 TABLET ORAL EVERY 6 HOURS PRN
Status: DISCONTINUED | OUTPATIENT
Start: 2023-03-28 | End: 2023-03-29 | Stop reason: HOSPADM

## 2023-03-28 RX ORDER — NALOXONE HYDROCHLORIDE 0.4 MG/ML
0.4 INJECTION, SOLUTION INTRAMUSCULAR; INTRAVENOUS; SUBCUTANEOUS
Status: DISCONTINUED | OUTPATIENT
Start: 2023-03-28 | End: 2023-03-29 | Stop reason: HOSPADM

## 2023-03-28 RX ORDER — SODIUM CHLORIDE, SODIUM LACTATE, POTASSIUM CHLORIDE, CALCIUM CHLORIDE 600; 310; 30; 20 MG/100ML; MG/100ML; MG/100ML; MG/100ML
INJECTION, SOLUTION INTRAVENOUS CONTINUOUS
Status: DISCONTINUED | OUTPATIENT
Start: 2023-03-28 | End: 2023-03-29 | Stop reason: HOSPADM

## 2023-03-28 RX ORDER — LIDOCAINE 40 MG/G
CREAM TOPICAL
Status: DISCONTINUED | OUTPATIENT
Start: 2023-03-28 | End: 2023-03-29 | Stop reason: HOSPADM

## 2023-03-28 RX ORDER — ONDANSETRON 4 MG/1
4 TABLET, ORALLY DISINTEGRATING ORAL EVERY 6 HOURS PRN
Status: DISCONTINUED | OUTPATIENT
Start: 2023-03-28 | End: 2023-03-29 | Stop reason: HOSPADM

## 2023-03-28 RX ORDER — NALOXONE HYDROCHLORIDE 0.4 MG/ML
0.2 INJECTION, SOLUTION INTRAMUSCULAR; INTRAVENOUS; SUBCUTANEOUS
Status: DISCONTINUED | OUTPATIENT
Start: 2023-03-28 | End: 2023-03-29 | Stop reason: HOSPADM

## 2023-03-28 RX ORDER — LIDOCAINE HYDROCHLORIDE 20 MG/ML
INJECTION, SOLUTION INFILTRATION; PERINEURAL PRN
Status: DISCONTINUED | OUTPATIENT
Start: 2023-03-28 | End: 2023-03-28

## 2023-03-28 RX ORDER — FLUMAZENIL 0.1 MG/ML
0.2 INJECTION, SOLUTION INTRAVENOUS
Status: DISCONTINUED | OUTPATIENT
Start: 2023-03-28 | End: 2023-03-29 | Stop reason: HOSPADM

## 2023-03-28 RX ORDER — SODIUM CHLORIDE, SODIUM LACTATE, POTASSIUM CHLORIDE, CALCIUM CHLORIDE 600; 310; 30; 20 MG/100ML; MG/100ML; MG/100ML; MG/100ML
INJECTION, SOLUTION INTRAVENOUS CONTINUOUS PRN
Status: DISCONTINUED | OUTPATIENT
Start: 2023-03-28 | End: 2023-03-28

## 2023-03-28 RX ORDER — ONDANSETRON 2 MG/ML
4 INJECTION INTRAMUSCULAR; INTRAVENOUS
Status: DISCONTINUED | OUTPATIENT
Start: 2023-03-28 | End: 2023-03-29 | Stop reason: HOSPADM

## 2023-03-28 RX ORDER — ONDANSETRON 2 MG/ML
4 INJECTION INTRAMUSCULAR; INTRAVENOUS EVERY 6 HOURS PRN
Status: DISCONTINUED | OUTPATIENT
Start: 2023-03-28 | End: 2023-03-29 | Stop reason: HOSPADM

## 2023-03-28 RX ORDER — DEXMEDETOMIDINE HYDROCHLORIDE 4 UG/ML
INJECTION, SOLUTION INTRAVENOUS PRN
Status: DISCONTINUED | OUTPATIENT
Start: 2023-03-28 | End: 2023-03-28

## 2023-03-28 RX ORDER — PROPOFOL 10 MG/ML
INJECTION, EMULSION INTRAVENOUS PRN
Status: DISCONTINUED | OUTPATIENT
Start: 2023-03-28 | End: 2023-03-28

## 2023-03-28 RX ADMIN — DEXMEDETOMIDINE HYDROCHLORIDE 20 MCG: 4 INJECTION, SOLUTION INTRAVENOUS at 12:19

## 2023-03-28 RX ADMIN — PROPOFOL 100 MG: 10 INJECTION, EMULSION INTRAVENOUS at 12:19

## 2023-03-28 RX ADMIN — SODIUM CHLORIDE, SODIUM LACTATE, POTASSIUM CHLORIDE, CALCIUM CHLORIDE: 600; 310; 30; 20 INJECTION, SOLUTION INTRAVENOUS at 11:30

## 2023-03-28 RX ADMIN — LIDOCAINE HYDROCHLORIDE 60 MG: 20 INJECTION, SOLUTION INFILTRATION; PERINEURAL at 12:17

## 2023-03-28 RX ADMIN — SODIUM CHLORIDE, SODIUM LACTATE, POTASSIUM CHLORIDE, CALCIUM CHLORIDE: 600; 310; 30; 20 INJECTION, SOLUTION INTRAVENOUS at 12:17

## 2023-03-28 RX ADMIN — PROPOFOL 100 MG: 10 INJECTION, EMULSION INTRAVENOUS at 12:22

## 2023-03-28 NOTE — ANESTHESIA PREPROCEDURE EVALUATION
Anesthesia Pre-Procedure Evaluation    Patient: Manoj Castellanos   MRN: [id number] : 1995        Procedure : Procedure(s):  ESOPHAGOGASTRODUODENOSCOPY, WITH CO2 INSUFFLATION          Past Medical History:   Diagnosis Date     Depressive disorder     it s chill now tho I m doing fine     Uncomplicated asthma early       Past Surgical History:   Procedure Laterality Date     COMBINED ESOPHAGOSCOPY, GASTROSCOPY, DUODENOSCOPY (EGD) WITH CO2 INSUFFLATION N/A 2023    Procedure: ESOPHAGOGASTRODUODENOSCOPY, WITH CO2 INSUFFLATION;  Surgeon: Zahraa Robbins DO;  Location: [facility] OR     ESOPHAGOSCOPY, GASTROSCOPY, DUODENOSCOPY (EGD), COMBINED N/A 2023    Procedure: ESOPHAGOGASTRODUODENOSCOPY, WITH BIOPSY;  Surgeon: Zahraa Robbins DO;  Location: [facility] OR     HC TOOTH EXTRACTION W/FORCEP      4 teeth       Allergies   Allergen Reactions     Amoxicillin Swelling     Lip swelling      Cefuroxime      Swelling       Social History     Tobacco Use     Smoking status: Never     Smokeless tobacco: Never   Substance Use Topics     Alcohol use: Yes     Comment: infrequent at worst/ occa       Wt Readings from Last 1 Encounters:   23 56.8 kg (125 lb 3.2 oz)        Anesthesia Evaluation   Pt has had prior anesthetic. Type: MAC.        ROS/MED HX  ENT/Pulmonary:     (+) asthma     Neurologic:  - neg neurologic ROS     Cardiovascular:  - neg cardiovascular ROS     METS/Exercise Tolerance:     Hematologic:  - neg hematologic  ROS     Musculoskeletal:  - neg musculoskeletal ROS     GI/Hepatic:  - neg GI/hepatic ROS     Renal/Genitourinary:  - neg Renal ROS     Endo:  - neg endo ROS     Psychiatric/Substance Use:     (+) psychiatric history depression     Infectious Disease:  - neg infectious disease ROS     Malignancy:  - neg malignancy ROS     Other:  - neg other ROS          Physical Exam    Airway  airway exam normal           Respiratory Devices and Support         Dental           Cardiovascular   cardiovascular  exam normal          Pulmonary   pulmonary exam normal                OUTSIDE LABS:  CBC:   Lab Results   Component Value Date    WBC 5.4 03/24/2023    WBC 6.0 11/23/2022    HGB 15.7 03/24/2023    HGB 17.3 11/23/2022    HCT 47.5 03/24/2023    HCT 52.3 11/23/2022     03/24/2023     11/23/2022     BMP:   Lab Results   Component Value Date     03/24/2023     11/23/2022    POTASSIUM 4.0 03/24/2023    POTASSIUM 4.3 11/23/2022    CHLORIDE 103 03/24/2023    CHLORIDE 105 11/23/2022    CO2 31 (H) 03/24/2023    CO2 30 (H) 11/23/2022    BUN 11.2 03/24/2023    BUN 12.3 11/23/2022    CR 0.85 03/24/2023    CR 0.94 11/23/2022    GLC 92 03/24/2023     (H) 11/23/2022     COAGS: No results found for: PTT, INR, FIBR  POC: No results found for: BGM, HCG, HCGS  HEPATIC:   Lab Results   Component Value Date    ALBUMIN 4.6 03/24/2023    PROTTOTAL 7.3 03/24/2023    ALT 12 03/24/2023    AST 19 03/24/2023    ALKPHOS 79 03/24/2023    BILITOTAL 0.2 03/24/2023     OTHER:   Lab Results   Component Value Date    IOANA 9.5 03/24/2023    LIPASE 34 11/23/2022    AMYLASE 54 06/22/2022    TSH 2.86 05/09/2022    CRP <2.9 05/09/2022    SED 4 12/12/2022       Anesthesia Plan    ASA Status:  2   NPO Status:  NPO Appropriate    Anesthesia Type: MAC.     - Reason for MAC: Deep or markedly invasive procedure (G8)   Induction: Propofol.   Maintenance: N/A.        Consents    Anesthesia Plan(s) and associated risks, benefits, and realistic alternatives discussed. Questions answered and patient/representative(s) expressed understanding.    - Discussed:     - Discussed with:  Patient    Use of blood products discussed: No .     Postoperative Care            Comments:           H&P reviewed: Unable to attach H&P to encounter due to EHR limitations. H&P Update: appropriate H&P reviewed, patient examined. No interval changes since H&P (within 30 days).         David Huizar, DO

## 2023-03-28 NOTE — ANESTHESIA POSTPROCEDURE EVALUATION
Patient: Manoj Castellanos    Procedure: Procedure(s):  ESOPHAGOGASTRODUODENOSCOPY, WITH CO2 INSUFFLATION  ESOPHAGOGASTRODUODENOSCOPY, WITH BIOPSY       Anesthesia Type:  MAC    Note:  Disposition: Outpatient   Postop Pain Control: Uneventful            Sign Out: Well controlled pain   PONV: No   Neuro/Psych: Uneventful            Sign Out: Acceptable/Baseline neuro status   Airway/Respiratory: Uneventful            Sign Out: Acceptable/Baseline resp. status   CV/Hemodynamics: Uneventful            Sign Out: Acceptable CV status; No obvious hypovolemia; No obvious fluid overload   Other NRE: NONE   DID A NON-ROUTINE EVENT OCCUR? No           Last vitals:  Vitals Value Taken Time   /72 03/28/23 1245   Temp 99.4  F (37.4  C) 03/28/23 1228   Pulse 93 03/28/23 1245   Resp 14 03/28/23 1245   SpO2 99 % 03/28/23 1300       Electronically Signed By: David Huizar DO  March 28, 2023  1:51 PM

## 2023-03-28 NOTE — DISCHARGE INSTRUCTIONS
[facility]  Same-Day Surgery   Adult Discharge Orders & Instructions   For 24 hours after surgery  Get plenty of rest.  A responsible adult must stay with you for at least 24 hours after you leave the hospital.   Do not drive or use heavy equipment.  If you have weakness or tingling, don't drive or use heavy equipment until this feeling goes away.  Do not drink alcohol.  Avoid strenuous or risky activities.  Ask for help when climbing stairs.   You may feel lightheaded.  IF so, sit for a few minutes before standing.  Have someone help you get up.   If you have nausea (feel sick to your stomach): Drink only clear liquids such as apple juice, ginger ale, broth or 7-Up.  Rest may also help.  Be sure to drink enough fluids.  Move to a regular diet as you feel able.  You may have a slight fever. Call the doctor if your fever is over 100 F (37.7 C) (taken under the tongue) or lasts longer than 24 hours.  You may have a dry mouth, a sore throat, muscle aches or trouble sleeping.  These should go away after 24 hours.  Do not make important or legal decisions.   Call your doctor for any of the followin.  Signs of infection (fever, growing tenderness at the surgery site, a large amount of drainage or bleeding, severe pain, foul-smelling drainage, redness, swelling).    2. It has been over 8 to 10 hours since surgery and you are still not able to urinate (pass water).    3.  Headache for over 24 hours.    4.  Numbness, tingling or weakness the day after surgery (if you had spinal anesthesia).

## 2023-03-28 NOTE — ANESTHESIA CARE TRANSFER NOTE
Patient: Manoj Castellanos    Procedure: Procedure(s):  ESOPHAGOGASTRODUODENOSCOPY, WITH CO2 INSUFFLATION  ESOPHAGOGASTRODUODENOSCOPY, WITH BIOPSY       Diagnosis: Eosinophilic esophagitis [K20.0]  Diagnosis Additional Information: No value filed.    Anesthesia Type:   MAC     Note:    Oropharynx: oropharynx clear of all foreign objects  Level of Consciousness: awake  Oxygen Supplementation: nasal cannula    Independent Airway: airway patency satisfactory and stable  Dentition: dentition unchanged  Vital Signs Stable: post-procedure vital signs reviewed and stable  Report to RN Given: handoff report given  Patient transferred to: Phase II    Handoff Report: Identifed the Patient, Identified the Reponsible Provider, Reviewed the pertinent medical history, Discussed the surgical course, Reviewed Intra-OP anesthesia mangement and issues during anesthesia, Set expectations for post-procedure period and Allowed opportunity for questions and acknowledgement of understanding      Vitals:  Vitals Value Taken Time   /72 03/28/23 1245   Temp 99.4  F (37.4  C) 03/28/23 1228   Pulse 93 03/28/23 1245   Resp 14 03/28/23 1245   SpO2 99 % 03/28/23 1300       Electronically Signed By: David Huizar DO  March 28, 2023  1:51 PM

## 2023-03-28 NOTE — H&P
[facility] Anesthesia Pre-op History and Physical    Manoj Castellanos MRN# [id number]   Age: 27 year old YOB: 1995            Date of Exam 3/28/2023           Primary care provider: Parrish Burnette         Chief Complaint and/or Reason for Procedure:     Follow-up EoE         Active problem list:     Patient Active Problem List    Diagnosis Date Noted     Palpitations 03/28/2019     Priority: Medium     Mild persistent asthma 03/06/2018     Priority: Medium     Chronic allergic rhinitis due to animal hair and dander 03/06/2018     Priority: Medium     Skin prick testing 3/6/18 positive for cat       Allergic rhinitis due to dust mite 03/06/2018     Priority: Medium     Chronic seasonal allergic rhinitis due to pollen 03/06/2018     Priority: Medium     Skin prick testing 3/6/18 positive for walnut tree, birch, oak, willow, demetris, cocklebur, sagebrush/mugwort, and sorrel       Allergic conjunctivitis of both eyes 03/06/2018     Priority: Medium     Abnormal sinus finding on MRI 10/19/2017     Priority: Medium     Seasonal affective disorder (H) 01/01/2014     Priority: Medium     it's chill now tho I'm doing fine              Medications (include herbals and vitamins):   Any Plavix use in the last 7 days? No     Current Outpatient Medications   Medication Sig     albuterol (PROAIR HFA/PROVENTIL HFA/VENTOLIN HFA) 108 (90 Base) MCG/ACT inhaler Inhale 2 puffs into the lungs every 4 hours as needed for shortness of breath / dyspnea or wheezing     budesonide (PULMICORT) 1 MG/2ML neb solution Take 2 mLs (1 mg) by nebulization 2 times daily Mix with 3ml of honey or chocolate syrup and swallow (do not inhale). Rinse mouth after use but do not eat or drink for 1 hour after taking.     cetirizine (ZYRTEC) 10 MG tablet TAKE 1 TABLET(10 MG) BY MOUTH TWICE DAILY     finasteride (PROSCAR) 5 MG tablet TAKE 1/4 TABLET BY MOUTH ONCE DAILY     gabapentin (NEURONTIN) 300 MG capsule Take 1 capsule (300 mg) by mouth 2 times  daily PATIENT STATES HE TAKING 300 MG TWICE DAILY     meclizine (ANTIVERT) 25 MG tablet Take 1 tablet (25 mg) by mouth 2 times daily as needed for dizziness . Appointment required for additional refills.     ondansetron (ZOFRAN ODT) 4 MG ODT tab DISSOLVE 1 TABLET(4 MG) ON THE TONGUE EVERY 8 HOURS AS NEEDED FOR NAUSEA     OXcarbazepine (TRILEPTAL) 150 MG tablet Take 2 tablets (300 mg) by mouth 2 times daily     pantoprazole (PROTONIX) 40 MG EC tablet Take 1 tablet (40 mg) by mouth 2 times daily     VITAMIN D3 25 MCG (1000 UT) tablet TAKE 2 TABLETS BY MOUTH DAILY     carbamide peroxide (DEBROX) 6.5 % otic solution Place 5 drops into both ears 2 times daily     COMPOUNDED NON-CONTROLLED SUBSTANCE (CMPD RX) - PHARMACY TO MIX COMPOUNDED MEDICATION Take two milligrams daily     IBUPROFEN Take 200 tablets by mouth daily as needed (Patient not taking: Reported on 2/20/2023)     Current Facility-Administered Medications   Medication     lactated ringers infusion     lidocaine (LMX4) kit     lidocaine 1 % 0.1-1 mL     ondansetron (ZOFRAN) injection 4 mg     sodium chloride (PF) 0.9% PF flush 3 mL     sodium chloride (PF) 0.9% PF flush 3 mL             Allergies:      Allergies   Allergen Reactions     Amoxicillin Swelling     Lip swelling      Cefuroxime      Swelling      Allergy to Latex? No  Allergy to tape?   No  Intolerances:             Physical Exam:   All vitals have been reviewed  Patient Vitals for the past 8 hrs:   BP Temp Temp src Pulse Resp SpO2   03/28/23 1105 (!) 135/90 99.7  F (37.6  C) Temporal 108 18 100 %     No intake/output data recorded.  Lungs:   No increased work of breathing, good air exchange, clear to auscultation bilaterally, no crackles or wheezing     Cardiovascular:   normal S1 and S2             Lab / Radiology Results:            Anesthetic risk and/or ASA classification:     2  Zahraa Robbins DO

## 2023-03-30 LAB
PATH REPORT.COMMENTS IMP SPEC: NORMAL
PATH REPORT.FINAL DX SPEC: NORMAL
PATH REPORT.GROSS SPEC: NORMAL
PATH REPORT.MICROSCOPIC SPEC OTHER STN: NORMAL
PATH REPORT.RELEVANT HX SPEC: NORMAL
PHOTO IMAGE: NORMAL

## 2023-03-31 NOTE — PROGRESS NOTES
Manifest refraction was not done at last visit. Dispensed new manifest refraction with same prism in each lens.   Patient to call if double vision or blurry vision persists with new glasses prescription.     CESARIO Nguyen 3/18/2020 1:57 PM  
Walk in

## 2023-04-17 DIAGNOSIS — K21.9 GASTROESOPHAGEAL REFLUX DISEASE, UNSPECIFIED WHETHER ESOPHAGITIS PRESENT: ICD-10-CM

## 2023-04-17 RX ORDER — PANTOPRAZOLE SODIUM 40 MG/1
TABLET, DELAYED RELEASE ORAL
Qty: 60 TABLET | Refills: 1 | Status: SHIPPED | OUTPATIENT
Start: 2023-04-17 | End: 2023-06-13

## 2023-04-17 NOTE — TELEPHONE ENCOUNTER
Pantoprazole (Protonix) 40mg EC tablet. Take 1 tablet (40mg) by mouth 2 times daily.    Last Written Prescription Date:  2/13/23  Last Fill Quantity: 60,  # refills: 1   Last office visit: Visit date not found ; last virtual visit: 12/7/2022 with prescribing provider:     Future Office Visit: No visits scheduled.    Refill approved per [facility] protocol.    Carli Kitchen RN

## 2023-04-18 ENCOUNTER — OFFICE VISIT (OUTPATIENT)
Dept: URGENT CARE | Facility: URGENT CARE | Age: 28
End: 2023-04-18
Payer: COMMERCIAL

## 2023-04-18 VITALS
HEART RATE: 80 BPM | SYSTOLIC BLOOD PRESSURE: 129 MMHG | OXYGEN SATURATION: 98 % | TEMPERATURE: 98 F | RESPIRATION RATE: 20 BRPM | DIASTOLIC BLOOD PRESSURE: 87 MMHG

## 2023-04-18 DIAGNOSIS — J02.9 SORE THROAT: ICD-10-CM

## 2023-04-18 DIAGNOSIS — R22.1 THROAT SWELLING: ICD-10-CM

## 2023-04-18 DIAGNOSIS — J02.0 ACUTE STREPTOCOCCAL PHARYNGITIS: Primary | ICD-10-CM

## 2023-04-18 LAB — DEPRECATED S PYO AG THROAT QL EIA: POSITIVE

## 2023-04-18 PROCEDURE — 87880 STREP A ASSAY W/OPTIC: CPT | Performed by: PHYSICIAN ASSISTANT

## 2023-04-18 PROCEDURE — 96372 THER/PROPH/DIAG INJ SC/IM: CPT | Performed by: PHYSICIAN ASSISTANT

## 2023-04-18 PROCEDURE — 99213 OFFICE O/P EST LOW 20 MIN: CPT | Mod: 25 | Performed by: PHYSICIAN ASSISTANT

## 2023-04-18 RX ORDER — AZITHROMYCIN 250 MG/1
TABLET, FILM COATED ORAL
Qty: 6 TABLET | Refills: 0 | Status: SHIPPED | OUTPATIENT
Start: 2023-04-18 | End: 2023-04-23

## 2023-04-18 RX ORDER — LIDOCAINE HYDROCHLORIDE 20 MG/ML
15 SOLUTION OROPHARYNGEAL
Qty: 100 ML | Refills: 0 | Status: SHIPPED | OUTPATIENT
Start: 2023-04-18 | End: 2023-04-21

## 2023-04-18 RX ORDER — DEXAMETHASONE SODIUM PHOSPHATE 10 MG/ML
10 INJECTION INTRAMUSCULAR; INTRAVENOUS ONCE
Status: COMPLETED | OUTPATIENT
Start: 2023-04-18 | End: 2023-04-18

## 2023-04-18 RX ADMIN — DEXAMETHASONE SODIUM PHOSPHATE 10 MG: 10 INJECTION INTRAMUSCULAR; INTRAVENOUS at 17:52

## 2023-04-18 ASSESSMENT — ENCOUNTER SYMPTOMS
TROUBLE SWALLOWING: 1
SORE THROAT: 1
MYALGIAS: 1
FEVER: 0
CHILLS: 0

## 2023-04-18 NOTE — PATIENT INSTRUCTIONS
Strep (+)  Take antibiotics as indicate in the AVS  Throw away your old toothbrush after taking the antibiotics for 24 hours  Supportive care (rest, adequate fluid intake, avoidance of respiratory irritants, soft diet)   Take acetaminophen or ibuprofen as needed for pain  Use lidocaine, viscous, (XYLOCAINE) 2 % solution for throat pain

## 2023-04-18 NOTE — PROGRESS NOTES
Assessment & Plan        1. Acute streptococcal pharyngitis  -Patient has allergies to Penicillins and Cephalosporins  - azithromycin (ZITHROMAX) 250 MG tablet; Take 2 tablets (500 mg) by mouth daily for 1 day, THEN 1 tablet (250 mg) daily for 4 days.  Dispense: 6 tablet; Refill: 0  - lidocaine, viscous, (XYLOCAINE) 2 % solution; Swish and spit 15 mLs in mouth every 3 hours as needed for pain ; Max 8 doses/24 hour period.  Dispense: 100 mL; Refill: 0    2. Sore throat  -Strep (+)  - Streptococcus A Rapid Screen w/Reflex to PCR - Clinic Collect  - lidocaine, viscous, (XYLOCAINE) 2 % solution; Swish and spit 15 mLs in mouth every 3 hours as needed for pain ; Max 8 doses/24 hour period.  Dispense: 100 mL; Refill: 0    3. Throat swelling  -Patient has oropharyngeal swelling, trouble swallowing and tonsillar swelling  - dexamethasone (DECADRON) injection 10 mg    Results for orders placed or performed in visit on 04/18/23   Streptococcus A Rapid Screen w/Reflex to PCR - Clinic Collect     Status: Abnormal    Specimen: Throat; Swab   Result Value Ref Range    Group A Strep antigen Positive (A) Negative       Patient Instructions   Strep (+)  Take antibiotics as indicate in the AVS  Throw away your old toothbrush after taking the antibiotics for 24 hours  Supportive care (rest, adequate fluid intake, avoidance of respiratory irritants, soft diet)   Take acetaminophen or ibuprofen as needed for pain control and fever             Return if symptoms worsen or fail to improve, for Follow up.    At the end of the encounter, I discussed results, diagnosis, medications. Discussed red flags for immediate return to clinic/ER, as well as indications for follow up if no improvement. Patient understood and agreed to plan. Patient was stable for discharge.    Subjective     Ty is a 27 year old adult who presents to clinic today  for the following health issues:  Chief Complaint   Patient presents with     Urgent Care     Sore throat x  5 days     HPI    Sore throat for 5 days. Notes trouble swallowing. Can not talk. Patient wrote the HPI on the notes on her phone. Post nasal drip, dehydrated, decreased appetite.     Review of Systems   Constitutional: Negative for chills and fever.   HENT: Positive for postnasal drip, sore throat and trouble swallowing.    Musculoskeletal: Positive for myalgias.       Problem List:  2022-07: Maltracking of left patella  2022-07: Maltracking of right patella  2019-03: Palpitations  2018-03: Mild persistent asthma  2018-03: Chronic allergic rhinitis due to animal hair and dander  2018-03: Allergic rhinitis due to dust mite  2018-03: Chronic seasonal allergic rhinitis due to pollen  2018-03: Allergic conjunctivitis of both eyes  2017-10: Abnormal sinus finding on MRI  2017-08: Pain in both wrists  2017-08: Bilateral thumb pain  2014-01: Seasonal affective disorder (H)  2012-12: Right hip pain  2012-09: Patellofemoral pain syndrome      Past Medical History:   Diagnosis Date     Depressive disorder 2014    it s chill now tho I m doing fine     Uncomplicated asthma early 2000s       Social History     Tobacco Use     Smoking status: Never     Smokeless tobacco: Never   Vaping Use     Vaping status: Not on file   Substance Use Topics     Alcohol use: Yes     Comment: infrequent at worst/ occa            Objective    /87   Pulse 80   Temp 98  F (36.7  C) (Tympanic)   Resp 20   SpO2 98%   Physical Exam  Constitutional:       Appearance: Normal appearance.   HENT:      Head: Normocephalic.      Right Ear: Tympanic membrane normal.      Left Ear: Tympanic membrane normal.      Mouth/Throat:      Mouth: Mucous membranes are moist.      Pharynx: Uvula midline. Pharyngeal swelling, oropharyngeal exudate and posterior oropharyngeal erythema present.      Tonsils: Tonsillar exudate present. 3+ on the right. 3+ on the left.   Cardiovascular:      Rate and Rhythm: Normal rate and regular rhythm.   Pulmonary:      Effort:  Pulmonary effort is normal.      Breath sounds: Normal breath sounds.   Lymphadenopathy:      Head:      Right side of head: No submental, submandibular or tonsillar adenopathy.      Left side of head: No submental, submandibular or tonsillar adenopathy.      Cervical: No cervical adenopathy.      Right cervical: No superficial cervical adenopathy.     Left cervical: No superficial cervical adenopathy.   Skin:     General: Skin is warm and dry.      Findings: No rash.   Neurological:      Mental Status: Manoj Castellanos is alert.   Psychiatric:         Mood and Affect: Mood normal.         Behavior: Behavior normal.              Melissa Barrett PA-C

## 2023-04-27 ENCOUNTER — MYC MEDICAL ADVICE (OUTPATIENT)
Dept: FAMILY MEDICINE | Facility: CLINIC | Age: 28
End: 2023-04-27
Payer: COMMERCIAL

## 2023-04-27 DIAGNOSIS — F90.0 ADHD (ATTENTION DEFICIT HYPERACTIVITY DISORDER), INATTENTIVE TYPE: Primary | ICD-10-CM

## 2023-05-02 ENCOUNTER — OFFICE VISIT (OUTPATIENT)
Dept: ALLERGY | Facility: CLINIC | Age: 28
End: 2023-05-02
Payer: COMMERCIAL

## 2023-05-02 VITALS
WEIGHT: 128 LBS | DIASTOLIC BLOOD PRESSURE: 93 MMHG | OXYGEN SATURATION: 98 % | SYSTOLIC BLOOD PRESSURE: 154 MMHG | HEART RATE: 100 BPM | BODY MASS INDEX: 18.53 KG/M2

## 2023-05-02 DIAGNOSIS — H10.13 ALLERGIC CONJUNCTIVITIS OF BOTH EYES: ICD-10-CM

## 2023-05-02 DIAGNOSIS — R09.81 NASAL CONGESTION: ICD-10-CM

## 2023-05-02 DIAGNOSIS — J30.89 ALLERGIC RHINITIS DUE TO DUST MITE: ICD-10-CM

## 2023-05-02 DIAGNOSIS — J30.1 SEASONAL ALLERGIC RHINITIS DUE TO POLLEN: ICD-10-CM

## 2023-05-02 DIAGNOSIS — J45.30 MILD PERSISTENT ASTHMA WITHOUT COMPLICATION: Primary | ICD-10-CM

## 2023-05-02 DIAGNOSIS — J30.81 ALLERGIC RHINITIS DUE TO ANIMALS: ICD-10-CM

## 2023-05-02 DIAGNOSIS — J30.81 ALLERGIC RHINITIS DUE TO ANIMALS: Primary | ICD-10-CM

## 2023-05-02 DIAGNOSIS — Z51.6 NEED FOR DESENSITIZATION TO ALLERGENS: ICD-10-CM

## 2023-05-02 LAB
FEF 25/75: NORMAL
FEV-1: NORMAL
FEV1/FVC: NORMAL
FVC: NORMAL

## 2023-05-02 PROCEDURE — 94010 BREATHING CAPACITY TEST: CPT | Performed by: ALLERGY & IMMUNOLOGY

## 2023-05-02 PROCEDURE — 99214 OFFICE O/P EST MOD 30 MIN: CPT | Mod: 25 | Performed by: ALLERGY & IMMUNOLOGY

## 2023-05-02 RX ORDER — METHYLPHENIDATE HYDROCHLORIDE 5 MG/1
5 TABLET ORAL DAILY
COMMUNITY
Start: 2023-01-01 | End: 2023-10-09

## 2023-05-02 RX ORDER — EPINEPHRINE 0.3 MG/.3ML
0.3 INJECTION SUBCUTANEOUS PRN
Qty: 2 EACH | Refills: 2 | Status: SHIPPED | OUTPATIENT
Start: 2023-05-02 | End: 2024-01-10

## 2023-05-02 ASSESSMENT — ENCOUNTER SYMPTOMS
EYE ITCHING: 0
ARTHRALGIAS: 0
SHORTNESS OF BREATH: 0
RHINORRHEA: 0
FEVER: 0
COUGH: 0
JOINT SWELLING: 0
VOMITING: 0
SINUS PRESSURE: 0
HEADACHES: 0
MYALGIAS: 0
EYE REDNESS: 0
EYE DISCHARGE: 0
DIARRHEA: 0
CHEST TIGHTNESS: 0
ADENOPATHY: 0
FATIGUE: 0
FACIAL SWELLING: 0
ACTIVITY CHANGE: 0
WHEEZING: 0
NAUSEA: 0

## 2023-05-02 NOTE — TELEPHONE ENCOUNTER
Dr. Burnette--    See pts message.     Sounds like they are taking methylphenidate that is not prescribed to them and asking you to prescribe.     Pended mental health referral for formal ADHD diagnosis    JOSI Vo RN  [facility]

## 2023-05-02 NOTE — TELEPHONE ENCOUNTER
Writer responded via [employer].    KEIRY HolmanN, RN-BC  [facility]

## 2023-05-02 NOTE — LETTER
5/2/2023         RE: Manoj Castellanos  [address] [address]        Dear Colleague,    Thank you for referring your patient, Manoj Castellanos, to the [facility]. Please see a copy of my visit note below.    Manoj Castellanos was seen in the Allergy Clinic at [facility].      Manoj Castellanos is a 27 year old Not  or  adult who is seen today for a follow-up visit.    Azelastine and fluticasone nasal spray didn't help much with nasal congestion and they have since stopped the medications. Continues to take cetirizine twice daily. Did allergy shots as a child but isn't sure if that is what they want to do again now. Interested in reviewing all treatment options. He has a deviated septum and is interested in seeing ENT to discuss surgical management options for his congestion and deviated septum.    Asthma has been well controlled. Stop fluticasone some time ago and hasn't needed to use albuterol. No exacerbations, urgent care/ED visits, or oral steroid use in the past year. No nocturnal asthma symptoms or limitations in activity.    Diagnosed with eosinophilic esophagitis in the past few months. Currently taking budesonide and pantoprazole and reports GI symptoms have resolved.    Past Medical History:   Diagnosis Date     Depressive disorder 2014    it s chill now tho I m doing fine     Uncomplicated asthma early 2000s     Family History   Problem Relation Age of Onset     Bipolar Disorder Mother      Substance Abuse Father      Asthma Father      Pancreatic Cancer Maternal Grandfather      Other Cancer Maternal Grandfather      Prostate Cancer Paternal Grandfather      Depression Other      Anxiety Disorder Other      Mental Illness Other      Depression Sister      Anxiety Disorder Sister      Attention Deficit Disorder Sister      Social History     Tobacco Use     Smoking status: Never     Smokeless tobacco: Never   Substance Use Topics     Alcohol use: Yes      Comment: infrequent at worst/ occa      Drug use: No     Social History     Social History Narrative     Not on file       Past medical, family, and social history were reviewed.    Review of Systems   Constitutional: Negative for activity change, fatigue and fever.   HENT: Positive for congestion. Negative for dental problem, ear pain, facial swelling, nosebleeds, postnasal drip, rhinorrhea, sinus pressure and sneezing.    Eyes: Negative for discharge, redness and itching.   Respiratory: Negative for cough, chest tightness, shortness of breath and wheezing.    Cardiovascular: Negative for chest pain.   Gastrointestinal: Negative for diarrhea, nausea and vomiting.   Musculoskeletal: Negative for arthralgias, joint swelling and myalgias.   Skin: Negative for rash.   Neurological: Negative for headaches.   Hematological: Negative for adenopathy.   Psychiatric/Behavioral: Negative for behavioral problems and self-injury.         Current Outpatient Medications:      albuterol (PROAIR HFA/PROVENTIL HFA/VENTOLIN HFA) 108 (90 Base) MCG/ACT inhaler, Inhale 2 puffs into the lungs every 4 hours as needed for shortness of breath / dyspnea or wheezing, Disp: 18 g, Rfl: 1     budesonide (PULMICORT) 1 MG/2ML neb solution, Take 2 mLs (1 mg) by nebulization 2 times daily Mix with 3ml of honey or chocolate syrup and swallow (do not inhale). Rinse mouth after use but do not eat or drink for 1 hour after taking., Disp: 60 mL, Rfl: 3     carbamide peroxide (DEBROX) 6.5 % otic solution, Place 5 drops into both ears 2 times daily, Disp: 15 mL, Rfl: 0     cetirizine (ZYRTEC) 10 MG tablet, TAKE 1 TABLET(10 MG) BY MOUTH TWICE DAILY, Disp: 180 tablet, Rfl: 3     COMPOUNDED NON-CONTROLLED SUBSTANCE (CMPD RX) - PHARMACY TO MIX COMPOUNDED MEDICATION, Take two milligrams daily, Disp: 60 mL, Rfl: 3     finasteride (PROSCAR) 5 MG tablet, TAKE 1/4 TABLET BY MOUTH ONCE DAILY, Disp: 23 tablet, Rfl: 2     gabapentin (NEURONTIN) 300 MG capsule, Take 1  capsule (300 mg) by mouth 2 times daily PATIENT STATES HE TAKING 300 MG TWICE DAILY, Disp: 180 capsule, Rfl: 3     IBUPROFEN, Take 200 tablets by mouth daily as needed, Disp: , Rfl:      meclizine (ANTIVERT) 25 MG tablet, Take 1 tablet (25 mg) by mouth 2 times daily as needed for dizziness . Appointment required for additional refills., Disp: 20 tablet, Rfl: 0     methylphenidate (RITALIN) 5 MG tablet, Take 5 mg by mouth daily, Disp: , Rfl:      ondansetron (ZOFRAN ODT) 4 MG ODT tab, DISSOLVE 1 TABLET(4 MG) ON THE TONGUE EVERY 8 HOURS AS NEEDED FOR NAUSEA, Disp: 20 tablet, Rfl: 0     OXcarbazepine (TRILEPTAL) 150 MG tablet, Take 2 tablets (300 mg) by mouth 2 times daily, Disp: 120 tablet, Rfl: 11     pantoprazole (PROTONIX) 40 MG EC tablet, TAKE 1 TABLET(40 MG) BY MOUTH TWICE DAILY, Disp: 60 tablet, Rfl: 1     VITAMIN D3 25 MCG (1000 UT) tablet, TAKE 2 TABLETS BY MOUTH DAILY, Disp: 180 tablet, Rfl: 3  Allergies   Allergen Reactions     Amoxicillin Swelling     Lip swelling      Cefuroxime      Swelling        EXAM:   BP (!) 154/93 (BP Location: Left arm, Patient Position: Sitting, Cuff Size: Adult Regular)   Pulse 100   Wt 58.1 kg (128 lb)   SpO2 98%   BMI 18.53 kg/m    Physical Exam  Vitals and nursing note reviewed.   Constitutional:       Appearance: Normal appearance.   HENT:      Head: Normocephalic and atraumatic.      Right Ear: External ear normal.      Left Ear: External ear normal.      Nose: Septal deviation and mucosal edema present. No rhinorrhea.      Right Turbinates: Swollen.      Left Turbinates: Swollen.      Mouth/Throat:      Mouth: Mucous membranes are moist. No oral lesions.      Pharynx: Oropharynx is clear. Uvula midline. No posterior oropharyngeal erythema.   Eyes:      General: Lids are normal.      Extraocular Movements: Extraocular movements intact.      Conjunctiva/sclera: Conjunctivae normal.   Neck:      Comments: No asymmetry, masses, or scars  Cardiovascular:      Rate and  Rhythm: Normal rate and regular rhythm.      Heart sounds: Normal heart sounds, S1 normal and S2 normal.   Pulmonary:      Effort: Pulmonary effort is normal. No respiratory distress.      Breath sounds: Normal breath sounds and air entry.   Musculoskeletal:      Comments: No musculoskeletal defects appreciated   Skin:     General: Skin is warm and dry.      Findings: No lesion or rash.   Neurological:      General: No focal deficit present.      Mental Status: Manoj Castellanos is alert.   Psychiatric:         Mood and Affect: Mood and affect normal.           WORKUP:  Spirometry    SPIROMETRY       FVC 4.36L (93% of predicted).     FEV1 3.92L (102% of predicted).     FEV1/FVC 90%      I have reviewed and interpreted these results. Testing meets criteria for acceptability and reproducibility. Values are consistent with normal lung function.      ASSESSMENT/PLAN:  Manoj Castellanos is a 27 year old adult here for a follow-up visit.    1. Mild persistent asthma without complication - Well controlled, no exacerbations or oral steroid use in the past year.    - Spirometry, Breathing Capacity: Normal Order, Clinic Performed    2. Seasonal allergic rhinitis due to pollen - Medications have not been particularly effective in managing rhinoconjunctivitis symptoms. This is in part due to structural anomalies that are also contributing to his symptoms. We discussed the risks, including possible fatal anaphylaxis, benefits, and recommended duration of immunotherapy treatment. He verbalized understanding and wishes to proceed with immunotherapy at this time.    - plan to initiate allergen immunotherapy treatment - consent and acknowledgment forms signed in clinic  - epinephrine auto-injector required per protocol    3. Allergic rhinitis due to animals - see above    4. Allergic rhinitis due to dust mite - see above    5. Allergic conjunctivitis of both eyes - see above    6. Nasal congestion - He has known septal deviation and is interested  in discussing surgical treatment options.    - Adult ENT  Referral; Future    7. Need for desensitization to allergens    - EPINEPHrine (ANY BX GENERIC EQUIV) 0.3 MG/0.3ML injection 2-pack; Inject 0.3 mLs (0.3 mg) into the muscle as needed for anaphylaxis  Dispense: 2 each; Refill: 2      Follow-up 3 months after initiation of immunotherapy      Thank you for allowing me to participate in the care of Manoj Castellanos.      Adriana Fraser MD, FAAAAI  Allergy/Immunology  [facility]      Chart documentation done in part with Dragon Voice Recognition Software. Although reviewed after completion, some word and grammatical errors may remain.      Again, thank you for allowing me to participate in the care of your patient.        Sincerely,        Adriana Fraser MD

## 2023-05-02 NOTE — PROGRESS NOTES
Manoj Castellanos was seen in the Allergy Clinic at [facility].      Manoj Castellanos is a 27 year old Not  or  adult who is seen today for a follow-up visit.    Azelastine and fluticasone nasal spray didn't help much with nasal congestion and they have since stopped the medications. Continues to take cetirizine twice daily. Did allergy shots as a child but isn't sure if that is what they want to do again now. Interested in reviewing all treatment options. He has a deviated septum and is interested in seeing ENT to discuss surgical management options for his congestion and deviated septum.    Asthma has been well controlled. Stop fluticasone some time ago and hasn't needed to use albuterol. No exacerbations, urgent care/ED visits, or oral steroid use in the past year. No nocturnal asthma symptoms or limitations in activity.    Diagnosed with eosinophilic esophagitis in the past few months. Currently taking budesonide and pantoprazole and reports GI symptoms have resolved.    Past Medical History:   Diagnosis Date     Depressive disorder 2014    it s chill now tho I m doing fine     Uncomplicated asthma early 2000s     Family History   Problem Relation Age of Onset     Bipolar Disorder Mother      Substance Abuse Father      Asthma Father      Pancreatic Cancer Maternal Grandfather      Other Cancer Maternal Grandfather      Prostate Cancer Paternal Grandfather      Depression Other      Anxiety Disorder Other      Mental Illness Other      Depression Sister      Anxiety Disorder Sister      Attention Deficit Disorder Sister      Social History     Tobacco Use     Smoking status: Never     Smokeless tobacco: Never   Substance Use Topics     Alcohol use: Yes     Comment: infrequent at worst/ occa      Drug use: No     Social History     Social History Narrative     Not on file       Past medical, family, and social history were reviewed.    Review of Systems   Constitutional: Negative for  activity change, fatigue and fever.   HENT: Positive for congestion. Negative for dental problem, ear pain, facial swelling, nosebleeds, postnasal drip, rhinorrhea, sinus pressure and sneezing.    Eyes: Negative for discharge, redness and itching.   Respiratory: Negative for cough, chest tightness, shortness of breath and wheezing.    Cardiovascular: Negative for chest pain.   Gastrointestinal: Negative for diarrhea, nausea and vomiting.   Musculoskeletal: Negative for arthralgias, joint swelling and myalgias.   Skin: Negative for rash.   Neurological: Negative for headaches.   Hematological: Negative for adenopathy.   Psychiatric/Behavioral: Negative for behavioral problems and self-injury.         Current Outpatient Medications:      albuterol (PROAIR HFA/PROVENTIL HFA/VENTOLIN HFA) 108 (90 Base) MCG/ACT inhaler, Inhale 2 puffs into the lungs every 4 hours as needed for shortness of breath / dyspnea or wheezing, Disp: 18 g, Rfl: 1     budesonide (PULMICORT) 1 MG/2ML neb solution, Take 2 mLs (1 mg) by nebulization 2 times daily Mix with 3ml of honey or chocolate syrup and swallow (do not inhale). Rinse mouth after use but do not eat or drink for 1 hour after taking., Disp: 60 mL, Rfl: 3     carbamide peroxide (DEBROX) 6.5 % otic solution, Place 5 drops into both ears 2 times daily, Disp: 15 mL, Rfl: 0     cetirizine (ZYRTEC) 10 MG tablet, TAKE 1 TABLET(10 MG) BY MOUTH TWICE DAILY, Disp: 180 tablet, Rfl: 3     COMPOUNDED NON-CONTROLLED SUBSTANCE (CMPD RX) - PHARMACY TO MIX COMPOUNDED MEDICATION, Take two milligrams daily, Disp: 60 mL, Rfl: 3     finasteride (PROSCAR) 5 MG tablet, TAKE 1/4 TABLET BY MOUTH ONCE DAILY, Disp: 23 tablet, Rfl: 2     gabapentin (NEURONTIN) 300 MG capsule, Take 1 capsule (300 mg) by mouth 2 times daily PATIENT STATES HE TAKING 300 MG TWICE DAILY, Disp: 180 capsule, Rfl: 3     IBUPROFEN, Take 200 tablets by mouth daily as needed, Disp: , Rfl:      meclizine (ANTIVERT) 25 MG tablet, Take 1  tablet (25 mg) by mouth 2 times daily as needed for dizziness . Appointment required for additional refills., Disp: 20 tablet, Rfl: 0     methylphenidate (RITALIN) 5 MG tablet, Take 5 mg by mouth daily, Disp: , Rfl:      ondansetron (ZOFRAN ODT) 4 MG ODT tab, DISSOLVE 1 TABLET(4 MG) ON THE TONGUE EVERY 8 HOURS AS NEEDED FOR NAUSEA, Disp: 20 tablet, Rfl: 0     OXcarbazepine (TRILEPTAL) 150 MG tablet, Take 2 tablets (300 mg) by mouth 2 times daily, Disp: 120 tablet, Rfl: 11     pantoprazole (PROTONIX) 40 MG EC tablet, TAKE 1 TABLET(40 MG) BY MOUTH TWICE DAILY, Disp: 60 tablet, Rfl: 1     VITAMIN D3 25 MCG (1000 UT) tablet, TAKE 2 TABLETS BY MOUTH DAILY, Disp: 180 tablet, Rfl: 3  Allergies   Allergen Reactions     Amoxicillin Swelling     Lip swelling      Cefuroxime      Swelling        EXAM:   BP (!) 154/93 (BP Location: Left arm, Patient Position: Sitting, Cuff Size: Adult Regular)   Pulse 100   Wt 58.1 kg (128 lb)   SpO2 98%   BMI 18.53 kg/m    Physical Exam  Vitals and nursing note reviewed.   Constitutional:       Appearance: Normal appearance.   HENT:      Head: Normocephalic and atraumatic.      Right Ear: External ear normal.      Left Ear: External ear normal.      Nose: Septal deviation and mucosal edema present. No rhinorrhea.      Right Turbinates: Swollen.      Left Turbinates: Swollen.      Mouth/Throat:      Mouth: Mucous membranes are moist. No oral lesions.      Pharynx: Oropharynx is clear. Uvula midline. No posterior oropharyngeal erythema.   Eyes:      General: Lids are normal.      Extraocular Movements: Extraocular movements intact.      Conjunctiva/sclera: Conjunctivae normal.   Neck:      Comments: No asymmetry, masses, or scars  Cardiovascular:      Rate and Rhythm: Normal rate and regular rhythm.      Heart sounds: Normal heart sounds, S1 normal and S2 normal.   Pulmonary:      Effort: Pulmonary effort is normal. No respiratory distress.      Breath sounds: Normal breath sounds and air  entry.   Musculoskeletal:      Comments: No musculoskeletal defects appreciated   Skin:     General: Skin is warm and dry.      Findings: No lesion or rash.   Neurological:      General: No focal deficit present.      Mental Status: Manoj Castellanos is alert.   Psychiatric:         Mood and Affect: Mood and affect normal.           WORKUP:  Spirometry    SPIROMETRY       FVC 4.36L (93% of predicted).     FEV1 3.92L (102% of predicted).     FEV1/FVC 90%      I have reviewed and interpreted these results. Testing meets criteria for acceptability and reproducibility. Values are consistent with normal lung function.      ASSESSMENT/PLAN:  Manoj Castellanos is a 27 year old adult here for a follow-up visit.    1. Mild persistent asthma without complication - Well controlled, no exacerbations or oral steroid use in the past year.    - Spirometry, Breathing Capacity: Normal Order, Clinic Performed    2. Seasonal allergic rhinitis due to pollen - Medications have not been particularly effective in managing rhinoconjunctivitis symptoms. This is in part due to structural anomalies that are also contributing to his symptoms. We discussed the risks, including possible fatal anaphylaxis, benefits, and recommended duration of immunotherapy treatment. He verbalized understanding and wishes to proceed with immunotherapy at this time.    - plan to initiate allergen immunotherapy treatment - consent and acknowledgment forms signed in clinic  - epinephrine auto-injector required per protocol    3. Allergic rhinitis due to animals - see above    4. Allergic rhinitis due to dust mite - see above    5. Allergic conjunctivitis of both eyes - see above    6. Nasal congestion - He has known septal deviation and is interested in discussing surgical treatment options.    - Adult ENT  Referral; Future    7. Need for desensitization to allergens    - EPINEPHrine (ANY BX GENERIC EQUIV) 0.3 MG/0.3ML injection 2-pack; Inject 0.3 mLs (0.3 mg) into the  muscle as needed for anaphylaxis  Dispense: 2 each; Refill: 2      Follow-up 3 months after initiation of immunotherapy      Thank you for allowing me to participate in the care of Manoj Ayalaelizabeth.      Adriana Fraser MD, FAAAAI  Allergy/Immunology  [facility]      Chart documentation done in part with Dragon Voice Recognition Software. Although reviewed after completion, some word and grammatical errors may remain.

## 2023-05-02 NOTE — PROGRESS NOTES
ALLERGY SOLUTION NEW REQUEST    Manoj Castellanos 1995 MRN: [id number]    DATE NEEDED:  3 weeks  Vial Color  Content   Top Dose         Vial Size  Green 1:1,000, Blue 1:100, Yellow 1:10 and Red 1:1 Cat, Dog, Dust Mite  Red 1:1 0.5   5mL  Green 1:1,000, Blue 1:100, Yellow 1:10 and Red 1:1 Trees, Weeds   Red 1:1 0.5   5mL      Shot Clinic Location:  [place]  Ship to Location: [place]  Billing Location: [place]  Special Instructions:  None        Requester Signature  Adriana Fraser MD

## 2023-05-02 NOTE — PATIENT INSTRUCTIONS
If you have any questions regarding your allergies, asthma, or what we discussed during your visit today please call the allergy clinic or contact us via [employer].    [facility] Allergy RN Line: [phone] - call this number with any questions during or after business/clinic hours  [facility] Scheduling Line: [phone]  [facility] Scheduling Line: [phone] - this number is ONLY for scheduling at the [facility] and should not be used to get in touch with the allergy team    All visits for food challenges, medication/drug allergy testing, and drug challenges MUST be scheduled through the allergy clinic nurse. Please call the nurse at [phone] or send a [employer] message for scheduling. Appointments for these visits that are made through the schedulers or via [employer] may be cancelled or rescheduled.    Clinic Schedule:   [facility] - Monday, Tuesday, and Thursday  [address] [place] [address]    [facility] - Wednesday  [address] [place] [address]      ACCELERATED IMMUNOTHERAPY PATIENT INFORMATION  Immunotherapy is a treatment that alters the patient's immune system so they have less allergy symptoms, use less medications to control symptoms, have improved quality of life, and less health care utilization  Accelerated immunotherapy schedules are designed to allow patients to reach their maintenance immunotherapy dose in a shorter time frame than conventional immunotherapy.  Clinical benefit can be reached more rapidly using accelerated immunotherapy.   Many patients do not want to start allergen immunotherapy secondary to the upfront time commitment associated with conventional allergy shots. Cluster immunotherapy would allow the patient to be on monthly injections in a much shorter period of time. The maintenance dose is typically reached within 8 to 9 weeks.   Cluster immunotherapy has a similar  risk of systemic reaction to conventional immunotherapy. The patient will need to take oral antihistamines to pre-medicate prior to injection appointments while going through this treatment.    CLUSTER IMMUNOTHERAPY PATIENT INSTRUCTIONS  Asthma medications must be continued and asthma must be well controlled prior to receiving CLUSTER immunotherapy. Immunotherapy should not be given if you are feeling ill.  Other allergy medications may be continued  You should plan to spend approximately 2 hours at the clinic. Please feel free to bring things to occupy your time such as books, work, or computers. You may also wish to bring something to eat as you will not be able to leave the clinic once the procedure has begun.  You will be required to bring an epinephrine auto-injector with you to your CLUSTER immunotherapy appointments and all subsequent allergy shot appointments  You will be required to take the following pre-medication regimen prior  to your CLUSTER immunotherapy appointments  Medications to be taken the day prior to CLUSTER appointment:  Zyrtec (cetirizine) 20mg twice daily, Xyzal (levocetirizine) 10mg twice daily, or Allegra (fexofenadine) 360mg twice daily  Medications to be taken the morning of CLUSTER appointment or at least 1 hour prior to the visit:  Zyrtec (cetirizine) 20mg, Xyzal (levocetirizine) 10mg, or Allegra (fexofenadine) 360mg

## 2023-05-05 DIAGNOSIS — J30.1 SEASONAL ALLERGIC RHINITIS DUE TO POLLEN: Primary | ICD-10-CM

## 2023-05-05 PROCEDURE — 95165 ANTIGEN THERAPY SERVICES: CPT | Performed by: ALLERGY & IMMUNOLOGY

## 2023-05-05 NOTE — PROGRESS NOTES
Allergy serums billed to [facility].     Vials billed below:    Vial Color   Content                      Vial Size Expiration Date  Green 1:1,000, Blue 1:100, Yellow 1:10 and Red 1:1  Trees, Weeds  5mL each Green 8/5/23, Blue 11/5/23, Yellow & Red 5/5/24    Billed 30 units    Checked by Iván Isidro / LPN        Signature  Iván Isidro LPN

## 2023-05-09 ENCOUNTER — OFFICE VISIT (OUTPATIENT)
Dept: OTOLARYNGOLOGY | Facility: CLINIC | Age: 28
End: 2023-05-09
Attending: ALLERGY & IMMUNOLOGY
Payer: COMMERCIAL

## 2023-05-09 VITALS
RESPIRATION RATE: 18 BRPM | OXYGEN SATURATION: 96 % | DIASTOLIC BLOOD PRESSURE: 98 MMHG | HEART RATE: 98 BPM | SYSTOLIC BLOOD PRESSURE: 139 MMHG

## 2023-05-09 DIAGNOSIS — J34.2 NASAL SEPTAL DEVIATION: ICD-10-CM

## 2023-05-09 DIAGNOSIS — J34.89 NASAL OBSTRUCTION: Primary | ICD-10-CM

## 2023-05-09 DIAGNOSIS — R09.81 NASAL CONGESTION: ICD-10-CM

## 2023-05-09 DIAGNOSIS — J34.3 NASAL TURBINATE HYPERTROPHY: ICD-10-CM

## 2023-05-09 PROCEDURE — 99243 OFF/OP CNSLTJ NEW/EST LOW 30: CPT | Performed by: OTOLARYNGOLOGY

## 2023-05-09 NOTE — PROGRESS NOTES
Allergy serums received at [facility].    Vials received below:    Vial Color Content                      Vial Size Expiration Date  Red 1:1 Cat, Dog, Dust Mite 5mL  05/04/2024  Red 1:1 Trees, Weeds 5mL  05/05/2024  Yellow 1:10 Cat, Dog, Dust Mite 5mL  05/04/2024  Yellow 1:10 Trees, Weeds 5mL  05/05/2024    Vial Color Content                      Vial Size Expiration Date  Blue 1:100 Cat, Dog, Dust Mite 5mL  11/04/2023  Blue 1:100 Trees, Weeds 5mL  11/05/2023  Green 1:1,000 Cat, Dog, Dust Mite 5mL  08/04/2023  Green 1:1,000 Trees, Weeds 5mL  08/05/2023      Bereket TANNER RN

## 2023-05-09 NOTE — PROGRESS NOTES
I am seeing this patient in consultation for nasal congestion at the request of the provider Adriana Arce.      Chief Complaint - Nasal obstruction    History of Present Illness - Manoj Castellanos is a 27 year old adult who presents for evaluation of nasal obstruction. The patient describes symptoms of nasal congestion, both sides, but left is worse, for the past many years. He is a mouth breather at night. The patient notes + allergies. He has been tested for allergies in the past. Treatments have included nasal steroids, astelin, and oral antihistamines. The treatments seem to not help much. No history of nasal trauma. No prior history of nasal surgery. I personally reviewed the relevant clinical notes in Epic including the primary care providers note. Has asthma.     Tests personally reviewed today for this visit:   1.) allergies reviewed - positive to dust mites, animals, trees and weeds  2.) MRI brain 10/7/22 images reviewed showed left septal deviation    Past Medical History -   Patient Active Problem List   Diagnosis     Abnormal sinus finding on MRI     Mild persistent asthma     Allergic rhinitis due to animals     Allergic rhinitis due to dust mite     Seasonal allergic rhinitis due to pollen     Allergic conjunctivitis of both eyes     Palpitations     Seasonal affective disorder (H)       Current Medications -   Current Outpatient Medications:      albuterol (PROAIR HFA/PROVENTIL HFA/VENTOLIN HFA) 108 (90 Base) MCG/ACT inhaler, Inhale 2 puffs into the lungs every 4 hours as needed for shortness of breath / dyspnea or wheezing, Disp: 18 g, Rfl: 1     budesonide (PULMICORT) 1 MG/2ML neb solution, Take 2 mLs (1 mg) by nebulization 2 times daily Mix with 3ml of honey or chocolate syrup and swallow (do not inhale). Rinse mouth after use but do not eat or drink for 1 hour after taking., Disp: 60 mL, Rfl: 3     carbamide peroxide (DEBROX) 6.5 % otic solution, Place 5 drops into both ears 2 times daily, Disp: 15  mL, Rfl: 0     cetirizine (ZYRTEC) 10 MG tablet, TAKE 1 TABLET(10 MG) BY MOUTH TWICE DAILY, Disp: 180 tablet, Rfl: 3     COMPOUNDED NON-CONTROLLED SUBSTANCE (CMPD RX) - PHARMACY TO MIX COMPOUNDED MEDICATION, Take two milligrams daily, Disp: 60 mL, Rfl: 3     EPINEPHrine (ANY BX GENERIC EQUIV) 0.3 MG/0.3ML injection 2-pack, Inject 0.3 mLs (0.3 mg) into the muscle as needed for anaphylaxis, Disp: 2 each, Rfl: 2     finasteride (PROSCAR) 5 MG tablet, TAKE 1/4 TABLET BY MOUTH ONCE DAILY, Disp: 23 tablet, Rfl: 2     gabapentin (NEURONTIN) 300 MG capsule, Take 1 capsule (300 mg) by mouth 2 times daily PATIENT STATES HE TAKING 300 MG TWICE DAILY, Disp: 180 capsule, Rfl: 3     IBUPROFEN, Take 200 tablets by mouth daily as needed, Disp: , Rfl:      meclizine (ANTIVERT) 25 MG tablet, Take 1 tablet (25 mg) by mouth 2 times daily as needed for dizziness . Appointment required for additional refills., Disp: 20 tablet, Rfl: 0     methylphenidate (RITALIN) 5 MG tablet, Take 5 mg by mouth daily, Disp: , Rfl:      ondansetron (ZOFRAN ODT) 4 MG ODT tab, DISSOLVE 1 TABLET(4 MG) ON THE TONGUE EVERY 8 HOURS AS NEEDED FOR NAUSEA, Disp: 20 tablet, Rfl: 0     ORDER FOR ALLERGEN IMMUNOTHERAPY, Name of Mix: Mix #1  Dust Mite, Cat, Dog Cat Hair, Standardized A.P. 10,000 BAU/mL, HS  2.0 ml Dog Hair-Dander, UF  1:650 w/v, HS  1.0 ml Dust Mites DF. 10,000 AU/mL, HS  1.0 ml Dust Mites DP. 10,000 AU/mL, HS  1.0 ml  Diluent: HSA qs to 5ml, Disp: 5 mL, Rfl: PRN     ORDER FOR ALLERGEN IMMUNOTHERAPY, Name of Mix: Mix #2  Tree , Weeds Ritchie, White 1:20 w/v, HS  0.5 ml Birch Mix PRW 1:20 w/v, HS  0.5 ml Oak Mix RVW 1:20 w/v, HS 0.5 ml [place] Tree, Black 1:20 w/v, HS 0.5 ml [place], Black 1:20 w/v, HS 0.5 ml Cock[place]bur, Common 1:20 w/v, HS 0.5 ml Sagebrush, Mugwort 1:20 w/v, HS 0.5 ml Sorrel, Sheep 1:20 w/v, HS 0.5 ml Diluent: HSA qs to 5ml, Disp: 5 mL, Rfl: PRN     OXcarbazepine (TRILEPTAL) 150 MG tablet, Take 2 tablets (300 mg) by mouth 2 times daily,  Disp: 120 tablet, Rfl: 11     pantoprazole (PROTONIX) 40 MG EC tablet, TAKE 1 TABLET(40 MG) BY MOUTH TWICE DAILY, Disp: 60 tablet, Rfl: 1     VITAMIN D3 25 MCG (1000 UT) tablet, TAKE 2 TABLETS BY MOUTH DAILY, Disp: 180 tablet, Rfl: 3    Allergies -   Allergies   Allergen Reactions     Amoxicillin Swelling     Lip swelling      Cefuroxime      Swelling        Social History -   Social History     Socioeconomic History     Marital status: Single   Tobacco Use     Smoking status: Never     Smokeless tobacco: Never   Substance and Sexual Activity     Alcohol use: Yes     Comment: infrequent at worst/ occa      Drug use: No     Sexual activity: Not Currently     Birth control/protection: Condom   Other Topics Concern     Parent/sibling w/ CABG, MI or angioplasty before 65F 55M? No       Family History -   Family History   Problem Relation Age of Onset     Bipolar Disorder Mother      Substance Abuse Father      Asthma Father      Pancreatic Cancer Maternal Grandfather      Other Cancer Maternal Grandfather      Prostate Cancer Paternal Grandfather      Depression Other      Anxiety Disorder Other      Mental Illness Other      Depression Sister      Anxiety Disorder Sister      Attention Deficit Disorder Sister      Review of Systems - As per HPI and PMHx, otherwise 7 system review of the head and neck is negative.    Physical Exam  General - The patient is in no distress. Alert and oriented to person and place, answers questions and cooperates with examination appropriately.   Neurologic - CN II-XII are grossly intact. No focal neurologic deficits.   Voice and Breathing - The patient was breathing comfortably without the use of accessory muscles. There was no wheezing, stridor, or stertor.  The patients voice was clear and strong.  Eyes -  Sclera were not icteric or injected, conjunctiva were pink and moist.  Mouth - Examination of the oral cavity showed pink, healthy oral mucosa. No lesions or ulcerations noted.  The  tongue was mobile and midline, and the dentition were in good condition.    Throat - The walls of the oropharynx were smooth, pink, moist, symmetric, and had no lesions or ulcerations.  The tonsillar pillars and soft palate were symmetric.  The uvula was midline on elevation. Tonsils 1+.  Neck -  Soft, non-tender. Palpation of the occipital, submental, submandibular, internal jugular chain, and supraclavicular nodes did not demonstrate any abnormal lymph nodes or masses. No parotid masses. Palpation of the thyroid was soft and smooth, with no nodules or goiter appreciated.  The trachea was mobile and midline.  Cardiovascular - carotid pulses are 2+ bilaterally, regular rhythm  Nose - External contour shows some deviation of the nasal tip.  Nasal mucosa is pink and moist with clear mucus. The septum was very deviated to the left and obstructive. Has significant turbinate hypertrophy. No polyps, masses, or purulence noted on examination.       A/P - Manoj Castellanos is a 27 year old adult with nasal obstruction due to septal deviation and turbinate hypertrophy. He has tried and failed medical management. I recommend nasal surgery in the form of septoplasty with inferior turbinate reduction and out-fracture. We discussed the surgery in detail.     I counseled the patient on the risks of surgery, including infection, bleeding, risks of general anesthesia, the risk of failure of the surgery to relieve nasal obstruction, the possible need for additional procedures, the possible need for additional medical therapy, and the possibility of alteration of the appearance of the external nose. At the end he wishes to see facial plastic surgery to discuss septorhinoplasty.       Shay Garcia MD  Otolaryngology  [facility]

## 2023-05-09 NOTE — LETTER
5/9/2023         RE: Manoj Castellanos  [address] [address]        Dear Colleague,    Thank you for referring your patient, Manoj Castellanos, to the [facility]. Please see a copy of my visit note below.    I am seeing this patient in consultation for nasal congestion at the request of the provider Adriana Arce.      Chief Complaint - Nasal obstruction    History of Present Illness - Manoj Castellanos is a 27 year old adult who presents for evaluation of nasal obstruction. The patient describes symptoms of nasal congestion, both sides, but left is worse, for the past many years. He is a mouth breather at night. The patient notes + allergies. He has been tested for allergies in the past. Treatments have included nasal steroids, astelin, and oral antihistamines. The treatments seem to not help much. No history of nasal trauma. No prior history of nasal surgery. I personally reviewed the relevant clinical notes in Epic including the primary care providers note. Has asthma.     Tests personally reviewed today for this visit:   1.) allergies reviewed - positive to dust mites, animals, trees and weeds  2.) MRI brain 10/7/22 images reviewed showed left septal deviation    Past Medical History -   Patient Active Problem List   Diagnosis     Abnormal sinus finding on MRI     Mild persistent asthma     Allergic rhinitis due to animals     Allergic rhinitis due to dust mite     Seasonal allergic rhinitis due to pollen     Allergic conjunctivitis of both eyes     Palpitations     Seasonal affective disorder (H)       Current Medications -   Current Outpatient Medications:      albuterol (PROAIR HFA/PROVENTIL HFA/VENTOLIN HFA) 108 (90 Base) MCG/ACT inhaler, Inhale 2 puffs into the lungs every 4 hours as needed for shortness of breath / dyspnea or wheezing, Disp: 18 g, Rfl: 1     budesonide (PULMICORT) 1 MG/2ML neb solution, Take 2 mLs (1 mg) by nebulization 2 times daily Mix with 3ml of honey or  chocolate syrup and swallow (do not inhale). Rinse mouth after use but do not eat or drink for 1 hour after taking., Disp: 60 mL, Rfl: 3     carbamide peroxide (DEBROX) 6.5 % otic solution, Place 5 drops into both ears 2 times daily, Disp: 15 mL, Rfl: 0     cetirizine (ZYRTEC) 10 MG tablet, TAKE 1 TABLET(10 MG) BY MOUTH TWICE DAILY, Disp: 180 tablet, Rfl: 3     COMPOUNDED NON-CONTROLLED SUBSTANCE (CMPD RX) - PHARMACY TO MIX COMPOUNDED MEDICATION, Take two milligrams daily, Disp: 60 mL, Rfl: 3     EPINEPHrine (ANY BX GENERIC EQUIV) 0.3 MG/0.3ML injection 2-pack, Inject 0.3 mLs (0.3 mg) into the muscle as needed for anaphylaxis, Disp: 2 each, Rfl: 2     finasteride (PROSCAR) 5 MG tablet, TAKE 1/4 TABLET BY MOUTH ONCE DAILY, Disp: 23 tablet, Rfl: 2     gabapentin (NEURONTIN) 300 MG capsule, Take 1 capsule (300 mg) by mouth 2 times daily PATIENT STATES HE TAKING 300 MG TWICE DAILY, Disp: 180 capsule, Rfl: 3     IBUPROFEN, Take 200 tablets by mouth daily as needed, Disp: , Rfl:      meclizine (ANTIVERT) 25 MG tablet, Take 1 tablet (25 mg) by mouth 2 times daily as needed for dizziness . Appointment required for additional refills., Disp: 20 tablet, Rfl: 0     methylphenidate (RITALIN) 5 MG tablet, Take 5 mg by mouth daily, Disp: , Rfl:      ondansetron (ZOFRAN ODT) 4 MG ODT tab, DISSOLVE 1 TABLET(4 MG) ON THE TONGUE EVERY 8 HOURS AS NEEDED FOR NAUSEA, Disp: 20 tablet, Rfl: 0     ORDER FOR ALLERGEN IMMUNOTHERAPY, Name of Mix: Mix #1  Dust Mite, Cat, Dog Cat Hair, Standardized A.P. 10,000 BAU/mL, HS  2.0 ml Dog Hair-Dander, UF  1:650 w/v, HS  1.0 ml Dust Mites DF. 10,000 AU/mL, HS  1.0 ml Dust Mites DP. 10,000 AU/mL, HS  1.0 ml  Diluent: HSA qs to 5ml, Disp: 5 mL, Rfl: PRN     ORDER FOR ALLERGEN IMMUNOTHERAPY, Name of Mix: Mix #2  Tree , Weeds Ritchie, White 1:20 w/v, HS  0.5 ml Birch Mix PRW 1:20 w/v, HS  0.5 ml Oak Mix RVW 1:20 w/v, HS 0.5 ml [place] Tree, Black 1:20 w/v, HS 0.5 ml [place], Black 1:20 w/v, HS 0.5 ml Cocklebur,  Common 1:20 w/v, HS 0.5 ml Sagebrush, Mugwort 1:20 w/v, HS 0.5 ml Sorrel, Sheep 1:20 w/v, HS 0.5 ml Diluent: HSA qs to 5ml, Disp: 5 mL, Rfl: PRN     OXcarbazepine (TRILEPTAL) 150 MG tablet, Take 2 tablets (300 mg) by mouth 2 times daily, Disp: 120 tablet, Rfl: 11     pantoprazole (PROTONIX) 40 MG EC tablet, TAKE 1 TABLET(40 MG) BY MOUTH TWICE DAILY, Disp: 60 tablet, Rfl: 1     VITAMIN D3 25 MCG (1000 UT) tablet, TAKE 2 TABLETS BY MOUTH DAILY, Disp: 180 tablet, Rfl: 3    Allergies -   Allergies   Allergen Reactions     Amoxicillin Swelling     Lip swelling      Cefuroxime      Swelling        Social History -   Social History     Socioeconomic History     Marital status: Single   Tobacco Use     Smoking status: Never     Smokeless tobacco: Never   Substance and Sexual Activity     Alcohol use: Yes     Comment: infrequent at worst/ occa      Drug use: No     Sexual activity: Not Currently     Birth control/protection: Condom   Other Topics Concern     Parent/sibling w/ CABG, MI or angioplasty before 65F 55M? No       Family History -   Family History   Problem Relation Age of Onset     Bipolar Disorder Mother      Substance Abuse Father      Asthma Father      Pancreatic Cancer Maternal Grandfather      Other Cancer Maternal Grandfather      Prostate Cancer Paternal Grandfather      Depression Other      Anxiety Disorder Other      Mental Illness Other      Depression Sister      Anxiety Disorder Sister      Attention Deficit Disorder Sister      Review of Systems - As per HPI and PMHx, otherwise 7 system review of the head and neck is negative.    Physical Exam  General - The patient is in no distress. Alert and oriented to person and place, answers questions and cooperates with examination appropriately.   Neurologic - CN II-XII are grossly intact. No focal neurologic deficits.   Voice and Breathing - The patient was breathing comfortably without the use of accessory muscles. There was no wheezing, stridor, or  stertor.  The patients voice was clear and strong.  Eyes -  Sclera were not icteric or injected, conjunctiva were pink and moist.  Mouth - Examination of the oral cavity showed pink, healthy oral mucosa. No lesions or ulcerations noted.  The tongue was mobile and midline, and the dentition were in good condition.    Throat - The walls of the oropharynx were smooth, pink, moist, symmetric, and had no lesions or ulcerations.  The tonsillar pillars and soft palate were symmetric.  The uvula was midline on elevation. Tonsils 1+.  Neck -  Soft, non-tender. Palpation of the occipital, submental, submandibular, internal jugular chain, and supraclavicular nodes did not demonstrate any abnormal lymph nodes or masses. No parotid masses. Palpation of the thyroid was soft and smooth, with no nodules or goiter appreciated.  The trachea was mobile and midline.  Cardiovascular - carotid pulses are 2+ bilaterally, regular rhythm  Nose - External contour shows some deviation of the nasal tip.  Nasal mucosa is pink and moist with clear mucus. The septum was very deviated to the left and obstructive. Has significant turbinate hypertrophy. No polyps, masses, or purulence noted on examination.       A/P - Manoj Castellanos is a 27 year old adult with nasal obstruction due to septal deviation and turbinate hypertrophy. He has tried and failed medical management. I recommend nasal surgery in the form of septoplasty with inferior turbinate reduction and out-fracture. We discussed the surgery in detail.     I counseled the patient on the risks of surgery, including infection, bleeding, risks of general anesthesia, the risk of failure of the surgery to relieve nasal obstruction, the possible need for additional procedures, the possible need for additional medical therapy, and the possibility of alteration of the appearance of the external nose. At the end he wishes to see facial plastic surgery to discuss septorhinoplasty.       Shay Garcia  MD  Otolaryngology  [facility]        Again, thank you for allowing me to participate in the care of your patient.        Sincerely,        Shay Garcia MD

## 2023-05-14 ASSESSMENT — ASTHMA QUESTIONNAIRES: ACT_TOTALSCORE: 25

## 2023-05-16 ENCOUNTER — E-CONSULT (OUTPATIENT)
Dept: DERMATOLOGY | Facility: CLINIC | Age: 28
End: 2023-05-16
Payer: COMMERCIAL

## 2023-05-16 ENCOUNTER — OFFICE VISIT (OUTPATIENT)
Dept: FAMILY MEDICINE | Facility: CLINIC | Age: 28
End: 2023-05-16
Payer: COMMERCIAL

## 2023-05-16 VITALS
WEIGHT: 122.6 LBS | TEMPERATURE: 99.2 F | DIASTOLIC BLOOD PRESSURE: 66 MMHG | BODY MASS INDEX: 17.55 KG/M2 | HEART RATE: 118 BPM | HEIGHT: 70 IN | SYSTOLIC BLOOD PRESSURE: 118 MMHG | OXYGEN SATURATION: 98 % | RESPIRATION RATE: 18 BRPM

## 2023-05-16 DIAGNOSIS — K20.0 EOSINOPHILIC ESOPHAGITIS: ICD-10-CM

## 2023-05-16 DIAGNOSIS — L40.4 GUTTATE PSORIASIS: Primary | ICD-10-CM

## 2023-05-16 PROBLEM — R20.2 NUMBNESS AND TINGLING: Status: ACTIVE | Noted: 2023-05-16

## 2023-05-16 PROBLEM — R42 VERTIGO: Status: ACTIVE | Noted: 2023-05-16

## 2023-05-16 PROBLEM — R20.0 NUMBNESS AND TINGLING: Status: ACTIVE | Noted: 2023-05-16

## 2023-05-16 PROBLEM — E55.9 VITAMIN D DEFICIENCY: Status: ACTIVE | Noted: 2023-05-16

## 2023-05-16 LAB
DEPRECATED S PYO AG THROAT QL EIA: NEGATIVE
GROUP A STREP BY PCR: NOT DETECTED

## 2023-05-16 PROCEDURE — 87651 STREP A DNA AMP PROBE: CPT | Performed by: INTERNAL MEDICINE

## 2023-05-16 PROCEDURE — 99213 OFFICE O/P EST LOW 20 MIN: CPT | Performed by: INTERNAL MEDICINE

## 2023-05-16 PROCEDURE — 99451 NTRPROF PH1/NTRNET/EHR 5/>: CPT | Performed by: DERMATOLOGY

## 2023-05-16 PROCEDURE — 99207 E-CONSULT TO DERMATOLOGY (ADULT OUTPT PROVIDER TO SPECIALIST WRITTEN QUESTION & RESPONSE): CPT | Performed by: INTERNAL MEDICINE

## 2023-05-16 ASSESSMENT — PATIENT HEALTH QUESTIONNAIRE - PHQ9
SUM OF ALL RESPONSES TO PHQ QUESTIONS 1-9: 1
10. IF YOU CHECKED OFF ANY PROBLEMS, HOW DIFFICULT HAVE THESE PROBLEMS MADE IT FOR YOU TO DO YOUR WORK, TAKE CARE OF THINGS AT HOME, OR GET ALONG WITH OTHER PEOPLE: NOT DIFFICULT AT ALL
SUM OF ALL RESPONSES TO PHQ QUESTIONS 1-9: 1

## 2023-05-16 ASSESSMENT — PAIN SCALES - GENERAL: PAINLEVEL: NO PAIN (0)

## 2023-05-16 NOTE — PROGRESS NOTES
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  Also had recent Strep infection which may have triggered guttate psoriasis.  Plan:   - Streptococcus A Rapid Screen w/Reflex to PCR  - If positive, would treat Strep (allergic to amoxicillin and cephalosporins)- this time, I would treat with clindamycin 300 mg three times daily x10 days (was treated with azithromycin before which can have some Strep resistance)  - Ideally, would want to start phototherapy for treatment, but I think will wait a long time for a formal Dermatology consult  - Discussed E-consult with patient- they agree with E-consult cost to try to see if phototherapy can be facilitated or if there are other systemic recommendations for treatment (too many lesions to do topical therapy for)  - Adult E-Consult to Dermatology (Outpt Provider to Specialist Written Question         & Response) placed 5/16/23    (K20.0) Eosinophilic esophagitis  Comment: Diagnosed by EGD  Plan:   - No longer taking budesonide enteral     Patient Instructions   You have guttate psoriasis.    I am going to do an e consult with Dermatology to see if we can start you on phototherapy and if they have any other recommendations to treat you in the meantime.    I will message you about Strep throat.        Joseline Peraza MD  [facility]   Ty is a 27 year old, presenting for the following health issues:  Derm Problem (Patient complain of after a lifetime of occasionally having eczema spots, had a sudden whole body flareup a couple weeks ago and need to get rid of it promptly. Patient is struggle immensely with topical treatments and is seeking oral steroids or dupilumab.)        5/16/2023     2:05 PM   Additional Questions   Roomed by kia luna    Accompanied by self     History of Present Illness       Reason for visit:  Sudden whole body eczema flareup  Symptom onset:  1-2 weeks ago  Symptoms include:  The symptom is eczema.  Symptom intensity:  Severe  Symptom progression:  Staying the same  Had these symptoms before:  Yes  Has tried/received treatment for these symptoms:  Yes  Previous treatment was successful:  Yes  Prior treatment description:  I've had some eczema in the past that was on small enough areas that I could tolerate topical steroids. This is a whole other beast.  What makes it worse:  Scratching, but I'm pretty good at not doing that.  What makes it better:  Nope!    Manoj Castellanos eats 0-1 servings of fruits and vegetables daily.Ty GENO Castellanos consumes 0 sweetened beverage(s) daily.Ty E Gale exercises with enough effort to increase Ty GENO Castellanos's heart rate 9 or less minutes per day.  Ty E Gale exercises with enough effort to increase Ty GENO Castellanos's heart rate 3 or less days per week.   Manoj Castellanos is taking medications regularly.    Today's PHQ-9         PHQ-9 Total Score: 1    PHQ-9 Q9 Thoughts of better off dead/self-harm past 2 weeks :   Not at all    How difficult have these problems made it for you to do your work, take care of things at home, or get along with other people: Not difficult at all     Has erythematous scaly papules all over his body- back, chest, abdomen, arms, legs, genitals, buttocks, face that started on 5/2/23.  No palmar or plantar aspects.  Some areas are itchy but it's not super itchy.  Has history of eczema in childhood.  Had a lot of contact allergies as a kid.    The problem that he has is that he cannot use topical medicine on all of these lesions because there are so many.  Had a really difficult Spring semester.  Had Strep throat 4/18/23 (treated with azithromycin), then on 5/2/23 noticed start of the rash.  Then, he developed Norovirus.  Really wants this to get better- largely for cosmetic reasons because it looks  "really prominent.    Review of Systems   Constitutional, HEENT, cardiovascular, pulmonary, gi and gu systems are negative, except as otherwise noted.      Objective    /66 (BP Location: Right arm, Patient Position: Sitting, Cuff Size: Adult Regular)   Pulse 118   Temp 99.2  F (37.3  C) (Temporal)   Resp 18   Ht 1.778 m (5' 10\")   Wt 55.6 kg (122 lb 9.6 oz)   SpO2 98%   BMI 17.59 kg/m    Body mass index is 17.59 kg/m .  Physical Exam   GENERAL: healthy, alert and no distress  EYES: Eyes grossly normal to inspection, PERRL and conjunctivae and sclerae normal  SKIN: numerous erythematous papules with overlying scale on chest, abdomen, back, arms, legs.  None on palms or soles of feet.   (male): normal male genitalia without lesions or urethral discharge, left suprapubic area with similar lesion to below                                         "

## 2023-05-16 NOTE — PATIENT INSTRUCTIONS
You have guttate psoriasis.    I am going to do an e consult with Dermatology to see if we can start you on phototherapy and if they have any other recommendations to treat you in the meantime.    I will message you about Strep throat.

## 2023-05-17 RX ORDER — MOMETASONE FUROATE 1 MG/G
OINTMENT TOPICAL
Qty: 454 G | Refills: 1 | Status: SHIPPED | OUTPATIENT
Start: 2023-05-17

## 2023-05-17 NOTE — PROGRESS NOTES
5/16/2023     E-Consult has been accepted.    Interprofessional consultation requested by:  Joseline Peraza MD      Clinical Question/Purpose: MY CLINICAL QUESTION IS: I believe this patient has guttate psoriasis- symptoms started about 2 weeks after Strep pharyngitis and lesions look fairly classic to me (although patient does have eczema)- please see photos in my note dated 5/16/23.  I am wondering if it is possible for this patient to start phototherapy in dermatology clinic (to help expedite treatment) or if there is another therapy you would recommend- prednisone?  I'm also swabbing him for Strep today to make sure it is cleared, but he is not having any pharyngitis symptoms.    Patient assessment and information reviewed: clinical notes and photos    Recommendations:   Agree with diagnosis of guttate psoriasis. The patient would need to be seen and evaluated in dermatology for phototherapy, as these treatments require a more detailed discussion with a provider before starting (risks, benefits, expectations, duration, consenting, etc). Would avoid prednisone - withdrawal from too-rapid taper may cause a severe rebound flare in symptoms. Instead, recommend use of triamcinolone 0.1% ointment or mometasone 0.1% ointment BID. The former is generally preferred as it can be dispensed in a 454 g tub, and given the large affected BSA, this will likely be necessary. If there is significant pruritus, can use adjunctive fexofenadine 180 mg BID.    The recommendations provided in this E-Consult are based on a review of clinical data pertinent to the clinical question presented, without a review of the patient's complete medical record or, the benefit of a comprehensive in-person or virtual patient evaluation. This consultation should not replace the clinical judgement and evaluation of the provider ordering this E-Consult. Any new clinical issues, or changes in patient status since the filing of this E-Consult  will need to be taken into account when assessing these recommendations. Please contact me if you have further questions.    My total time spent reviewing clinical information and formulating assessment was 10 minutes.    Elmo Lake MD, FAAD   of Dermatology  Department of Dermatology  [facility]

## 2023-05-18 RX ORDER — MOMETASONE FUROATE 1 MG/G
CREAM TOPICAL DAILY
Qty: 50 G | Refills: 3 | Status: SHIPPED | OUTPATIENT
Start: 2023-05-18

## 2023-06-01 ENCOUNTER — TELEPHONE (OUTPATIENT)
Dept: PLASTIC SURGERY | Facility: CLINIC | Age: 28
End: 2023-06-01
Payer: COMMERCIAL

## 2023-06-02 ENCOUNTER — OFFICE VISIT (OUTPATIENT)
Dept: NEUROLOGY | Facility: CLINIC | Age: 28
End: 2023-06-02
Attending: INTERNAL MEDICINE
Payer: COMMERCIAL

## 2023-06-02 DIAGNOSIS — R20.2 NUMBNESS AND TINGLING: ICD-10-CM

## 2023-06-02 DIAGNOSIS — R20.0 NUMBNESS AND TINGLING: ICD-10-CM

## 2023-06-02 PROCEDURE — 95913 NRV CNDJ TEST 13/> STUDIES: CPT | Performed by: PHYSICAL MEDICINE & REHABILITATION

## 2023-06-02 PROCEDURE — 95885 MUSC TST DONE W/NERV TST LIM: CPT | Mod: 59 | Performed by: PHYSICAL MEDICINE & REHABILITATION

## 2023-06-02 PROCEDURE — 95886 MUSC TEST DONE W/N TEST COMP: CPT | Mod: LT | Performed by: PHYSICAL MEDICINE & REHABILITATION

## 2023-06-02 NOTE — PROGRESS NOTES
[facility]  Electrodiagnostic Laboratory                 Department of Neurology                                                                                                         Test Date:  2023    Patient: Manoj Castellanos : 1995 Physician: Edita Shin MD   Sex: --- AGE: 27 year Ref Phys: Gopi Espinal MD    ID#: [id number]   Technician: Anastacio Maxwell       History and Examination:  Manoj Castellanos is a 27 year old adult with PMH significant for asthma, allergic rhinitis, nasal septum deviation,  eosinophilic esophagitis who presents with numbness and tingling of their face as well as paresthesia at their left upper extremity and chest. Query polyneuropathy vs polyradiculopathy.    Techniques:  Motor conduction studies were done with surface recording electrodes. Sensory conduction studies were performed with surface electrodes, unless indicated otherwise by (n), designating the use of subdermal recording electrodes. Temperature was monitored and recorded throughout the study. Upper extremities were maintained at a temperature of 32 degrees Centigrade or higher.  EMG was done with a concentric needle electrode.     Results:  All nerve conduction studies (as indicated in the following tables) were within normal limits.      All F Wave latencies were within normal limits.      Blink reflex study was attempted but the results didn't seem technically reliable.     All examined muscles (as indicated in the following table) showed no evidence of electrical instability.        Interpretation:  Normal study.    --There is no electrodiagnostic evidence of median, ulnar or radial mononeuropathy in the left upper extremity.    --There is no electrodiagnostic evidence of a diffuse sensory or motor peripheral neuropathy.    --There is no electrodiagnostic evidence of left lower or left upper extremity radiculopathy.      ___________________________  Edita Shin MD        Nerve  Conduction Studies  Motor Sites      Latency Amplitude Neg. Amp Diff Segment Distance Velocity Neg. Dur Neg Area Diff Temperature Comment   Site (ms) Norm (mV) Norm %  cm m/s Norm ms %  C    Left Fibular (EDB) Motor   Ankle 3.0  < 6.0 6.9  > 2.5  Ankle-EDB 8   4.6  31.7    Bel Fib Head 9.3 - 6.5 - -5.8 Bel Fib Head-Ankle 30.5 48  > 38 4.7 1.25 31.8    Pop Fossa 11.4 - 5.9 - -9.2 Pop Fossa-Bel Fib Head 10 48  > 38 4.5 -7.4 31.9    Right Fibular (EDB) Motor   Ankle 3.4  < 6.0 6.6  > 2.5  Ankle-EDB 8   4.4  30.4    Left Median (APB) Motor   Wrist 2.5  < 4.4 12.7  > 5.0  Wrist-APB 8   5.1  30.7    Elbow 6.8 - 12.0  > 5.0 -5.5 Elbow-Wrist 26 60  > 48 5.4 -1.55 31.5    Left Tibial (AHB) Motor   Ankle 3.2  < 6.5 16.0  > 5.0  Ankle-AHB 8   4.6  32.2    Knee 11.1 - 9.7 - -39.4 Knee-Ankle 42 53  > 38 5.2 -11.6 32.4    Right Tibial (AHB) Motor   Ankle 3.6  < 6.5 12.9  > 5.0  Ankle-AHB 8   4.5  30.6    Left Ulnar (ADM) Motor   Wrist 2.5  < 3.5 9.6  > 5.0  Wrist-ADM 8   5.4  33.2    Bel Elbow 5.8 - 8.8 - -8.3 Bel Elbow-Wrist 18 55  > 48 5.5 2.1 33.4    Abv Elbow 7.8 - 9.8 - 11.4 Abv Elbow-Bel Elbow 11 55  > 48 5.8 6.5 33.5      F-Wave Sites      Min F-Lat Max-Min F-Lat Mean F-Lat   Site (ms) ms ms   Left Median F-Wave   Wrist 24.5 17.7 34.3   Left Tibial F-Wave   Ankle 45.7 6.1 -   Left Ulnar F-Wave   Wrist 26.8 1.60 27.5     Sensory Sites      Onset Lat Peak Lat Amp (O-P) Amp (P-P) Segment Distance Velocity Temperature Comment   Site ms ms  V Norm  V  cm m/s Norm  C    Left Median Sensory   Wrist-Dig II 2.3 3.0 45  > 10 72 Wrist-Dig II 14 61  > 48 33.4    Left Median-Ulnar Palmar Sensory        Median   Palm-Wrist 1.33 1.75 40 - 43 Palm-Wrist 8 60 - 33.5         Ulnar   Palm-Wrist 1.03 1.65 30 - 32 Palm-Wrist 8 78 - 33.5    Left Radial Sensory   Forearm-Wrist 1.60 2.2 68  > 15 82 Forearm-Wrist 10 63 - 33.6    Left Superficial Fibular Sensory   14 cm-Ankle 2.2 2.9 20  > 3 27 14 cm-Ankle 12.5 57  > 38 31.8    Right Superficial  Fibular Sensory   14 cm-Ankle 2.3 3.0 24  > 3 27 14 cm-Ankle 12.5 54  > 38 30.9    Left Sural Sensory   Calf-Lat Mall 2.8 3.6 15  > 5 25 Calf-Lat Mall 14 50  > 38 30.8    Right Sural Sensory   Calf-Lat Mall 2.6 3.4 17  > 5 24 Calf-Lat Mall 14 54  > 38 31.3    Left Ulnar Sensory   Wrist-Dig V 2.1 2.9 49  > 8 90 Wrist-Dig V 12.5 60  > 48 33.4      Inter-Nerve Comparisons     Nerve 1 Value 1 Nerve 2 Value 2 Parameter Result Normal   Sensory Sites   L Median Palm-Wrist 1.8 ms L Ulnar Palm-Wrist 1.7 ms Peak Lat Diff 0.10 ms <0.40       Electromyography     Side Muscle Ins Act Fibs/PSW Fasc HF Amp Dur Poly Recrt Int Pat   Left Deltoid Nml None Nml 0 Nml Nml 0 Nml Nml   Left Biceps Nml None Nml 0 Nml Nml 0 Nml Nml   Left Triceps Nml None Nml 0 Nml Nml 0 Nml Nml   Left Pronator Teres Nml None Nml 0 Nml Nml 0 Nml Nml   Left FDI Nml None Nml 0 Nml Nml 0 Nml Nml   Left EIP Nml None Nml 0 Nml Nml 0 Nml Nml   Left Tib Anterior Nml None Nml 0 Nml Nml 0 Nml Nml   Left Gastroc Nml None Nml 0 Nml Nml 0 Nml Nml   Left Vastus Lat Nml None Nml 0 Nml Nml 0 Nml Nml         NCS Waveforms:    Motor                    F-Wave           Sensory                                        Ultrasound Images:          0

## 2023-06-02 NOTE — LETTER
2023       RE: Manoj Castellanos  [address] [place] [place] [address]     Dear Colleague,    Thank you for referring your patient, Manoj Castellanos, to the [facility] [place] at [facility]. Please see a copy of my visit note below.                            [facility]  Electrodiagnostic Laboratory                 Department of Neurology                                                                                                         Test Date:  2023    Patient: Manoj Castellanos : 1995 Physician: Edita Shin MD   Sex: --- AGE: 27 year Ref Phys: Gopi Espinal MD    ID#: [id number]   Technician: Anastacio Maxwell       History and Examination:  Manoj Castellanos is a 27 year old adult with PMH significant for asthma, allergic rhinitis, nasal septum deviation,  eosinophilic esophagitis who presents with numbness and tingling of their face as well as paresthesia at their left upper extremity and chest. Query polyneuropathy vs polyradiculopathy.    Techniques:  Motor conduction studies were done with surface recording electrodes. Sensory conduction studies were performed with surface electrodes, unless indicated otherwise by (n), designating the use of subdermal recording electrodes. Temperature was monitored and recorded throughout the study. Upper extremities were maintained at a temperature of 32 degrees Centigrade or higher.  EMG was done with a concentric needle electrode.     Results:  All nerve conduction studies (as indicated in the following tables) were within normal limits.      All F Wave latencies were within normal limits.      Blink reflex study was attempted but the results didn't seem technically reliable.     All examined muscles (as indicated in the following table) showed no evidence of electrical instability.        Interpretation:  Normal study.    --There is no electrodiagnostic evidence of median, ulnar or radial  mononeuropathy in the left upper extremity.    --There is no electrodiagnostic evidence of a diffuse sensory or motor peripheral neuropathy.    --There is no electrodiagnostic evidence of left lower or left upper extremity radiculopathy.      ___________________________  Edita Shin MD        Nerve Conduction Studies  Motor Sites      Latency Amplitude Neg. Amp Diff Segment Distance Velocity Neg. Dur Neg Area Diff Temperature Comment   Site (ms) Norm (mV) Norm %  cm m/s Norm ms %  C    Left Fibular (EDB) Motor   Ankle 3.0  < 6.0 6.9  > 2.5  Ankle-EDB 8   4.6  31.7    Bel Fib Head 9.3 - 6.5 - -5.8 Bel Fib Head-Ankle 30.5 48  > 38 4.7 1.25 31.8    Pop Fossa 11.4 - 5.9 - -9.2 Pop Fossa-Bel Fib Head 10 48  > 38 4.5 -7.4 31.9    Right Fibular (EDB) Motor   Ankle 3.4  < 6.0 6.6  > 2.5  Ankle-EDB 8   4.4  30.4    Left Median (APB) Motor   Wrist 2.5  < 4.4 12.7  > 5.0  Wrist-APB 8   5.1  30.7    Elbow 6.8 - 12.0  > 5.0 -5.5 Elbow-Wrist 26 60  > 48 5.4 -1.55 31.5    Left Tibial (AHB) Motor   Ankle 3.2  < 6.5 16.0  > 5.0  Ankle-AHB 8   4.6  32.2    Knee 11.1 - 9.7 - -39.4 Knee-Ankle 42 53  > 38 5.2 -11.6 32.4    Right Tibial (AHB) Motor   Ankle 3.6  < 6.5 12.9  > 5.0  Ankle-AHB 8   4.5  30.6    Left Ulnar (ADM) Motor   Wrist 2.5  < 3.5 9.6  > 5.0  Wrist-ADM 8   5.4  33.2    Bel Elbow 5.8 - 8.8 - -8.3 Bel Elbow-Wrist 18 55  > 48 5.5 2.1 33.4    Abv Elbow 7.8 - 9.8 - 11.4 Abv Elbow-Bel Elbow 11 55  > 48 5.8 6.5 33.5      F-Wave Sites      Min F-Lat Max-Min F-Lat Mean F-Lat   Site (ms) ms ms   Left Median F-Wave   Wrist 24.5 17.7 34.3   Left Tibial F-Wave   Ankle 45.7 6.1 -   Left Ulnar F-Wave   Wrist 26.8 1.60 27.5     Sensory Sites      Onset Lat Peak Lat Amp (O-P) Amp (P-P) Segment Distance Velocity Temperature Comment   Site ms ms  V Norm  V  cm m/s Norm  C    Left Median Sensory   Wrist-Dig II 2.3 3.0 45  > 10 72 Wrist-Dig II 14 61  > 48 33.4    Left Median-Ulnar Palmar Sensory        Median   Palm-Wrist 1.33 1.75 40 -  43 Palm-Wrist 8 60 - 33.5         Ulnar   Palm-Wrist 1.03 1.65 30 - 32 Palm-Wrist 8 78 - 33.5    Left Radial Sensory   Forearm-Wrist 1.60 2.2 68  > 15 82 Forearm-Wrist 10 63 - 33.6    Left Superficial Fibular Sensory   14 cm-Ankle 2.2 2.9 20  > 3 27 14 cm-Ankle 12.5 57  > 38 31.8    Right Superficial Fibular Sensory   14 cm-Ankle 2.3 3.0 24  > 3 27 14 cm-Ankle 12.5 54  > 38 30.9    Left Sural Sensory   Calf-Lat Mall 2.8 3.6 15  > 5 25 Calf-Lat Mall 14 50  > 38 30.8    Right Sural Sensory   Calf-Lat Mall 2.6 3.4 17  > 5 24 Calf-Lat Mall 14 54  > 38 31.3    Left Ulnar Sensory   Wrist-Dig V 2.1 2.9 49  > 8 90 Wrist-Dig V 12.5 60  > 48 33.4      Inter-Nerve Comparisons     Nerve 1 Value 1 Nerve 2 Value 2 Parameter Result Normal   Sensory Sites   L Median Palm-Wrist 1.8 ms L Ulnar Palm-Wrist 1.7 ms Peak Lat Diff 0.10 ms <0.40       Electromyography     Side Muscle Ins Act Fibs/PSW Fasc HF Amp Dur Poly Recrt Int Pat   Left Deltoid Nml None Nml 0 Nml Nml 0 Nml Nml   Left Biceps Nml None Nml 0 Nml Nml 0 Nml Nml   Left Triceps Nml None Nml 0 Nml Nml 0 Nml Nml   Left Pronator Teres Nml None Nml 0 Nml Nml 0 Nml Nml   Left FDI Nml None Nml 0 Nml Nml 0 Nml Nml   Left EIP Nml None Nml 0 Nml Nml 0 Nml Nml   Left Tib Anterior Nml None Nml 0 Nml Nml 0 Nml Nml   Left Gastroc Nml None Nml 0 Nml Nml 0 Nml Nml   Left Vastus Lat Nml None Nml 0 Nml Nml 0 Nml Nml         NCS Waveforms:    Motor                    F-Wave           Sensory                                        Ultrasound Images:          0      Edita Shin MD

## 2023-06-07 ENCOUNTER — PRE VISIT (OUTPATIENT)
Dept: PLASTIC SURGERY | Facility: CLINIC | Age: 28
End: 2023-06-07

## 2023-06-07 ENCOUNTER — OFFICE VISIT (OUTPATIENT)
Dept: PLASTIC SURGERY | Facility: CLINIC | Age: 28
End: 2023-06-07
Payer: COMMERCIAL

## 2023-06-07 VITALS
OXYGEN SATURATION: 97 % | BODY MASS INDEX: 18.04 KG/M2 | HEIGHT: 70 IN | HEART RATE: 99 BPM | WEIGHT: 126 LBS | DIASTOLIC BLOOD PRESSURE: 90 MMHG | SYSTOLIC BLOOD PRESSURE: 143 MMHG

## 2023-06-07 DIAGNOSIS — F64.0 GENDER DYSPHORIA IN ADULT: ICD-10-CM

## 2023-06-07 PROCEDURE — 99204 OFFICE O/P NEW MOD 45 MIN: CPT | Performed by: STUDENT IN AN ORGANIZED HEALTH CARE EDUCATION/TRAINING PROGRAM

## 2023-06-07 ASSESSMENT — PAIN SCALES - GENERAL: PAINLEVEL: NO PAIN (0)

## 2023-06-07 NOTE — LETTER
"6/7/2023       RE: Manoj Castellanos  [address] [facility] [address]     Dear Colleague,    Thank you for referring your patient, Manoj Castellanos, to the [facility] PLASTIC [facility]GERY [facility] [place] at [facility]. Please see a copy of my visit note below.    PRS    HPI: 27 year-old (they/them) gender-less patient presenting with desire for feminizing breast augmentation surgery. They have been in their current gender role for 10+ years. No hormones, because they do not want the systemic effects of estrogen. They have a therapist with whom they meet weekly for the last 4+ years. Strong family support system. Lives with mom, dad and sister. Premedical student and very medically literate. Does not yet have letter of support. Of note, recently developed post-viral psoriatic plaques and is in the process of undergoing UV treatment and may need immune suppression. Also, has evolving polyneuropathy with unclear diagnosis. However, mental health concerns are stable. No breast masses, nipple discharge, skin changes, axillary lymphadenopathy.     ROS: Negative, see HPI  PMH: Gender dysphoria, mild persistent asthma, evolving conditions as above  PSH: No prior chest or breast surgery  Medications: Albuterol, Zyrtec, Trileptal, Ritalin  Allergies: Amoxicillin, Cefuroxime  SH: Nonsmoker, denies any tobacco or nicotine use  FH: No bleeding or clotting issues, or problems with anesthesia    Examination:  BP (!) 143/90 (BP Location: Left arm, Patient Position: Chair, Cuff Size: Adult Regular)   Pulse 99   Ht 1.778 m (5' 10\")   Wt 57.2 kg (126 lb)   SpO2 97%   BMI 18.08 kg/m    Nonlabored breathing  Not distressed  Bilateral non-ptotic breasts with biologic male chest anatomy  Base width: 11.5 cm bilateral  Nipple inframammary crease distance: 3.5-4 cm bilaterally    No breast imaging    A/P: 27 gender-less patient presenting for fem breast aug " consult    -Patient is not quite a candidate for trans fem breast augmentation yet. First, patient has evolving skin condition and polyneuropathy with possible need for immune suppression. Second, patient does not have a letter of support for chest surgery indicating that a breast augmentation would be medically necessary to relieve symptoms of gender dysphoria.   -Discussed the impact that estrogen would have on breast development. Patient does not have interest in estrogen due to systemic effects, which is understandable. This limits the aesthetic result following implant augmentation and patient understands this.  -Since the patient is thin, my preference would be a partial submuscular augmentation with dual-plane technique (to separate skin from muscle). This would be an opportunity to use a teardrop (textured shaped) implant, since the patient does not have any breast tissue. We will discuss details in the future. Discussed the association between certain surface  macrotexturing and SUKH-ALCL. Patient would be open to a textured implant if that would provide for a better result.   -Return to clinic in 3 months for re-evaluation, can be virtual or phone with photos sent via Not[employer].  -A total of 45 minutes was devoted to review of chart, direct face-to-face patient counseling and documentation during this encounter, exclusive of any procedure performed.    River Kim MD, PhD

## 2023-06-07 NOTE — NURSING NOTE
"Chief Complaint   Patient presents with     Consult     Ty, is being seen today for a consult regarding gender affirming breast augmentation.       Vitals:    06/07/23 1525   BP: (!) 143/90   BP Location: Left arm   Patient Position: Chair   Cuff Size: Adult Regular   Pulse: 99   SpO2: 97%   Weight: 57.2 kg (126 lb)   Height: 1.778 m (5' 10\")       Body mass index is 18.08 kg/m .      Mili Hull LPN    "

## 2023-06-08 ENCOUNTER — TELEPHONE (OUTPATIENT)
Dept: PLASTIC SURGERY | Facility: CLINIC | Age: 28
End: 2023-06-08
Payer: COMMERCIAL

## 2023-06-08 NOTE — TELEPHONE ENCOUNTER
Called Ty to explain the diagnostic assessment requirement needed for PMAP after receiving a voicemail with their question of what the DA was.

## 2023-06-11 DIAGNOSIS — K21.9 GASTROESOPHAGEAL REFLUX DISEASE, UNSPECIFIED WHETHER ESOPHAGITIS PRESENT: ICD-10-CM

## 2023-06-13 RX ORDER — PANTOPRAZOLE SODIUM 40 MG/1
TABLET, DELAYED RELEASE ORAL
Qty: 60 TABLET | Refills: 1 | Status: SHIPPED | OUTPATIENT
Start: 2023-06-13 | End: 2023-08-14

## 2023-06-15 DIAGNOSIS — M79.2 NEUROPATHIC PAIN: ICD-10-CM

## 2023-06-15 NOTE — TELEPHONE ENCOUNTER
Rx Authorization:    Requested Medication/ Dose gabapentin (NEURONTIN) 300 MG capsule    Date last refill ordered: 9/7/22    Quantity ordered: 180 capsules    # refills: 3    Date of last clinic visit with ordering provider: 9/7/22    Date of next clinic visit with ordering provider:      pertinent protocol data (lab date/result):     Include pertinent information from patients message:

## 2023-06-16 RX ORDER — GABAPENTIN 300 MG/1
300 CAPSULE ORAL 2 TIMES DAILY
Qty: 180 CAPSULE | Refills: 3 | OUTPATIENT
Start: 2023-06-16

## 2023-06-16 RX ORDER — GABAPENTIN 300 MG/1
300 CAPSULE ORAL 2 TIMES DAILY
Qty: 180 CAPSULE | Refills: 3 | Status: SHIPPED | OUTPATIENT
Start: 2023-06-16 | End: 2024-06-05

## 2023-06-27 ENCOUNTER — TRANSFERRED RECORDS (OUTPATIENT)
Dept: HEALTH INFORMATION MANAGEMENT | Facility: CLINIC | Age: 28
End: 2023-06-27

## 2023-08-02 ENCOUNTER — OFFICE VISIT (OUTPATIENT)
Dept: OPHTHALMOLOGY | Facility: CLINIC | Age: 28
End: 2023-08-02
Attending: OPHTHALMOLOGY
Payer: COMMERCIAL

## 2023-08-02 DIAGNOSIS — H50.00 ESOTROPIA: ICD-10-CM

## 2023-08-02 DIAGNOSIS — H52.13 MYOPIA OF BOTH EYES WITH ASTIGMATISM: Primary | ICD-10-CM

## 2023-08-02 DIAGNOSIS — H52.203 MYOPIA OF BOTH EYES WITH ASTIGMATISM: Primary | ICD-10-CM

## 2023-08-02 PROCEDURE — G0463 HOSPITAL OUTPT CLINIC VISIT: HCPCS | Performed by: OPHTHALMOLOGY

## 2023-08-02 PROCEDURE — 92004 COMPRE OPH EXAM NEW PT 1/>: CPT | Performed by: OPHTHALMOLOGY

## 2023-08-02 ASSESSMENT — TONOMETRY
IOP_METHOD: TONOPEN
IOP_METHOD: TONOPEN
OD_IOP_MMHG: 18
OS_IOP_MMHG: 20
OS_IOP_MMHG: 23
OD_IOP_MMHG: 20

## 2023-08-02 ASSESSMENT — CUP TO DISC RATIO
OD_RATIO: 0.25
OS_RATIO: 0.25

## 2023-08-02 ASSESSMENT — REFRACTION_MANIFEST
OD_HPRISM: 6BO
OS_SPHERE: -8.50
OS_AXIS: 065
OD_AXIS: 135
OS_CYLINDER: +1.50
OS_HPRISM: 6BO
OD_SPHERE: -6.00
OD_CYLINDER: +0.75

## 2023-08-02 ASSESSMENT — CONF VISUAL FIELD
OD_SUPERIOR_NASAL_RESTRICTION: 0
OS_NORMAL: 1
OS_SUPERIOR_TEMPORAL_RESTRICTION: 0
OD_INFERIOR_NASAL_RESTRICTION: 0
OS_INFERIOR_NASAL_RESTRICTION: 0
OD_NORMAL: 1
OS_INFERIOR_TEMPORAL_RESTRICTION: 0
OD_INFERIOR_TEMPORAL_RESTRICTION: 0
METHOD: COUNTING FINGERS
OD_SUPERIOR_TEMPORAL_RESTRICTION: 0
OS_SUPERIOR_NASAL_RESTRICTION: 0

## 2023-08-02 ASSESSMENT — REFRACTION_WEARINGRX
OS_CYLINDER: +1.50
OD_HPRISM: 6 BO
OD_SPHERE: -5.50
OS_SPHERE: -8.00
OS_HPRISM: 6 BO
OD_AXIS: 135
OS_AXIS: 065
OD_CYLINDER: +0.75

## 2023-08-02 ASSESSMENT — VISUAL ACUITY
CORRECTION_TYPE: GLASSES
OS_CC: 20/25
OD_CC: 20/20
OS_CC+: -2
OD_CC+: -2
METHOD: SNELLEN - LINEAR

## 2023-08-02 ASSESSMENT — EXTERNAL EXAM - LEFT EYE: OS_EXAM: NORMAL

## 2023-08-02 ASSESSMENT — REFRACTION_FINALRX
OD_HPRISM: 6BO
OS_HPRISM: 6BO

## 2023-08-02 ASSESSMENT — SLIT LAMP EXAM - LIDS
COMMENTS: NORMAL
COMMENTS: NORMAL

## 2023-08-02 ASSESSMENT — EXTERNAL EXAM - RIGHT EYE: OD_EXAM: NORMAL

## 2023-08-02 NOTE — NURSING NOTE
Chief Complaints and History of Present Illnesses   Patient presents with    Annual Eye Exam     Annual eye exam      Chief Complaint(s) and History of Present Illness(es)       Annual Eye Exam              Associated symptoms: Negative for eye pain, flashes and floaters    Pain scale: 0/10    Comments: Annual eye exam               Comments    Thinks glasses RX is from 2020 exam here. Diplopia is better, but still notices it. States it gets worse when he is tired. No pain, flashes or floaters. States vision seems about the same.   No drops currently being used. Artificial tears as needed.     Lissette Maurer OA 2:43 PM August 2, 2023

## 2023-08-04 NOTE — PROGRESS NOTES
HPI       Annual Eye Exam    Associated symptoms include Negative for eye pain, flashes and floaters.  Pain was noted as 0/10. Additional comments: Annual eye exam              Comments    Thinks glasses RX is from 2020 exam here. Diplopia is better, but still notices it. States it gets worse when he is tired. No pain, flashes or floaters. States vision seems about the same.   No drops currently being used. Artificial tears as needed.     Lissette Maurer OA 2:43 PM August 2, 2023             Last edited by Lissette Maurer on 8/2/2023  2:43 PM.         Review of systems for the eyes was negative other than the pertinent positives/negatives listed in the HPI.      Assessment & Plan    HPI:  Manoj Castellanos is a 27 year old adult with history of psoriasis, gender dysphoria, myopia, esotropia presents for dilated exam. Doing well with prism. Is not interested in strabismus surgery at this time. Occasionally worse at the end of the day    POHx: myopia, esotropia  PMHx: psoriasis, gender dysphoria  Current Medications: albuterol (PROAIR HFA/PROVENTIL HFA/VENTOLIN HFA) 108 (90 Base) MCG/ACT inhaler, Inhale 2 puffs into the lungs every 4 hours as needed for shortness of breath / dyspnea or wheezing  carbamide peroxide (DEBROX) 6.5 % otic solution, Place 5 drops into both ears 2 times daily  cetirizine (ZYRTEC) 10 MG tablet, TAKE 1 TABLET(10 MG) BY MOUTH TWICE DAILY  finasteride (PROSCAR) 5 MG tablet, TAKE 1/4 TABLET BY MOUTH ONCE DAILY  gabapentin (NEURONTIN) 300 MG capsule, Take 1 capsule (300 mg) by mouth 2 times daily PATIENT STATES HE TAKING 300 MG TWICE DAILY  IBUPROFEN, Take 200 tablets by mouth daily as needed  meclizine (ANTIVERT) 25 MG tablet, Take 1 tablet (25 mg) by mouth 2 times daily as needed for dizziness . Appointment required for additional refills.  methylphenidate (RITALIN) 5 MG tablet, Take 5 mg by mouth daily  mometasone (ELOCON) 0.1 % external cream, Apply topically daily To areas of psoriasis.  mometasone  (ELOCON) 0.1 % external ointment, Apply topically twice daily.  Do not expose skin to sun after applying steroid cream.  ondansetron (ZOFRAN ODT) 4 MG ODT tab, DISSOLVE 1 TABLET(4 MG) ON THE TONGUE EVERY 8 HOURS AS NEEDED FOR NAUSEA  OXcarbazepine (TRILEPTAL) 150 MG tablet, Take 2 tablets (300 mg) by mouth 2 times daily  pantoprazole (PROTONIX) 40 MG EC tablet, TAKE 1 TABLET(40 MG) BY MOUTH TWICE DAILY  VITAMIN D3 25 MCG (1000 UT) tablet, TAKE 2 TABLETS BY MOUTH DAILY  EPINEPHrine (ANY BX GENERIC EQUIV) 0.3 MG/0.3ML injection 2-pack, Inject 0.3 mLs (0.3 mg) into the muscle as needed for anaphylaxis  ORDER FOR ALLERGEN IMMUNOTHERAPY, Name of Mix: Mix #1  Dust Mite, Cat, Dog  Cat Hair, Standardized A.P. 10,000 BAU/mL, HS  2.0 ml  Dog Hair-Dander, UF  1:650 w/v, HS  1.0 ml  Dust Mites DF. 10,000 AU/mL, HS  1.0 ml  Dust Mites DP. 10,000 AU/mL, HS  1.0 ml   Diluent: HSA qs to 5ml  ORDER FOR ALLERGEN IMMUNOTHERAPY, Name of Mix: Mix #2  Tree , Weeds  Ritchie, White 1:20 w/v, HS  0.5 ml  Birch Mix PRW 1:20 w/v, HS  0.5 ml  Oak Mix RVW 1:20 w/v, HS 0.5 ml  Hollins Tree, Black 1:20 w/v, HS 0.5 ml  Coleridge, Black 1:20 w/v, HS 0.5 ml  Cocklebur, Common 1:20 w/v, HS 0.5 ml  Sagebrush, Mugwort 1:20 w/v, HS 0.5 ml  Sorrel, Sheep 1:20 w/v, HS 0.5 ml  Diluent: HSA qs to 5ml    No current facility-administered medications on file prior to visit.    FHx: denies family history of ocular conditions   PSHx: denies history of ocular surgeries       Current Eye Medications:    Assessment & Plan:  (H52.13,  H52.203) Myopia of both eyes with astigmatism  (primary encounter diagnosis)  Patient has minimal change in myopia but a copy of today's glasses prescription was given.  The patient may wish to update the glasses if the lenses are scratched or the frames are too small.      (H50.00) Esotropia  Continue prism glasses  If interested in surgery, may see Alyssa at any time      Return in about 1 year (around 8/2/2024) for Annual  Visit-v/t/d/MRx.        Chandra Benavides MD     Attending Physician Attestation:  Complete documentation of historical and exam elements from today's encounter can be found in the full encounter summary report (not reduplicated in this progress note).  I personally obtained the chief complaint(s) and history of present illness.  I confirmed and edited as necessary the review of systems, past medical/surgical history, family history, social history, and examination findings as documented by others; and I examined the patient myself.  I personally reviewed the relevant tests, images, and reports as documented above.  I formulated and edited as necessary the assessment and plan and discussed the findings and management plan with the patient and family. - Chandra Benavides MD

## 2023-08-07 ENCOUNTER — OFFICE VISIT (OUTPATIENT)
Dept: OPTOMETRY | Facility: CLINIC | Age: 28
End: 2023-08-07
Payer: COMMERCIAL

## 2023-08-07 DIAGNOSIS — H52.13 MYOPIA OF BOTH EYES: Primary | ICD-10-CM

## 2023-08-07 DIAGNOSIS — H52.223 REGULAR ASTIGMATISM OF BOTH EYES: ICD-10-CM

## 2023-08-07 PROCEDURE — 92310 CONTACT LENS FITTING OU: CPT | Performed by: OPTOMETRIST

## 2023-08-07 PROCEDURE — 99207 PR NO CHARGE LOS: CPT | Performed by: OPTOMETRIST

## 2023-08-07 ASSESSMENT — REFRACTION_WEARINGRX
SPECS_TYPE: SVL
OS_CYLINDER: +1.50
OD_AXIS: 135
OS_AXIS: 065
OD_CYLINDER: +0.75
OD_HPRISM: 6BO
OD_SPHERE: -6.00
OS_HPRISM: 6BO
OS_SPHERE: -8.50

## 2023-08-07 ASSESSMENT — KERATOMETRY
OD_K2POWER_DIOPTERS: 45.50
OS_K2POWER_DIOPTERS: 46.00
OD_K1POWER_DIOPTERS: 44.50
OS_AXISANGLE_DEGREES: 077
OD_AXISANGLE_DEGREES: 109
OD_AXISANGLE2_DEGREES: 019
OS_AXISANGLE2_DEGREES: 167
OS_K1POWER_DIOPTERS: 44.50

## 2023-08-07 ASSESSMENT — REFRACTION_CURRENTRX
OS_BRAND: COOPER BIOFINITY TORIC BC 8.7, D 14.5
OS_SPHERE: -6.50
OD_CYLINDER: -0.75
OD_SPHERE: -5.00
OD_AXIS: 040
OS_AXIS: 160
OD_BRAND: COOPER BIOFINITY TORIC BC 8.7, D 14.5
OS_CYLINDER: -1.25

## 2023-08-07 ASSESSMENT — VISUAL ACUITY
CORRECTION_TYPE: GLASSES
OD_CC+: -2
METHOD: SNELLEN - LINEAR
OS_CC: 20/25
OD_CC: 20/25

## 2023-08-07 NOTE — LETTER
8/7/2023         RE: Manoj Castellanos  [address] [facility] [place] [address]        Dear Colleague,    Thank you for referring your patient, Manoj Castellanos, to the [facility]. Please see a copy of my visit note below.    Chief Complaint   Patient presents with     Contact Lens Fitting     New contact fitting fee $100 ok       Last Eye Exam: 8-2-2023    Allergies: Seasonal:  Spring and Summer    Environmental:  animals and pollen  Eye Comfort:  good  Motivation:  more choices  Swimming:   No  Visual Demands:  midrange and student     Ronit Bautista Optometric Assistant, A.B.O.C.    OBJECTIVE: See Ophthalmology exam     ASSESSMENT:    ICD-10-CM    1. Myopia of both eyes  H52.13 CONTACT LENS FITTING,BILAT (NEW) w/ signed waiver      2. Regular astigmatism of both eyes  H52.223 CONTACT LENS FITTING,BILAT (NEW) w/ signed waiver               PLAN:  Patient Instructions   Trial contact lenses will be ordered. Schedule a contact lens insertion and removal appointment when they are in.    Yosi Henry OD         Again, thank you for allowing me to participate in the care of your patient.        Sincerely,        Yosi Henry, OD

## 2023-08-07 NOTE — PATIENT INSTRUCTIONS
Trial contact lenses will be ordered. Schedule a contact lens insertion and removal appointment when they are in.    Yosi Henry, OD

## 2023-08-07 NOTE — PROGRESS NOTES
Chief Complaint   Patient presents with    Contact Lens Fitting     New contact fitting fee $100 ok       Last Eye Exam: 8-2-2023    Allergies: Seasonal:  Spring and Summer    Environmental:  animals and pollen  Eye Comfort:  good  Motivation:  more choices  Swimming:   No  Visual Demands:  midrange and student     Ronit Bautista Optometric Assistant, A.B.O.C.    OBJECTIVE: See Ophthalmology exam     ASSESSMENT:    ICD-10-CM    1. Myopia of both eyes  H52.13 CONTACT LENS FITTING,BILAT (NEW) w/ signed waiver      2. Regular astigmatism of both eyes  H52.223 CONTACT LENS FITTING,BILAT (NEW) w/ signed waiver               PLAN:  Patient Instructions   Trial contact lenses will be ordered. Schedule a contact lens insertion and removal appointment when they are in.    Yosi Henry, OD

## 2023-08-08 ENCOUNTER — TRANSFERRED RECORDS (OUTPATIENT)
Dept: HEALTH INFORMATION MANAGEMENT | Facility: CLINIC | Age: 28
End: 2023-08-08
Payer: COMMERCIAL

## 2023-08-14 DIAGNOSIS — K21.9 GASTROESOPHAGEAL REFLUX DISEASE, UNSPECIFIED WHETHER ESOPHAGITIS PRESENT: ICD-10-CM

## 2023-08-14 RX ORDER — PANTOPRAZOLE SODIUM 40 MG/1
TABLET, DELAYED RELEASE ORAL
Qty: 60 TABLET | Refills: 1 | Status: SHIPPED | OUTPATIENT
Start: 2023-08-14 | End: 2023-08-14

## 2023-08-14 RX ORDER — PANTOPRAZOLE SODIUM 40 MG/1
TABLET, DELAYED RELEASE ORAL
Qty: 180 TABLET | Refills: 0 | Status: SHIPPED | OUTPATIENT
Start: 2023-08-14 | End: 2023-10-04

## 2023-08-14 NOTE — TELEPHONE ENCOUNTER
Refill was approved in earlier encounter, patient is requesting a separate encounter. This request was approved for a 90 day supply.    Carli Kitchen RN

## 2023-08-14 NOTE — TELEPHONE ENCOUNTER
Pantoprazole (Protonix) 40mg EC tablet. Take 1 tablet (40mg) by mouth twice daily.    Last Written Prescription Date:  6/13/23  Last Fill Quantity: 60,  # refills: 1   Last office visit: 12/7/2022   Future Office Visit:  10/4/23    Refill approved per [facility] protocol.  Carli Kitchen RN

## 2023-08-15 ENCOUNTER — OFFICE VISIT (OUTPATIENT)
Dept: OPTOMETRY | Facility: CLINIC | Age: 28
End: 2023-08-15
Payer: COMMERCIAL

## 2023-08-15 DIAGNOSIS — H52.13 MYOPIA OF BOTH EYES: Primary | ICD-10-CM

## 2023-08-15 DIAGNOSIS — H52.223 REGULAR ASTIGMATISM OF BOTH EYES: ICD-10-CM

## 2023-08-15 PROCEDURE — 99207 PR NO CHARGE LOS: CPT | Performed by: OPTOMETRIST

## 2023-08-15 PROCEDURE — 92499 UNLISTED OPH SVC/PROCEDURE: CPT | Performed by: OPTOMETRIST

## 2023-08-15 ASSESSMENT — REFRACTION_CURRENTRX
OD_BRAND: COOPER BIOFINITY TORIC BC 8.7, D 14.5
OS_CYLINDER: -1.25
OS_SPHERE: -6.50
OD_CYLINDER: -0.75
OS_BRAND: COOPER BIOFINITY TORIC BC 8.7, D 14.5
OS_AXIS: 160
OD_AXIS: 040
OD_SPHERE: -5.00

## 2023-08-15 ASSESSMENT — VISUAL ACUITY
OS_CC: 20/20
METHOD: SNELLEN - LINEAR
OS_CC: 20/20
OD_CC: 20/20
OD_CC: 20/20
CORRECTION_TYPE: CONTACTS

## 2023-08-15 NOTE — PATIENT INSTRUCTIONS
Dispensed trial contacts.  Return in 1 week for a contact lens check.  Be sure to wear contact lenses in to that appointment.     Yosi Henry, OD

## 2023-08-15 NOTE — PROGRESS NOTES
Chief Complaint   Patient presents with    Contact Lens Insertion and Removal        Replacement : monthly  Solution :Bio True   Skill level :excellent  Class duration: 30 minutes    Dayday Alcocer - Optometric Assistant    OBJECTIVE: See Ophthalmology exam     ASSESSMENT:    ICD-10-CM    1. Myopia of both eyes  H52.13 CONTACT LENS CHECK      2. Regular astigmatism of both eyes  H52.223 CONTACT LENS CHECK          Completed  I & R today.    PLAN:  Patient Instructions   Dispensed trial contacts.  Return in 1 week for a contact lens check.  Be sure to wear contact lenses in to that appointment.     ROLANDO Cheney Jasmin          1995     [id number]     Procedure      Wearing Schedule 1st Week    Wash hands with oil/perfume free soap.   Day 1    4 hours  Cleanse and disinfect contacts daily.    Day 2    6 hours  Clean_________________________   Day 3    8 hours  Rinse_________________________   Day 4    8 hours  Disinfect______________________    Day 5    10 hours  Rewet________________________    Day 6    10 hours          Day 7    10 hours  Use only eye drops made for contact lenses.  Return in 1-2 weeks for contact check appointment-Come in wearing your contacts.    Replacement Schedule  Replace in    Sleeping in contacts is NOT recommended.    Sleeping contact lenses increases the risk of contact lens related problems such as but not limited to corneal ulceration,  infiltrates, conjunctivitis, and neovascularization.  Not all contacts are approved for overnight wear.  Make sure you are using your contacts as they are intended.    Congratulations! You have been fitted with contact lenses.  Please follow these simple steps to insure successful contact lens wear.  1.  If your lenses are uncomfortable, cause redness, pain, or blurry vision discontinue wear immediately and call [facility] for an appointment at [phone].    2. Return to Optometry contact lens checks and yearly eye exams.  3.  Never wear lenses longer than the prescribed time.  Maximum wearing time of 12  hours a day.  4. Use only prescribed solutions because mixing brands or types may result in problems.  5. Never wear a torn, discolored, scratched, or chipped lens for any reason.  6. Always follow your doctor and 's recommendations.  7. Use only water-soluble cosmetics, especially mascara.  8. Always have a current pair of eyeglasses available.  9. Do not wear contacts while soaking in a hot tub or while swimming.  10. Only FDA approved extended wear contacts are suitable for sleeping.    I have read and understand all the enclosed material and have been instructed on insertion, removal, and care of my contact lenses.    X_______________________________________________

## 2023-08-15 NOTE — LETTER
8/15/2023         RE: Manoj Castellanos  [address]  [facility] [address]        Dear Colleague,    Thank you for referring your patient, Manoj Castellanos, to the [facility]. Please see a copy of my visit note below.    Chief Complaint   Patient presents with     Contact Lens Insertion and Removal        Replacement : monthly  Solution :Bio True   Skill level :excellent  Class duration: 30 minutes    Dayday Alcocer - Optometric Assistant    OBJECTIVE: See Ophthalmology exam     ASSESSMENT:    ICD-10-CM    1. Myopia of both eyes  H52.13 CONTACT LENS CHECK      2. Regular astigmatism of both eyes  H52.223 CONTACT LENS CHECK          Completed  I & R today.    PLAN:  Patient Instructions   Dispensed trial contacts.  Return in 1 week for a contact lens check.  Be sure to wear contact lenses in to that appointment.     Yosi Henry, OD      Manoj Castellanos          1995     [id number]     Procedure      Wearing Schedule 1st Week    Wash hands with oil/perfume free soap.   Day 1    4 hours  Cleanse and disinfect contacts daily.    Day 2    6 hours  Clean_________________________   Day 3    8 hours  Rinse_________________________   Day 4    8 hours  Disinfect______________________    Day 5    10 hours  Rewet________________________    Day 6    10 hours          Day 7    10 hours  Use only eye drops made for contact lenses.  Return in 1-2 weeks for contact check appointment-Come in wearing your contacts.    Replacement Schedule  Replace in    Sleeping in contacts is NOT recommended.    Sleeping contact lenses increases the risk of contact lens related problems such as but not limited to corneal ulceration,  infiltrates, conjunctivitis, and neovascularization.  Not all contacts are approved for overnight wear.  Make sure you are using your contacts as they are intended.    Congratulations! You have been fitted with contact lenses.  Please follow these simple steps to insure successful contact lens  wear.  1.  If your lenses are uncomfortable, cause redness, pain, or blurry vision discontinue wear immediately and call [facility] for an appointment at [phone].    2. Return to Optometry contact lens checks and yearly eye exams.  3. Never wear lenses longer than the prescribed time.  Maximum wearing time of 12  hours a day.  4. Use only prescribed solutions because mixing brands or types may result in problems.  5. Never wear a torn, discolored, scratched, or chipped lens for any reason.  6. Always follow your doctor and 's recommendations.  7. Use only water-soluble cosmetics, especially mascara.  8. Always have a current pair of eyeglasses available.  9. Do not wear contacts while soaking in a hot tub or while swimming.  10. Only FDA approved extended wear contacts are suitable for sleeping.    I have read and understand all the enclosed material and have been instructed on insertion, removal, and care of my contact lenses.    X_______________________________________________            Again, thank you for allowing me to participate in the care of your patient.        Sincerely,        Yosi Henry, OD

## 2023-08-16 ENCOUNTER — VIRTUAL VISIT (OUTPATIENT)
Dept: PLASTIC SURGERY | Facility: CLINIC | Age: 28
End: 2023-08-16
Payer: COMMERCIAL

## 2023-08-16 ENCOUNTER — TELEPHONE (OUTPATIENT)
Dept: PLASTIC SURGERY | Facility: CLINIC | Age: 28
End: 2023-08-16

## 2023-08-16 DIAGNOSIS — F64.0 GENDER DYSPHORIA IN ADULT: Primary | ICD-10-CM

## 2023-08-16 PROCEDURE — 99213 OFFICE O/P EST LOW 20 MIN: CPT | Mod: VID | Performed by: STUDENT IN AN ORGANIZED HEALTH CARE EDUCATION/TRAINING PROGRAM

## 2023-08-16 NOTE — LETTER
8/16/2023       RE: Manoj Castellanos  [address]  [facility] [address]     Dear Colleague,    Thank you for referring your patient, Manoj Castellanos, to the [facility] [place] [facility]. Please see a copy of my visit note below.    PRS    Virtual visit start: 2 PM  Virtual visit end: 2:15 PM  Patient is taking virtual visit from home in [place]    Psoriatic plaques have been resolving with phototherapy and topical treatment as needed.  Was able to visualize clearance of skin changes on the arms during the virtual visit.  Patient would like to proceed with scheduling of feminizing breast augmentation surgery.  In the absence of any breast growth due to no desire for systemic estrogen therapy, rediscussed possible use of textured anatomic implants as this will help shape the breast in the absence of overlying breast tissue.  Patient is totally on board with this plan.  Patient accepts the potential unknown but increased association with SUKH-ALCL as this was discussed at length.  We will have textured anatomic implants of full projection but variable height available for the case.     Reiterated risks of implants, including but not limited to: capsular contracture, implant rupture, implant malposition, double bubble deformity, animation deformity, SUKH-ALCL, implant related illness. Reviewed the FDA checklist for implant related risks.  Patient understands these risks and is appreciative of the discussion.    Reiterated importance of implant surveillance, which includes MR imaging study 5-6 years after the original operation, followed by MR imaging every 3 years thereafter.      Discussed the typical activity restrictions following surgery.  For the first 2 weeks, limit lifting to 5 pounds and no activity that would elevate the heart rate for sustained periods of time above 100 bpm.  We may initiate upper pole strapping  at 2 weeks.  Patient is to wear surgical bra at all times.  At the 6-week visit, we will increase activities usually to near full activity including swimming.  At 3 months postoperatively, patient can perform all activities including push-ups.    Discussed the risks of surgery, including but not limited to: Infection, bleeding, pain, poor scarring, hematoma, asymmetry, suboptimal aesthetic result, need for revision surgery, capsular contracture, implant rupture, implant malposition, double bubble deformity, animation deformity, SUKH-ALCL, implant related illness.  Despite these risks, patient consents and would like to proceed with bilateral feminizing breast augmentation in the partial submuscular or dual plane position using silicone textured anatomic gel implants.  All questions answered.    A total of 20 minutes was devoted to review of chart, direct face-to-face patient counseling and documentation during this encounter, exclusive of any procedure performed.          Again, thank you for allowing me to participate in the care of your patient.      Sincerely,    River Kim MD

## 2023-08-16 NOTE — TELEPHONE ENCOUNTER
Lm for patient regarding appointment today at 2:00 for a virtual visit.    No answer, call back number was provided.

## 2023-08-18 NOTE — PROGRESS NOTES
PRS    Virtual visit start: 2 PM  Virtual visit end: 2:15 PM  Patient is taking virtual visit from home in [place]    Psoriatic plaques have been resolving with phototherapy and topical treatment as needed.  Was able to visualize clearance of skin changes on the arms during the virtual visit.  Patient would like to proceed with scheduling of feminizing breast augmentation surgery.  In the absence of any breast growth due to no desire for systemic estrogen therapy, rediscussed possible use of textured anatomic implants as this will help shape the breast in the absence of overlying breast tissue.  Patient is totally on board with this plan.  Patient accepts the potential unknown but increased association with SUKH-ALCL as this was discussed at length.  We will have textured anatomic implants of full projection but variable height available for the case.     Reiterated risks of implants, including but not limited to: capsular contracture, implant rupture, implant malposition, double bubble deformity, animation deformity, SUKH-ALCL, implant related illness. Reviewed the FDA checklist for implant related risks.  Patient understands these risks and is appreciative of the discussion.    Reiterated importance of implant surveillance, which includes MR imaging study 5-6 years after the original operation, followed by MR imaging every 3 years thereafter.      Discussed the typical activity restrictions following surgery.  For the first 2 weeks, limit lifting to 5 pounds and no activity that would elevate the heart rate for sustained periods of time above 100 bpm.  We may initiate upper pole strapping at 2 weeks.  Patient is to wear surgical bra at all times.  At the 6-week visit, we will increase activities usually to near full activity including swimming.  At 3 months postoperatively, patient can perform all activities including push-ups.    Discussed the risks of surgery, including but not limited to: Infection, bleeding,  pain, poor scarring, hematoma, asymmetry, suboptimal aesthetic result, need for revision surgery, capsular contracture, implant rupture, implant malposition, double bubble deformity, animation deformity, SUKH-ALCL, implant related illness.  Despite these risks, patient consents and would like to proceed with bilateral feminizing breast augmentation in the partial submuscular or dual plane position using silicone textured anatomic gel implants.  All questions answered.    A total of 20 minutes was devoted to review of chart, direct face-to-face patient counseling and documentation during this encounter, exclusive of any procedure performed.    River Kim MD, PhD

## 2023-08-19 ENCOUNTER — MYC MEDICAL ADVICE (OUTPATIENT)
Dept: FAMILY MEDICINE | Facility: CLINIC | Age: 28
End: 2023-08-19
Payer: COMMERCIAL

## 2023-08-25 ENCOUNTER — PATIENT OUTREACH (OUTPATIENT)
Dept: PLASTIC SURGERY | Facility: CLINIC | Age: 28
End: 2023-08-25
Payer: COMMERCIAL

## 2023-08-25 NOTE — PROGRESS NOTES
Pt called to see when they would be scheduled for surgery after visit with Dr Kim on 8/16. I reviewed chart and pt still needs LOS from therapist. Pt updated and they will contact therapist, upload to [employer] ASAP. Pt's LOS will be reviewed and then surgery scheduling request can be made at that time.  Ferdinand RICE RN

## 2023-08-25 NOTE — TELEPHONE ENCOUNTER
Pt is calling again as pt stated he called yesterday in regards to getting a response from message below. Please review and advise. Pt stated a [employer] message back would be preferred.

## 2023-09-06 ENCOUNTER — TRANSFERRED RECORDS (OUTPATIENT)
Dept: HEALTH INFORMATION MANAGEMENT | Facility: CLINIC | Age: 28
End: 2023-09-06
Payer: COMMERCIAL

## 2023-09-07 ENCOUNTER — VIRTUAL VISIT (OUTPATIENT)
Dept: FAMILY MEDICINE | Facility: CLINIC | Age: 28
End: 2023-09-07
Payer: COMMERCIAL

## 2023-09-07 DIAGNOSIS — F90.2 ADHD (ATTENTION DEFICIT HYPERACTIVITY DISORDER), COMBINED TYPE: Primary | ICD-10-CM

## 2023-09-07 PROCEDURE — 99214 OFFICE O/P EST MOD 30 MIN: CPT | Mod: VID | Performed by: FAMILY MEDICINE

## 2023-09-07 RX ORDER — METHYLPHENIDATE HYDROCHLORIDE 5 MG/1
5 TABLET ORAL 2 TIMES DAILY
Qty: 60 TABLET | Refills: 0 | Status: SHIPPED | OUTPATIENT
Start: 2023-09-07 | End: 2023-10-07

## 2023-09-07 NOTE — PROGRESS NOTES
Manoj is a 28 year old who is being evaluated via a billable video visit.      How would you like to obtain your AVS? MyChart  If the video visit is dropped, the invitation should be resent by: Text to cell phone: [phone]  Will anyone else be joining your video visit? No        ADHD (attention deficit hyperactivity disorder), combined type  - New diagnosis  - Discussed medication and side effects  - Manoj opted for Ritalin, sent one month script and follow up phone visit in one month   - methylphenidate (RITALIN) 5 MG tablet; Take 1 tablet (5 mg) by mouth 2 times daily for 30 days  Dispense: 60 tablet; Refill: 0     Subjective   Manoj is a 28 year old, presenting for the following health issues:  KIM      9/7/2023     5:15 PM   Additional Questions   Roomed by GENNY WRAY    History of Present Illness       Reason for visit:  ADHD  Symptom onset:  More than a month  Symptoms include:  ADHD lol  Symptom intensity:  Severe  Symptom progression:  Staying the same  Had these symptoms before:  Yes  Has tried/received treatment for these symptoms:  No    Manoj Castellanos eats 0-1 servings of fruits and vegetables daily.Manoj Castellanos consumes 0 sweetened beverage(s) daily.Manoj Ayalae exercises with enough effort to increase Manoj Castellanos's heart rate 9 or less minutes per day.  Manoj Castellanos exercises with enough effort to increase Manoj Castellanos's heart rate 3 or less days per week.   Manoj Castellanos is taking medications regularly.     He is currently taking ritalin 5 mg IR BID.       Review of Systems         Objective           Vitals:  No vitals were obtained today due to virtual visit.    Physical Exam               Telephone 12 minutes

## 2023-09-15 ENCOUNTER — DOCUMENTATION ONLY (OUTPATIENT)
Dept: PLASTIC SURGERY | Facility: CLINIC | Age: 28
End: 2023-09-15
Payer: COMMERCIAL

## 2023-09-15 NOTE — PROGRESS NOTES
[facility] :  Care Coordination Note     SITUATION   Manoj Castellanos (they/them)is a 28 year old adult who is receiving support for:  Care Team  .    BACKGROUND     Los was received and meets criteria. DA does not meet criteria-does not have gender dysphoria diagnosis. Updated Pt list. Sent message to pt re: need for updated DA.    11/29/23  DA received and meets criteria. Updated pt list and moved to ready to scheduled. Sent message to RNCC, surgeon, and PA coordinator to start scheduling and PA process.     ASSESSMENT     Surgery              C[facility] Assessment  [facility] ([facility] Enrollment: Enrolled  Patient has a therapist: Yes  Name of therapist: Kristy Moreno  Letter of support #1: Received  Letter #1 Date: 08/16/23  Surgery being considered: Yes  Augmentation: Yes      PLAN          Nursing Interventions:       Follow-up plan:   will schedule for surgery. PA coordinator will start PA process.        WILL Hooks

## 2023-10-04 ENCOUNTER — VIRTUAL VISIT (OUTPATIENT)
Dept: GASTROENTEROLOGY | Facility: CLINIC | Age: 28
End: 2023-10-04
Payer: COMMERCIAL

## 2023-10-04 DIAGNOSIS — K21.9 GASTROESOPHAGEAL REFLUX DISEASE, UNSPECIFIED WHETHER ESOPHAGITIS PRESENT: ICD-10-CM

## 2023-10-04 DIAGNOSIS — K20.0 EOSINOPHILIC ESOPHAGITIS: Primary | ICD-10-CM

## 2023-10-04 PROCEDURE — 99215 OFFICE O/P EST HI 40 MIN: CPT | Mod: 95 | Performed by: PHYSICIAN ASSISTANT

## 2023-10-04 RX ORDER — PANTOPRAZOLE SODIUM 40 MG/1
40 TABLET, DELAYED RELEASE ORAL EVERY MORNING
Qty: 180 TABLET | Refills: 0 | Status: SHIPPED | OUTPATIENT
Start: 2023-10-04 | End: 2023-10-26

## 2023-10-04 ASSESSMENT — PAIN SCALES - GENERAL: PAINLEVEL: NO PAIN (0)

## 2023-10-04 NOTE — PATIENT INSTRUCTIONS
It was a pleasure taking care of you today.  I've included a brief summary of our discussion and care plan from today's visit below.  Please review this information with your primary care provider.  _______________________________________________________________________     My recommendations are summarized as follows:     I am glad to hear that you are doing well. Let's continue with the protonix but at once daily. This is best taken on an empty stomach about 30 minutes before breakfast. We will plan for an upper endoscopy ~ March 2024 with a follow up ~ April 2024.      ______________________________________________________________________     How do I schedule labs, imaging studies, or procedures that were ordered in clinic today?      Labs: To schedule lab appointment you can contact your local [facility] or call [phone] to schedule at any convenient [facility] location.     Procedures: If a colonoscopy, upper endoscopy, breath test, esophageal manometry, or pH impedence was ordered today, our endoscopy team will call you to schedule this. If you have not heard from our endoscopy team within a week, please call [phone] to schedule.      Imaging Studies: If you were scheduled for a CT scan, X-ray, MRI, ultrasound, HIDA scan or other imaging study, please call [phone] to have this scheduled.      Referral: If a referral to another specialty was ordered, expect a phone call or follow instructions above. If you have not heard from anyone regarding your referral in a week, please call our clinic to check the status.      Who do I call with any questions after my visit?  Please be in touch if there are any further questions that arise following today's visit.  There are multiple ways to contact your gastroenterology care team.       During business hours, you may reach a Gastroenterology nurse at [phone]     To schedule or reschedule an appointment, please call [phone].       You can always send a secure message through TextÃ¡do.  TextÃ¡do messages are answered by your nurse or doctor typically within 24 hours.  Please allow extra time on weekends and holidays.       For urgent/emergent questions after business hours, you may reach the on-call GI Fellow by contacting the [facility]  at [phone].     How will I get the results of any tests ordered?    You will receive all of your results.  If you have signed up for [employer], any tests ordered at your visit will be available to you after your physician reviews them.  Typically this takes 1-2 weeks.  If there are urgent results that require a change in your care plan, your physician or nurse will call you to discuss the next steps.       What is TextÃ¡do?  TextÃ¡do is a secure way for you to access all of your healthcare records from the [facility].  It is a web based computer program, so you can sign on to it from any location.  It also allows you to send secure messages to your care team.  I recommend signing up for TextÃ¡do access if you have not already done so and are comfortable with using a computer.       How to I schedule a follow-up visit?  If you did not schedule a follow-up visit today, please call [phone] to schedule a follow-up office visit.      Simi Nicholson PA-C  Division of Gastroenterology, Hepatology and Nutrition   [facility]

## 2023-10-04 NOTE — NURSING NOTE
Is the patient currently in the state of [place]? YES    Visit mode:VIDEO    If the visit is dropped, the patient can be reconnected by: VIDEO VISIT: Text to cell phone:   Telephone Information:   Mobile [phone]       Will anyone else be joining the visit? NO  (If patient encounters technical issues they should call [phone]    How would you like to obtain your AVS? MyChart    Are changes needed to the allergy or medication list? No    Reason for visit: DEEP RENEE

## 2023-10-04 NOTE — PROGRESS NOTES
GI FOLLOW UP VISIT     CC/REFERRING MD:    Parrish Burnette MD     REASON FOR VISIT: FOLLOW UP     HISTORY OF PRESENT ILLNESS:    Manoj Castellanos is 28 year old adult with pmhx of GERD, allergies and asthma who presents for follow up.     I initially visited with patient in December 2022 for concerns with abdominal pain which were described as hunger cramps exacerbated by food.  An upper endoscopy in January 2023 noted esophageal mucosal changes consistent with eosinophilic esophagitis as well as white plaques.  Proximal esophageal biopsy noted 75 eosinophils per high-power field consistent with eosinophilic esophagitis.  No fungal organisms. The remainder of procedure was otherwise aborted due to stenosis and patient was started on Protonix 40 mg twice daily as well as viscous budesonide daily.  An EGD was repeated about 6 weeks later in March 2023 which continued to note esophageal mucosal changes consistent with eosinophilic esophagitis however significantly improved compared to prior exam.  Esophageal biopsy without eos. Stomach appeared normal as well as duodenum.    Patient returns today for follow up. Reports doing well at this time. No nausea. No vomiting. No dysphagia. No abdominal pain. Occ reflux symptoms but rarely. Patient continues to take protonix 40mg twice daily. Has not used viscous budesonide since prior scope.     UPPER ENDOSCOPY 3/28/2023  Impression:  - Esophageal mucosal changes secondary to eosinophilic esophagitis. Biopsied. Significantly improved from previous exam   - Gastroesophageal flap valve classified as Hill Grade I (prominent fold, tight to endoscope).   - Z-line regular, 42 cm from the incisors.   - Normal stomach.   - Normal examined duodenum.     Final Diagnosis   A. DISTAL ESOPHAGUS, BIOPSY:  Squamous esophageal mucosa with mild basal cell hyperplasia and occasional intraepithelial lymphocytes, and rare (0-1 per HPF) eosinophils;  (cf. [id number] of 1/2/2023; See Comment)       B. MID ESOPHAGUS, BIOPSY:  Squamous esophageal mucosa with no intraepithelial eosinophils or other abnormality           UPPER ENDOSCOPY 1/2/2023  Impression:             - The procedure was aborted due to stenosis.   - Esophageal mucosal changes consistent with eosinophilic esophagitis. White plaques present as well - could be related to EoE but biopsied to assess for candida. Biopsied. Unable to pass the scope beyond the middle third of the esophagus     PROXIMAL ESOPHAGUS, BIOPSY:  Squamous epithelial mucosa with peak more than 75 intraepithelial eosinophils per HPF:   -Consistent with eosinophilic esophagitis; negative for dysplasia or malignancy   -No fungal or herpes organisms identified with PAS/f special or HSV immuno stains         Manoj  has a past medical history of Depressive disorder (2014) and Uncomplicated asthma (early 2000s).    Manoj Castellanos  has a past surgical history that includes TOOTH EXTRACTION W/FORCEP (2016); Combined Esophagoscopy, Gastroscopy, Duodenoscopy (Egd) With Co2 Insufflation (N/A, 1/2/2023); Esophagoscopy, gastroscopy, duodenoscopy (EGD), combined (N/A, 1/2/2023); Combined Esophagoscopy, Gastroscopy, Duodenoscopy (Egd) With Co2 Insufflation (N/A, 3/28/2023); and Esophagoscopy, gastroscopy, duodenoscopy (EGD), combined (N/A, 3/28/2023).    Manoj Castellanos  reports that Manoj Castellanos has never smoked. Manoj Castellanos has never used smokeless tobacco. Manoj Castellanos reports current alcohol use. Manoj Castellanos reports that Manoj Castellanos does not use drugs.    Manoj Castellanos's family history includes Anxiety Disorder in Manoj Castellanos's sister and another family member; Asthma in Manoj Castellanos's father; Attention Deficit Disorder in Manoj Castellanos's sister; Bipolar Disorder in Manoj Castellanos's mother; Depression in Manoj Castellanos's sister and another family member; Fibromyalgia in Manoj Castellanos's sister; Mental Illness in an other family member; Other Cancer in Manoj Castellanos's maternal grandfather; Pancreatic Cancer in Manoj Castellanos's maternal  grandfather; Prostate Cancer in Manoj Castellanos's paternal grandfather; Psoriasis in Manoj Castellanos's mother; Substance Abuse in Manoj Castellanos's father.    ALLERGIES:  Amoxicillin and Cefuroxime      PERTINENT MEDICATIONS:    Current Outpatient Medications:     albuterol (PROAIR HFA/PROVENTIL HFA/VENTOLIN HFA) 108 (90 Base) MCG/ACT inhaler, Inhale 2 puffs into the lungs every 4 hours as needed for shortness of breath / dyspnea or wheezing, Disp: 18 g, Rfl: 1    carbamide peroxide (DEBROX) 6.5 % otic solution, Place 5 drops into both ears 2 times daily, Disp: 15 mL, Rfl: 0    cetirizine (ZYRTEC) 10 MG tablet, TAKE 1 TABLET(10 MG) BY MOUTH TWICE DAILY, Disp: 180 tablet, Rfl: 3    EPINEPHrine (ANY BX GENERIC EQUIV) 0.3 MG/0.3ML injection 2-pack, Inject 0.3 mLs (0.3 mg) into the muscle as needed for anaphylaxis, Disp: 2 each, Rfl: 2    finasteride (PROSCAR) 5 MG tablet, TAKE 1/4 TABLET BY MOUTH ONCE DAILY, Disp: 23 tablet, Rfl: 2    gabapentin (NEURONTIN) 300 MG capsule, Take 1 capsule (300 mg) by mouth 2 times daily PATIENT STATES HE TAKING 300 MG TWICE DAILY, Disp: 180 capsule, Rfl: 3    IBUPROFEN, Take 200 tablets by mouth daily as needed, Disp: , Rfl:     meclizine (ANTIVERT) 25 MG tablet, Take 1 tablet (25 mg) by mouth 2 times daily as needed for dizziness . Appointment required for additional refills., Disp: 20 tablet, Rfl: 0    methylphenidate (RITALIN) 5 MG tablet, Take 1 tablet (5 mg) by mouth 2 times daily for 30 days, Disp: 60 tablet, Rfl: 0    methylphenidate (RITALIN) 5 MG tablet, Take 5 mg by mouth daily, Disp: , Rfl:     mometasone (ELOCON) 0.1 % external cream, Apply topically daily To areas of psoriasis., Disp: 50 g, Rfl: 3    mometasone (ELOCON) 0.1 % external ointment, Apply topically twice daily.  Do not expose skin to sun after applying steroid cream., Disp: 454 g, Rfl: 1    ondansetron (ZOFRAN ODT) 4 MG ODT tab, DISSOLVE 1 TABLET(4 MG) ON THE TONGUE EVERY 8 HOURS AS NEEDED FOR NAUSEA, Disp: 20 tablet, Rfl: 0     ORDER FOR ALLERGEN IMMUNOTHERAPY, Name of Mix: Mix #2  Tree , Weeds [place], White 1:20 w/v, HS  0.5 ml Birch Mix PRW 1:20 w/v, HS  0.5 ml Oak Mix RVW 1:20 w/v, HS 0.5 ml Beyer Tree, Black 1:20 w/v, HS 0.5 ml [place], Black 1:20 w/v, HS 0.5 ml [place], Common 1:20 w/v, HS 0.5 ml Sage[place], Mugwort 1:20 w/v, HS 0.5 ml Sorrel, Sheep 1:20 w/v, HS 0.5 ml Diluent: HSA qs to 5ml (Patient not taking: Reported on 9/7/2023), Disp: 5 mL, Rfl: PRN    OXcarbazepine (TRILEPTAL) 150 MG tablet, Take 2 tablets (300 mg) by mouth 2 times daily, Disp: 120 tablet, Rfl: 11    pantoprazole (PROTONIX) 40 MG EC tablet, TAKE 1 TABLET(40 MG) BY MOUTH TWICE DAILY, Disp: 180 tablet, Rfl: 0    VITAMIN D3 25 MCG (1000 UT) tablet, TAKE 2 TABLETS BY MOUTH DAILY, Disp: 180 tablet, Rfl: 3      PHYSICAL EXAMINATION:  Constitutional: aaox3, cooperative, pleasant, not dyspneic/diaphoretic, no acute distress      GENERAL: Healthy, alert and no distress  EYES: Eyes grossly normal to inspection.  No discharge or erythema, or obvious scleral/conjunctival abnormalities.  RESP: No audible wheeze, cough, or visible cyanosis.  No visible retractions or increased work of breathing.    SKIN: Visible skin clear. No significant rash, abnormal pigmentation or lesions.  NEURO: Cranial nerves grossly intact.  Mentation and speech appropriate for age.  PSYCH: Mentation appears normal, affect normal/bright, judgement and insight intact, normal speech and appearance well-groomed.      PERTINENT STUDIES: (I personally reviewed these laboratory studies today)  Most recent CBC:   Recent Labs   Lab Test 03/24/23  1423 11/23/22  1201   WBC 5.4 6.0   HGB 15.7 17.3   HCT 47.5 52.3    291     Most recent hepatic panel:  Recent Labs   Lab Test 03/24/23  1423 11/23/22  1203   ALT 12 17   AST 19 22     Most recent creatinine:  Recent Labs   Lab Test 03/24/23  1423 11/23/22  1203   CR 0.85 0.94         ASSESSMENT/PLAN:    Catherine Aranda GENO Castellanos is a 28 year old adult who presents  for follow up. Diagnoses of EoE made on upper endoscopy in January 2023. Procedure could not be completed due to stenosis. Proximal esophageal biopsy did show eos of 75 per hpf. Repeat EGD in March 2023 noted significant improvement after protonix BID and viscous budesonide. Patient has done well since then. Continues protonix BID. Not having any upper GI symptoms. We reviewed diagnosis and management of EoE including PPI, topical therapy with budesonide, food elimination diet and dupixent. We reviewed benefits vs risks and mutually agreed to continue with PPI management at this time. We will reduce to once daily since doing well. Will plan to repeat EGD at 1 year balaji ~ March 2024 to reassess esophagus endoscopically and histologically. All questions and concerns addressed.     Recommendations/Plan:   - continue with PPI, dose to be decreased to protonix 40mg daily   - repeat EGD at 1 year balaji ~ March 2024   - follow up in ~ 6 months, sooner if needed.     Thank you for this consultation.  It was a pleasure to participate in the care of this patient; please contact us with any further questions.        This note was created with voice recognition software, and while reviewed for accuracy, typos may remain.       I spent a total of 46 minutes on the day of the visit.   Time spent by me doing chart review, history and exam, documentation and further activities per the note      Simi Nicholson PA-C  Division of Gastroenterology, Hepatology and Nutrition   [facility]            Video-Visit Details    Type of service:  Video Visit    Joined the call at 10/4/2023, 9:58:45 am.    Left the call at 10/4/2023, 10:25:13 am.    You were on the call for 26 minutes 27 seconds .    Originating Location (pt. Location): Home    Distant Location (provider location):  Off-site    Platform used for Video Visit: Naa

## 2023-10-09 ENCOUNTER — MYC MEDICAL ADVICE (OUTPATIENT)
Dept: FAMILY MEDICINE | Facility: CLINIC | Age: 28
End: 2023-10-09

## 2023-10-09 ENCOUNTER — VIRTUAL VISIT (OUTPATIENT)
Dept: FAMILY MEDICINE | Facility: CLINIC | Age: 28
End: 2023-10-09
Payer: COMMERCIAL

## 2023-10-09 DIAGNOSIS — F90.9 ATTENTION DEFICIT HYPERACTIVITY DISORDER (ADHD), UNSPECIFIED ADHD TYPE: Primary | ICD-10-CM

## 2023-10-09 DIAGNOSIS — G50.0 TRIGEMINAL NEURALGIA: ICD-10-CM

## 2023-10-09 PROCEDURE — 99213 OFFICE O/P EST LOW 20 MIN: CPT | Mod: VID | Performed by: FAMILY MEDICINE

## 2023-10-09 RX ORDER — METHYLPHENIDATE HYDROCHLORIDE 5 MG/1
5 TABLET ORAL DAILY PRN
Qty: 30 TABLET | Refills: 0 | Status: SHIPPED | OUTPATIENT
Start: 2023-10-09 | End: 2024-02-09

## 2023-10-09 RX ORDER — METHYLPHENIDATE HYDROCHLORIDE 10 MG/1
10 CAPSULE, EXTENDED RELEASE ORAL EVERY MORNING
Qty: 30 CAPSULE | Refills: 0 | Status: SHIPPED | OUTPATIENT
Start: 2023-10-09 | End: 2024-03-12

## 2023-10-09 RX ORDER — OXCARBAZEPINE 150 MG/1
300 TABLET, FILM COATED ORAL 2 TIMES DAILY
Qty: 120 TABLET | Refills: 11 | Status: SHIPPED | OUTPATIENT
Start: 2023-10-09 | End: 2024-08-19

## 2023-10-09 NOTE — PROGRESS NOTES
Manoj is a 28 year old who is being evaluated via a billable video visit.      How would you like to obtain your AVS? MyChart  If the video visit is dropped, the invitation should be resent by: Text to cell phone: [phone]  Will anyone else be joining your video visit? No          Assessment & Plan     Attention deficit hyperactivity disorder (ADHD)  - symptoms not controlled  - I recommended a trial of Ritalin LA in the morning and reducing Ritalin IR from BID to once daily PRN. Advised to send me an update in few weeks with symptoms and then will do further refills.   - methylphenidate (RITALIN LA) 10 MG 24 hr capsule; Take 1 capsule (10 mg) by mouth every morning  Dispense: 30 capsule; Refill: 0  - methylphenidate (RITALIN) 5 MG tablet; Take 1 tablet (5 mg) by mouth daily as needed (ADHD)  Dispense: 30 tablet; Refill: 0     Coria Chayito Burnette MD  [facility]    Subjective   Manoj is a 28 year old, presenting for the following health issues:  No chief complaint on file.      History of Present Illness       Reason for visit:  ADHD followup    Manoj Castellanos eats 0-1 servings of fruits and vegetables daily.Manoj Castellanos consumes 0 sweetened beverage(s) daily.Manoj Castellanos exercises with enough effort to increase Manoj Castellanos's heart rate 9 or less minutes per day.  Manoj Castellanos exercises with enough effort to increase Manoj Castellanos's heart rate 3 or less days per week.   Manoj Castellanos is taking medications regularly.     Manoj feels that now more prone to headache and irritability.   Ty has noticed it is difficult to do intensive work after medication wears off.   Ty has noticed if there was a smoother transition if it was in and out of medication.       Review of Systems         Objective           Vitals:  No vitals were obtained today due to virtual visit.    Physical Exam                     Visit < 12 mins

## 2023-10-26 ENCOUNTER — TELEPHONE (OUTPATIENT)
Dept: GASTROENTEROLOGY | Facility: CLINIC | Age: 28
End: 2023-10-26
Payer: COMMERCIAL

## 2023-10-26 DIAGNOSIS — K20.0 EOSINOPHILIC ESOPHAGITIS: Primary | ICD-10-CM

## 2023-10-26 DIAGNOSIS — K21.9 GASTROESOPHAGEAL REFLUX DISEASE, UNSPECIFIED WHETHER ESOPHAGITIS PRESENT: ICD-10-CM

## 2023-10-26 RX ORDER — OMEPRAZOLE 40 MG/1
40 CAPSULE, DELAYED RELEASE ORAL EVERY MORNING
Qty: 90 CAPSULE | Refills: 3 | Status: SHIPPED | OUTPATIENT
Start: 2023-10-26

## 2023-10-26 NOTE — TELEPHONE ENCOUNTER
"Clinic received fax from [facility] stating \"Pantoprazole is not covered by patients plan. Preferred alternative is  Esomeprazole Magnesium, Lansoprazole, Omeprazole, or Nexium.\" Routing to provider to advise on.     Carrie Barnhart LPN    "

## 2023-10-26 NOTE — TELEPHONE ENCOUNTER
Rx of omeprazole provided instead. Please advise patient of switch from protonix to omeprazole.     Thank you,     Simi Nicholson PA-C  Division of Gastroenterology, Hepatology and Nutrition   [facility]

## 2023-10-31 ENCOUNTER — MYC MEDICAL ADVICE (OUTPATIENT)
Dept: OCCUPATIONAL THERAPY | Facility: CLINIC | Age: 28
End: 2023-10-31
Payer: COMMERCIAL

## 2023-11-14 NOTE — TELEPHONE ENCOUNTER
FUTURE VISIT INFORMATION      FUTURE VISIT INFORMATION:  Date: 1/17/24  Time: 10am  Location: [facility]  REFERRAL INFORMATION:  Referring provider:  Shay Garcia MD   Referring providers clinic:  [facility]  Reason for visit/diagnosis  Nasal issues, recs in [facility] location verified     RECORDS REQUESTED FROM:       Clinic name Comments Records Status Imaging Status   [facility] 5/9/23- Ov Shay Amaya MD  Epic     Imaging  10/7/22- MR Brain   4/29/20- MR Brain   10/17/17- MR Brain  [facility]  PACS

## 2023-11-15 ENCOUNTER — MYC MEDICAL ADVICE (OUTPATIENT)
Dept: FAMILY MEDICINE | Facility: CLINIC | Age: 28
End: 2023-11-15
Payer: COMMERCIAL

## 2023-11-15 NOTE — LETTER
To whom it may concern,    Manoj Castellanos continues to have wrist and hand pain. Due to ongoing pain, I would recommend below accommodations/restrictions:      Use a dictation on the phone or using laptop when needed to take notes in class.     Please let me know if you have any questions or concerns.     Sincerely,    Dr. Burnette

## 2023-11-18 ENCOUNTER — IMMUNIZATION (OUTPATIENT)
Dept: FAMILY MEDICINE | Facility: CLINIC | Age: 28
End: 2023-11-18
Payer: COMMERCIAL

## 2023-11-18 PROCEDURE — 90471 IMMUNIZATION ADMIN: CPT

## 2023-11-18 PROCEDURE — 90686 IIV4 VACC NO PRSV 0.5 ML IM: CPT

## 2023-11-26 DIAGNOSIS — L64.9 MALE PATTERN ALOPECIA: ICD-10-CM

## 2023-11-27 RX ORDER — FINASTERIDE 5 MG/1
TABLET, FILM COATED ORAL
Qty: 23 TABLET | Refills: 3 | Status: SHIPPED | OUTPATIENT
Start: 2023-11-27 | End: 2024-05-09

## 2023-11-29 NOTE — TELEPHONE ENCOUNTER
Ty - I called with restrictions. Hand pain is managed with exercises. Declined further evaluation. Letter sent through my chart.     Ty is taking Ritalin LA 10 mg - doing better with medication. Ty is not taking the 5 mg immediate release.     DM

## 2023-12-01 ENCOUNTER — TELEPHONE (OUTPATIENT)
Dept: PLASTIC SURGERY | Facility: CLINIC | Age: 28
End: 2023-12-01
Payer: COMMERCIAL

## 2023-12-01 PROBLEM — F64.0 GENDER DYSPHORIA IN ADULT: Status: ACTIVE | Noted: 2023-08-16

## 2023-12-01 NOTE — TELEPHONE ENCOUNTER
RN Care Coordinator: Ferdinand Sneha    Surgery is scheduled with Dr. Kim  Date: 7/30/2024  [facility]      H&P to be completed by: Primary Care team - patient instructed to schedule. Patient states they will schedule with [facility]     Surgical consult: 7/17/2024 at 2:00pm with [facility]    Post-op visit: 8/14/2024 at 4:45pm with Dr. Kim [facility]      Patient will receive a phone call from pre-admission nurses 1-2 days prior to surgery with arrival time and NPO instructions. Patient aware times are subject to change up until day before surgery.         Spoke with the patient and was able to confirm all scheduled information.       Patient questions/concerns: N/A       Surgery packet: to be sent via [employer]    __    Barbara Driver, Senior Perioperative Coordinator, on 12/1/2023 at 1:32 PM  P: [phone]  /

## 2023-12-14 DIAGNOSIS — J30.81 CHRONIC ALLERGIC RHINITIS DUE TO ANIMAL HAIR AND DANDER: ICD-10-CM

## 2023-12-14 DIAGNOSIS — J30.1 CHRONIC SEASONAL ALLERGIC RHINITIS DUE TO POLLEN: ICD-10-CM

## 2023-12-14 RX ORDER — CETIRIZINE HYDROCHLORIDE 10 MG/1
TABLET ORAL
Qty: 180 TABLET | Refills: 1 | Status: SHIPPED | OUTPATIENT
Start: 2023-12-14 | End: 2024-05-09

## 2024-01-10 ENCOUNTER — TELEPHONE (OUTPATIENT)
Dept: SCHEDULING | Facility: CLINIC | Age: 29
End: 2024-01-10
Payer: COMMERCIAL

## 2024-01-10 ENCOUNTER — VIRTUAL VISIT (OUTPATIENT)
Dept: FAMILY MEDICINE | Facility: CLINIC | Age: 29
End: 2024-01-10
Payer: COMMERCIAL

## 2024-01-10 DIAGNOSIS — Z29.81 ENCOUNTER FOR HIV PRE-EXPOSURE PROPHYLAXIS: Primary | ICD-10-CM

## 2024-01-10 PROCEDURE — 99214 OFFICE O/P EST MOD 30 MIN: CPT | Mod: 95 | Performed by: FAMILY MEDICINE

## 2024-01-10 ASSESSMENT — PATIENT HEALTH QUESTIONNAIRE - PHQ9
10. IF YOU CHECKED OFF ANY PROBLEMS, HOW DIFFICULT HAVE THESE PROBLEMS MADE IT FOR YOU TO DO YOUR WORK, TAKE CARE OF THINGS AT HOME, OR GET ALONG WITH OTHER PEOPLE: NOT DIFFICULT AT ALL
SUM OF ALL RESPONSES TO PHQ QUESTIONS 1-9: 6
SUM OF ALL RESPONSES TO PHQ QUESTIONS 1-9: 6

## 2024-01-10 NOTE — TELEPHONE ENCOUNTER
"I would like to read 6 statements to you in regard to your sexual health. Once I am done, I will ask you if you fit into any of the 6 below statements, no need to specify which statement you might fit into. I just need to know \"YES, I fit in or \"NO, I do not\".    1. Have you had an exposure to a person with mpox in the past 14 days or have you been contacted by the health department and instructed to get a vaccine?  a. Information for the call center. Does NOT need to be explained to the patient but are provided in case they are asked.   i.  Unprotected contact between a person's skin or mucous membranes, rash, lesions, scabs, or bodily fluids.  ii. Contact with materials contaminated or in contact with lesions.  iii. Being near a person greater than 6 ft. for 3 or more hours without using a surgical mask.    2. Do you engage in sex work, or exchange sex for food, money, substances, shelter, etc?  3. Do you identify as badillo, bisexual, or other man who has sex with men (MSM) or transgender (including trans man, trans woman, nonbinary or gender-nonconforming person)?  4. Do you have a sex partner who meets the mentioned criteria and/or do you anticipate meeting the mentioned criteria?  5. Have you been diagnosed with an sexually transmitted infection (STD/STI) in the last 6 months, are you experiencing homelessness, and/or you are in contact with the justice system?  6. Do you have a provider's order to receive the vaccine?     Do you meet any of the above criteria?    Patient answered: YES, I fit in.If patient answers \"Yes\" or \"Yes, I fit in\" to any of above statements, the patient qualifies for vaccination.  Please schedule patient at [place] or [place] locations using the following Epic Schedules:   Infectious Disease - ID Nurse, [facility] Infectious Disease - [facility] Infectious Disease CSS or call the ID clinic at [phone] to schedule (number can be provided to the patient so that they can call).   "

## 2024-01-10 NOTE — PROGRESS NOTES
"Ty is a 28 year old who is being evaluated via a billable video visit.      Instructions Relayed to Patient by Virtual Roomer:     Patient is active on [employer]:   Relayed following to patient: \"It looks like you are active on [employer], are you able to join the visit this way? If not, do you need us to send you a link now or would you like your provider to send a link via text or email when they are ready to initiate the visit?\"    Reminded patient to ensure they were logged on to virtual visit by arrival time listed. Documented in appointment notes if patient had flexibility to initiate visit sooner than arrival time. If pediatric virtual visit, ensured pediatric patient along with parent/guardian will be present for video visit.     Patient offered the website [email or web address] and/or phone number to [employer] Help line: [phone]   How would you like to obtain your AVS? [employer]  If the video visit is dropped, the invitation should be resent by: Send to e-mail at: [email or web address]  Will anyone else be joining your video visit? No    Assessment & Plan   1. Encounter for HIV pre-exposure prophylaxis  Labs ordered. Will order Truvada once complete and reviewed.  - Comprehensive metabolic panel (BMP + Alb, Alk Phos, ALT, AST, Total. Bili, TP); Standing  - Neisseria gonorrhoeae PCR; Standing  - Chlamydia trachomatis PCR; Standing  - HIV Antigen Antibody Combo; Standing  - Treponema Abs w Reflex to RPR and Titer; Standing  - Hepatitis C antibody; Future  - Hepatitis B surface antigen; Future  - Hepatitis B Surface Antibody; Future  - Hepatitis B core antibody; Future       Follow-up Visit   Expected date:  Jan 17, 2024 (Approximate)      Follow Up Appointment Details:     Follow-up with whom?: Other Primary Care Services    Follow-Up for what?: Lab Visit    How?: In Person             Follow-up Visit   Expected date:  Apr 10, 2024 (Approximate)      Follow Up Appointment Details:     Follow-up with " whom?: Other Primary Care Services    Follow-Up for what?: Lab Visit    How?: In Person             Follow-up Visit   Expected date:  Jul 10, 2024 (Approximate)      Follow Up Appointment Details:     Follow-up with whom?: Other Primary Care Services    Follow-Up for what?: Lab Visit    How?: In Person             Follow-up Visit   Expected date: Oct 10, 2024      Follow Up Appointment Details:     Follow-up with whom?: Other Primary Care Services    Follow-Up for what?: Lab Visit    How?: In Person             Follow-up Visit   Expected date:  Michael 10, 2025 (Approximate)      Follow Up Appointment Details:     Follow-up with whom?: Other Primary Care Services    Follow-Up for what?: Lab Visit    How?: In Person                   Chapin Silverman MD  [facility]    Disclaimer: This note consists of symbols derived from keyboarding, dictation and/or voice recognition software. As a result, there may be errors in the script that have gone undetected. Please consider this when interpreting information found in this chart.      Subjective   Manoj is a 28 year old, presenting for the following health issues:  Recheck Medication (PrEP) and HIV testing        1/10/2024     3:55 PM   Additional Questions   Roomed by Napoleon RIVERO       History of Present Illness       Reason for visit:  Starting PrEP    Manoj Castellanos eats 0-1 servings of fruits and vegetables daily.Manoj Castellanos consumes 0 sweetened beverage(s) daily.Manoj Castellanos exercises with enough effort to increase Manoj Castellanos's heart rate 20 to 29 minutes per day.  Manoj LORA Gale exercises with enough effort to increase Manoj Castellanos's heart rate 3 or less days per week.   Manoj Castellanos is taking medications regularly.       PATIENT IS REQUESTING TO START PrEP FOR HIV. States has been on this in the past.      Review of Systems   Constitutional, HEENT, cardiovascular, pulmonary, GI, , musculoskeletal, neuro, skin, endocrine and psych systems are negative, except  as otherwise noted.      Objective           Vitals:  No vitals were obtained today due to virtual visit.    Physical Exam   GENERAL: Healthy, alert and no distress  EYES: Eyes grossly normal to inspection.  No discharge or erythema, or obvious scleral/conjunctival abnormalities.  RESP: No audible wheeze, cough, or visible cyanosis.  No visible retractions or increased work of breathing.    SKIN: Visible skin clear. No significant rash, abnormal pigmentation or lesions.  NEURO: Cranial nerves grossly intact.  Mentation and speech appropriate for age.  PSYCH: Mentation appears normal, affect normal/bright, judgement and insight intact, normal speech and appearance well-groomed.    Labs: pending        Video-Visit Details    Type of service:  Video Visit   Video Start Time:  5:48 PM  Video End Time:5:55 PM    Originating Location (pt. Location): Home    Distant Location (provider location):  On-site  Platform used for Video Visit: AVOB

## 2024-01-11 NOTE — PATIENT INSTRUCTIONS
"Please call [phone] to schedule your \"Lab only\" visit. You can also schedule this \"Lab only\" visit using dooub.    You will need labs done within 1 week prior to when your next refill is due (every 3 months). These MUST be completed, or further refills will not be offered.     These follow-up labs will include HIV testing, STD testing, and kidney testing.    Standing STD orders are available for you if desired.    I still recommend barrier protection as well to protect against HIV and other STDs.    You should avoid taking NSAIDs (Ibuprofen [Motrin/Advil], naproxen [Aleve/Naprosyn]) frequently while on this medication. These medications can effect kidney function.    Acetaminophen (Tylenol) is ok to take for pain/fever. It does not effect kidney function.  "

## 2024-01-17 ENCOUNTER — PRE VISIT (OUTPATIENT)
Dept: OTOLARYNGOLOGY | Facility: CLINIC | Age: 29
End: 2024-01-17

## 2024-01-17 ENCOUNTER — OFFICE VISIT (OUTPATIENT)
Dept: OTOLARYNGOLOGY | Facility: CLINIC | Age: 29
End: 2024-01-17
Attending: OTOLARYNGOLOGY
Payer: COMMERCIAL

## 2024-01-17 ENCOUNTER — LAB (OUTPATIENT)
Dept: LAB | Facility: CLINIC | Age: 29
End: 2024-01-17
Payer: COMMERCIAL

## 2024-01-17 VITALS
WEIGHT: 126 LBS | SYSTOLIC BLOOD PRESSURE: 137 MMHG | DIASTOLIC BLOOD PRESSURE: 92 MMHG | BODY MASS INDEX: 18.04 KG/M2 | HEART RATE: 90 BPM | HEIGHT: 70 IN

## 2024-01-17 DIAGNOSIS — J34.3 HYPERTROPHY OF INFERIOR NASAL TURBINATE: ICD-10-CM

## 2024-01-17 DIAGNOSIS — J34.89 NASAL VALVE STENOSIS: ICD-10-CM

## 2024-01-17 DIAGNOSIS — J34.89 NASAL OBSTRUCTION: ICD-10-CM

## 2024-01-17 DIAGNOSIS — J34.89 NASAL OBSTRUCTION: Primary | ICD-10-CM

## 2024-01-17 DIAGNOSIS — J34.2 DEVIATED NASAL SEPTUM: ICD-10-CM

## 2024-01-17 DIAGNOSIS — J34.2 NASAL SEPTAL DEVIATION: ICD-10-CM

## 2024-01-17 DIAGNOSIS — J34.3 NASAL TURBINATE HYPERTROPHY: ICD-10-CM

## 2024-01-17 DIAGNOSIS — Z29.81 ENCOUNTER FOR HIV PRE-EXPOSURE PROPHYLAXIS: ICD-10-CM

## 2024-01-17 LAB
ALBUMIN SERPL BCG-MCNC: 4.6 G/DL (ref 3.5–5.2)
ALP SERPL-CCNC: 69 U/L (ref 40–150)
ALT SERPL W P-5'-P-CCNC: 9 U/L (ref 0–70)
ANION GAP SERPL CALCULATED.3IONS-SCNC: 8 MMOL/L (ref 7–15)
AST SERPL W P-5'-P-CCNC: 18 U/L (ref 0–45)
BILIRUB SERPL-MCNC: 0.3 MG/DL
BUN SERPL-MCNC: 11.5 MG/DL (ref 6–20)
CALCIUM SERPL-MCNC: 9.5 MG/DL (ref 8.6–10)
CHLORIDE SERPL-SCNC: 103 MMOL/L (ref 98–107)
CREAT SERPL-MCNC: 0.9 MG/DL (ref 0.51–1.17)
DEPRECATED HCO3 PLAS-SCNC: 30 MMOL/L (ref 22–29)
EGFRCR SERPLBLD CKD-EPI 2021: >90 ML/MIN/1.73M2
GLUCOSE SERPL-MCNC: 91 MG/DL (ref 70–99)
HBV CORE AB SERPL QL IA: NONREACTIVE
HBV SURFACE AB SERPL IA-ACNC: 25.6 M[IU]/ML
HBV SURFACE AB SERPL IA-ACNC: REACTIVE M[IU]/ML
HBV SURFACE AG SERPL QL IA: NONREACTIVE
HCV AB SERPL QL IA: NONREACTIVE
POTASSIUM SERPL-SCNC: 4.1 MMOL/L (ref 3.4–5.3)
PROT SERPL-MCNC: 7.6 G/DL (ref 6.4–8.3)
SODIUM SERPL-SCNC: 141 MMOL/L (ref 135–145)
T PALLIDUM AB SER QL: NONREACTIVE

## 2024-01-17 PROCEDURE — 80053 COMPREHEN METABOLIC PANEL: CPT | Performed by: PATHOLOGY

## 2024-01-17 PROCEDURE — 87591 N.GONORRHOEAE DNA AMP PROB: CPT | Performed by: FAMILY MEDICINE

## 2024-01-17 PROCEDURE — 99204 OFFICE O/P NEW MOD 45 MIN: CPT | Mod: 25 | Performed by: OTOLARYNGOLOGY

## 2024-01-17 PROCEDURE — 86780 TREPONEMA PALLIDUM: CPT | Performed by: FAMILY MEDICINE

## 2024-01-17 PROCEDURE — 86803 HEPATITIS C AB TEST: CPT | Performed by: FAMILY MEDICINE

## 2024-01-17 PROCEDURE — 31231 NASAL ENDOSCOPY DX: CPT | Performed by: OTOLARYNGOLOGY

## 2024-01-17 PROCEDURE — 87491 CHLMYD TRACH DNA AMP PROBE: CPT | Performed by: FAMILY MEDICINE

## 2024-01-17 PROCEDURE — 87389 HIV-1 AG W/HIV-1&-2 AB AG IA: CPT | Performed by: FAMILY MEDICINE

## 2024-01-17 PROCEDURE — 99000 SPECIMEN HANDLING OFFICE-LAB: CPT | Performed by: PATHOLOGY

## 2024-01-17 PROCEDURE — 87340 HEPATITIS B SURFACE AG IA: CPT | Performed by: FAMILY MEDICINE

## 2024-01-17 PROCEDURE — 86706 HEP B SURFACE ANTIBODY: CPT | Performed by: FAMILY MEDICINE

## 2024-01-17 PROCEDURE — 36415 COLL VENOUS BLD VENIPUNCTURE: CPT | Performed by: PATHOLOGY

## 2024-01-17 PROCEDURE — 86704 HEP B CORE ANTIBODY TOTAL: CPT | Performed by: FAMILY MEDICINE

## 2024-01-17 RX ORDER — CLINDAMYCIN PHOSPHATE 900 MG/50ML
900 INJECTION, SOLUTION INTRAVENOUS
Status: CANCELLED | OUTPATIENT
Start: 2024-01-17

## 2024-01-17 RX ORDER — CLINDAMYCIN PHOSPHATE 900 MG/50ML
900 INJECTION, SOLUTION INTRAVENOUS SEE ADMIN INSTRUCTIONS
Status: CANCELLED | OUTPATIENT
Start: 2024-01-17

## 2024-01-17 NOTE — NURSING NOTE
Photodocumentation obtained. Consent obtained for nasal endoscopy.    Will place orders for nasal surgery with Dr. Beltran.    Marely King RN  1/17/2024 10:58 AM

## 2024-01-17 NOTE — PROGRESS NOTES
Facial Plastic and Reconstructive Surgery Consultation    Referring Provider:   Shay Garcia MD  [address] [place] [address]    HPI:   I had the pleasure of seeing Manoj Castellanos today in clinic for consultation for nasal obstruction. Manoj Castellanos is a 28 year old who has suffered nasal obstruction for a long time.  This is constantly concerned and a challenge with baseline breathing and also exertion.  The patient had significant evaluation of allergies with allergy testing and has tried several allergy medications including nasal steroid with no significant improvement in nasal breathing.  When they breathe then the right side of the nose collapses significantly and they do not feel any significant air movement on the left.  They note the nasal framework is shifted with a cannot of the nasal tip to the right.    There is no history of nasal trauma nor any history of significant sinus infections however constant nasal congestion is a primary concern.      Review Of Systems  ROS: 10 point ROS neg other than the symptoms noted above in the HPI.    Patient Active Problem List   Diagnosis    Abnormal sinus finding on MRI    Mild persistent asthma    Allergic rhinitis due to animals    Allergic rhinitis due to dust mite    Seasonal allergic rhinitis due to pollen    Allergic conjunctivitis of both eyes    Palpitations    Seasonal affective disorder (H24)    Numbness and tingling    Vertigo    Vitamin D deficiency    Eosinophilic esophagitis    Gender dysphoria in adult     Past Surgical History:   Procedure Laterality Date    COMBINED ESOPHAGOSCOPY, GASTROSCOPY, DUODENOSCOPY (EGD) WITH CO2 INSUFFLATION N/A 1/2/2023    Procedure: ESOPHAGOGASTRODUODENOSCOPY, WITH CO2 INSUFFLATION;  Surgeon: Zahraa Robbins DO;  Location:  OR    COMBINED ESOPHAGOSCOPY, GASTROSCOPY, DUODENOSCOPY (EGD) WITH CO2 INSUFFLATION N/A 3/28/2023    Procedure: ESOPHAGOGASTRODUODENOSCOPY, WITH CO2 INSUFFLATION;  Surgeon: Itzel  DO Zahraa;  Location: [facility] OR    ESOPHAGOSCOPY, GASTROSCOPY, DUODENOSCOPY (EGD), COMBINED N/A 1/2/2023    Procedure: ESOPHAGOGASTRODUODENOSCOPY, WITH BIOPSY;  Surgeon: Zahraa Robbins DO;  Location: [facility] OR    ESOPHAGOSCOPY, GASTROSCOPY, DUODENOSCOPY (EGD), COMBINED N/A 3/28/2023    Procedure: ESOPHAGOGASTRODUODENOSCOPY, WITH BIOPSY;  Surgeon: Zahraa Robbins DO;  Location: [facility] OR    HC TOOTH EXTRACTION W/FORCEP  2016    4 teeth      Current Outpatient Medications   Medication Sig Dispense Refill    albuterol (PROAIR HFA/PROVENTIL HFA/VENTOLIN HFA) 108 (90 Base) MCG/ACT inhaler Inhale 2 puffs into the lungs every 4 hours as needed for shortness of breath / dyspnea or wheezing 18 g 1    carbamide peroxide (DEBROX) 6.5 % otic solution Place 5 drops into both ears 2 times daily 15 mL 0    cetirizine (ZYRTEC) 10 MG tablet TAKE 1 TABLET(10 MG) BY MOUTH TWICE DAILY 180 tablet 1    finasteride (PROSCAR) 5 MG tablet TAKE 1/4 TABLET BY MOUTH EVERY DAY 23 tablet 3    gabapentin (NEURONTIN) 300 MG capsule Take 1 capsule (300 mg) by mouth 2 times daily PATIENT STATES HE TAKING 300 MG TWICE DAILY 180 capsule 3    IBUPROFEN Take 200 tablets by mouth daily as needed      meclizine (ANTIVERT) 25 MG tablet Take 1 tablet (25 mg) by mouth 2 times daily as needed for dizziness . Appointment required for additional refills. 20 tablet 0    methylphenidate (RITALIN LA) 10 MG 24 hr capsule Take 1 capsule (10 mg) by mouth daily for 30 days 30 capsule 0    [START ON 2/8/2024] methylphenidate (RITALIN LA) 10 MG 24 hr capsule Take 1 capsule (10 mg) by mouth daily for 30 days 30 capsule 0    methylphenidate (RITALIN LA) 10 MG 24 hr capsule Take 1 capsule (10 mg) by mouth every morning 30 capsule 0    methylphenidate (RITALIN) 5 MG tablet Take 1 tablet (5 mg) by mouth daily as needed (ADHD) 30 tablet 0    mometasone (ELOCON) 0.1 % external cream Apply topically daily To areas of psoriasis. 50 g 3    mometasone (ELOCON) 0.1 % external ointment  Apply topically twice daily.  Do not expose skin to sun after applying steroid cream. 454 g 1    omeprazole (PRILOSEC) 40 MG DR capsule Take 1 capsule (40 mg) by mouth every morning On an empty stomach about 30 minutes before breakfast 90 capsule 3    ondansetron (ZOFRAN ODT) 4 MG ODT tab DISSOLVE 1 TABLET(4 MG) ON THE TONGUE EVERY 8 HOURS AS NEEDED FOR NAUSEA 20 tablet 0    OXcarbazepine (TRILEPTAL) 150 MG tablet Take 2 tablets (300 mg) by mouth 2 times daily 120 tablet 11    VITAMIN D3 25 MCG (1000 UT) tablet TAKE 2 TABLETS BY MOUTH DAILY 180 tablet 3     Amoxicillin and Cefuroxime  Social History     Socioeconomic History    Marital status: Single     Spouse name: Not on file    Number of children: Not on file    Years of education: Not on file    Highest education level: Not on file   Occupational History    Not on file   Tobacco Use    Smoking status: Never    Smokeless tobacco: Never   Vaping Use    Vaping Use: Never used   Substance and Sexual Activity    Alcohol use: Yes     Comment: infrequent at worst/ occa     Drug use: No    Sexual activity: Not Currently     Birth control/protection: Condom   Other Topics Concern    Parent/sibling w/ CABG, MI or angioplasty before 65F 55M? No   Social History Narrative    Not on file     Social Determinants of Health     Financial Resource Strain: Unknown (10/9/2023)    Financial Resource Strain     Within the past 12 months, have you or your family members you live with been unable to get utilities (heat, electricity) when it was really needed?: Patient refused   Food Insecurity: Unknown (10/9/2023)    Food Insecurity     Within the past 12 months, did you worry that your food would run out before you got money to buy more?: Patient refused     Within the past 12 months, did the food you bought just not last and you didn t have money to get more?: Patient refused   Transportation Needs: Unknown (10/9/2023)    Transportation Needs     Within the past 12 months, has lack  of transportation kept you from medical appointments, getting your medicines, non-medical meetings or appointments, work, or from getting things that you need?: Patient refused   Physical Activity: Not on file   Stress: Not on file   Social Connections: Not on file   Interpersonal Safety: Not on file   Housing Stability: Unknown (10/9/2023)    Housing Stability     Do you have housing? : Patient refused     Are you worried about losing your housing?: Patient refused     Family History   Problem Relation Age of Onset    Bipolar Disorder Mother     Psoriasis Mother     Substance Abuse Father     Asthma Father     Depression Sister     Anxiety Disorder Sister     Attention Deficit Disorder Sister     Fibromyalgia Sister     Pancreatic Cancer Maternal Grandfather     Other Cancer Maternal Grandfather     Prostate Cancer Paternal Grandfather     Depression Other     Anxiety Disorder Other     Mental Illness Other        PE:  Alert and Oriented, Answering Questions Appropriately  Atraumatic, Normocephalic, Face Symmetric  Skin: Miller 2  Facial Nerve Intact and facial movement symmetric  EOM's, PEERL  Nasal Exam: A twist of the right nasal tip from external appearance.  With inspiration the right nasal ala completely collapses almost flush with the midline.  With not forceful inspiration we have similar collapse.  Anterior rhinoscopy demonstrates that the septum from the caudal aspect posteriorly is deviated to the left.  This barely allows for any opening of the left nasal airway.  There is approximately a 85 to 90% obstruction of the left nasal airway.  There is some slight mucopurulence in that space.  The inferior turbinate cannot be visualized.  I cannot visualize the middle meatus.  no mucopurulence or polyps, no masses  Chin: Normal             PROCEDURE:Diagnostic Nasal Endoscopy   Indication: Nasal obstruction  Performed by: Renetta Beltran     Consent was obtained. 0 degree sinus endoscope was used. Nasal  Endoscopy is performed to provide a more thorough evaluation of the nasal airway than seen on standard nasal speculum exam.  Findings are as follows:  Nasal endoscopy is performed with a 0 degree rigid endoscope after informed consent this is passed through the right nasal airway and demonstrates that the septum almost fully through the whole body of the septum is deviated towards the left side.  This creates a open space on the right side which has led to compensatory hypertrophy of the right inferior turbinate.  There is both external and internal nasal valve collapse on the side.  The internal nasal valve is stenosed because of the shift the septum.  On the left side I cannot pass the scope past the anterior caudal septal deflection.  I cannot visualize the middle turbinate nor get any view of the posterior aspect of the nasopharynx.  Patient tolerated the procedure well without any complications.             IMPRESSION:    Diagnoses of Nasal obstruction, Nasal septal deviation, and Nasal turbinate hypertrophy were pertinent to this visit.  Nasal valve collapse      PLAN:    Manoj Castellanos presents today with significant severe septal deviation that involves the entire body of the septum including the supportive ulcer of the nose.  The caudal septum is deviated along with the dorsal septum.  This stenosis the internal nasal valve and external nasal valve on the left.  They will require a complex septoplasty with an open approach.  In addition this has led to compensatory hypertrophy of the right inferior turbinate on the right.  This will require reduction and outfracture.  Because of the shift there is less support on the valve component of the nose on the right with notable collapse of the internal nasal valve on the right and collapse of the right external nasal valve.  Support for this will require a  grafting bilaterally and I will consider a possible batten graft.    The patient is concerned about the  twisting and shifting of the nasal tip.  This should be corrected as the caudal septum is mobilized to the midline.  We discussed the perioperative time course for the surgery.  They are having surgery in June and would like to time this around then.  They describe they do have idiopathic neuropathy however this is primarily characterized with numbness and there has not been a history of challenges with pain control.  When they had their wisdom teeth removed they were able to just use half of Vicodin and that was appropriate.    No orders of the defined types were placed in this encounter.        Photodocumentation was obtained.     I spent a total of 40 minutes in the care of patient Manoj Castellanos during today's office visit. This time includes reviewing the patient's chart and prior history, obtaining a history, performing an examination and evaluation and counseling the patient. This time also includes ordering mediations or tests necessary in addition to communication to other member's of the patient's health care team. Time spent in documentation and care coordination is included.

## 2024-01-17 NOTE — LETTER
1/17/2024       RE: Manoj Castellanos  [address] [facility] [address]     Dear Colleague,    Thank you for referring your patient, Manoj Castellanos, to the [facility] [place] at [facility]. Please see a copy of my visit note below.    Facial Plastic and Reconstructive Surgery Consultation    Referring Provider:   Shay Garcia MD  [address] [facility] [place] [address]    HPI:   I had the pleasure of seeing Manoj Castellanos today in clinic for consultation for nasal obstruction. Manoj Castellanos is a 28 year old who has suffered nasal obstruction for a long time.  This is constantly concerned and a challenge with baseline breathing and also exertion.  The patient had significant evaluation of allergies with allergy testing and has tried several allergy medications including nasal steroid with no significant improvement in nasal breathing.  When they breathe then the right side of the nose collapses significantly and they do not feel any significant air movement on the left.  They note the nasal framework is shifted with a cannot of the nasal tip to the right.    There is no history of nasal trauma nor any history of significant sinus infections however constant nasal congestion is a primary concern.      Review Of Systems  ROS: 10 point ROS neg other than the symptoms noted above in the HPI.    Patient Active Problem List   Diagnosis    Abnormal sinus finding on MRI    Mild persistent asthma    Allergic rhinitis due to animals    Allergic rhinitis due to dust mite    Seasonal allergic rhinitis due to pollen    Allergic conjunctivitis of both eyes    Palpitations    Seasonal affective disorder (H24)    Numbness and tingling    Vertigo    Vitamin D deficiency    Eosinophilic esophagitis    Gender dysphoria in adult     Past Surgical History:   Procedure Laterality Date    COMBINED ESOPHAGOSCOPY, GASTROSCOPY, DUODENOSCOPY (EGD) WITH CO2  INSUFFLATION N/A 1/2/2023    Procedure: ESOPHAGOGASTRODUODENOSCOPY, WITH CO2 INSUFFLATION;  Surgeon: Zahraa Robbins DO;  Location: [facility] OR    COMBINED ESOPHAGOSCOPY, GASTROSCOPY, DUODENOSCOPY (EGD) WITH CO2 INSUFFLATION N/A 3/28/2023    Procedure: ESOPHAGOGASTRODUODENOSCOPY, WITH CO2 INSUFFLATION;  Surgeon: Zahraa Robbins DO;  Location: [facility] OR    ESOPHAGOSCOPY, GASTROSCOPY, DUODENOSCOPY (EGD), COMBINED N/A 1/2/2023    Procedure: ESOPHAGOGASTRODUODENOSCOPY, WITH BIOPSY;  Surgeon: Zahraa Robbins DO;  Location: [facility] OR    ESOPHAGOSCOPY, GASTROSCOPY, DUODENOSCOPY (EGD), COMBINED N/A 3/28/2023    Procedure: ESOPHAGOGASTRODUODENOSCOPY, WITH BIOPSY;  Surgeon: Zahraa Robbins DO;  Location: [facility] OR    HC TOOTH EXTRACTION W/FORCEP  2016    4 teeth      Current Outpatient Medications   Medication Sig Dispense Refill    albuterol (PROAIR HFA/PROVENTIL HFA/VENTOLIN HFA) 108 (90 Base) MCG/ACT inhaler Inhale 2 puffs into the lungs every 4 hours as needed for shortness of breath / dyspnea or wheezing 18 g 1    carbamide peroxide (DEBROX) 6.5 % otic solution Place 5 drops into both ears 2 times daily 15 mL 0    cetirizine (ZYRTEC) 10 MG tablet TAKE 1 TABLET(10 MG) BY MOUTH TWICE DAILY 180 tablet 1    finasteride (PROSCAR) 5 MG tablet TAKE 1/4 TABLET BY MOUTH EVERY DAY 23 tablet 3    gabapentin (NEURONTIN) 300 MG capsule Take 1 capsule (300 mg) by mouth 2 times daily PATIENT STATES HE TAKING 300 MG TWICE DAILY 180 capsule 3    IBUPROFEN Take 200 tablets by mouth daily as needed      meclizine (ANTIVERT) 25 MG tablet Take 1 tablet (25 mg) by mouth 2 times daily as needed for dizziness . Appointment required for additional refills. 20 tablet 0    methylphenidate (RITALIN LA) 10 MG 24 hr capsule Take 1 capsule (10 mg) by mouth daily for 30 days 30 capsule 0    [START ON 2/8/2024] methylphenidate (RITALIN LA) 10 MG 24 hr capsule Take 1 capsule (10 mg) by mouth daily for 30 days 30 capsule 0    methylphenidate (RITALIN LA) 10 MG 24  hr capsule Take 1 capsule (10 mg) by mouth every morning 30 capsule 0    methylphenidate (RITALIN) 5 MG tablet Take 1 tablet (5 mg) by mouth daily as needed (ADHD) 30 tablet 0    mometasone (ELOCON) 0.1 % external cream Apply topically daily To areas of psoriasis. 50 g 3    mometasone (ELOCON) 0.1 % external ointment Apply topically twice daily.  Do not expose skin to sun after applying steroid cream. 454 g 1    omeprazole (PRILOSEC) 40 MG DR capsule Take 1 capsule (40 mg) by mouth every morning On an empty stomach about 30 minutes before breakfast 90 capsule 3    ondansetron (ZOFRAN ODT) 4 MG ODT tab DISSOLVE 1 TABLET(4 MG) ON THE TONGUE EVERY 8 HOURS AS NEEDED FOR NAUSEA 20 tablet 0    OXcarbazepine (TRILEPTAL) 150 MG tablet Take 2 tablets (300 mg) by mouth 2 times daily 120 tablet 11    VITAMIN D3 25 MCG (1000 UT) tablet TAKE 2 TABLETS BY MOUTH DAILY 180 tablet 3     Amoxicillin and Cefuroxime  Social History     Socioeconomic History    Marital status: Single     Spouse name: Not on file    Number of children: Not on file    Years of education: Not on file    Highest education level: Not on file   Occupational History    Not on file   Tobacco Use    Smoking status: Never    Smokeless tobacco: Never   Vaping Use    Vaping Use: Never used   Substance and Sexual Activity    Alcohol use: Yes     Comment: infrequent at worst/ occa     Drug use: No    Sexual activity: Not Currently     Birth control/protection: Condom   Other Topics Concern    Parent/sibling w/ CABG, MI or angioplasty before 65F 55M? No   Social History Narrative    Not on file     Social Determinants of Health     Financial Resource Strain: Unknown (10/9/2023)    Financial Resource Strain     Within the past 12 months, have you or your family members you live with been unable to get utilities (heat, electricity) when it was really needed?: Patient refused   Food Insecurity: Unknown (10/9/2023)    Food Insecurity     Within the past 12 months, did you  worry that your food would run out before you got money to buy more?: Patient refused     Within the past 12 months, did the food you bought just not last and you didn t have money to get more?: Patient refused   Transportation Needs: Unknown (10/9/2023)    Transportation Needs     Within the past 12 months, has lack of transportation kept you from medical appointments, getting your medicines, non-medical meetings or appointments, work, or from getting things that you need?: Patient refused   Physical Activity: Not on file   Stress: Not on file   Social Connections: Not on file   Interpersonal Safety: Not on file   Housing Stability: Unknown (10/9/2023)    Housing Stability     Do you have housing? : Patient refused     Are you worried about losing your housing?: Patient refused     Family History   Problem Relation Age of Onset    Bipolar Disorder Mother     Psoriasis Mother     Substance Abuse Father     Asthma Father     Depression Sister     Anxiety Disorder Sister     Attention Deficit Disorder Sister     Fibromyalgia Sister     Pancreatic Cancer Maternal Grandfather     Other Cancer Maternal Grandfather     Prostate Cancer Paternal Grandfather     Depression Other     Anxiety Disorder Other     Mental Illness Other        PE:  Alert and Oriented, Answering Questions Appropriately  Atraumatic, Normocephalic, Face Symmetric  Skin: Miller 2  Facial Nerve Intact and facial movement symmetric  EOM's, PEERL  Nasal Exam: A twist of the right nasal tip from external appearance.  With inspiration the right nasal ala completely collapses almost flush with the midline.  With not forceful inspiration we have similar collapse.  Anterior rhinoscopy demonstrates that the septum from the caudal aspect posteriorly is deviated to the left.  This barely allows for any opening of the left nasal airway.  There is approximately a 85 to 90% obstruction of the left nasal airway.  There is some slight mucopurulence in that space.   The inferior turbinate cannot be visualized.  I cannot visualize the middle meatus.  no mucopurulence or polyps, no masses  Chin: Normal             PROCEDURE:Diagnostic Nasal Endoscopy   Indication: Nasal obstruction  Performed by: Renetta Beltran     Consent was obtained. 0 degree sinus endoscope was used. Nasal Endoscopy is performed to provide a more thorough evaluation of the nasal airway than seen on standard nasal speculum exam.  Findings are as follows:  Nasal endoscopy is performed with a 0 degree rigid endoscope after informed consent this is passed through the right nasal airway and demonstrates that the septum almost fully through the whole body of the septum is deviated towards the left side.  This creates a open space on the right side which has led to compensatory hypertrophy of the right inferior turbinate.  There is both external and internal nasal valve collapse on the side.  The internal nasal valve is stenosed because of the shift the septum.  On the left side I cannot pass the scope past the anterior caudal septal deflection.  I cannot visualize the middle turbinate nor get any view of the posterior aspect of the nasopharynx.  Patient tolerated the procedure well without any complications.             IMPRESSION:    Diagnoses of Nasal obstruction, Nasal septal deviation, and Nasal turbinate hypertrophy were pertinent to this visit.  Nasal valve collapse      PLAN:    Manoj Castellanos presents today with significant severe septal deviation that involves the entire body of the septum including the supportive ulcer of the nose.  The caudal septum is deviated along with the dorsal septum.  This stenosis the internal nasal valve and external nasal valve on the left.  They will require a complex septoplasty with an open approach.  In addition this has led to compensatory hypertrophy of the right inferior turbinate on the right.  This will require reduction and outfracture.  Because of the shift there is  less support on the valve component of the nose on the right with notable collapse of the internal nasal valve on the right and collapse of the right external nasal valve.  Support for this will require a  grafting bilaterally and I will consider a possible batten graft.    The patient is concerned about the twisting and shifting of the nasal tip.  This should be corrected as the caudal septum is mobilized to the midline.  We discussed the perioperative time course for the surgery.  They are having surgery in June and would like to time this around then.  They describe they do have idiopathic neuropathy however this is primarily characterized with numbness and there has not been a history of challenges with pain control.  When they had their wisdom teeth removed they were able to just use half of Vicodin and that was appropriate.    No orders of the defined types were placed in this encounter.        Photodocumentation was obtained.     I spent a total of 40 minutes in the care of patient Manoj Castellanos during today's office visit. This time includes reviewing the patient's chart and prior history, obtaining a history, performing an examination and evaluation and counseling the patient. This time also includes ordering mediations or tests necessary in addition to communication to other member's of the patient's health care team. Time spent in documentation and care coordination is included.           Again, thank you for allowing me to participate in the care of your patient.      Sincerely,    Renetta Beltran MD

## 2024-01-17 NOTE — RESULT ENCOUNTER NOTE
Please inform of results if patient has not viewed in [employer] within 3 business days.    Your syphilis testing is normal.    Other labs pending.    Please call the clinic with any questions you may have.     Have a great day,    Dr. Landrum

## 2024-01-17 NOTE — RESULT ENCOUNTER NOTE
Please inform of results if patient has not viewed in [employer] within 3 business days.    CMP Results - Your blood sugar was normal. Your kidney function, electrolytes, and liver function were normal.    Your other labs are pending.    Please call the clinic with any questions you may have.     Have a great day,    Dr. Landrum

## 2024-01-18 ENCOUNTER — ALLIED HEALTH/NURSE VISIT (OUTPATIENT)
Dept: INFECTIOUS DISEASES | Facility: CLINIC | Age: 29
End: 2024-01-18
Attending: FAMILY MEDICINE
Payer: COMMERCIAL

## 2024-01-18 DIAGNOSIS — Z23 NEED FOR VACCINATION: Primary | ICD-10-CM

## 2024-01-18 LAB
C TRACH DNA SPEC QL NAA+PROBE: NEGATIVE
HIV 1+2 AB+HIV1 P24 AG SERPL QL IA: NONREACTIVE
N GONORRHOEA DNA SPEC QL NAA+PROBE: NEGATIVE

## 2024-01-18 PROCEDURE — 90611 SMALLPOX&MONKEYPOX VAC 0.5ML: CPT | Performed by: FAMILY MEDICINE

## 2024-01-18 PROCEDURE — 90471 IMMUNIZATION ADMIN: CPT | Performed by: FAMILY MEDICINE

## 2024-01-18 PROCEDURE — 250N000011 HC RX IP 250 OP 636: Performed by: FAMILY MEDICINE

## 2024-01-18 RX ORDER — EMTRICITABINE AND TENOFOVIR DISOPROXIL FUMARATE 200; 300 MG/1; MG/1
1 TABLET, FILM COATED ORAL DAILY
Qty: 90 TABLET | Refills: 0 | Status: SHIPPED | OUTPATIENT
Start: 2024-01-18 | End: 2024-04-26

## 2024-01-18 RX ADMIN — RABIES VACCINE 0.5 ML: KIT at 13:19

## 2024-01-18 NOTE — RESULT ENCOUNTER NOTE
Please inform of results if patient has not viewed in [employer] within 3 business days.    You are immune to hep B.    You do not have hepatitis C.    Your other labs are pending.    Please call the clinic with any questions you may have.     Have a great day,    Dr. Landrum

## 2024-01-18 NOTE — PROGRESS NOTES
Nurse review: Mpox Jynneos vaccine administration criteria:     Mpox Standing Order - Adult Mpox Standing Order  Manoj Castellanos is an established patient that is 28 year old     Are you an employee or contracted staff of [facility] and exposure was through work? No    Are you sick today? No    Mpox eligibility criteria screen:     Exposure to a person with confirmed or probable orthopoxvirus infection (i.e.,mpox) in the previous 14 days: I will ask you 5 questions, no need to specify which statement you might fit into.   Have you been contacted by the [facility] to be vaccinated due to concern for potential exposure?     Have you had unprotected contact between a person s skin or mucous membranes, rash, lesions, scabs, or bodily fluids?     Have you had contact with materials contaminated or in contact with lesions?     Have you been near a person less than 6 ft for 3 or more hours without using a surgical face mask?     Have you been near a person within 6 ft or a patient during any procedures that may create aerosols without wearing an N95 mask?     Do you meet any of the 5 questions mentioned above? YES, criteria met.    Reviewed Jynneos vaccine criteria Yes and patient is a candidate for the vaccine: Yes    Does the patient have symptoms of mpox: No. If patient has symptoms of mpox then a provider visit should be scheduled and vaccination not administered.    Does the patient have contraindications to the jynneos vaccine: No. Contraindications for vaccine include:   Allergies to a Jynneos vaccine or to an ingredient in the vaccine (gentamicin, ciprofloxacin, chicken, egg protein).    Date of vaccine: 01/18/24

## 2024-01-18 NOTE — RESULT ENCOUNTER NOTE
Please inform of results if patient has not viewed in [employer] within 3 business days.    Gonorrhea and chlamydia testing was normal.    Please call the clinic with any questions you may have.     Have a great day,    Dr. Landrum

## 2024-01-19 NOTE — RESULT ENCOUNTER NOTE
Please inform of results if patient has not viewed in [employer] within 3 business days.    Your HIV test is normal. Your Truvada has been sent for a 90 day supply. Please recheck labs ~1 week prior to your next refill due.    Please call the clinic with any questions you may have.     Have a great day,    Dr. Landrum

## 2024-01-23 ENCOUNTER — TELEPHONE (OUTPATIENT)
Dept: OTOLARYNGOLOGY | Facility: CLINIC | Age: 29
End: 2024-01-23
Payer: COMMERCIAL

## 2024-01-25 NOTE — TELEPHONE ENCOUNTER
Called patient to schedule Complex Open Septoplasty, Repair of Nasal Vestibular Stenosis, Bilateral Inferior Turbinate Reduction (N/A)  with Dr. Beltran. No answer, callback number [phone] left on  for patient.     Rose Mary John on 1/25/2024 at 9:39 AM

## 2024-01-29 PROBLEM — J34.2 DEVIATED NASAL SEPTUM: Status: ACTIVE | Noted: 2024-01-17

## 2024-01-29 PROBLEM — J34.89 NASAL VALVE STENOSIS: Status: ACTIVE | Noted: 2024-01-17

## 2024-01-29 PROBLEM — J34.89 NASAL OBSTRUCTION: Status: ACTIVE | Noted: 2024-01-17

## 2024-01-29 PROBLEM — J34.3 HYPERTROPHY OF INFERIOR NASAL TURBINATE: Status: ACTIVE | Noted: 2024-01-17

## 2024-01-29 NOTE — TELEPHONE ENCOUNTER
Patient is scheduled for surgery with Dr. Beltran.     Spoke with: Patient     Date of Surgery: 5/09/24, patient said they were only able to be scheduled between 5/09-6/03 due to school.     Location: [facility] OR     Pre op with Provider: RONY     H&P: Patient will schedule pre op at [facility] in [place]. Informed patient pre op will need to be done within 30 days of surgery date.     Additional imaging/appointments: Patient is scheduled for a 1 week post op on 5/15/24 at 12:00.     Surgery packet: Per patients preference, will send packet through [employer]. Informed patient arrival time will not be listed within packet.      Additional comments: Informed patient a pre op nurse will call 2-5 days prior to surgery to go over further details/give arrival time.         Rose Mary John on 1/29/2024 at 10:23 AM

## 2024-02-13 ENCOUNTER — TELEPHONE (OUTPATIENT)
Dept: FAMILY MEDICINE | Facility: CLINIC | Age: 29
End: 2024-02-13

## 2024-02-13 DIAGNOSIS — F90.9 ATTENTION DEFICIT HYPERACTIVITY DISORDER (ADHD), UNSPECIFIED ADHD TYPE: ICD-10-CM

## 2024-02-13 RX ORDER — METHYLPHENIDATE HYDROCHLORIDE 5 MG/1
5 TABLET ORAL DAILY PRN
Qty: 30 TABLET | Refills: 0 | Status: CANCELLED | OUTPATIENT
Start: 2024-02-13

## 2024-02-13 NOTE — TELEPHONE ENCOUNTER
PT calling needing the methylphenidate 5 mg short acting rx filled.  PT is taking this twice per day.  Pt says the [place] rx  has been out for 2 months.    PT is asking for both rx to be sent in . He will fill what they have available in stock.    There was also a refill request dated 2/9/24.    OK to leave a detailed message.  GENNY Elkins

## 2024-02-15 ENCOUNTER — ALLIED HEALTH/NURSE VISIT (OUTPATIENT)
Dept: INFECTIOUS DISEASES | Facility: CLINIC | Age: 29
End: 2024-02-15
Payer: MEDICAID

## 2024-02-15 DIAGNOSIS — Z23 NEED FOR VACCINATION: Primary | ICD-10-CM

## 2024-02-15 PROCEDURE — 90471 IMMUNIZATION ADMIN: CPT | Performed by: FAMILY MEDICINE

## 2024-02-15 PROCEDURE — 90611 SMALLPOX&MONKEYPOX VAC 0.5ML: CPT | Performed by: FAMILY MEDICINE

## 2024-02-15 PROCEDURE — 250N000011 HC RX IP 250 OP 636: Performed by: FAMILY MEDICINE

## 2024-02-15 RX ADMIN — RABIES VACCINE 0.5 ML: KIT at 13:49

## 2024-02-15 NOTE — PROGRESS NOTES
Nurse review: Mpox Jynneos vaccine administration criteria:     Mpox Standing Order - Adult Mpox Standing Order  Manoj Castellanos is an established patient that is 28 year old     Are you an employee or contracted staff of [facility] and exposure was through work? No    Are you sick today? No    Mpox eligibility criteria screen:     Exposure to a person with confirmed or probable orthopoxvirus infection (i.e.,mpox) in the previous 14 days: I will ask you 5 questions, no need to specify which statement you might fit into.   Have you been contacted by the [facility] to be vaccinated due to concern for potential exposure?     Have you had unprotected contact between a person s skin or mucous membranes, rash, lesions, scabs, or bodily fluids?     Have you had contact with materials contaminated or in contact with lesions?     Have you been near a person less than 6 ft for 3 or more hours without using a surgical face mask?     Have you been near a person within 6 ft or a patient during any procedures that may create aerosols without wearing an N95 mask?     Do you meet any of the 5 questions mentioned above? YES, criteria met.    Reviewed Jynneos vaccine criteria Yes and patient is a candidate for the vaccine: Yes    Does the patient have symptoms of mpox: No. If patient has symptoms of mpox then a provider visit should be scheduled and vaccination not administered.    Does the patient have contraindications to the jynneos vaccine: No. Contraindications for vaccine include:   Allergies to a Jynneos vaccine or to an ingredient in the vaccine (gentamicin, ciprofloxacin, chicken, egg protein).    Date of vaccine: 02/15/24

## 2024-02-16 ENCOUNTER — MYC REFILL (OUTPATIENT)
Dept: FAMILY MEDICINE | Facility: CLINIC | Age: 29
End: 2024-02-16
Payer: MEDICAID

## 2024-02-16 DIAGNOSIS — F90.9 ATTENTION DEFICIT HYPERACTIVITY DISORDER (ADHD), UNSPECIFIED ADHD TYPE: ICD-10-CM

## 2024-02-20 RX ORDER — METHYLPHENIDATE HYDROCHLORIDE 5 MG/1
5 TABLET ORAL 2 TIMES DAILY
Qty: 60 TABLET | Refills: 0 | Status: SHIPPED | OUTPATIENT
Start: 2024-02-20 | End: 2024-03-28

## 2024-02-27 ENCOUNTER — TELEPHONE (OUTPATIENT)
Dept: FAMILY MEDICINE | Facility: CLINIC | Age: 29
End: 2024-02-27
Payer: MEDICAID

## 2024-02-27 NOTE — TELEPHONE ENCOUNTER
Pt calling and I did tell him that Dr Burnette did refill and sent note to him on 2/20    HE said ok and he can see this now    Mayra Caputo, RN, BSN  [facility]

## 2024-02-27 NOTE — TELEPHONE ENCOUNTER
Dr Burnette refilled rx and it was the wrong one that was sent in     See 2/16/24 [employer] message. Dr Burnette did send in rx on 2/20 and did send him message about this. He did not read this but now understands.    This encounter will be closed    Mayra Caputo RN

## 2024-03-12 ENCOUNTER — NURSE TRIAGE (OUTPATIENT)
Dept: FAMILY MEDICINE | Facility: CLINIC | Age: 29
End: 2024-03-12
Payer: COMMERCIAL

## 2024-03-12 ENCOUNTER — VIRTUAL VISIT (OUTPATIENT)
Dept: FAMILY MEDICINE | Facility: CLINIC | Age: 29
End: 2024-03-12
Payer: COMMERCIAL

## 2024-03-12 DIAGNOSIS — U07.1 INFECTION DUE TO 2019 NOVEL CORONAVIRUS: Primary | ICD-10-CM

## 2024-03-12 PROCEDURE — 99213 OFFICE O/P EST LOW 20 MIN: CPT | Mod: 95 | Performed by: NURSE PRACTITIONER

## 2024-03-12 RX ORDER — PIMECROLIMUS 10 MG/G
CREAM TOPICAL
COMMUNITY
Start: 2023-08-08

## 2024-03-12 ASSESSMENT — ENCOUNTER SYMPTOMS
RHINORRHEA: 1
SORE THROAT: 1
MUSCULOSKELETAL NEGATIVE: 1
FATIGUE: 1
SHORTNESS OF BREATH: 1
CHILLS: 0
WHEEZING: 0
FEVER: 0
GASTROINTESTINAL NEGATIVE: 1
COUGH: 1

## 2024-03-12 ASSESSMENT — PATIENT HEALTH QUESTIONNAIRE - PHQ9
10. IF YOU CHECKED OFF ANY PROBLEMS, HOW DIFFICULT HAVE THESE PROBLEMS MADE IT FOR YOU TO DO YOUR WORK, TAKE CARE OF THINGS AT HOME, OR GET ALONG WITH OTHER PEOPLE: SOMEWHAT DIFFICULT
SUM OF ALL RESPONSES TO PHQ QUESTIONS 1-9: 7
SUM OF ALL RESPONSES TO PHQ QUESTIONS 1-9: 7

## 2024-03-12 ASSESSMENT — ASTHMA QUESTIONNAIRES
QUESTION_5 LAST FOUR WEEKS HOW WOULD YOU RATE YOUR ASTHMA CONTROL: COMPLETELY CONTROLLED
ACT_TOTALSCORE: 24
QUESTION_1 LAST FOUR WEEKS HOW MUCH OF THE TIME DID YOUR ASTHMA KEEP YOU FROM GETTING AS MUCH DONE AT WORK, SCHOOL OR AT HOME: NONE OF THE TIME
QUESTION_3 LAST FOUR WEEKS HOW OFTEN DID YOUR ASTHMA SYMPTOMS (WHEEZING, COUGHING, SHORTNESS OF BREATH, CHEST TIGHTNESS OR PAIN) WAKE YOU UP AT NIGHT OR EARLIER THAN USUAL IN THE MORNING: NOT AT ALL
QUESTION_4 LAST FOUR WEEKS HOW OFTEN HAVE YOU USED YOUR RESCUE INHALER OR NEBULIZER MEDICATION (SUCH AS ALBUTEROL): NOT AT ALL
ACT_TOTALSCORE: 24
QUESTION_2 LAST FOUR WEEKS HOW OFTEN HAVE YOU HAD SHORTNESS OF BREATH: ONCE OR TWICE A WEEK

## 2024-03-12 NOTE — TELEPHONE ENCOUNTER
COVID Positive/Requesting COVID treatment    Patient is positive for COVID and requesting treatment options.    Date of positive COVID test (PCR or at home)? 3/12/2024 at home test  Current COVID symptoms: shortness of breath or difficulty breathing, fatigue, sore throat, and congestion or runny nose  Date COVID symptoms began: 3/11/2024    Message should be routed to clinic RN pool. Best phone number to use for call back: [phone]  Best time to reach patient would be at after 1:15 pm today

## 2024-03-12 NOTE — PATIENT INSTRUCTIONS
Paxlovid is no longer free from the government. There are financial assistance programs available. You can learn more at [email or web address]://paxlovid.[email or web address]/ or 1-208.218.5912, press 2 for patient options. Please look into this before you go to the pharmacy to  your medicine.

## 2024-03-12 NOTE — TELEPHONE ENCOUNTER
Nurse called and left a message informing patient that they do not qualify for the Paxlovid protocol due to current medications (Truvada). Nurse advised patient to call and schedule a virtual visit with a provider to discuss treatment options.     Иван Grubbs RN BSN  [facility]

## 2024-03-12 NOTE — PROGRESS NOTES
Manoj is a 28 year old who is being evaluated via a billable video visit.      How would you like to obtain your AVS? MyChart  If the video visit is dropped, the invitation should be resent by: Text to cell phone: [phone]  Will anyone else be joining your video visit? No          Assessment & Plan     Infection due to 2019 novel coronavirus  Ty started with COVID-19 symptoms yesterday, tested positive this morning.  Has history of asthma and therefore is at increased risk for severe COVID and hospitalization.  On Truvada for exposure prophylaxis.  Most recent STI screening negative for HIV, hepatitis B, hepatitis C.  Kidney function and liver function test within normal limits.    Risks and benefits of Paxlovid antiviral treatment were reviewed. Risk for allergic reaction discussed. Possible medication interactions reviewed and discussed with patient using [facility] drug interaction check.  Patient understands Paxlovid is contraindicated in liver disease and that Paxlovid should be renally dosed with kidney disease. Patient informed of the following potential side effects, besides allergic reaction, liver problems are resistance to HIV medications, including altered sense of taste, diarrhea, high blood pressure, muscle aches, abdominal pain, nausea, and generally feeling unwell. Patient verbalized understanding and using shared decision making, agrees to proceed with Paxlovid treatment.  Potential interactions reviewed in [facility] COVID-19 interaction .  No medication adjustments indicated but he should watch for signs of increased concentration of oxcarbazepine such as drowsiness, dizziness, diplopia, nausea vomiting.  He verbalized understanding.    - nirmatrelvir and ritonavir (PAXLOVID) 300 mg/100 mg therapy pack; Take 3 tablets by mouth 2 times daily for 5 days                  Subjective   Manoj is a 28 year old, presenting for the following health issues:  Covid Concern (Verbal consent given for video  visit.  Discuss COVID treatment, tested positive this morning with onset of symptoms yesterday with SOB, congestion, runny nose, cough.)        3/12/2024     2:40 PM   Additional Questions   Roomed by Natalie RICE CMA   Accompanied by Self     HPI     Patient is wanting to discuss COVID treatment.  He tested positive for COVID this morning at home.  His symptoms started yesterday with SOB, fatigue, sore throat and congestion/runny nose.  He stated that he started coughing this morning.  He is currently taking Truvada and wasn't qualified to get Paxlovid without a doctor's visit.            Review of Systems   Constitutional:  Positive for fatigue. Negative for chills and fever.   HENT:  Positive for congestion, rhinorrhea and sore throat.    Respiratory:  Positive for cough and shortness of breath. Negative for wheezing.    Cardiovascular:  Negative for chest pain.   Gastrointestinal: Negative.    Musculoskeletal: Negative.            Objective           Vitals:  No vitals were obtained today due to virtual visit.    Physical Exam   GENERAL: alert and no distress  EYES: Eyes grossly normal to inspection.  No discharge or erythema, or obvious scleral/conjunctival abnormalities.  RESP: No audible wheeze, cough, or visible cyanosis.    SKIN: Visible skin clear. No significant rash, abnormal pigmentation or lesions.  NEURO: Cranial nerves grossly intact.  Mentation and speech appropriate for age.  PSYCH: Appropriate affect, tone, and pace of words          Video-Visit Details    Type of service:  Video Visit   Video Start Time:  1315  Video End Time: 1330    Originating Location (pt. Location): Home    Distant Location (provider location):  On-site  Platform used for Video Visit: Naa  Signed Electronically by: KEYANNA Sosa CNP    Answers submitted by the patient for this visit:  Patient Health Questionnaire (Submitted on 3/12/2024)  If you checked off any problems, how difficult have these problems made it for you  to do your work, take care of things at home, or get along with other people?: Somewhat difficult  PHQ9 TOTAL SCORE: 7

## 2024-03-17 ENCOUNTER — ANCILLARY PROCEDURE (OUTPATIENT)
Dept: GENERAL RADIOLOGY | Facility: CLINIC | Age: 29
End: 2024-03-17
Attending: PHYSICIAN ASSISTANT
Payer: COMMERCIAL

## 2024-03-17 ENCOUNTER — NURSE TRIAGE (OUTPATIENT)
Dept: NURSING | Facility: CLINIC | Age: 29
End: 2024-03-17

## 2024-03-17 ENCOUNTER — OFFICE VISIT (OUTPATIENT)
Dept: URGENT CARE | Facility: URGENT CARE | Age: 29
End: 2024-03-17
Payer: COMMERCIAL

## 2024-03-17 ENCOUNTER — VIRTUAL VISIT (OUTPATIENT)
Dept: URGENT CARE | Facility: CLINIC | Age: 29
End: 2024-03-17
Payer: COMMERCIAL

## 2024-03-17 VITALS
HEART RATE: 121 BPM | WEIGHT: 123.5 LBS | TEMPERATURE: 98.6 F | BODY MASS INDEX: 17.72 KG/M2 | OXYGEN SATURATION: 100 % | SYSTOLIC BLOOD PRESSURE: 125 MMHG | RESPIRATION RATE: 16 BRPM | DIASTOLIC BLOOD PRESSURE: 88 MMHG

## 2024-03-17 DIAGNOSIS — U07.1 INFECTION DUE TO 2019 NOVEL CORONAVIRUS: ICD-10-CM

## 2024-03-17 DIAGNOSIS — U07.1 INFECTION DUE TO 2019 NOVEL CORONAVIRUS: Primary | ICD-10-CM

## 2024-03-17 DIAGNOSIS — J45.20 MILD INTERMITTENT ASTHMA, UNSPECIFIED WHETHER COMPLICATED: ICD-10-CM

## 2024-03-17 DIAGNOSIS — J45.30 MILD PERSISTENT ASTHMA, UNSPECIFIED WHETHER COMPLICATED: ICD-10-CM

## 2024-03-17 PROCEDURE — 99214 OFFICE O/P EST MOD 30 MIN: CPT | Performed by: PHYSICIAN ASSISTANT

## 2024-03-17 PROCEDURE — 71046 X-RAY EXAM CHEST 2 VIEWS: CPT | Mod: TC | Performed by: RADIOLOGY

## 2024-03-17 PROCEDURE — 99207 PR NON-BILLABLE SERV PER CHARTING: CPT

## 2024-03-17 NOTE — PROGRESS NOTES
Manoj is a 28 year old adult who presents for a billable video visit.    ASSESSMENT/PLAN:  Diagnoses and all orders for this visit:    Infection due to 2019 novel coronavirus    Mild intermittent asthma, unspecified whether complicated      I advised patient to be seen in person at the Urgent care clinic for a lung exam. Patient agreed to plan and verbalized understanding.     SUBJECTIVE:    Patient reports possible asthma flare up. He has mild intermittent asthma. He takes albuterol inhaler as needed.  He notes he has COVID-19 infection. He is currently on Paxlovid, day 5. He notes cough and shortness of breath. He took his albuterol inhaler one hour ago which helped somewhat. He reports symptoms have worsened since onset and that the Paxlovid is not helping. He notes his Oxygen saturation is %. He denies fever or chills, chest pain, wheezing.    ROS: Pertinent ROS neg other than the symptoms noted above in the HPI.     OBJECTIVE:  Vitals not done due to this being a virtual visit    GENERAL: healthy, alert and no distress  EYES: Eyes grossly normal to inspection,conjunctivae and sclerae normal  RESP: Able to speak in complete sentences, no audible wheeze or cough  SKIN: no suspicious lesions or rashes  NEURO: mentation intact and speech normal  PSYCH: mentation appears normal, affect normal/bright    Video-Visit Details    Type of service:  Video Visit  Video Start Time: 11:33  Video End Time: 11:41    Originating Location: Home    Distant Location:  [facility]     Platform used for Video Visit: Naa Barrett PA-C

## 2024-03-17 NOTE — PROGRESS NOTES
SUBJECTIVE:  Manoj Castellanos is a 28 year old adult who comes in with concerns for continued URI related symptoms.  Patient tested positive for COVID approximately 5 days ago.  Does not have any wheezing but does feel slightly short of breath and tight in the chest for the past 2 days.  Does have underlying history of asthma.  Cough is primarily dry in nature.  Does have some mucus in the nasal.  Did take Paxlovid for COVID and finished the last dose today.  Concerned about possible pneumonia and would like to have her lungs checked.  Does not have any current fevers.  History of asthma and does have albuterol.  Otherwise at baseline health.        Past Medical History:   Diagnosis Date    Depressive disorder 2014    it s chill now tho I m doing fine    Uncomplicated asthma early 2000s     Patient Active Problem List   Diagnosis    Abnormal sinus finding on MRI    Mild persistent asthma    Allergic rhinitis due to animals    Allergic rhinitis due to dust mite    Seasonal allergic rhinitis due to pollen    Allergic conjunctivitis of both eyes    Palpitations    Seasonal affective disorder (H24)    Numbness and tingling    Vertigo    Vitamin D deficiency    Eosinophilic esophagitis    Gender dysphoria in adult    Nasal obstruction    Deviated nasal septum    Nasal valve stenosis    Hypertrophy of inferior nasal turbinate     Current Outpatient Medications   Medication    albuterol (PROAIR HFA/PROVENTIL HFA/VENTOLIN HFA) 108 (90 Base) MCG/ACT inhaler    cetirizine (ZYRTEC) 10 MG tablet    emtricitabine-tenofovir (TRUVADA) 200-300 MG per tablet    finasteride (PROSCAR) 5 MG tablet    gabapentin (NEURONTIN) 300 MG capsule    meclizine (ANTIVERT) 25 MG tablet    methylphenidate (RITALIN) 5 MG tablet    mometasone (ELOCON) 0.1 % external cream    mometasone (ELOCON) 0.1 % external ointment    nirmatrelvir and ritonavir (PAXLOVID) 300 mg/100 mg therapy pack    omeprazole (PRILOSEC) 40 MG DR capsule    ondansetron (ZOFRAN ODT) 4  MG ODT tab    OXcarbazepine (TRILEPTAL) 150 MG tablet    pimecrolimus (ELIDEL) 1 % external cream    VITAMIN D3 25 MCG (1000 UT) tablet    carbamide peroxide (DEBROX) 6.5 % otic solution    IBUPROFEN     No current facility-administered medications for this visit.     Social History     Socioeconomic History    Marital status: Single     Spouse name: Not on file    Number of children: Not on file    Years of education: Not on file    Highest education level: Not on file   Occupational History    Not on file   Tobacco Use    Smoking status: Never     Passive exposure: Never    Smokeless tobacco: Never   Vaping Use    Vaping Use: Never used   Substance and Sexual Activity    Alcohol use: Yes     Comment: infrequent at worst/ occa     Drug use: No    Sexual activity: Not Currently     Birth control/protection: Condom   Other Topics Concern    Parent/sibling w/ CABG, MI or angioplasty before 65F 55M? No   Social History Narrative    Not on file     Social Determinants of Health     Financial Resource Strain: Unknown (10/9/2023)    Financial Resource Strain     Within the past 12 months, have you or your family members you live with been unable to get utilities (heat, electricity) when it was really needed?: Patient refused   Food Insecurity: Unknown (10/9/2023)    Food Insecurity     Within the past 12 months, did you worry that your food would run out before you got money to buy more?: Patient refused     Within the past 12 months, did the food you bought just not last and you didn t have money to get more?: Patient refused   Transportation Needs: Unknown (10/9/2023)    Transportation Needs     Within the past 12 months, has lack of transportation kept you from medical appointments, getting your medicines, non-medical meetings or appointments, work, or from getting things that you need?: Patient refused   Physical Activity: Not on file   Stress: Not on file   Social Connections: Not on file   Interpersonal Safety: Not  on file   Housing Stability: Unknown (10/9/2023)    Housing Stability     Do you have housing? : Patient refused     Are you worried about losing your housing?: Patient refused     ROS negative other than stated above    Exam:  GENERAL APPEARANCE: healthy, alert and no distress  EYES: EOMI,  PERRL  HENT: ear canals and TM's normal and nose and mouth without ulcers or lesions  NECK: no adenopathy, no asymmetry, masses, or scars and thyroid normal to palpation  RESP: lungs clear to auscultation - no rales, rhonchi or wheezes  CV: regular rates and rhythm, normal S1 S2, no S3 or S4 and no murmur, click or rub -  SKIN: no suspicious lesions or rashes    Results for orders placed or performed in visit on 03/17/24   XR Chest 2 Views     Status: None    Narrative    EXAM: XR CHEST 2 VIEWS  LOCATION: [facility]  DATE: 3/17/2024    INDICATION:  Infection due to 2019 novel coronavirus  COMPARISON: None.      Impression    IMPRESSION:   No acute abnormality.    No focal pulmonary infiltrate, pleural effusion, or pneumothorax. The cardiac size and mediastinal contours appear within normal limits.      assessment/plan:  (U07.1) Infection due to 2019 novel coronavirus  (primary encounter diagnosis)  Comment:   Plan: XR Chest 2 Views          Patient with recent COVID in setting of history of asthma.  Did take Paxlovid and overall tolerated well.  Vitals and exam are unremarkable.  Did feel slightly short of breath and chest x-ray was obtained.  There is no infiltrate and lungs appear normal.  Advise continue with over-the-counter med for symptomatic relief.  Use inhaler as needed.  Prednisone not indicated at this time.  Red flag signs were discussed will return to clinic if symptoms worsen or new symptoms develop.    (J45.30) Mild persistent asthma, unspecified whether complicated  Comment:   Plan:   Mild persistent asthma has albuterol if needed.  Discussed prednisone and does not feel that needs at this  time.

## 2024-03-17 NOTE — TELEPHONE ENCOUNTER
S-(situation): asthma flareup, COVID    B-(background):   COVID on 3/12, on Paxlovid. Last dose this morning.  Respiratory symptoms started getting worse last night      A-(assessment):   Cough started last night, pt states they had no cough when they tested positive for COVID. No wheezing. Able to sleep.  SOB  Runny nose and congestion    O2 sat 98%  Pulse 100 (after walking)  No fever, subjective  No chest pain, no tightness        R-(recommendations):   Virtual visit today. Use inhaler, 2 puffs PRN every 4 hours for SOB.  Pt verbalized understanding.    Shilpi Shaw RN/[place] Nurse Advisor              Reason for Disposition   [1] MILD asthma attack (e.g., no SOB at rest, mild SOB with walking, speaks normally in sentences, mild wheezing) AND [2] lasting > 24 hours on prescribed treatment    Additional Information   Negative: SEVERE asthma attack (e.g., struggling for each breath, speaks in single words, loud wheezes, pulse >120)  (RED  Zone)   Negative: Bluish (or gray) lips or face now   Negative: Difficult to awaken or acting confused (e.g., disoriented, slurred speech)   Negative: Passed out (i.e., lost consciousness, collapsed and was not responding)   Negative: Wheezing started suddenly after medicine, an allergic food, or bee sting   Negative: Sounds like a life-threatening emergency to the triager   Negative: [1] MODERATE asthma attack (e.g., SOB at rest, speaks in phrases, audible wheezes) AND [2] doesn't have neb or inhaler available   Negative: Peak flow rate less than 50% of baseline level  (RED  Zone)   Negative: [1] MODERATE asthma attack (e.g., SOB at rest, speaks in phrases, audible wheezes) AND [2] not resolved after 2 or 3 inhaler or nebulizer treatments given 20 minutes apart   Negative: Oxygen level (e.g., pulse oximetry) 90 percent or lower   Negative: [1] Hospitalized before with asthma AND [2] feels the same now   Negative: Chest pain  (Exception: Mild chest tightness that feels the  same as prior asthma attacks.)   Negative: [1] Peak flow rate 50-79% of baseline level (YELLOW zone) AND [2] still present after 2 or 3 inhaler or nebulizer treatments given 20 minutes apart   Negative: Quick-relief asthma medicine (e.g., albuterol /salbutamol, levalbuterol by inhaler or nebulizer) is needed more frequently than every 4 hours to keep you comfortable   Negative: Fever > 103 F (39.4 C)   Negative: Patient sounds very sick or weak to the triager   Negative: [1] Continuous (nonstop) coughing AND [2] keeps from working or sleeping AND [3] not improved after 2 or 3 inhaler or nebulizer treatments given 20 minutes apart   Negative: [1] Fever > 101 F (38.3 C) AND [2] age > 60 years   Negative: [1] Wheezing or coughing AND [2] hasn't used neb or inhaler (up to 3 treatments given 20 minutes apart) AND [3] it's available   Negative: [1] COVID-19 diagnosed or suspected (e.g., COVID-19 present in community, known COVID-19 exposure, positive test) AND [2] COVID symptoms (e.g., cough, fever)   Negative: [1] Influenza diagnosed or suspected (e.g., flu present in the community, known flu exposure) AND [2] FLU symptoms (e.g., cough with fever)   Negative: Oxygen level (e.g., pulse oximetry) 91 to 94 percent    Protocols used: Asthma Attack-A-AH

## 2024-04-08 DIAGNOSIS — E55.9 VITAMIN D DEFICIENCY: ICD-10-CM

## 2024-04-08 RX ORDER — CHOLECALCIFEROL (VITAMIN D3) 25 MCG
TABLET ORAL
Qty: 180 TABLET | Refills: 1 | Status: SHIPPED | OUTPATIENT
Start: 2024-04-08

## 2024-04-19 ASSESSMENT — ASTHMA QUESTIONNAIRES
QUESTION_5 LAST FOUR WEEKS HOW WOULD YOU RATE YOUR ASTHMA CONTROL: COMPLETELY CONTROLLED
QUESTION_4 LAST FOUR WEEKS HOW OFTEN HAVE YOU USED YOUR RESCUE INHALER OR NEBULIZER MEDICATION (SUCH AS ALBUTEROL): ONCE A WEEK OR LESS
ACT_TOTALSCORE: 23
QUESTION_1 LAST FOUR WEEKS HOW MUCH OF THE TIME DID YOUR ASTHMA KEEP YOU FROM GETTING AS MUCH DONE AT WORK, SCHOOL OR AT HOME: NONE OF THE TIME
QUESTION_3 LAST FOUR WEEKS HOW OFTEN DID YOUR ASTHMA SYMPTOMS (WHEEZING, COUGHING, SHORTNESS OF BREATH, CHEST TIGHTNESS OR PAIN) WAKE YOU UP AT NIGHT OR EARLIER THAN USUAL IN THE MORNING: NOT AT ALL
ACT_TOTALSCORE: 23
QUESTION_2 LAST FOUR WEEKS HOW OFTEN HAVE YOU HAD SHORTNESS OF BREATH: ONCE OR TWICE A WEEK

## 2024-04-20 ENCOUNTER — HEALTH MAINTENANCE LETTER (OUTPATIENT)
Age: 29
End: 2024-04-20

## 2024-04-26 ENCOUNTER — OFFICE VISIT (OUTPATIENT)
Dept: FAMILY MEDICINE | Facility: CLINIC | Age: 29
End: 2024-04-26
Payer: COMMERCIAL

## 2024-04-26 VITALS
DIASTOLIC BLOOD PRESSURE: 72 MMHG | BODY MASS INDEX: 18.32 KG/M2 | SYSTOLIC BLOOD PRESSURE: 130 MMHG | RESPIRATION RATE: 15 BRPM | OXYGEN SATURATION: 97 % | HEART RATE: 102 BPM | HEIGHT: 70 IN | WEIGHT: 128 LBS | TEMPERATURE: 97.7 F

## 2024-04-26 DIAGNOSIS — J34.3 HYPERTROPHY OF INFERIOR NASAL TURBINATE: ICD-10-CM

## 2024-04-26 DIAGNOSIS — L40.4 GUTTATE PSORIASIS: ICD-10-CM

## 2024-04-26 DIAGNOSIS — Z29.81 ENCOUNTER FOR HIV PRE-EXPOSURE PROPHYLAXIS: ICD-10-CM

## 2024-04-26 DIAGNOSIS — J34.2 DEVIATED NASAL SEPTUM: ICD-10-CM

## 2024-04-26 DIAGNOSIS — Z01.818 PREOP GENERAL PHYSICAL EXAM: Primary | ICD-10-CM

## 2024-04-26 DIAGNOSIS — J34.89 NASAL VALVE STENOSIS: ICD-10-CM

## 2024-04-26 DIAGNOSIS — F90.9 ATTENTION DEFICIT HYPERACTIVITY DISORDER (ADHD), UNSPECIFIED ADHD TYPE: ICD-10-CM

## 2024-04-26 DIAGNOSIS — J34.89 NASAL OBSTRUCTION: ICD-10-CM

## 2024-04-26 DIAGNOSIS — J45.30 MILD PERSISTENT ASTHMA WITHOUT COMPLICATION: ICD-10-CM

## 2024-04-26 DIAGNOSIS — G50.0 TRIGEMINAL NEURALGIA: ICD-10-CM

## 2024-04-26 DIAGNOSIS — L64.9 MALE PATTERN ALOPECIA: ICD-10-CM

## 2024-04-26 DIAGNOSIS — M79.2 NEUROPATHIC PAIN: ICD-10-CM

## 2024-04-26 PROCEDURE — 87389 HIV-1 AG W/HIV-1&-2 AB AG IA: CPT | Performed by: FAMILY MEDICINE

## 2024-04-26 PROCEDURE — 36415 COLL VENOUS BLD VENIPUNCTURE: CPT | Performed by: FAMILY MEDICINE

## 2024-04-26 PROCEDURE — 99214 OFFICE O/P EST MOD 30 MIN: CPT | Performed by: FAMILY MEDICINE

## 2024-04-26 RX ORDER — METHYLPHENIDATE HYDROCHLORIDE 5 MG/1
5 TABLET ORAL 2 TIMES DAILY
Qty: 60 TABLET | Refills: 0 | Status: SHIPPED | OUTPATIENT
Start: 2024-06-03 | End: 2024-07-03

## 2024-04-26 RX ORDER — EMTRICITABINE AND TENOFOVIR DISOPROXIL FUMARATE 200; 300 MG/1; MG/1
1 TABLET, FILM COATED ORAL DAILY
Qty: 90 TABLET | Refills: 0 | Status: SHIPPED | OUTPATIENT
Start: 2024-04-26 | End: 2024-07-03

## 2024-04-26 RX ORDER — METHYLPHENIDATE HYDROCHLORIDE 5 MG/1
5 TABLET ORAL 2 TIMES DAILY
Qty: 60 TABLET | Refills: 0 | Status: SHIPPED | OUTPATIENT
Start: 2024-07-04 | End: 2024-08-03

## 2024-04-26 RX ORDER — METHYLPHENIDATE HYDROCHLORIDE 5 MG/1
5 TABLET ORAL 2 TIMES DAILY
Qty: 60 TABLET | Refills: 0 | Status: SHIPPED | OUTPATIENT
Start: 2024-05-03 | End: 2024-06-02

## 2024-04-26 ASSESSMENT — PAIN SCALES - GENERAL: PAINLEVEL: NO PAIN (0)

## 2024-04-26 NOTE — PROGRESS NOTES
Preoperative Evaluation  [address]  [address]  [place] [address]  Phone: [phone]  Fax: [phone]  Primary Provider: Heron Burnette  Pre-op Performing Provider: HERON BURNETTE Y  Apr 26, 2024       Ty is a 28 year old, presenting for the following:  Pre-Op Exam (Pre Op 5/9/24 )        4/26/2024     3:05 PM   Additional Questions   Roomed by Emma Rawls     Surgical Information  Surgery/Procedure: Complex Open Septoplasty, Repair of Nasal Vestibular Stenosis, Bilateral Inferior Turbinate Reduction   Surgery Location: [facility] [place]  Surgeon:   Renetta Beltran MD   Surgery Date: 5/9/24  Time of Surgery: 7:15 pm   Where patient plans to recover: At home with family  Fax number for surgical facility: Note does not need to be faxed, will be available electronically in Epic.    Assessment & Plan     The proposed surgical procedure is considered LOW risk.    Preop general physical exam    Nasal obstruction    Nasal valve stenosis    Deviated nasal septum    Hypertrophy of inferior nasal turbinate    Encounter for HIV pre-exposure prophylaxis  - emtricitabine-tenofovir (TRUVADA) 200-300 MG per tablet; Take 1 tablet by mouth daily  - HIV Antigen Antibody Combo; Future  - HIV Antigen Antibody Combo    Attention deficit hyperactivity disorder (ADHD), unspecified ADHD type  - methylphenidate (RITALIN) 5 MG tablet; Take 1 tablet (5 mg) by mouth 2 times daily for 30 days  - methylphenidate (RITALIN) 5 MG tablet; Take 1 tablet (5 mg) by mouth 2 times daily for 30 days  - methylphenidate (RITALIN) 5 MG tablet; Take 1 tablet (5 mg) by mouth 2 times daily for 30 days    Male pattern alopecia    Neuropathic pain    Guttate psoriasis    Trigeminal neuralgia    Mild persistent asthma without complication       - No identified additional risk factors other than previously addressed    Antiplatelet or Anticoagulation Medication Instructions   - Patient is on no antiplatelet or  anticoagulation medications.    Additional Medication Instructions  Patient is to take all scheduled medications on the day of surgery EXCEPT for modifications listed below:   - pregabalin, gabapentin: Continue without modification.  - Hold Ritalin on morning of surgery    - Herbal medications and vitamins: HOLD 14 days prior to surgery.    Recommendation  APPROVAL GIVEN to proceed with proposed procedure, without further diagnostic evaluation.          Subjective       HPI related to upcoming procedure:nasal obstruction         4/19/2024    12:37 PM   Preop Questions   1. Have you ever had a heart attack or stroke? No   2. Have you ever had surgery on your heart or blood vessels, such as a stent placement, a coronary artery bypass, or surgery on an artery in your head, neck, heart, or legs? No   3. Do you have chest pain with activity? No   4. Do you have a history of  heart failure? No   5. Do you currently have a cold, bronchitis or symptoms of other infection? No   6. Do you have a cough, shortness of breath, or wheezing? No    7. Do you or anyone in your family have previous history of blood clots? No   8. Do you or does anyone in your family have a serious bleeding problem such as prolonged bleeding following surgeries or cuts? No   9. Have you ever had problems with anemia or been told to take iron pills? No   10. Have you had any abnormal blood loss such as black, tarry or bloody stools, or abnormal vaginal bleeding? No   10. Have you had any abnormal blood loss such as black, tarry or bloody stools? No   11. Have you ever had a blood transfusion? No   12. Are you willing to have a blood transfusion if it is medically needed before, during, or after your surgery? Yes   13. Have you or any of your relatives ever had problems with anesthesia? No   14. Do you have sleep apnea, excessive snoring or daytime drowsiness? No   15. Do you have any artifical heart valves or other implanted medical devices like a  pacemaker, defibrillator, or continuous glucose monitor? No   16. Do you have artificial joints? No   17. Are you allergic to latex? No   18. Is there any chance that you may be pregnant? No       Health Care Directive  Patient does not have a Health Care Directive or Living Will: Discussed advance care planning with patient; however, patient declined at this time.    Preoperative Review of    reviewed - controlled substances reflected in medication list.      Status of Chronic Conditions:  See problem list for active medical problems.  Problems all longstanding and stable, except as noted/documented.  See ROS for pertinent symptoms related to these conditions.    Patient Active Problem List    Diagnosis Date Noted    Nasal obstruction 01/17/2024     Priority: Medium    Deviated nasal septum 01/17/2024     Priority: Medium    Nasal valve stenosis 01/17/2024     Priority: Medium    Hypertrophy of inferior nasal turbinate 01/17/2024     Priority: Medium    Gender dysphoria in adult 08/16/2023     Priority: Medium    Numbness and tingling 05/16/2023     Priority: Medium    Vertigo 05/16/2023     Priority: Medium    Vitamin D deficiency 05/16/2023     Priority: Medium    Eosinophilic esophagitis 05/16/2023     Priority: Medium    Palpitations 03/28/2019     Priority: Medium    Mild persistent asthma 03/06/2018     Priority: Medium    Allergic rhinitis due to animals 03/06/2018     Priority: Medium     Skin prick testing 3/6/18 positive for cat      Allergic rhinitis due to dust mite 03/06/2018     Priority: Medium    Seasonal allergic rhinitis due to pollen 03/06/2018     Priority: Medium     Skin prick testing 3/6/18 positive for walnut tree, birch, oak, willow, demetris, cocklebur, sagebrush/mugwort, and sorrel      Allergic conjunctivitis of both eyes 03/06/2018     Priority: Medium    Abnormal sinus finding on MRI 10/19/2017     Priority: Medium    Seasonal affective disorder (H24) 01/01/2014     Priority: Medium      it's chill now tho I'm doing fine        Past Medical History:   Diagnosis Date    Depressive disorder 2014    it s chill now tho I m doing fine    Uncomplicated asthma early 2000s     Past Surgical History:   Procedure Laterality Date    BIOPSY  feb 2023    As part of upper endoscopy    COMBINED ESOPHAGOSCOPY, GASTROSCOPY, DUODENOSCOPY (EGD) WITH CO2 INSUFFLATION N/A 01/02/2023    Procedure: ESOPHAGOGASTRODUODENOSCOPY, WITH CO2 INSUFFLATION;  Surgeon: Zahraa Robbins DO;  Location: [facility]    COMBINED ESOPHAGOSCOPY, GASTROSCOPY, DUODENOSCOPY (EGD) WITH CO2 INSUFFLATION N/A 03/28/2023    Procedure: ESOPHAGOGASTRODUODENOSCOPY, WITH CO2 INSUFFLATION;  Surgeon: Zahraa Robbins DO;  Location: [facility]    ESOPHAGOSCOPY, GASTROSCOPY, DUODENOSCOPY (EGD), COMBINED N/A 01/02/2023    Procedure: ESOPHAGOGASTRODUODENOSCOPY, WITH BIOPSY;  Surgeon: Zahraa Robbins DO;  Location: [facility]    ESOPHAGOSCOPY, GASTROSCOPY, DUODENOSCOPY (EGD), COMBINED N/A 03/28/2023    Procedure: ESOPHAGOGASTRODUODENOSCOPY, WITH BIOPSY;  Surgeon: Zahraa Robbins DO;  Location: [facility] OR    HC TOOTH EXTRACTION W/FORCEP  2016    4 teeth      Current Outpatient Medications   Medication Sig Dispense Refill    albuterol (PROAIR HFA/PROVENTIL HFA/VENTOLIN HFA) 108 (90 Base) MCG/ACT inhaler Inhale 2 puffs into the lungs every 4 hours as needed for shortness of breath / dyspnea or wheezing 18 g 1    cetirizine (ZYRTEC) 10 MG tablet TAKE 1 TABLET(10 MG) BY MOUTH TWICE DAILY 180 tablet 1    emtricitabine-tenofovir (TRUVADA) 200-300 MG per tablet Take 1 tablet by mouth daily 90 tablet 0    finasteride (PROSCAR) 5 MG tablet TAKE 1/4 TABLET BY MOUTH EVERY DAY 23 tablet 3    gabapentin (NEURONTIN) 300 MG capsule Take 1 capsule (300 mg) by mouth 2 times daily PATIENT STATES HE TAKING 300 MG TWICE DAILY 180 capsule 3    meclizine (ANTIVERT) 25 MG tablet Take 1 tablet (25 mg) by mouth 2 times daily as needed for dizziness . Appointment required for additional refills.  20 tablet 0    methylphenidate (RITALIN) 5 MG tablet Take 1 tablet (5 mg) by mouth 2 times daily 60 tablet 0    mometasone (ELOCON) 0.1 % external cream Apply topically daily To areas of psoriasis. 50 g 3    mometasone (ELOCON) 0.1 % external ointment Apply topically twice daily.  Do not expose skin to sun after applying steroid cream. 454 g 1    omeprazole (PRILOSEC) 40 MG DR capsule Take 1 capsule (40 mg) by mouth every morning On an empty stomach about 30 minutes before breakfast 90 capsule 3    ondansetron (ZOFRAN ODT) 4 MG ODT tab DISSOLVE 1 TABLET(4 MG) ON THE TONGUE EVERY 8 HOURS AS NEEDED FOR NAUSEA 20 tablet 0    OXcarbazepine (TRILEPTAL) 150 MG tablet Take 2 tablets (300 mg) by mouth 2 times daily 120 tablet 11    pimecrolimus (ELIDEL) 1 % external cream APPLY 1 APPLICATION TWICE DAILY TOPICALLY TO GENITALS FOR 2 WEEKS      vitamin D3 25 mcg (1000 units) tablet TAKE 2 TABLETS BY MOUTH DAILY 180 tablet 1    carbamide peroxide (DEBROX) 6.5 % otic solution Place 5 drops into both ears 2 times daily (Patient not taking: Reported on 3/17/2024) 15 mL 0     No current facility-administered medications for this visit.    OTC - tylenol     Allergies   Allergen Reactions    Amoxicillin Swelling     Lip swelling     Cefuroxime      Swelling         Social History     Tobacco Use    Smoking status: Never     Passive exposure: Never    Smokeless tobacco: Never   Substance Use Topics    Alcohol use: Yes     Comment: infrequent at worst     Family History   Problem Relation Age of Onset    Bipolar Disorder Mother     Psoriasis Mother     Depression Mother     Substance Abuse Father     Asthma Father     Depression Father     Depression Sister     Anxiety Disorder Sister     Attention Deficit Disorder Sister     Fibromyalgia Sister     Pancreatic Cancer Maternal Grandfather     Other Cancer Maternal Grandfather     Depression Maternal Grandfather     Prostate Cancer Paternal Grandfather     Depression Other     Anxiety  "Disorder Other     Mental Illness Other     Depression Sister     Anxiety Disorder Sister      History   Drug Use No         Review of Systems    Review of Systems  Constitutional, HEENT, cardiovascular, pulmonary, gi and gu systems are negative, except as otherwise noted.    Objective    /72 (BP Location: Right arm, Patient Position: Sitting, Cuff Size: Adult Regular)   Pulse 102   Temp 97.7  F (36.5  C) (Temporal)   Resp 15   Ht 1.77 m (5' 9.69\")   Wt 58.1 kg (128 lb)   SpO2 97%   BMI 18.53 kg/m     Estimated body mass index is 18.53 kg/m  as calculated from the following:    Height as of this encounter: 1.77 m (5' 9.69\").    Weight as of this encounter: 58.1 kg (128 lb).  Physical Exam  GENERAL: alert and no distress  EYES: Eyes grossly normal to inspection  RESP: lungs clear to auscultation - no rales, rhonchi or wheezes  CV: regular rate and rhythm, normal S1 S2  ABDOMEN: soft, nontender, no hepatosplenomegaly, no masses and bowel sounds normal  MS: no gross musculoskeletal defects noted, no edema  SKIN: no suspicious lesions or rashes  NEURO: Normal strength and tone, mentation intact and speech normal  PSYCH: mentation appears normal, affect normal/bright    Recent Labs   Lab Test 01/17/24  1108 03/24/23  1423 11/23/22  1203 11/23/22  1201   HGB  --  15.7  --  17.3   PLT  --  236  --  291    142   < >  --    POTASSIUM 4.1 4.0   < >  --    CR 0.90 0.85   < >  --     < > = values in this interval not displayed.        Diagnostics  Labs pending at this time.  Results will be reviewed when available.   No EKG required, no history of coronary heart disease, significant arrhythmia, peripheral arterial disease or other structural heart disease.    Revised Cardiac Risk Index (RCRI)  The patient has the following serious cardiovascular risks for perioperative complications:   - No serious cardiac risks = 0 points     RCRI Interpretation: 0 points: Class I (very low risk - 0.4% complication rate)     "     Signed Electronically by: Parrish Burnette MD  Copy of this evaluation report is provided to requesting physician.

## 2024-04-26 NOTE — PATIENT INSTRUCTIONS
Preparing for Your Surgery  Getting started  A nurse will call you to review your health history and instructions. They will give you an arrival time based on your scheduled surgery time. Please be ready to share:  Your doctor's clinic name and phone number  Your medical, surgical, and anesthesia history  A list of allergies and sensitivities  A list of medicines, including herbal treatments and over-the-counter drugs  Whether the patient has a legal guardian (ask how to send us the papers in advance)  Please tell us if you're pregnant--or if there's any chance you might be pregnant. Some surgeries may injure a fetus (unborn baby), so they require a pregnancy test. Surgeries that are safe for a fetus don't always need a test, and you can choose whether to have one.   If you have a child who's having surgery, please ask for a copy of Preparing for Your Child's Surgery.    Preparing for surgery  Within 10 to 30 days of surgery: Have a pre-op exam (sometimes called an H&P, or History and Physical). This can be done at a clinic or pre-operative center.  If you're having a , you may not need this exam. Talk to your care team.  At your pre-op exam, talk to your care team about all medicines you take. If you need to stop any medicines before surgery, ask when to start taking them again.  We do this for your safety. Many medicines can make you bleed too much during surgery. Some change how well surgery (anesthesia) drugs work.  Call your insurance company to let them know you're having surgery. (If you don't have insurance, call [phone].)  Call your clinic if there's any change in your health. This includes signs of a cold or flu (sore throat, runny nose, cough, rash, fever). It also includes a scrape or scratch near the surgery site.  If you have questions on the day of surgery, call your hospital or surgery center.  Eating and drinking guidelines  For your safety: Unless your surgeon tells you otherwise,  follow the guidelines below.  Eat and drink as usual until 8 hours before you arrive for surgery. After that, no food or milk.  Drink clear liquids until 2 hours before you arrive. These are liquids you can see through, like water, Gatorade, and Propel Water. They also include plain black coffee and tea (no cream or milk), candy, and breath mints. You can spit out gum when you arrive.  If you drink alcohol: Stop drinking it the night before surgery.  If your care team tells you to take medicine on the morning of surgery, it's okay to take it with a sip of water.  Preventing infection  Shower or bathe the night before and morning of your surgery. Follow the instructions your clinic gave you. (If no instructions, use regular soap.)  Don't shave or clip hair near your surgery site. We'll remove the hair if needed.  Don't smoke or vape the morning of surgery. You may chew nicotine gum up to 2 hours before surgery. A nicotine patch is okay.  Note: Some surgeries require you to completely quit smoking and nicotine. Check with your surgeon.  Your care team will make every effort to keep you safe from infection. We will:  Clean our hands often with soap and water (or an alcohol-based hand rub).  Clean the skin at your surgery site with a special soap that kills germs.  Give you a special gown to keep you warm. (Cold raises the risk of infection.)  Wear special hair covers, masks, gowns and gloves during surgery.  Give antibiotic medicine, if prescribed. Not all surgeries need antibiotics.  What to bring on the day of surgery  Photo ID and insurance card  Copy of your health care directive, if you have one  Glasses and hearing aids (bring cases)  You can't wear contacts during surgery  Inhaler and eye drops, if you use them (tell us about these when you arrive)  CPAP machine or breathing device, if you use them  A few personal items, if spending the night  If you have . . .  A pacemaker, ICD (cardiac defibrillator) or other  implant: Bring the ID card.  An implanted stimulator: Bring the remote control.  A legal guardian: Bring a copy of the certified (court-stamped) guardianship papers.  Please remove any jewelry, including body piercings. Leave jewelry and other valuables at home.  If you're going home the day of surgery  You must have a responsible adult drive you home. They should stay with you overnight as well.  If you don't have someone to stay with you, and you aren't safe to go home alone, we may keep you overnight. Insurance often won't pay for this.  After surgery  If it's hard to control your pain or you need more pain medicine, please call your surgeon's office.  Questions?   If you have any questions for your care team, list them here: _________________________________________________________________________________________________________________________________________________________________________ ____________________________________ ____________________________________ ____________________________________  For informational purposes only. Not to replace the advice of your health care provider. Copyright   2003, 2019 [employer] [facility]. All rights reserved. Clinically reviewed by Jacey Devries MD. [employer] 803108 - REV 12/22.    How to Take Your Medication Before Surgery  - Take all of your medications before surgery as usual  - HOLD (do not take) Aspirin for 7 days, aleve for 4 days, Advil for one day   - HOLD (do not take) on morning of surgery - gabapentin, ritalin   - STOP taking all vitamins and herbal supplements 14 days before surgery.

## 2024-04-27 LAB — HIV 1+2 AB+HIV1 P24 AG SERPL QL IA: NONREACTIVE

## 2024-05-08 ENCOUNTER — ANESTHESIA EVENT (OUTPATIENT)
Dept: SURGERY | Facility: AMBULATORY SURGERY CENTER | Age: 29
End: 2024-05-08
Payer: COMMERCIAL

## 2024-05-08 NOTE — ANESTHESIA PREPROCEDURE EVALUATION
Anesthesia Pre-Procedure Evaluation    Patient: Manoj Castellanos   MRN: [id number] : 1995        Procedure : Procedure(s):  Complex Open Septoplasty, Repair of Nasal Vestibular Stenosis, Bilateral Inferior Turbinate Reduction          Past Medical History:   Diagnosis Date     Depressive disorder     it s chill now tho I m doing fine     Uncomplicated asthma early       Past Surgical History:   Procedure Laterality Date     BIOPSY  2023    As part of upper endoscopy     COMBINED ESOPHAGOSCOPY, GASTROSCOPY, DUODENOSCOPY (EGD) WITH CO2 INSUFFLATION N/A 2023    Procedure: ESOPHAGOGASTRODUODENOSCOPY, WITH CO2 INSUFFLATION;  Surgeon: Zahraa Robbins DO;  Location: [facility] OR     COMBINED ESOPHAGOSCOPY, GASTROSCOPY, DUODENOSCOPY (EGD) WITH CO2 INSUFFLATION N/A 2023    Procedure: ESOPHAGOGASTRODUODENOSCOPY, WITH CO2 INSUFFLATION;  Surgeon: Zahraa Robbins DO;  Location: [facility] OR     ESOPHAGOSCOPY, GASTROSCOPY, DUODENOSCOPY (EGD), COMBINED N/A 2023    Procedure: ESOPHAGOGASTRODUODENOSCOPY, WITH BIOPSY;  Surgeon: Zahraa Robbins DO;  Location: [facility] OR     ESOPHAGOSCOPY, GASTROSCOPY, DUODENOSCOPY (EGD), COMBINED N/A 2023    Procedure: ESOPHAGOGASTRODUODENOSCOPY, WITH BIOPSY;  Surgeon: Zahraa Robbins DO;  Location: [facility] OR     HC TOOTH EXTRACTION W/FORCEP      4 teeth       Allergies   Allergen Reactions     Amoxicillin Swelling     Lip swelling      Cefuroxime      Swelling       Social History     Tobacco Use     Smoking status: Never     Passive exposure: Never     Smokeless tobacco: Never   Substance Use Topics     Alcohol use: Yes     Comment: infrequent at worst      Wt Readings from Last 1 Encounters:   24 58.1 kg (128 lb)        Anesthesia Evaluation   Pt has had prior anesthetic. Type: MAC.        ROS/MED HX  ENT/Pulmonary:     (+)                     Mild Persistent, asthma                  Neurologic: Comment: ADHD    (+)    peripheral neuropathy, - trigeminal neuralgia.   "                         Cardiovascular:  - neg cardiovascular ROS     METS/Exercise Tolerance: >4 METS    Hematologic:  - neg hematologic  ROS     Musculoskeletal:  - neg musculoskeletal ROS     GI/Hepatic:  - neg GI/hepatic ROS     Renal/Genitourinary:  - neg Renal ROS     Endo:  - neg endo ROS     Psychiatric/Substance Use:     (+) psychiatric history anxiety and other (comment) (panic d/o)       Infectious Disease:  - neg infectious disease ROS     Malignancy:  - neg malignancy ROS     Other:  - neg other ROS          Physical Exam    Airway        Mallampati: II   TM distance: > 3 FB   Neck ROM: full   Mouth opening: > 3 cm    Respiratory Devices and Support         Dental     Comment: Lower retainer bar    (+) Completely normal teeth      Cardiovascular   cardiovascular exam normal       Rhythm and rate: regular and tachycardia     Pulmonary   pulmonary exam normal        breath sounds clear to auscultation       OUTSIDE LABS:  CBC:   Lab Results   Component Value Date    WBC 5.4 03/24/2023    WBC 6.0 11/23/2022    HGB 15.7 03/24/2023    HGB 17.3 11/23/2022    HCT 47.5 03/24/2023    HCT 52.3 11/23/2022     03/24/2023     11/23/2022     BMP:   Lab Results   Component Value Date     01/17/2024     03/24/2023    POTASSIUM 4.1 01/17/2024    POTASSIUM 4.0 03/24/2023    CHLORIDE 103 01/17/2024    CHLORIDE 103 03/24/2023    CO2 30 (H) 01/17/2024    CO2 31 (H) 03/24/2023    BUN 11.5 01/17/2024    BUN 11.2 03/24/2023    CR 0.90 01/17/2024    CR 0.85 03/24/2023    GLC 91 01/17/2024    GLC 92 03/24/2023     COAGS: No results found for: \"PTT\", \"INR\", \"FIBR\"  POC: No results found for: \"BGM\", \"HCG\", \"HCGS\"  HEPATIC:   Lab Results   Component Value Date    ALBUMIN 4.6 01/17/2024    PROTTOTAL 7.6 01/17/2024    ALT 9 01/17/2024    AST 18 01/17/2024    ALKPHOS 69 01/17/2024    BILITOTAL 0.3 01/17/2024     OTHER:   Lab Results   Component Value Date    IOANA 9.5 01/17/2024    LIPASE 34 11/23/2022    " AMYLASE 54 06/22/2022    TSH 2.86 05/09/2022    CRP <2.9 05/09/2022    SED 4 12/12/2022       Anesthesia Plan    ASA Status:  1    NPO Status:  NPO Appropriate    Anesthesia Type: General.     - Airway: ETT   Induction: Intravenous.   Maintenance: TIVA.        Consents    Anesthesia Plan(s) and associated risks, benefits, and realistic alternatives discussed. Questions answered and patient/representative(s) expressed understanding.     - Discussed:     - Discussed with:  Patient            Postoperative Care    Pain management: IV analgesics, Oral pain medications, Multi-modal analgesia.   PONV prophylaxis: Ondansetron (or other 5HT-3), Dexamethasone or Solumedrol, Background Propofol Infusion     Comments:             Ledy Dela Cruz MD    I have reviewed the pertinent notes and labs in the chart from the past 30 days and (re)examined the patient.  Any updates or changes from those notes are reflected in this note.

## 2024-05-09 ENCOUNTER — ANESTHESIA (OUTPATIENT)
Dept: SURGERY | Facility: AMBULATORY SURGERY CENTER | Age: 29
End: 2024-05-09
Payer: COMMERCIAL

## 2024-05-09 ENCOUNTER — HOSPITAL ENCOUNTER (OUTPATIENT)
Facility: AMBULATORY SURGERY CENTER | Age: 29
Discharge: HOME | End: 2024-05-09
Attending: OTOLARYNGOLOGY
Payer: COMMERCIAL

## 2024-05-09 VITALS
TEMPERATURE: 98 F | HEART RATE: 126 BPM | OXYGEN SATURATION: 97 % | RESPIRATION RATE: 16 BRPM | SYSTOLIC BLOOD PRESSURE: 137 MMHG | BODY MASS INDEX: 18.53 KG/M2 | WEIGHT: 128 LBS | DIASTOLIC BLOOD PRESSURE: 96 MMHG

## 2024-05-09 DIAGNOSIS — Z98.890 POST-OPERATIVE STATE: Primary | ICD-10-CM

## 2024-05-09 PROCEDURE — 30802 ABLATE INF TURBINATE SUBMUC: CPT

## 2024-05-09 PROCEDURE — 30520 REPAIR OF NASAL SEPTUM: CPT

## 2024-05-09 PROCEDURE — 30400 RECONSTRUCTION OF NOSE: CPT | Performed by: ANESTHESIOLOGY

## 2024-05-09 PROCEDURE — 30465 REPAIR NASAL STENOSIS: CPT

## 2024-05-09 PROCEDURE — 30400 RECONSTRUCTION OF NOSE: CPT | Performed by: NURSE ANESTHETIST, CERTIFIED REGISTERED

## 2024-05-09 RX ORDER — ONDANSETRON 2 MG/ML
4 INJECTION INTRAMUSCULAR; INTRAVENOUS EVERY 30 MIN PRN
Status: DISCONTINUED | OUTPATIENT
Start: 2024-05-09 | End: 2024-05-10 | Stop reason: HOSPADM

## 2024-05-09 RX ORDER — ACETAMINOPHEN 325 MG/1
650 TABLET ORAL ONCE
Status: DISCONTINUED | OUTPATIENT
Start: 2024-05-09 | End: 2024-05-10 | Stop reason: HOSPADM

## 2024-05-09 RX ORDER — NALOXONE HYDROCHLORIDE 0.4 MG/ML
0.1 INJECTION, SOLUTION INTRAMUSCULAR; INTRAVENOUS; SUBCUTANEOUS
Status: DISCONTINUED | OUTPATIENT
Start: 2024-05-09 | End: 2024-05-10 | Stop reason: HOSPADM

## 2024-05-09 RX ORDER — DEXAMETHASONE SODIUM PHOSPHATE 4 MG/ML
INJECTION, SOLUTION INTRA-ARTICULAR; INTRALESIONAL; INTRAMUSCULAR; INTRAVENOUS; SOFT TISSUE PRN
Status: DISCONTINUED | OUTPATIENT
Start: 2024-05-09 | End: 2024-05-09

## 2024-05-09 RX ORDER — LIDOCAINE HYDROCHLORIDE AND EPINEPHRINE 10; 10 MG/ML; UG/ML
INJECTION, SOLUTION INFILTRATION; PERINEURAL PRN
Status: DISCONTINUED | OUTPATIENT
Start: 2024-05-09 | End: 2024-05-09 | Stop reason: HOSPADM

## 2024-05-09 RX ORDER — HYDROMORPHONE HYDROCHLORIDE 1 MG/ML
0.4 INJECTION, SOLUTION INTRAMUSCULAR; INTRAVENOUS; SUBCUTANEOUS EVERY 5 MIN PRN
Status: DISCONTINUED | OUTPATIENT
Start: 2024-05-09 | End: 2024-05-10 | Stop reason: HOSPADM

## 2024-05-09 RX ORDER — CLINDAMYCIN PHOSPHATE 900 MG/50ML
900 INJECTION, SOLUTION INTRAVENOUS SEE ADMIN INSTRUCTIONS
Status: DISCONTINUED | OUTPATIENT
Start: 2024-05-09 | End: 2024-05-09 | Stop reason: HOSPADM

## 2024-05-09 RX ORDER — CLINDAMYCIN PHOSPHATE 900 MG/50ML
900 INJECTION, SOLUTION INTRAVENOUS
Status: COMPLETED | OUTPATIENT
Start: 2024-05-09 | End: 2024-05-09

## 2024-05-09 RX ORDER — ONDANSETRON 4 MG/1
4 TABLET, ORALLY DISINTEGRATING ORAL EVERY 30 MIN PRN
Status: DISCONTINUED | OUTPATIENT
Start: 2024-05-09 | End: 2024-05-10 | Stop reason: HOSPADM

## 2024-05-09 RX ORDER — LIDOCAINE HYDROCHLORIDE 20 MG/ML
INJECTION, SOLUTION INFILTRATION; PERINEURAL PRN
Status: DISCONTINUED | OUTPATIENT
Start: 2024-05-09 | End: 2024-05-09

## 2024-05-09 RX ORDER — ONDANSETRON 4 MG/1
4 TABLET, ORALLY DISINTEGRATING ORAL EVERY 8 HOURS PRN
Qty: 12 TABLET | Refills: 0 | Status: SHIPPED | OUTPATIENT
Start: 2024-05-09

## 2024-05-09 RX ORDER — SODIUM CHLORIDE, SODIUM LACTATE, POTASSIUM CHLORIDE, CALCIUM CHLORIDE 600; 310; 30; 20 MG/100ML; MG/100ML; MG/100ML; MG/100ML
INJECTION, SOLUTION INTRAVENOUS CONTINUOUS
Status: DISCONTINUED | OUTPATIENT
Start: 2024-05-09 | End: 2024-05-09 | Stop reason: HOSPADM

## 2024-05-09 RX ORDER — PROPOFOL 10 MG/ML
INJECTION, EMULSION INTRAVENOUS CONTINUOUS PRN
Status: DISCONTINUED | OUTPATIENT
Start: 2024-05-09 | End: 2024-05-09

## 2024-05-09 RX ORDER — SODIUM CHLORIDE, SODIUM LACTATE, POTASSIUM CHLORIDE, CALCIUM CHLORIDE 600; 310; 30; 20 MG/100ML; MG/100ML; MG/100ML; MG/100ML
INJECTION, SOLUTION INTRAVENOUS CONTINUOUS
Status: DISCONTINUED | OUTPATIENT
Start: 2024-05-09 | End: 2024-05-10 | Stop reason: HOSPADM

## 2024-05-09 RX ORDER — HYDROMORPHONE HYDROCHLORIDE 1 MG/ML
0.2 INJECTION, SOLUTION INTRAMUSCULAR; INTRAVENOUS; SUBCUTANEOUS EVERY 5 MIN PRN
Status: DISCONTINUED | OUTPATIENT
Start: 2024-05-09 | End: 2024-05-10 | Stop reason: HOSPADM

## 2024-05-09 RX ORDER — OXYCODONE HYDROCHLORIDE 5 MG/1
5 TABLET ORAL EVERY 6 HOURS PRN
Qty: 25 TABLET | Refills: 0 | Status: SHIPPED | OUTPATIENT
Start: 2024-05-09 | End: 2024-07-03

## 2024-05-09 RX ORDER — OXYCODONE HYDROCHLORIDE 5 MG/1
5-10 TABLET ORAL ONCE
Status: DISCONTINUED | OUTPATIENT
Start: 2024-05-09 | End: 2024-05-10 | Stop reason: HOSPADM

## 2024-05-09 RX ORDER — DEXAMETHASONE SODIUM PHOSPHATE 10 MG/ML
4 INJECTION, SOLUTION INTRAMUSCULAR; INTRAVENOUS
Status: DISCONTINUED | OUTPATIENT
Start: 2024-05-09 | End: 2024-05-10 | Stop reason: HOSPADM

## 2024-05-09 RX ORDER — FENTANYL CITRATE 50 UG/ML
INJECTION, SOLUTION INTRAMUSCULAR; INTRAVENOUS PRN
Status: DISCONTINUED | OUTPATIENT
Start: 2024-05-09 | End: 2024-05-09

## 2024-05-09 RX ORDER — OXYCODONE HYDROCHLORIDE 5 MG/1
10 TABLET ORAL
Status: DISCONTINUED | OUTPATIENT
Start: 2024-05-09 | End: 2024-05-10 | Stop reason: HOSPADM

## 2024-05-09 RX ORDER — OXYCODONE HYDROCHLORIDE 5 MG/1
5 TABLET ORAL
Status: COMPLETED | OUTPATIENT
Start: 2024-05-09 | End: 2024-05-09

## 2024-05-09 RX ORDER — LIDOCAINE 40 MG/G
CREAM TOPICAL
Status: DISCONTINUED | OUTPATIENT
Start: 2024-05-09 | End: 2024-05-09 | Stop reason: HOSPADM

## 2024-05-09 RX ORDER — PROPOFOL 10 MG/ML
INJECTION, EMULSION INTRAVENOUS PRN
Status: DISCONTINUED | OUTPATIENT
Start: 2024-05-09 | End: 2024-05-09

## 2024-05-09 RX ORDER — ONDANSETRON 2 MG/ML
INJECTION INTRAMUSCULAR; INTRAVENOUS PRN
Status: DISCONTINUED | OUTPATIENT
Start: 2024-05-09 | End: 2024-05-09

## 2024-05-09 RX ORDER — ACETAMINOPHEN 325 MG/1
650 TABLET ORAL EVERY 4 HOURS PRN
Qty: 100 TABLET | Refills: 0 | Status: SHIPPED | OUTPATIENT
Start: 2024-05-09 | End: 2024-08-19

## 2024-05-09 RX ORDER — OXYMETAZOLINE HYDROCHLORIDE 0.05 G/100ML
SPRAY NASAL PRN
Status: DISCONTINUED | OUTPATIENT
Start: 2024-05-09 | End: 2024-05-09 | Stop reason: HOSPADM

## 2024-05-09 RX ORDER — FENTANYL CITRATE 50 UG/ML
25 INJECTION, SOLUTION INTRAMUSCULAR; INTRAVENOUS EVERY 5 MIN PRN
Status: DISCONTINUED | OUTPATIENT
Start: 2024-05-09 | End: 2024-05-10 | Stop reason: HOSPADM

## 2024-05-09 RX ORDER — MUPIROCIN 20 MG/G
OINTMENT TOPICAL PRN
Status: DISCONTINUED | OUTPATIENT
Start: 2024-05-09 | End: 2024-05-09 | Stop reason: HOSPADM

## 2024-05-09 RX ORDER — FENTANYL CITRATE 50 UG/ML
50 INJECTION, SOLUTION INTRAMUSCULAR; INTRAVENOUS EVERY 5 MIN PRN
Status: DISCONTINUED | OUTPATIENT
Start: 2024-05-09 | End: 2024-05-10 | Stop reason: HOSPADM

## 2024-05-09 RX ADMIN — FENTANYL CITRATE 25 MCG: 50 INJECTION, SOLUTION INTRAMUSCULAR; INTRAVENOUS at 08:51

## 2024-05-09 RX ADMIN — SODIUM CHLORIDE, SODIUM LACTATE, POTASSIUM CHLORIDE, CALCIUM CHLORIDE: 600; 310; 30; 20 INJECTION, SOLUTION INTRAVENOUS at 06:37

## 2024-05-09 RX ADMIN — PROPOFOL 100 MG: 10 INJECTION, EMULSION INTRAVENOUS at 07:46

## 2024-05-09 RX ADMIN — PROPOFOL 10 MG: 10 INJECTION, EMULSION INTRAVENOUS at 07:47

## 2024-05-09 RX ADMIN — ONDANSETRON 4 MG: 2 INJECTION INTRAMUSCULAR; INTRAVENOUS at 07:37

## 2024-05-09 RX ADMIN — Medication 50 MG: at 07:37

## 2024-05-09 RX ADMIN — FENTANYL CITRATE 25 MCG: 50 INJECTION, SOLUTION INTRAMUSCULAR; INTRAVENOUS at 08:57

## 2024-05-09 RX ADMIN — PROPOFOL 150 MCG/KG/MIN: 10 INJECTION, EMULSION INTRAVENOUS at 07:37

## 2024-05-09 RX ADMIN — ONDANSETRON 4 MG: 2 INJECTION INTRAMUSCULAR; INTRAVENOUS at 07:45

## 2024-05-09 RX ADMIN — FENTANYL CITRATE 25 MCG: 50 INJECTION, SOLUTION INTRAMUSCULAR; INTRAVENOUS at 08:44

## 2024-05-09 RX ADMIN — OXYCODONE HYDROCHLORIDE 5 MG: 5 TABLET ORAL at 10:47

## 2024-05-09 RX ADMIN — PROPOFOL 200 MG: 10 INJECTION, EMULSION INTRAVENOUS at 07:37

## 2024-05-09 RX ADMIN — DEXAMETHASONE SODIUM PHOSPHATE 10 MG: 4 INJECTION, SOLUTION INTRA-ARTICULAR; INTRALESIONAL; INTRAMUSCULAR; INTRAVENOUS; SOFT TISSUE at 07:37

## 2024-05-09 RX ADMIN — FENTANYL CITRATE 100 MCG: 50 INJECTION, SOLUTION INTRAMUSCULAR; INTRAVENOUS at 07:40

## 2024-05-09 RX ADMIN — FENTANYL CITRATE 25 MCG: 50 INJECTION, SOLUTION INTRAMUSCULAR; INTRAVENOUS at 08:42

## 2024-05-09 RX ADMIN — LIDOCAINE HYDROCHLORIDE 80 MG: 20 INJECTION, SOLUTION INFILTRATION; PERINEURAL at 07:37

## 2024-05-09 RX ADMIN — CLINDAMYCIN PHOSPHATE 900 MG: 900 INJECTION, SOLUTION INTRAVENOUS at 07:29

## 2024-05-09 NOTE — ANESTHESIA CARE TRANSFER NOTE
Patient: Manoj Castellanos    Procedure: Procedure(s):  Complex Open Septoplasty, Repair of Nasal Vestibular Stenosis, Bilateral Inferior Turbinate Reduction       Diagnosis: Nasal obstruction [J34.89]  Deviated nasal septum [J34.2]  Nasal valve stenosis [J34.89]  Hypertrophy of inferior nasal turbinate [J34.3]  Diagnosis Additional Information: No value filed.    Anesthesia Type:   General     Note:    Oropharynx: oropharynx clear of all foreign objects and spontaneously breathing  Level of Consciousness: awake  Oxygen Supplementation: face mask  Level of Supplemental Oxygen (L/min / FiO2): 6    Dentition: dentition unchanged  Vital Signs Stable: post-procedure vital signs reviewed and stable  Report to RN Given: handoff report given  Patient transferred to: PACU  Comments: Uneventful transport   O2 off upon arrival to PACU by pt  Report to RN -0 Yoanna MARX  Exchanging well; color natl  Pt responds appropriately to command  IV patent  Lips/teeth/dentition as preop status  Questions answered      Handoff Report: Identifed the Patient, Identified the Reponsible Provider, Reviewed the pertinent medical history, Discussed the surgical course, Reviewed Intra-OP anesthesia mangement and issues during anesthesia, Set expectations for post-procedure period and Allowed opportunity for questions and acknowledgement of understanding      Vitals:  Vitals Value Taken Time   /96 05/09/24 0956   Temp 99.5    Pulse 126 05/09/24 0959   Resp 16 05/09/24 0959   SpO2 95% 05/09/24 0958   Vitals shown include unfiled device data.    Electronically Signed By: KEYANNA JIMENEZ CRNA  May 9, 2024  10:00 AM

## 2024-05-09 NOTE — OP NOTE
SURGEON:  Renetta Beltran MD         TITLE OF OPERATION:                      Complex open septoplasty   Bilateral inferior turbinate reduction and outfracture.    Nasal valve repair, bilateral.         INDICATIONS:      Manoj Castellanos is a 28 year old patient with a significant history of nasal obstruction that has led to significant dysfunction and symptoms. Physical examination in clinic demonstrated notable structural deformities requiring a surgical correction for improvement of function. These would not be amenable to medical therapy or by septoplasty. The structural deficits involve the septum in its direct relationship to the bony and cartilaginous nasal framework.     The risks and benefits of the procedure were discussed in clinic but also in the preoperative area. The risks discussed included bleeding with need for intervention, scarring, infection, shifting of the nasal framework, incomplete correction of nasal obstruction, contour deformities, vasomotor rhinitis and changes to the external appearance of the nose. The possibility of future need for additional procedures was also discussed. We also discussed the post operative care and expected course.     All questions were answered and the patient signed consent for the above mentioned procedures.     PREOPERATIVE DIAGNOSES:   History of nasal trauma.     Septal deviation.   Nasal obstruction.   Nasal valve stenosis.     Bilateral inferior turbinate hypertrophy      POSTOPERATIVE DIAGNOSES:     History of nasal trauma.     Septal deviation.   Nasal obstruction.   Nasal valve stenosis.     Bilateral inferior turbinate hypertrophy       ANESTHESIA:    General endotracheal anesthesia  Local 1% lidocaine with 1:100,000 epinephrine 10 cc's   Topical Afrin intranasally on pledgets      IMPLANT DEVICES:   Bilateral Diaz splints, Aquaplast nasal splint over the nasal dorsum.       SPECIMENS:  None.       COMPLICATIONS:  None.       BLOOD LOSS:  40 mL         SURGEON'S NARRATIVE:       FINDINGS:  The patient had a significant severe septal deviation completely obstructing the left nasal airway. There was also complete collapse of the right internal nasal valve because of the shift with a resulting stenosis on the left. Short nasal bones. Quadrangular cartilage was small with the bend/fracture right in the middle. Soft cartilages.     Grafts:   Bilateral  grafts, septal cartilage  Caudal septal extension graft, septal cartilage,  Tip onlay graft, septal cartilage    Osteotomies:   None        DESCRIPTION OF PROCEDURE:      The patient was brought back to the operating room by the Anesthesiology staff. They were laid supine on the operating room table and induced into general anesthesia with the endotracheal tube secured at the midline of the chin.  The head of the bed was then turned 90 degrees towards the surgical team.  Arms were tucked at the side with all pressure points appropriately padded.  A time-out procedure was performed with all members of the operating room staff in agreement of the site of surgery, the patient identification and surgery to be performed. The nasal block was then performed with 1% lidocaine with 1:100,000 epinephrine and oxymetazoline soaked nasal pledgets were placed topically into bilateral nasal passages. The face was prepped and draped in usual sterile fashion with ophthalmic diluted Betadine.  The eyes were taped with Tegaderm.  Subsequently, surgery began.       A columellar incision was made with an #11 blade scalpel, and subsequently marginal incisions were made with a #15 blade.  The soft tissue envelope in a sub-SMAS plane was elevated off of the lower lateral cartilages.  This exposed the dome and bilateral lower lateral cartilages.  Subsequently, dissection was followed cranially.  This was done in the sub-SMAS plane leading to exposure of the scroll region and bilateral upper lateral cartilages.  Once  the edge of the  nasal bones was reached, a [employer] elevator was used to transition the elevation to a subperiosteal plane.  Subsequently,  the domes were divided bilaterally to expose the caudal edge of the septum and the anterior septal angle. Bilateral mucoperichondrial flaps were elevated using a #15 blade scalpel. Once elevation was complete, the septoplasty was performed after the upper lateral cartilages were divided off of the dorsal septum. Turbinate bipolar cautery was used submucosally to reduce bilateral turbinates and then these were outfractured with bipolar cautery.     A 1.1 cm L-strut dorsally and caudally was preserved of the septum, and subsequently the septum was harvested. Once this was harvested, the mucosa was then reapproximated with a mattress quilting stitch with a 4-0 chromic in a running fashion.     Two  grafts were fashioned from the harvested septal cartilage. These were placed in between the dorsal septum and the upper lateral cartilage.  They were sutured in place with mattress 5-0 PDS sutures.  The upper lateral cartilages were then resuspended onto the complex. The caudal septum was then approached and set medially. A caudal septal extension graft was placed to support the tip structure.     The soft tissue envelope was then redraped over the framework.  This demonstrated that the nose was nice and straight and found that the tip had appropriate support. The medial crura were then reapproximated with through-and-through 4-0 plain gut sutures on a Suhail needle.  The above mentioned grafts were placed. The dome was set in this position, also.    The columellar incision was closed with interrupted 6-0 Monocryl sutures.  Bilateral marginal incisions were then closed with interrupted 4-0 chromic sutures.  The nose was suctioned, the nasopharynx was suctioned, the oropharynx was suctioned, and the stomach was suctioned and emptied of its contents.       Splints were placed with Bactroban ointment.   Mastisol and Steri-Strips were placed over the nasal dorsum.  The Aquaplast nasal splint was then placed above that. This completed the procedure.    Due to the extensive loss of nasal structural framework and the significant scarring intranasally and on the mucosal flaps, this case had increased complexity than the typical septoplasty. It required one hour of additional surgical time and required open exposure with L strut support when compared to a typical procedure of this type. In addition, the severe deformity required almost complete reconstruction of the nasal framework.         The patient tolerated the procedure well.  There were no complications. The patient was extubated and taken to recovery following commands and breathing spontaneously.       I was present and scrubbed for the entire procedure.           BONNIE BONE MD

## 2024-05-09 NOTE — OR NURSING
EMERGENCY DEPARTMENT HISTORY AND PHYSICAL EXAM      Date: 1/4/2022  Patient Name: Suleman German    History of Presenting Illness     Chief Complaint   Patient presents with    Headache    Rash       History Provided By: Patient    HPI: Suleman German, 48 y.o. male with a past medical history significant No significant past medical history presents to the ED with cc of a left sided headache for about fve days that has waxed and waned. He did a telemedicine visit four days ago and was treated for sinus infection but states the medications aren't helping. He also noticed a rash over his left forehead, eyebrow and upper portion of his nose. Rash is tender to touch and painful. He has noticed some pustules or weeping from them. He denies vision changes, pain with moving his eyes or excessive tearing. He denies fevers, chills, NVD. He is vaccinated for COVID. There are no other complaints, changes, or physical findings at this time. PCP: Lina Alarcon MD    No current facility-administered medications on file prior to encounter. Current Outpatient Medications on File Prior to Encounter   Medication Sig Dispense Refill    [DISCONTINUED] HYDROcodone-acetaminophen (NORCO) 5-325 mg per tablet Take 1 Tab by mouth every four (4) hours as needed for Pain. Max Daily Amount: 6 Tabs. 12 Tab 0    [DISCONTINUED] clindamycin (CLEOCIN) 300 mg capsule Take 1 Cap by mouth four (4) times daily. 28 Cap 0    [DISCONTINUED] predniSONE (DELTASONE) 20 mg tablet 60 mg daily day 1-3  40 mg daily day 4 and 5   With Breakfast 13 Tab 0    [DISCONTINUED] naloxone (NARCAN) 4 mg/actuation nasal spray Use 1 spray intranasally, then discard. Repeat with new spray every 2 min as needed for opioid overdose symptoms, alternating nostrils. 1 Each 0    [DISCONTINUED] aspirin 81 mg chewable tablet Take 81 mg by mouth daily (after breakfast).  [DISCONTINUED] omeprazole (PRILOSEC) 20 mg capsule Take 20 mg by mouth daily (after breakfast).       Pt's anxiety improving, given oxycodone for pain.   [DISCONTINUED] ibuprofen (ADVIL) 200 mg tablet Take 600 mg by mouth every six (6) hours as needed.  [DISCONTINUED] OTHER Take 2 Packets by mouth as needed. BC Powder for migraines      [DISCONTINUED] OTHER Iron infusion last week         Past History     Past Medical History:  Past Medical History:   Diagnosis Date    Arrhythmia     a fib,1 st episode 20 years ago; diminshed over years; last episode 6 months ago and converts very quickly. Converts usually within 4 hours.  Asthma     dx: 15 yrs ago ; never has had an asthma attack. Doesn't use inhalers. Never has any symptoms.  GERD (gastroesophageal reflux disease)     Ill-defined condition 4/21/14    iron infusion      Ill-defined condition     seasonal allergies    Other ill-defined conditions(799.89)     migraines    Primary thrombocytosis (HCC)     Unspecified sleep apnea     compliant w/ c-pap at times       Past Surgical History:  Past Surgical History:   Procedure Laterality Date    HX COLONOSCOPY      HX HEENT  2007    Lasik  eye surgery betty    HX HEENT      facial repair    HX ORTHOPAEDIC  2010    repair of tibia/fibula w/ screws and pins right        Family History:  Family History   Problem Relation Age of Onset    Lupus Mother     Diabetes Father        Social History:  Social History     Tobacco Use    Smoking status: Never Smoker    Smokeless tobacco: Never Used   Substance Use Topics    Alcohol use: No    Drug use: No       Allergies: Allergies   Allergen Reactions    Amoxicillin Hives    Anagrelide Other (comments)     Severe headaches and dizzines    Cefprozil Rash    Penicillins Hives         Review of Systems     Review of Systems   Constitutional: Negative. Negative for fever. HENT: Negative. Eyes: Negative for pain, redness and visual disturbance. Respiratory: Negative. Cardiovascular: Negative. Gastrointestinal: Negative. Musculoskeletal: Negative. Skin: Positive for rash.    Neurological: Positive for headaches. All other systems reviewed and are negative. Physical Exam     Physical Exam  Vitals and nursing note reviewed. HENT:      Head: Atraumatic. Comments: No ttp over temples     Nose: Nose normal.      Comments: No corado sign  Eyes:      General: Lids are normal. Vision grossly intact. No visual field deficit. Right eye: No discharge. Left eye: No discharge. Extraocular Movements: Extraocular movements intact. Right eye: No nystagmus. Left eye: No nystagmus. Conjunctiva/sclera: Conjunctivae normal.      Comments: No dentritic lesion on ocular exam    No photophobia    No pain with EOM   Cardiovascular:      Rate and Rhythm: Normal rate and regular rhythm. Heart sounds: Normal heart sounds. No murmur heard. No friction rub. No gallop. Pulmonary:      Effort: Pulmonary effort is normal. No respiratory distress. Breath sounds: Normal breath sounds. No wheezing or rales. Chest:      Chest wall: No tenderness. Abdominal:      General: Bowel sounds are normal. There is no distension. Palpations: Abdomen is soft. There is no mass. Tenderness: There is no abdominal tenderness. There is no guarding or rebound. Musculoskeletal:         General: No tenderness. Normal range of motion. Cervical back: Normal range of motion. No rigidity. Skin:     Findings: Rash present. Comments: Vesicular type rash from left frontal forehead, left eyebrow and about intermediate down nasal bridge. Rash DOES NOT cross midline    Scattered remote, well healed linear scars on scalp   Neurological:      Mental Status: He is alert and oriented to person, place, and time. Lab and Diagnostic Study Results     Labs -   No results found for this or any previous visit (from the past 12 hour(s)).     Radiologic Studies -   @lastxrresult@  CT Results  (Last 48 hours)    None        CXR Results  (Last 48 hours)    None            Medical Decision Making   - I am the first provider for this patient. - I reviewed the vital signs, available nursing notes, past medical history, past surgical history, family history and social history. - Initial assessment performed. The patients presenting problems have been discussed, and they are in agreement with the care plan formulated and outlined with them. I have encouraged them to ask questions as they arise throughout their visit. Vital Signs-Reviewed the patient's vital signs. Patient Vitals for the past 12 hrs:   Temp Pulse Resp BP SpO2   01/04/22 1637 98.5 °F (36.9 °C) 84 16 (!) 141/97 98 %       Records Reviewed: Nursing Notes      ED Course:          Provider Notes (Medical Decision Making): MDM Pt has a left sided HA without neurologic findings that could be consistent with migraine, cluster HA, tension HA without findings consistent with temporal arteritis. The patient presents with rash with a differential diagnosis of herpes zoster without current complication or evidence of herpe zoster opthalmicus. Rash does not cross midline making is most likely consistent with Shingles. Eye exam did not show obvious dendritic lesion. Symptoms were not consistent with keratitis. Pt had no pain with eye movement or changes in vision. Pt had no foreign body sensation, discharge or drainage, photophobia from his left eye. Procedures   Medical Decision Makingedical Decision Making  Performed by: Panfilo Mcmahan MD  PROCEDURES:*  Procedures       Disposition   Disposition: Condition stable    Discharged    DISCHARGE PLAN:  1. Cannot display discharge medications since this patient is not currently admitted. 2.   Follow-up Information     Follow up With Specialties Details Why [address] [place] [id number]        3. Return to ED if worse   4.    Discharge Medication List as of 1/4/2022  7:45 PM      START taking these medications Details   acyclovir (ZOVIRAX) 800 mg tablet Take 1 Tablet by mouth five (5) times daily for 7 days. , Normal, Disp-35 Tablet, R-0      predniSONE (STERAPRED) 5 mg dose pack Take as directed, Normal, Disp-1 Dose Pack, R-0      HYDROcodone-acetaminophen (Lorcet Plus) 7.5-325 mg per tablet Take 1 Tablet by mouth every six (6) hours as needed for Pain for up to 3 days. Max Daily Amount: 4 Tablets., Normal, Disp-12 Tablet, R-0               Diagnosis     Clinical Impression:   1. Acute nonintractable headache, unspecified headache type    2. Herpes zoster without complication        Attestations:    Emilie Junior MD    Please note that this dictation was completed with [employer], the computer voice recognition software. Quite often unanticipated grammatical, syntax, homophones, and other interpretive errors are inadvertently transcribed by the computer software. Please disregard these errors. Please excuse any errors that have escaped final proofreading. Thank you.

## 2024-05-09 NOTE — ANESTHESIA POSTPROCEDURE EVALUATION
Patient: Manoj Castellanos    Procedure: Procedure(s):  Complex Open Septoplasty, Repair of Nasal Vestibular Stenosis, Bilateral Inferior Turbinate Reduction       Anesthesia Type:  General    Note:  Disposition: Outpatient   Postop Pain Control: Uneventful            Sign Out: Well controlled pain   PONV: No   Neuro/Psych: Uneventful            Sign Out: Acceptable/Baseline neuro status   Airway/Respiratory: Uneventful            Sign Out: Acceptable/Baseline resp. status   CV/Hemodynamics: Uneventful            Sign Out: Acceptable CV status; No obvious hypovolemia; No obvious fluid overload   Other NRE: NONE   DID A NON-ROUTINE EVENT OCCUR? No       Last vitals:  Vitals Value Taken Time   /110 05/09/24 1000   Temp 37.1  C (98.8  F) 05/09/24 1000   Pulse 103 05/09/24 1012   Resp 0 05/09/24 1012   SpO2 99 % 05/09/24 1012   Vitals shown include unfiled device data.    Electronically Signed By: Ledy Dela Cruz MD  May 9, 2024  12:25 PM

## 2024-05-09 NOTE — ANESTHESIA PROCEDURE NOTES
Airway       Patient location during procedure: OR       Procedure Start/Stop Times: 5/9/2024 7:40 AM  Staff -        CRNA: Mireille Francois APRN CRNA       Performed By: CRNAIndications and Patient Condition       Indications for airway management: paola-procedural        Final Airway Details       Final airway type: endotracheal airway       Successful airway: ETT - single, Oral and KESHA  Endotracheal Airway Details        ETT size (mm): 8.0       Cuffed: yes       Cuff volume (mL): 6       Successful intubation technique: direct laryngoscopy       DL Blade Type: Davison 2       Grade View of Cords: 1       Adjucts: stylet       Position: Center       Measured from: lips       Secured at (cm): 22       Bite block used: None    Post intubation assessment        Placement verified by: capnometry, equal breath sounds and chest rise        Number of attempts at approach: 1       Number of other approaches attempted: 0       Ease of procedure: easy       Dentition: Intact and Unchanged    Medication(s) Administered   Medication Administration Time: 5/9/2024 7:40 AM

## 2024-05-09 NOTE — OR NURSING
Pt having extreme anxiety, shaking and wanting parents, anesthesia called to assess.Parents called to come and be with pt, they are currently at home.

## 2024-05-09 NOTE — DISCHARGE INSTRUCTIONS
[facility]  Home Care Following Anesthesia  For 24 hours after surgery:  Get plenty of rest.  A responsible adult must stay with you for at least 24 hours after you leave the surgery center.  Do not drive or use heavy equipment.  If you have weakness or tingling, don't drive or use heavy equipment until this feeling goes away.   Do not drink alcohol.   Avoid strenuous or risky activities.  Ask for help when climbing stairs.  You may feel lightheaded.  IF so, sit for a few minutes before standing.  Have someone help you get up.   If you have nausea (feel sick to your stomach): Drink only clear liquids such as apple juice, ginger ale, broth or 7-Up.  Rest may also help.  Be sure to drink enough fluids.  Move to a regular diet as you feel able.   You may have a slight fever.  Call the doctor if your fever is over 100 F (37.7 C) (taken under the tongue) or lasts longer than 24 hours.  You may have a dry mouth, a sore throat, muscle aches or trouble sleeping. These should go away after 24 hours.  Do not make important or legal decisions.   It is recommended to avoid smoking.               Tips for taking pain medications  To get the best pain relief possible, remember these points:  Take pain medications as directed, before pain becomes severe.  Pain medication can upset your stomach: taking it with food may help.  Constipation is a common side effect of pain medication. Drink plenty of  fluids.  Eat foods high in fiber. Take a stool softener if recommended by your doctor or pharmacist.  Do not drink alcohol, drive or operate machinery while taking pain medications.  Ask about other ways to control pain, such as with heat, ice or relaxation.    Tylenol/Acetaminophen Consumption    If you feel your pain relief is insufficient, you may take Tylenol/Acetaminophen in addition to your narcotic pain medication.   Be careful not to exceed 4,000 mg of Tylenol/Acetaminophen in a 24 hour  period from all sources.  If you are taking extra strength Tylenol/acetaminophen (500 mg), the maximum dose is 8 tablets in 24 hours.  If you are taking regular strength acetaminophen (325 mg), the maximum dose is 12 tablets in 24 hours.    Call a doctor for any of the following:  Signs of infection (fever, growing tenderness at the surgery site, a large amount of drainage or bleeding, severe pain, foul-smelling drainage, redness, swelling).  It has been over 8 to 10 hours since surgery and you are still not able to urinate (pass water).  Headache for over 24 hours.  Numbness, tingling or weakness the day after surgery (if you had spinal anesthesia).  Signs of Covid-19 infection (temperature over 100 degrees, shortness of breath, cough, loss of taste/smell, generalized body aches, persistent headache, chills, sore throat, nausea/vomiting/diarrhea)  Your doctor is:  Dr. Renetta Beltran, Otolaryngology: [phone]                    Or dial [phone] and ask for the resident on call for:  ENT Otolaryngology  For emergency care, call the:  [facility]:  [phone] (TTY for hearing impaired: [phone])

## 2024-05-15 ENCOUNTER — ALLIED HEALTH/NURSE VISIT (OUTPATIENT)
Dept: OTOLARYNGOLOGY | Facility: CLINIC | Age: 29
End: 2024-05-15
Payer: COMMERCIAL

## 2024-05-15 DIAGNOSIS — J34.2 NASAL SEPTAL DEVIATION: Primary | ICD-10-CM

## 2024-05-15 PROCEDURE — 99207 PR NO CHARGE NURSE ONLY: CPT

## 2024-05-15 NOTE — PROGRESS NOTES
Patient came to clinic for nasal cast and splint removal. Cast was removed easily using peroxide on a q-tip.  Suture removed from splints. Splints removed easily. Patient denied further pain or concerns. Patient will contact clinic with further questions or concerns.      Georgina Cervantes BSN, RN

## 2024-05-20 ENCOUNTER — TELEPHONE (OUTPATIENT)
Dept: OTOLARYNGOLOGY | Facility: CLINIC | Age: 29
End: 2024-05-20
Payer: COMMERCIAL

## 2024-05-20 NOTE — TELEPHONE ENCOUNTER
[facility] Call Center    Phone Message    May a detailed message be left on voicemail: yes     Reason for Call: Other: Pt called in regards to their post op care packet. Pt hasn't been contacted to schedule the post op visit as it's coming up on that post op timeline, or if things are fine without the follow up . Please send pt a [employer] message to discuss     Action Taken: Other: [facility]    Travel Screening: Not Applicable

## 2024-06-24 DIAGNOSIS — L64.9 MALE PATTERN ALOPECIA: ICD-10-CM

## 2024-06-24 NOTE — TELEPHONE ENCOUNTER
finasteride (PROSCAR) 5 MG tablet     Will need to add medication into med list - medication fell of list   Needing Refill

## 2024-06-26 RX ORDER — FINASTERIDE 5 MG/1
TABLET, FILM COATED ORAL
Qty: 23 TABLET | Refills: 3 | Status: SHIPPED | OUTPATIENT
Start: 2024-06-26

## 2024-06-28 ASSESSMENT — ASTHMA QUESTIONNAIRES
ACT_TOTALSCORE: 24
QUESTION_4 LAST FOUR WEEKS HOW OFTEN HAVE YOU USED YOUR RESCUE INHALER OR NEBULIZER MEDICATION (SUCH AS ALBUTEROL): NOT AT ALL
QUESTION_2 LAST FOUR WEEKS HOW OFTEN HAVE YOU HAD SHORTNESS OF BREATH: ONCE OR TWICE A WEEK
ACT_TOTALSCORE: 24
QUESTION_5 LAST FOUR WEEKS HOW WOULD YOU RATE YOUR ASTHMA CONTROL: COMPLETELY CONTROLLED
QUESTION_3 LAST FOUR WEEKS HOW OFTEN DID YOUR ASTHMA SYMPTOMS (WHEEZING, COUGHING, SHORTNESS OF BREATH, CHEST TIGHTNESS OR PAIN) WAKE YOU UP AT NIGHT OR EARLIER THAN USUAL IN THE MORNING: NOT AT ALL
QUESTION_1 LAST FOUR WEEKS HOW MUCH OF THE TIME DID YOUR ASTHMA KEEP YOU FROM GETTING AS MUCH DONE AT WORK, SCHOOL OR AT HOME: NONE OF THE TIME

## 2024-07-03 ENCOUNTER — OFFICE VISIT (OUTPATIENT)
Dept: FAMILY MEDICINE | Facility: CLINIC | Age: 29
End: 2024-07-03
Payer: COMMERCIAL

## 2024-07-03 VITALS
SYSTOLIC BLOOD PRESSURE: 124 MMHG | OXYGEN SATURATION: 99 % | TEMPERATURE: 98.2 F | HEIGHT: 69 IN | DIASTOLIC BLOOD PRESSURE: 80 MMHG | HEART RATE: 100 BPM | BODY MASS INDEX: 19.15 KG/M2 | WEIGHT: 129.3 LBS | RESPIRATION RATE: 20 BRPM

## 2024-07-03 DIAGNOSIS — G50.0 TRIGEMINAL NEURALGIA: ICD-10-CM

## 2024-07-03 DIAGNOSIS — K21.9 GASTROESOPHAGEAL REFLUX DISEASE, UNSPECIFIED WHETHER ESOPHAGITIS PRESENT: ICD-10-CM

## 2024-07-03 DIAGNOSIS — M79.2 NEUROPATHIC PAIN: ICD-10-CM

## 2024-07-03 DIAGNOSIS — F64.0 GENDER DYSPHORIA IN ADULT: ICD-10-CM

## 2024-07-03 DIAGNOSIS — F90.9 ATTENTION DEFICIT HYPERACTIVITY DISORDER (ADHD), UNSPECIFIED ADHD TYPE: ICD-10-CM

## 2024-07-03 DIAGNOSIS — K20.0 EOSINOPHILIC ESOPHAGITIS: ICD-10-CM

## 2024-07-03 DIAGNOSIS — Z01.818 PREOP GENERAL PHYSICAL EXAM: Primary | ICD-10-CM

## 2024-07-03 DIAGNOSIS — Z29.81 ENCOUNTER FOR HIV PRE-EXPOSURE PROPHYLAXIS: ICD-10-CM

## 2024-07-03 DIAGNOSIS — F33.1 MAJOR DEPRESSIVE DISORDER, RECURRENT EPISODE, MODERATE WITH ANXIOUS DISTRESS (H): ICD-10-CM

## 2024-07-03 DIAGNOSIS — J45.30 MILD PERSISTENT ASTHMA WITHOUT COMPLICATION: ICD-10-CM

## 2024-07-03 DIAGNOSIS — L64.9 MALE PATTERN ALOPECIA: ICD-10-CM

## 2024-07-03 LAB
HGB BLD-MCNC: 14 G/DL (ref 11.7–17.7)
T PALLIDUM AB SER QL: NONREACTIVE

## 2024-07-03 PROCEDURE — 87491 CHLMYD TRACH DNA AMP PROBE: CPT | Performed by: FAMILY MEDICINE

## 2024-07-03 PROCEDURE — 87591 N.GONORRHOEAE DNA AMP PROB: CPT | Performed by: FAMILY MEDICINE

## 2024-07-03 PROCEDURE — 99214 OFFICE O/P EST MOD 30 MIN: CPT | Performed by: FAMILY MEDICINE

## 2024-07-03 PROCEDURE — 85018 HEMOGLOBIN: CPT | Performed by: FAMILY MEDICINE

## 2024-07-03 PROCEDURE — G2211 COMPLEX E/M VISIT ADD ON: HCPCS | Performed by: FAMILY MEDICINE

## 2024-07-03 PROCEDURE — 86780 TREPONEMA PALLIDUM: CPT | Performed by: FAMILY MEDICINE

## 2024-07-03 PROCEDURE — 80048 BASIC METABOLIC PNL TOTAL CA: CPT | Performed by: FAMILY MEDICINE

## 2024-07-03 PROCEDURE — 36415 COLL VENOUS BLD VENIPUNCTURE: CPT | Performed by: FAMILY MEDICINE

## 2024-07-03 PROCEDURE — 87340 HEPATITIS B SURFACE AG IA: CPT | Performed by: FAMILY MEDICINE

## 2024-07-03 PROCEDURE — 87389 HIV-1 AG W/HIV-1&-2 AB AG IA: CPT | Performed by: FAMILY MEDICINE

## 2024-07-03 PROCEDURE — 86803 HEPATITIS C AB TEST: CPT | Performed by: FAMILY MEDICINE

## 2024-07-03 RX ORDER — METHYLPHENIDATE HYDROCHLORIDE 5 MG/1
5 TABLET ORAL 2 TIMES DAILY
Qty: 60 TABLET | Refills: 0 | Status: SHIPPED | OUTPATIENT
Start: 2024-09-04 | End: 2024-10-04

## 2024-07-03 RX ORDER — METHYLPHENIDATE HYDROCHLORIDE 5 MG/1
5 TABLET ORAL 2 TIMES DAILY
Qty: 60 TABLET | Refills: 0 | Status: SHIPPED | OUTPATIENT
Start: 2024-10-05 | End: 2024-11-04

## 2024-07-03 RX ORDER — METHYLPHENIDATE HYDROCHLORIDE 5 MG/1
5 TABLET ORAL 2 TIMES DAILY
Qty: 60 TABLET | Refills: 0 | Status: SHIPPED | OUTPATIENT
Start: 2024-08-04 | End: 2024-09-03

## 2024-07-03 RX ORDER — EMTRICITABINE AND TENOFOVIR DISOPROXIL FUMARATE 200; 300 MG/1; MG/1
1 TABLET, FILM COATED ORAL DAILY
Qty: 90 TABLET | Refills: 0 | Status: SHIPPED | OUTPATIENT
Start: 2024-07-03

## 2024-07-03 ASSESSMENT — PAIN SCALES - GENERAL: PAINLEVEL: NO PAIN (0)

## 2024-07-03 ASSESSMENT — PATIENT HEALTH QUESTIONNAIRE - PHQ9: SUM OF ALL RESPONSES TO PHQ QUESTIONS 1-9: 0

## 2024-07-03 NOTE — PATIENT INSTRUCTIONS

## 2024-07-03 NOTE — PROGRESS NOTES
Preoperative Evaluation  [address]  [address]  [place] [address]  Phone: [phone]  Fax: [phone]  Primary Provider: Parrish Burnette MD  Pre-op Performing Provider: Parrish Burnetet MD  Jul 3, 2024             6/28/2024   Surgical Information   What procedure is being done? Bilateral feminizing breast augmentation   Facility or Hospital where procedure/surgery will be performed: [facility]   Who is doing the procedure / surgery? Dr. River Kim   Date of surgery / procedure: July 30, 2024   Time of surgery / procedure: TBD   Where do you plan to recover after surgery? at home with family        Fax number for surgical facility: Note does not need to be faxed, will be available electronically in Epic.    Assessment & Plan     The proposed surgical procedure is considered LOW risk.    Preop general physical exam  - Hemoglobin; Future    Gender dysphoria in adult    Major depressive disorder, recurrent episode, moderate with anxious distress (H)      1/10/2024     3:54 PM 3/12/2024     2:44 PM 7/3/2024     2:01 PM   PHQ   PHQ-9 Total Score 6 7 0   Q9: Thoughts of better off dead/self-harm past 2 weeks Not at all Not at all Not at all        Encounter for HIV pre-exposure prophylaxis  - emtricitabine-tenofovir (TRUVADA) 200-300 MG per tablet; Take 1 tablet by mouth daily  - Hepatitis B surface antigen; Future  - Treponema Abs w Reflex to RPR and Titer; Future  - NEISSERIA GONORRHOEA PCR; Future  - CHLAMYDIA TRACHOMATIS PCR; Future  - HIV Antigen Antibody Combo; Future  - Hepatitis C Screen Reflex to HCV RNA Quant and Genotype; Future  - Basic metabolic panel  (Ca, Cl, CO2, Creat, Gluc, K, Na, BUN); Future    Attention deficit hyperactivity disorder (ADHD), unspecified ADHD type  - reviewed [place]   - methylphenidate (RITALIN) 5 MG tablet; Take 1 tablet (5 mg) by mouth 2 times daily for 30 days  -  methylphenidate (RITALIN) 5 MG tablet; Take 1 tablet (5 mg) by mouth 2 times daily for 30 days  - methylphenidate (RITALIN) 5 MG tablet; Take 1 tablet (5 mg) by mouth 2 times daily for 30 days    Male pattern alopecia    Eosinophilic esophagitis    Gastroesophageal reflux disease, unspecified whether esophagitis present    Trigeminal neuralgia    Mild persistent asthma without complication    Neuropathic pain      Risks and Recommendations  The patient has the following additional risks and recommendations for perioperative complications:   - No identified additional risk factors other than previously addressed    Preoperative Medication Instructions  Antiplatelet or Anticoagulation Medication Instructions   - Patient is on no antiplatelet or anticoagulation medications.    Additional Medication Instructions  Take all scheduled medications on the day of surgery EXCEPT for modifications listed below:   - pregabalin, gabapentin: Continue without modification.   - Psychostimulants: Hold the day of surgery  Hold all supplements for 14 days before surgery.  Ok to take tylenol.  No aleve for 4 days before surgery, No advil (ibuprofen) for one day before surgery, No aspirin for 7 days before surgery   Recommendation  Approval given to proceed with proposed procedure, without further diagnostic evaluation.    The longitudinal plan of care for the diagnosis(es)/condition(s) as documented were addressed during this visit. Due to the added complexity in care, I will continue to support Ty in the subsequent management and with ongoing continuity of care.     Subjective   Ty is a 28 year old, presenting for the following:  Pre-Op Exam          7/3/2024     1:57 PM   Additional Questions   Roomed by Stephanie   Accompanied by alone         7/3/2024     1:57 PM   Patient Reported Additional Medications   Patient reports taking the following new medications none     HPI related to upcoming procedure: Bilateral feminizing breast  augmentation with use of textured anatomic silicone gel implants         6/28/2024   Pre-Op Questionnaire   Have you ever had a heart attack or stroke? No   Have you ever had surgery on your heart or blood vessels, such as a stent placement, a coronary artery bypass, or surgery on an artery in your head, neck, heart, or legs? No   Do you have chest pain with activity? No   Do you have a history of heart failure? No   Do you currently have a cold, bronchitis or symptoms of other infection? No   Do you have a cough, shortness of breath, or wheezing? No   Do you or anyone in your family have previous history of blood clots? (!) YES - Dad spontaneous coronary artery dissection and after that developed clot in subclavian artery and on anticoagulation    Do you or does anyone in your family have a serious bleeding problem such as prolonged bleeding following surgeries or cuts? No   Have you ever had problems with anemia or been told to take iron pills? No   Have you had any abnormal blood loss such as black, tarry or bloody stools? No   Have you had any abnormal blood loss such as black, tarry or bloody stools, or abnormal vaginal bleeding? No   Have you ever had a blood transfusion? No   Are you willing to have a blood transfusion if it is medically needed before, during, or after your surgery? Yes   Have you or any of your relatives ever had problems with anesthesia? No   Do you have sleep apnea, excessive snoring or daytime drowsiness? No   Do you have any artifical heart valves or other implanted medical devices like a pacemaker, defibrillator, or continuous glucose monitor? No   Do you have artificial joints? No   Are you allergic to latex? No        Health Care Directive  Patient does not have a Health Care Directive or Living Will: Discussed advance care planning with patient; however, patient declined at this time.    Preoperative Review of    reviewed - controlled substances reflected in medication  list.      Status of Chronic Conditions:  See problem list for active medical problems.  Problems all longstanding and stable, except as noted/documented.  See ROS for pertinent symptoms related to these conditions.    Patient Active Problem List    Diagnosis Date Noted    Nasal obstruction 01/17/2024     Priority: Medium    Deviated nasal septum 01/17/2024     Priority: Medium    Nasal valve stenosis 01/17/2024     Priority: Medium    Hypertrophy of inferior nasal turbinate 01/17/2024     Priority: Medium    Gender dysphoria in adult 08/16/2023     Priority: Medium    Numbness and tingling 05/16/2023     Priority: Medium    Vertigo 05/16/2023     Priority: Medium    Vitamin D deficiency 05/16/2023     Priority: Medium    Eosinophilic esophagitis 05/16/2023     Priority: Medium    Palpitations 03/28/2019     Priority: Medium    Mild persistent asthma 03/06/2018     Priority: Medium    Allergic rhinitis due to animals 03/06/2018     Priority: Medium     Skin prick testing 3/6/18 positive for cat      Allergic rhinitis due to dust mite 03/06/2018     Priority: Medium    Seasonal allergic rhinitis due to pollen 03/06/2018     Priority: Medium     Skin prick testing 3/6/18 positive for walnut tree, birch, oak, willow, demetris, cocklebur, sagebrush/mugwort, and sorrel      Allergic conjunctivitis of both eyes 03/06/2018     Priority: Medium    Abnormal sinus finding on MRI 10/19/2017     Priority: Medium    Seasonal affective disorder (H24) 01/01/2014     Priority: Medium     it's chill now tho I'm doing fine        Past Medical History:   Diagnosis Date    Depressive disorder 2014    it s chill now tho I m doing fine    Uncomplicated asthma early 2000s     Past Surgical History:   Procedure Laterality Date    BIOPSY  feb 2023    As part of upper endoscopy    COMBINED ESOPHAGOSCOPY, GASTROSCOPY, DUODENOSCOPY (EGD) WITH CO2 INSUFFLATION N/A 01/02/2023    Procedure: ESOPHAGOGASTRODUODENOSCOPY, WITH CO2 INSUFFLATION;  Surgeon:  Zahraa Robbins DO;  Location: [facility] OR    COMBINED ESOPHAGOSCOPY, GASTROSCOPY, DUODENOSCOPY (EGD) WITH CO2 INSUFFLATION N/A 03/28/2023    Procedure: ESOPHAGOGASTRODUODENOSCOPY, WITH CO2 INSUFFLATION;  Surgeon: Zahraa Robbins DO;  Location: [facility] OR    ESOPHAGOSCOPY, GASTROSCOPY, DUODENOSCOPY (EGD), COMBINED N/A 01/02/2023    Procedure: ESOPHAGOGASTRODUODENOSCOPY, WITH BIOPSY;  Surgeon: Zahraa Robbins DO;  Location: [facility] OR    ESOPHAGOSCOPY, GASTROSCOPY, DUODENOSCOPY (EGD), COMBINED N/A 03/28/2023    Procedure: ESOPHAGOGASTRODUODENOSCOPY, WITH BIOPSY;  Surgeon: Zahraa Robbins DO;  Location: [facility] OR    HC TOOTH EXTRACTION W/FORCEP  2016    4 teeth     RECONSTRUCT NOSE AND SEPTUM (FUNCTIONAL) Bilateral 5/9/2024    Procedure: Complex Open Septoplasty, Repair of Nasal Vestibular Stenosis, Bilateral Inferior Turbinate Reduction;  Surgeon: Renetta Beltran MD;  Location: [facility] OR     Current Outpatient Medications   Medication Sig Dispense Refill    carbamide peroxide (DEBROX) 6.5 % otic solution Place 5 drops into both ears 2 times daily 15 mL 0    emtricitabine-tenofovir (TRUVADA) 200-300 MG per tablet Take 1 tablet by mouth daily 90 tablet 0    finasteride (PROSCAR) 5 MG tablet TAKE 1/4 TABLET BY MOUTH EVERY DAY 23 tablet 3    gabapentin (NEURONTIN) 300 MG capsule Take 1 capsule (300 mg) by mouth 2 times daily PATIENT STATES HE TAKING 300 MG TWICE DAILY 180 capsule 0    methylphenidate (RITALIN) 5 MG tablet Take 1 tablet (5 mg) by mouth 2 times daily for 30 days 60 tablet 0    [START ON 7/4/2024] methylphenidate (RITALIN) 5 MG tablet Take 1 tablet (5 mg) by mouth 2 times daily for 30 days 60 tablet 0    methylphenidate (RITALIN) 5 MG tablet Take 1 tablet (5 mg) by mouth 2 times daily 60 tablet 0    mometasone (ELOCON) 0.1 % external cream Apply topically daily To areas of psoriasis. 50 g 3    mometasone (ELOCON) 0.1 % external ointment Apply topically twice daily.  Do not expose skin to sun after applying steroid  "cream. 454 g 1    omeprazole (PRILOSEC) 40 MG DR capsule Take 1 capsule (40 mg) by mouth every morning On an empty stomach about 30 minutes before breakfast 90 capsule 3    ondansetron (ZOFRAN ODT) 4 MG ODT tab Take 1 tablet (4 mg) by mouth every 8 hours as needed for nausea 12 tablet 0    OXcarbazepine (TRILEPTAL) 150 MG tablet Take 2 tablets (300 mg) by mouth 2 times daily 120 tablet 11    pimecrolimus (ELIDEL) 1 % external cream APPLY 1 APPLICATION TWICE DAILY TOPICALLY TO GENITALS FOR 2 WEEKS      vitamin D3 25 mcg (1000 units) tablet TAKE 2 TABLETS BY MOUTH DAILY 180 tablet 1    acetaminophen (TYLENOL) 325 MG tablet Take 2 tablets (650 mg) by mouth every 4 hours as needed for other (mild pain) 100 tablet 0    albuterol (PROAIR HFA/PROVENTIL HFA/VENTOLIN HFA) 108 (90 Base) MCG/ACT inhaler Inhale 2 puffs into the lungs every 4 hours as needed for shortness of breath / dyspnea or wheezing (Patient not taking: Reported on 7/3/2024) 18 g 1    sodium chloride (OCEAN) 0.65 % nasal spray Spray 1-2 sprays in nostril 8 times daily Use in EACH nostril. 44 mL 1       Allergies   Allergen Reactions    Amoxicillin Swelling     Lip swelling     Cefuroxime      Swelling         Social History     Tobacco Use    Smoking status: Never     Passive exposure: Never    Smokeless tobacco: Never   Substance Use Topics    Alcohol use: Yes     Comment: infrequent at worst       History   Drug Use No               Objective    /80 (BP Location: Right arm, Patient Position: Sitting, Cuff Size: Adult Small)   Pulse 100   Temp 98.2  F (36.8  C) (Temporal)   Resp 20   Ht 1.76 m (5' 9.29\")   Wt 58.7 kg (129 lb 4.8 oz)   SpO2 99%   BMI 18.93 kg/m     Estimated body mass index is 18.93 kg/m  as calculated from the following:    Height as of this encounter: 1.76 m (5' 9.29\").    Weight as of this encounter: 58.7 kg (129 lb 4.8 oz).  Physical Exam  GENERAL: alert and no distress  EYES: Eyes grossly normal to inspection  HENT: ear " canals and TM's normal  NECK: no adenopathy, no asymmetry, masses, or scars  RESP: lungs clear to auscultation - no rales, rhonchi or wheezes  CV: regular rate and rhythm, normal S1 S2  ABDOMEN: soft, nontender, no hepatosplenomegaly, no masses and bowel sounds normal  MS: no gross musculoskeletal defects noted, no edema  SKIN: no suspicious lesions or rashes  NEURO: Normal strength and tone, mentation intact and speech normal  PSYCH: mentation appears normal, affect normal    Recent Labs   Lab Test 01/17/24  1108      POTASSIUM 4.1   CR 0.90        Diagnostics  Labs pending at this time.  Results will be reviewed when available.   No EKG required for low risk surgery (cataract, skin procedure, breast biopsy, etc).    Revised Cardiac Risk Index (RCRI)  The patient has the following serious cardiovascular risks for perioperative complications:   - No serious cardiac risks = 0 points     RCRI Interpretation: 0 points: Class I (very low risk - 0.4% complication rate)         Signed Electronically by: Parrish Burnette MD  Copy of this evaluation report is provided to requesting physician.

## 2024-07-04 LAB
ANION GAP SERPL CALCULATED.3IONS-SCNC: 9 MMOL/L (ref 7–15)
BUN SERPL-MCNC: 17.2 MG/DL (ref 6–20)
C TRACH DNA SPEC QL NAA+PROBE: NEGATIVE
CALCIUM SERPL-MCNC: 9.2 MG/DL (ref 8.6–10)
CHLORIDE SERPL-SCNC: 100 MMOL/L (ref 98–107)
CREAT SERPL-MCNC: 0.89 MG/DL (ref 0.51–1.17)
DEPRECATED HCO3 PLAS-SCNC: 29 MMOL/L (ref 22–29)
EGFRCR SERPLBLD CKD-EPI 2021: >90 ML/MIN/1.73M2
GLUCOSE SERPL-MCNC: 89 MG/DL (ref 70–99)
HBV SURFACE AG SERPL QL IA: NONREACTIVE
HCV AB SERPL QL IA: NONREACTIVE
HIV 1+2 AB+HIV1 P24 AG SERPL QL IA: NONREACTIVE
N GONORRHOEA DNA SPEC QL NAA+PROBE: NEGATIVE
POTASSIUM SERPL-SCNC: 4.6 MMOL/L (ref 3.4–5.3)
SODIUM SERPL-SCNC: 138 MMOL/L (ref 135–145)

## 2024-07-10 ENCOUNTER — ALLIED HEALTH/NURSE VISIT (OUTPATIENT)
Dept: OTOLARYNGOLOGY | Facility: CLINIC | Age: 29
End: 2024-07-10
Payer: COMMERCIAL

## 2024-07-10 ENCOUNTER — TELEPHONE (OUTPATIENT)
Dept: PLASTIC SURGERY | Facility: CLINIC | Age: 29
End: 2024-07-10

## 2024-07-10 DIAGNOSIS — Z98.890 POSTOPERATIVE STATE: Primary | ICD-10-CM

## 2024-07-10 PROCEDURE — 99207 PR NO CHARGE NURSE ONLY: CPT

## 2024-07-10 NOTE — TELEPHONE ENCOUNTER
Patient confirmed scheduled appointment:  Date: 1/15/ 24  Time: 1:45  Visit type: Return ENT   Provider: Dr. Kevin Lyon  Location: [facility]  Testing/imaging:   Additional notes:

## 2024-07-10 NOTE — PROGRESS NOTES
Pt is PO 5/9/24 s/p Complex open septoplasty, Bilateral inferior turbinate reduction and outfracture, Nasal valve repair, bilateral.    Pt pleased with improvement in nasal breathing and states that nasal tenderness has improved.    Dr. Beltran assessed pt.    Follow-up with Dr. Beltran in 6 mos.    Marely King RN  7/10/2024 2:58 PM

## 2024-07-24 ENCOUNTER — OFFICE VISIT (OUTPATIENT)
Dept: PLASTIC SURGERY | Facility: CLINIC | Age: 29
End: 2024-07-24
Payer: COMMERCIAL

## 2024-07-24 VITALS
WEIGHT: 128.8 LBS | BODY MASS INDEX: 19.08 KG/M2 | SYSTOLIC BLOOD PRESSURE: 161 MMHG | HEART RATE: 108 BPM | HEIGHT: 69 IN | DIASTOLIC BLOOD PRESSURE: 85 MMHG | OXYGEN SATURATION: 97 %

## 2024-07-24 DIAGNOSIS — F64.0 GENDER DYSPHORIA IN ADULT: Primary | ICD-10-CM

## 2024-07-24 PROCEDURE — 99213 OFFICE O/P EST LOW 20 MIN: CPT | Performed by: STUDENT IN AN ORGANIZED HEALTH CARE EDUCATION/TRAINING PROGRAM

## 2024-07-24 ASSESSMENT — PAIN SCALES - GENERAL: PAINLEVEL: NO PAIN (0)

## 2024-07-24 NOTE — LETTER
7/24/2024       RE: Manoj Castellanos  [address] [facility] [address]       Dear Colleague,    Thank you for referring your patient, Manoj Castellanos, to the [facility] [place] [facility]. Please see a copy of my visit note below.    PRS    Discussed upcoming surgery. Explained that we ordered low cohesivity smooth silicone round gel implants since the textured anatomic implants are no longer available. Explained that the fourth generation gel will take on the teardrop shape when patient upright since there is gravity on Earth.     Reiterated risks of implants, including but not limited to: capsular contracture, implant rupture, implant malposition, double bubble deformity, animation deformity, SUKH-ALCL, implant related illness. Reviewed the FDA checklist for implant related risks.  Patient understands these risks and is appreciative of the discussion.    Reiterated importance of implant surveillance, which includes high-definition US or MR imaging study 5-6 years after the original operation, followed by US or MR imaging every 3 years thereafter.      Discussed the typical activity restrictions following surgery.  For the first 2 weeks, limit lifting to 5-7 pounds and no activity that would elevate the heart rate for sustained periods of time above 100 bpm.  We may initiate upper pole strapping at 2 weeks.  Patient is to wear surgical bra at all times.  At the 6-week visit, we will increase activities usually to near full activity including swimming.  At 3 months postoperatively, patient can perform all activities including push-ups.    Discussed the risks of surgery, including but not limited to: infection, bleeding, pain, poor scarring, hematoma, asymmetry, suboptimal aesthetic result, need for revision surgery, capsular contracture, implant rupture, implant malposition, double bubble deformity, animation deformity,  SUKH-ALCL, implant related illness, anesthesia-related complications, DVT/PE, death.  Despite these risks, patient consents and would like to proceed with BILATERAL feminizing breast augmentation using silicone smooth round gel implants.  All questions answered.     -A total of 20 minutes was devoted to review of chart, direct face-to-face patient counseling and documentation during this encounter, exclusive of any procedure performed.          Again, thank you for allowing me to participate in the care of your patient.      Sincerely,    River Kim MD

## 2024-07-25 NOTE — PROGRESS NOTES
PRS    Discussed upcoming surgery. Explained that we ordered low cohesivity smooth silicone round gel implants since the textured anatomic implants are no longer available. Explained that the fourth generation gel will take on the teardrop shape when patient upright since there is gravity on Earth.     Reiterated risks of implants, including but not limited to: capsular contracture, implant rupture, implant malposition, double bubble deformity, animation deformity, SUKH-ALCL, implant related illness. Reviewed the FDA checklist for implant related risks.  Patient understands these risks and is appreciative of the discussion.    Reiterated importance of implant surveillance, which includes high-definition US or MR imaging study 5-6 years after the original operation, followed by US or MR imaging every 3 years thereafter.      Discussed the typical activity restrictions following surgery.  For the first 2 weeks, limit lifting to 5-7 pounds and no activity that would elevate the heart rate for sustained periods of time above 100 bpm.  We may initiate upper pole strapping at 2 weeks.  Patient is to wear surgical bra at all times.  At the 6-week visit, we will increase activities usually to near full activity including swimming.  At 3 months postoperatively, patient can perform all activities including push-ups.    Discussed the risks of surgery, including but not limited to: infection, bleeding, pain, poor scarring, hematoma, asymmetry, suboptimal aesthetic result, need for revision surgery, capsular contracture, implant rupture, implant malposition, double bubble deformity, animation deformity, SUKH-ALCL, implant related illness, anesthesia-related complications, DVT/PE, death.  Despite these risks, patient consents and would like to proceed with BILATERAL feminizing breast augmentation using silicone smooth round gel implants.  All questions answered.     -A total of 20 minutes was devoted to review of chart, direct  face-to-face patient counseling and documentation during this encounter, exclusive of any procedure performed.    River Kim MD, PhD

## 2024-07-29 ENCOUNTER — ANESTHESIA EVENT (OUTPATIENT)
Dept: SURGERY | Facility: AMBULATORY SURGERY CENTER | Age: 29
End: 2024-07-29
Payer: COMMERCIAL

## 2024-07-29 RX ORDER — DEXAMETHASONE SODIUM PHOSPHATE 10 MG/ML
4 INJECTION, SOLUTION INTRAMUSCULAR; INTRAVENOUS
Status: CANCELLED | OUTPATIENT
Start: 2024-07-29

## 2024-07-29 RX ORDER — NALOXONE HYDROCHLORIDE 0.4 MG/ML
0.1 INJECTION, SOLUTION INTRAMUSCULAR; INTRAVENOUS; SUBCUTANEOUS
Status: CANCELLED | OUTPATIENT
Start: 2024-07-29

## 2024-07-29 RX ORDER — ONDANSETRON 4 MG/1
4 TABLET, ORALLY DISINTEGRATING ORAL EVERY 30 MIN PRN
Status: CANCELLED | OUTPATIENT
Start: 2024-07-29

## 2024-07-29 RX ORDER — FENTANYL CITRATE 50 UG/ML
25 INJECTION, SOLUTION INTRAMUSCULAR; INTRAVENOUS
Status: CANCELLED | OUTPATIENT
Start: 2024-07-29

## 2024-07-29 RX ORDER — OXYCODONE HYDROCHLORIDE 5 MG/1
10 TABLET ORAL
Status: CANCELLED | OUTPATIENT
Start: 2024-07-29

## 2024-07-29 RX ORDER — ONDANSETRON 2 MG/ML
4 INJECTION INTRAMUSCULAR; INTRAVENOUS EVERY 30 MIN PRN
Status: CANCELLED | OUTPATIENT
Start: 2024-07-29

## 2024-07-30 ENCOUNTER — HOSPITAL ENCOUNTER (OUTPATIENT)
Facility: AMBULATORY SURGERY CENTER | Age: 29
Discharge: HOME | End: 2024-07-30
Attending: STUDENT IN AN ORGANIZED HEALTH CARE EDUCATION/TRAINING PROGRAM
Payer: COMMERCIAL

## 2024-07-30 ENCOUNTER — ANESTHESIA (OUTPATIENT)
Dept: SURGERY | Facility: AMBULATORY SURGERY CENTER | Age: 29
End: 2024-07-30
Payer: COMMERCIAL

## 2024-07-30 VITALS
HEART RATE: 82 BPM | SYSTOLIC BLOOD PRESSURE: 119 MMHG | BODY MASS INDEX: 18.96 KG/M2 | DIASTOLIC BLOOD PRESSURE: 88 MMHG | HEIGHT: 69 IN | OXYGEN SATURATION: 99 % | TEMPERATURE: 97 F | RESPIRATION RATE: 16 BRPM | WEIGHT: 128 LBS

## 2024-07-30 DIAGNOSIS — F64.0 GENDER DYSPHORIA IN ADULT: Primary | ICD-10-CM

## 2024-07-30 PROCEDURE — 19325 BREAST AUGMENTATION W/IMPLT: CPT | Performed by: NURSE ANESTHETIST, CERTIFIED REGISTERED

## 2024-07-30 PROCEDURE — 19325 BREAST AUGMENTATION W/IMPLT: CPT | Mod: LT

## 2024-07-30 PROCEDURE — 19325 BREAST AUGMENTATION W/IMPLT: CPT | Mod: 50 | Performed by: STUDENT IN AN ORGANIZED HEALTH CARE EDUCATION/TRAINING PROGRAM

## 2024-07-30 PROCEDURE — 19325 BREAST AUGMENTATION W/IMPLT: CPT | Performed by: ANESTHESIOLOGY

## 2024-07-30 DEVICE — IMPLANTABLE DEVICE: Type: IMPLANTABLE DEVICE | Site: BREAST | Status: FUNCTIONAL

## 2024-07-30 RX ORDER — FENTANYL CITRATE 50 UG/ML
INJECTION, SOLUTION INTRAMUSCULAR; INTRAVENOUS PRN
Status: DISCONTINUED | OUTPATIENT
Start: 2024-07-30 | End: 2024-07-30

## 2024-07-30 RX ORDER — METHOCARBAMOL 500 MG/1
500 TABLET, FILM COATED ORAL 4 TIMES DAILY
Qty: 12 TABLET | Refills: 0 | Status: SHIPPED | OUTPATIENT
Start: 2024-07-30 | End: 2024-08-19

## 2024-07-30 RX ORDER — SODIUM CHLORIDE, SODIUM LACTATE, POTASSIUM CHLORIDE, CALCIUM CHLORIDE 600; 310; 30; 20 MG/100ML; MG/100ML; MG/100ML; MG/100ML
INJECTION, SOLUTION INTRAVENOUS CONTINUOUS
Status: DISCONTINUED | OUTPATIENT
Start: 2024-07-30 | End: 2024-08-01 | Stop reason: HOSPADM

## 2024-07-30 RX ORDER — CEFAZOLIN SODIUM 2 G/50ML
2 SOLUTION INTRAVENOUS EVERY 8 HOURS
Status: DISCONTINUED | OUTPATIENT
Start: 2024-07-30 | End: 2024-08-01 | Stop reason: HOSPADM

## 2024-07-30 RX ORDER — GLYCOPYRROLATE 0.2 MG/ML
INJECTION, SOLUTION INTRAMUSCULAR; INTRAVENOUS PRN
Status: DISCONTINUED | OUTPATIENT
Start: 2024-07-30 | End: 2024-07-30

## 2024-07-30 RX ORDER — ONDANSETRON 2 MG/ML
INJECTION INTRAMUSCULAR; INTRAVENOUS PRN
Status: DISCONTINUED | OUTPATIENT
Start: 2024-07-30 | End: 2024-07-30

## 2024-07-30 RX ORDER — KETAMINE HYDROCHLORIDE 10 MG/ML
INJECTION INTRAMUSCULAR; INTRAVENOUS PRN
Status: DISCONTINUED | OUTPATIENT
Start: 2024-07-30 | End: 2024-07-30

## 2024-07-30 RX ORDER — OXYCODONE HYDROCHLORIDE 5 MG/1
5 TABLET ORAL EVERY 6 HOURS PRN
Qty: 12 TABLET | Refills: 0 | Status: SHIPPED | OUTPATIENT
Start: 2024-07-30 | End: 2024-08-02

## 2024-07-30 RX ORDER — ONDANSETRON 4 MG/1
4 TABLET, ORALLY DISINTEGRATING ORAL EVERY 30 MIN PRN
Status: DISCONTINUED | OUTPATIENT
Start: 2024-07-30 | End: 2024-08-01 | Stop reason: HOSPADM

## 2024-07-30 RX ORDER — DOXYCYCLINE 100 MG/1
100 CAPSULE ORAL 2 TIMES DAILY
Qty: 6 CAPSULE | Refills: 0 | Status: SHIPPED | OUTPATIENT
Start: 2024-07-30 | End: 2024-08-19

## 2024-07-30 RX ORDER — PROPOFOL 10 MG/ML
INJECTION, EMULSION INTRAVENOUS CONTINUOUS PRN
Status: DISCONTINUED | OUTPATIENT
Start: 2024-07-30 | End: 2024-07-30

## 2024-07-30 RX ORDER — LIDOCAINE 40 MG/G
CREAM TOPICAL
Status: DISCONTINUED | OUTPATIENT
Start: 2024-07-30 | End: 2024-08-01 | Stop reason: HOSPADM

## 2024-07-30 RX ORDER — LABETALOL HYDROCHLORIDE 5 MG/ML
10 INJECTION, SOLUTION INTRAVENOUS
Status: DISCONTINUED | OUTPATIENT
Start: 2024-07-30 | End: 2024-08-01 | Stop reason: HOSPADM

## 2024-07-30 RX ORDER — OXYCODONE HYDROCHLORIDE 5 MG/1
5 TABLET ORAL
Status: COMPLETED | OUTPATIENT
Start: 2024-07-30 | End: 2024-07-30

## 2024-07-30 RX ORDER — PROPOFOL 10 MG/ML
INJECTION, EMULSION INTRAVENOUS PRN
Status: DISCONTINUED | OUTPATIENT
Start: 2024-07-30 | End: 2024-07-30

## 2024-07-30 RX ORDER — HYDROMORPHONE HYDROCHLORIDE 1 MG/ML
0.2 INJECTION, SOLUTION INTRAMUSCULAR; INTRAVENOUS; SUBCUTANEOUS EVERY 5 MIN PRN
Status: DISCONTINUED | OUTPATIENT
Start: 2024-07-30 | End: 2024-08-01 | Stop reason: HOSPADM

## 2024-07-30 RX ORDER — FENTANYL CITRATE 50 UG/ML
50 INJECTION, SOLUTION INTRAMUSCULAR; INTRAVENOUS EVERY 5 MIN PRN
Status: DISCONTINUED | OUTPATIENT
Start: 2024-07-30 | End: 2024-08-01 | Stop reason: HOSPADM

## 2024-07-30 RX ORDER — FENTANYL CITRATE 50 UG/ML
25 INJECTION, SOLUTION INTRAMUSCULAR; INTRAVENOUS EVERY 5 MIN PRN
Status: DISCONTINUED | OUTPATIENT
Start: 2024-07-30 | End: 2024-08-01 | Stop reason: HOSPADM

## 2024-07-30 RX ORDER — ONDANSETRON 2 MG/ML
4 INJECTION INTRAMUSCULAR; INTRAVENOUS EVERY 30 MIN PRN
Status: DISCONTINUED | OUTPATIENT
Start: 2024-07-30 | End: 2024-08-01 | Stop reason: HOSPADM

## 2024-07-30 RX ORDER — LIDOCAINE HYDROCHLORIDE 20 MG/ML
INJECTION, SOLUTION INFILTRATION; PERINEURAL PRN
Status: DISCONTINUED | OUTPATIENT
Start: 2024-07-30 | End: 2024-07-30

## 2024-07-30 RX ORDER — HYDROMORPHONE HYDROCHLORIDE 1 MG/ML
0.4 INJECTION, SOLUTION INTRAMUSCULAR; INTRAVENOUS; SUBCUTANEOUS EVERY 5 MIN PRN
Status: DISCONTINUED | OUTPATIENT
Start: 2024-07-30 | End: 2024-08-01 | Stop reason: HOSPADM

## 2024-07-30 RX ORDER — ACETAMINOPHEN 325 MG/1
975 TABLET ORAL ONCE
Status: COMPLETED | OUTPATIENT
Start: 2024-07-30 | End: 2024-07-30

## 2024-07-30 RX ORDER — DEXAMETHASONE SODIUM PHOSPHATE 4 MG/ML
INJECTION, SOLUTION INTRA-ARTICULAR; INTRALESIONAL; INTRAMUSCULAR; INTRAVENOUS; SOFT TISSUE PRN
Status: DISCONTINUED | OUTPATIENT
Start: 2024-07-30 | End: 2024-07-30

## 2024-07-30 RX ORDER — MEPERIDINE HYDROCHLORIDE 25 MG/ML
12.5 INJECTION INTRAMUSCULAR; INTRAVENOUS; SUBCUTANEOUS EVERY 5 MIN PRN
Status: DISCONTINUED | OUTPATIENT
Start: 2024-07-30 | End: 2024-08-01 | Stop reason: HOSPADM

## 2024-07-30 RX ORDER — DEXAMETHASONE SODIUM PHOSPHATE 10 MG/ML
4 INJECTION, SOLUTION INTRAMUSCULAR; INTRAVENOUS
Status: DISCONTINUED | OUTPATIENT
Start: 2024-07-30 | End: 2024-08-01 | Stop reason: HOSPADM

## 2024-07-30 RX ORDER — ONDANSETRON 4 MG/1
4 TABLET, FILM COATED ORAL EVERY 6 HOURS PRN
Qty: 12 TABLET | Refills: 0 | Status: SHIPPED | OUTPATIENT
Start: 2024-07-30

## 2024-07-30 RX ORDER — NALOXONE HYDROCHLORIDE 0.4 MG/ML
0.1 INJECTION, SOLUTION INTRAMUSCULAR; INTRAVENOUS; SUBCUTANEOUS
Status: DISCONTINUED | OUTPATIENT
Start: 2024-07-30 | End: 2024-08-01 | Stop reason: HOSPADM

## 2024-07-30 RX ADMIN — PROPOFOL 200 MG: 10 INJECTION, EMULSION INTRAVENOUS at 12:55

## 2024-07-30 RX ADMIN — SODIUM CHLORIDE, SODIUM LACTATE, POTASSIUM CHLORIDE, CALCIUM CHLORIDE: 600; 310; 30; 20 INJECTION, SOLUTION INTRAVENOUS at 12:32

## 2024-07-30 RX ADMIN — LIDOCAINE HYDROCHLORIDE 40 MG: 20 INJECTION, SOLUTION INFILTRATION; PERINEURAL at 12:55

## 2024-07-30 RX ADMIN — ONDANSETRON 4 MG: 2 INJECTION INTRAMUSCULAR; INTRAVENOUS at 14:06

## 2024-07-30 RX ADMIN — KETAMINE HYDROCHLORIDE 20 MG: 10 INJECTION INTRAMUSCULAR; INTRAVENOUS at 13:16

## 2024-07-30 RX ADMIN — Medication 0.5 MG: at 13:22

## 2024-07-30 RX ADMIN — FENTANYL CITRATE 50 MCG: 50 INJECTION, SOLUTION INTRAMUSCULAR; INTRAVENOUS at 12:55

## 2024-07-30 RX ADMIN — GLYCOPYRROLATE 0.2 MG: 0.2 INJECTION, SOLUTION INTRAMUSCULAR; INTRAVENOUS at 13:07

## 2024-07-30 RX ADMIN — OXYCODONE HYDROCHLORIDE 5 MG: 5 TABLET ORAL at 14:42

## 2024-07-30 RX ADMIN — PROPOFOL 150 MCG/KG/MIN: 10 INJECTION, EMULSION INTRAVENOUS at 13:37

## 2024-07-30 RX ADMIN — PROPOFOL 200 MCG/KG/MIN: 10 INJECTION, EMULSION INTRAVENOUS at 12:55

## 2024-07-30 RX ADMIN — CEFAZOLIN SODIUM 2 G: 2 SOLUTION INTRAVENOUS at 12:45

## 2024-07-30 RX ADMIN — DEXAMETHASONE SODIUM PHOSPHATE 4 MG: 4 INJECTION, SOLUTION INTRA-ARTICULAR; INTRALESIONAL; INTRAMUSCULAR; INTRAVENOUS; SOFT TISSUE at 13:10

## 2024-07-30 RX ADMIN — ACETAMINOPHEN 975 MG: 325 TABLET ORAL at 12:32

## 2024-07-30 RX ADMIN — FENTANYL CITRATE 50 MCG: 50 INJECTION, SOLUTION INTRAMUSCULAR; INTRAVENOUS at 13:20

## 2024-07-30 NOTE — ANESTHESIA PROCEDURE NOTES
Airway       Patient location during procedure: OR       Procedure Start/Stop Times: 7/30/2024 12:58 PM  Staff -        CRNA: Annemarie Quintanilla APRN CRNA       Performed By: CRNA  Consent for Airway        Urgency: elective  Indications and Patient Condition       Indications for airway management: paola-procedural       Induction type:intravenous       Mask difficulty assessment: 1 - vent by mask    Final Airway Details       Final airway type: supraglottic airway    Supraglottic Airway Details        Type: LMA       Brand: LMA Unique       LMA size: 4    Post intubation assessment        Placement verified by: capnometry, equal breath sounds and chest rise        Number of attempts at approach: 1       Number of other approaches attempted: 0       Secured with: tape       Ease of procedure: easy       Dentition: Intact and Unchanged    Medication(s) Administered   Medication Administration Time: 7/30/2024 12:58 PM

## 2024-07-30 NOTE — DISCHARGE INSTRUCTIONS
Plastic Surgery Discharge Instructions    Patient has been treated for top gender dysphoria with Dr. Kim on 7/30/24.     Care: Please wear the supportive surgical bra at all times. If you have foam tape strapped on your upper chest, please leave in place for at least 36-48 hours. You can shower after this time frame, but keep the incision dressing as dry as possible. Do not rub the incisions. When you are done showering, gently pad dry and wear the supportive surgical bra at all times, and particularly when ambulating upright. You can loosen supportive bra when lying flat and resting in bed.     Medications: You can take Ibuprofen 400-800 mg and Tylenol 650 mg for pain relief. Please take each every 6 hours, and for optimal pain relief - please stagger the medications so that you are taking one or the other every 3 hours. If you have been prescribed additional pain medications or muscle relaxants, please take as instructed and as needed. If you are taking additional pain medications, please do not exceed 4000 mg of Tylenol daily from all sources. Also, if you are taking narcotic medications, please do not operate heavy machinery or drive. If you have been prescribed antibiotics, please also take as instructed and for the first few days after surgery.     Diet: Start with clear liquids and slow down if nauseated. Advance the diet as tolerated.    Weight-bearing: You can use your arms. No heavy lifting. No strenuous activities. For 2 weeks, do not elevate your heart rate above 100 beats per minute and no lifting greater than 3-5 pounds. After your first clinic visit, we will discuss advancing your activity level to include cardio workout and more lifting.     ER Instructions: Please call the office and/or consider return to the ER if you experience worsening pain not relieved by medications, increased swelling, redness or high fevers >101F or if there are unexpected problems like shortness of breath.    Post-op  follow-up: Clinic in 10-14 days with Dr. Kim at the [facility]  Home Care Following Anesthesia  For 24 hours after surgery:  Get plenty of rest.  A responsible adult must stay with you for at least 24 hours after you leave the surgery center.  Do not drive or use heavy equipment.  If you have weakness or tingling, don't drive or use heavy equipment until this feeling goes away.   Do not drink alcohol.   Avoid strenuous or risky activities.  Ask for help when climbing stairs.  You may feel lightheaded.  IF so, sit for a few minutes before standing.  Have someone help you get up.   If you have nausea (feel sick to your stomach): Drink only clear liquids such as apple juice, ginger ale, broth or 7-Up.  Rest may also help.  Be sure to drink enough fluids.  Move to a regular diet as you feel able.   You may have a slight fever.  Call the doctor if your fever is over 100 F (37.7 C) (taken under the tongue) or lasts longer than 24 hours.  You may have a dry mouth, a sore throat, muscle aches or trouble sleeping. These should go away after 24 hours.  Do not make important or legal decisions.   It is recommended to avoid smoking.               Tips for taking pain medications  To get the best pain relief possible, remember these points:  Take pain medications as directed, before pain becomes severe.  Pain medication can upset your stomach: taking it with food may help.  Constipation is a common side effect of pain medication. Drink plenty of  fluids.  Eat foods high in fiber. Take a stool softener if recommended by your doctor or pharmacist.  Do not drink alcohol, drive or operate machinery while taking pain medications.  Ask about other ways to control pain, such as with heat, ice or relaxation.    Tylenol/Acetaminophen Consumption    If you feel your pain relief is insufficient, you may take Tylenol/Acetaminophen in addition to your narcotic pain medication.   Be  careful not to exceed 4,000 mg of Tylenol/Acetaminophen in a 24 hour period from all sources.  If you are taking extra strength Tylenol/acetaminophen (500 mg), the maximum dose is 8 tablets in 24 hours.  If you are taking regular strength acetaminophen (325 mg), the maximum dose is 12 tablets in 24 hours.    Call a doctor for any of the following:  Signs of infection (fever, growing tenderness at the surgery site, a large amount of drainage or bleeding, severe pain, foul-smelling drainage, redness, swelling).  It has been over 8 to 10 hours since surgery and you are still not able to urinate (pass water).  Headache for over 24 hours.  Numbness, tingling or weakness the day after surgery (if you had spinal anesthesia).  Signs of Covid-19 infection (temperature over 100 degrees, shortness of breath, cough, loss of taste/smell, generalized body aches, persistent headache, chills, sore throat, nausea/vomiting/diarrhea)  Your doctor is:       Dr. River Kim, Plastic Surgery: [phone]               Or dial [phone] and ask for the resident on call for:  Plastics  For emergency care, call the:  [facility]:  [phone] (TTY for hearing impaired: [phone])

## 2024-07-30 NOTE — ANESTHESIA POSTPROCEDURE EVALUATION
Patient: Manoj Castellanos    Procedure: Procedure(s):  Bilateral feminizing breast augmentation with use of anatomic smooth silicone gel implants       Anesthesia Type:  General    Note:  Disposition: Outpatient   Postop Pain Control: Uneventful            Sign Out: Well controlled pain   PONV: No   Neuro/Psych: Uneventful            Sign Out: Acceptable/Baseline neuro status   Airway/Respiratory: Uneventful            Sign Out: Acceptable/Baseline resp. status   CV/Hemodynamics: Uneventful            Sign Out: Acceptable CV status; No obvious hypovolemia; No obvious fluid overload   Other NRE: NONE   DID A NON-ROUTINE EVENT OCCUR? No           Last vitals:  Vitals Value Taken Time   /77 07/30/24 1445   Temp 36.1  C (97  F) 07/30/24 1445   Pulse 85 07/30/24 1444   Resp 20 07/30/24 1445   SpO2 99 % 07/30/24 1445   Vitals shown include unfiled device data.    Electronically Signed By: Abida Garcia MD  July 30, 2024  4:15 PM

## 2024-07-30 NOTE — PROGRESS NOTES
PRS    No changes in history or exam. Medically optimized.     OR today for bilateral feminizing augmentation with use of smooth silicone round gel implants.    Discussed the risks of surgery, including but not limited to: infection, bleeding, pain, poor scarring, hematoma, asymmetry, suboptimal aesthetic result, need for revision surgery, capsular contracture, implant rupture, implant malposition, double bubble deformity, animation deformity, SUKH-ALCL, implant related illness, anesthesia-related complications, DVT/PE, death.  Despite these risks, patient consents and would like to proceed with BILATERAL feminizing breast augmentation using silicone smooth round gel implants.  All questions answered.     River Kim MD, PhD

## 2024-07-30 NOTE — OP NOTE
PLASTIC SURGERY OPERATIVE REPORT     Date of Surgery: 7/30/2024  Surgical Service: Plastic Surgery     Preoperative Diagnosis: Chest gender dysphoria     Postoperative Diagnosis: Same as preoperative diagnosis     Procedures Performed: Bilateral feminizing breast augmentation with use of permanent silicone gel implants     Attending:  JESSICA Kim MD PhD  Assistant: EMMA Bolaños,      Complications: None apparent  Specimens: None  Implants: Allergan 200 SRF implants bilaterally (serial numbers; [id number]: [id number], [id number]: [id number])  Estimated blood loss: 25 mL  Wound classification: Clean  Anesthesia: General     Indications for Procedure: This is a 28-year-old trans feminine agendered presenting with chest gender dysphoria.  Has a strong relationship that is long-term with her therapist with excellent support at home, and after providing a letter of support, seeks chest feminizing surgery with permanent silicone gel implant augmentation.     Intraoperative findings: Bilateral partial submuscular dual plane 3 pockets were created with no bleeding, leaving a cuff of inferior pectoralis major muscle caudally.  Very good symmetry following trial of 200 mL full profile implants. Inframammary fold was lowered to approximately 1.5 cm on stretch bilaterally.     Description of Procedure: Patient was seen in the preoperative holding area.  Consent was verified.  The breasts were marked.  All additional questions were answered.  The risks of the surgery were reiterated, including (but not limited to): infection, bleeding, pain, poor scarring, need for aesthetic revision, implant malposition, implant rupture, animation deformity, capsular contracture, implant extrusion, hematoma, seroma. Following discussion of all these risks, the patient agrees to proceed with surgery.  Patient was then transferred to the operating room and placed supine on the operating table.  All pressure points were padded.  Sequential compression devices were placed on  bilateral lower extremities and verified to be operational.  IV antibiotic prophylaxis was given.  Preinduction timeout was performed.  General anesthesia was induced.  At the start of the case, the chest and both breasts were prepped and draped in usual sterile fashion.  The nipples were covered with sterile Tegaderm.  Of note, the same procedure was done on both breasts. Next, a 5 cm inframammary incision was done at approximately 6.5 cm of nipple inframammary distance on stretch with the medialmost extent of the incision located at the medial edge of the areola.  Next, once through skin, monopolar electrocautery was used to identify the pectoralis major muscle and its lateral border.  Once done, pectoralis major was lifted off of the chest wall.   Next, the inferior pectoralis major was  from the overlying glandular tissue in a dual plane to fashion to better reposition the breast mound over the eventual implant.  Next, the inferior pectoralis major tendons were monopolar electrocauterized with approximately 1 cm of muscle cuff left attached to the chest wall caudally.  The pectoralis major inferior attachments were divided all the way to the medial origins.  Next, hemostasis was ensured, although there was little to no bleeding.  Next, 200 cc full profile implant sizers were placed bilaterally.  Provisional SFS Vicryl suture as well as skin stapling was done.  Patient was sat upright for evaluation.  It was determined that the implant position was appropriate and symmetric, and the size of the augmentation was body appropriate.  Patient was then laid back down.  The sizers were removed. The permanent silicone gel implant placement was first done on the right side with complete pocket closure, followed by the left side. The same procedure was done on both sides. Next, each breast pocket was irrigated with 250 cc of antibiotic-containing sterile normal saline solution.  Hemostasis was again ensured using  monopolar electrocautery.  There was no bleeding bilaterally.  Next, Betadine solution was irrigated into both breast pockets.  A total of 100 cc of the solution was used on each side.  Next, sterile gloves were exchanged for new ones.  Additional sterile blue towels were used to redraped around the breasts.  Only one permanent gel implant at a time was opened.  The implant was coated with antibiotic solution.  A Lund funnel was opened.  A Sarah retractor was cleansed with antibiotic solution and positioned within the breast pocket.  The implant was then placed within the Lund funnel and delivered into the breast pocket, after ensuring the appropriate implant as well as positioning.  Next, three times three-point 0 PDS suture was placed from the chest wall to the superficial fascial system of both the inferior and superior breast skin flaps, to reestablish to the new inframammary fold.  Additionally, 2-0 Vicryl suture was used to reapproximate the SFS and to close the pocket.  The same procedure was done on the contralateral side.  The skin was closed with 3-0 deep dermal and a running 3-0 intracuticular suture.  The incisions were dressed with Steri-Strips and skin adhesive.  Fluff dressing and foam tape were used to secure the implants in their current position on the chest wall.  Patient was then extubated uneventfully, and transferred to the postanesthesia care unit without event.     Postoperative plan:  Patient will be seen in approximately 2 weeks for reevaluation.  Patient was instructed to leave the foam tape in place for 36 to 48 hours.  After which, patient will be able to shower.  Once showered, patient was instructed to keep the incision clean and to allow the Steri-Strips to fall off on their own.  Additionally, patient was instructed to engage in no strenuous activity that would raise her heart rate above 100 bpm or lifting involving anything heavier than 3 to 5 pounds.     River Kim MD,  PhD

## 2024-07-30 NOTE — ANESTHESIA PREPROCEDURE EVALUATION
Anesthesia Pre-Procedure Evaluation    Patient: Manoj Castellanos   MRN: [id number] : 1995        Procedure : Procedure(s):  Bilateral feminizing breast augmentation with use of textured anatomic silicone gel implants          Past Medical History:   Diagnosis Date    Depressive disorder     it s chill now tho I m doing fine    Uncomplicated asthma early       Past Surgical History:   Procedure Laterality Date    BIOPSY  2023    As part of upper endoscopy    COMBINED ESOPHAGOSCOPY, GASTROSCOPY, DUODENOSCOPY (EGD) WITH CO2 INSUFFLATION N/A 2023    Procedure: ESOPHAGOGASTRODUODENOSCOPY, WITH CO2 INSUFFLATION;  Surgeon: Zahraa Robbins DO;  Location: [facility] OR    COMBINED ESOPHAGOSCOPY, GASTROSCOPY, DUODENOSCOPY (EGD) WITH CO2 INSUFFLATION N/A 2023    Procedure: ESOPHAGOGASTRODUODENOSCOPY, WITH CO2 INSUFFLATION;  Surgeon: Zahraa Robbins DO;  Location: [facility] OR    ESOPHAGOSCOPY, GASTROSCOPY, DUODENOSCOPY (EGD), COMBINED N/A 2023    Procedure: ESOPHAGOGASTRODUODENOSCOPY, WITH BIOPSY;  Surgeon: Zahraa Robbins DO;  Location: [facility] OR    ESOPHAGOSCOPY, GASTROSCOPY, DUODENOSCOPY (EGD), COMBINED N/A 2023    Procedure: ESOPHAGOGASTRODUODENOSCOPY, WITH BIOPSY;  Surgeon: Zahraa Robbins DO;  Location: [facility] OR    HC TOOTH EXTRACTION W/FORCEP      4 teeth     RECONSTRUCT NOSE AND SEPTUM (FUNCTIONAL) Bilateral 2024    Procedure: Complex Open Septoplasty, Repair of Nasal Vestibular Stenosis, Bilateral Inferior Turbinate Reduction;  Surgeon: Renetta Beltran MD;  Location: [facility] OR      Allergies   Allergen Reactions    Amoxicillin Swelling     Lip swelling     Cefuroxime      Swelling       Social History     Tobacco Use    Smoking status: Never     Passive exposure: Never    Smokeless tobacco: Never   Substance Use Topics    Alcohol use: Yes     Comment: infrequent at worst      Wt Readings from Last 1 Encounters:   24 58.4 kg (128 lb 12.8 oz)        Anesthesia Evaluation   Pt has  "had prior anesthetic. Type: MAC.        ROS/MED HX  ENT/Pulmonary:     (+)                     Mild Persistent, asthma                  Neurologic: Comment: ADHD    (+)    peripheral neuropathy, - trigeminal neuralgia.                           Cardiovascular:  - neg cardiovascular ROS     METS/Exercise Tolerance: >4 METS    Hematologic:  - neg hematologic  ROS     Musculoskeletal:  - neg musculoskeletal ROS     GI/Hepatic:  - neg GI/hepatic ROS     Renal/Genitourinary:  - neg Renal ROS     Endo:  - neg endo ROS     Psychiatric/Substance Use:     (+) psychiatric history anxiety and other (comment) (panic d/o)       Infectious Disease:  - neg infectious disease ROS     Malignancy:  - neg malignancy ROS     Other:  - neg other ROS             OUTSIDE LABS:  CBC:   Lab Results   Component Value Date    WBC 5.4 03/24/2023    WBC 6.0 11/23/2022    HGB 14.0 07/03/2024    HGB 15.7 03/24/2023    HCT 47.5 03/24/2023    HCT 52.3 11/23/2022     03/24/2023     11/23/2022     BMP:   Lab Results   Component Value Date     07/03/2024     01/17/2024    POTASSIUM 4.6 07/03/2024    POTASSIUM 4.1 01/17/2024    CHLORIDE 100 07/03/2024    CHLORIDE 103 01/17/2024    CO2 29 07/03/2024    CO2 30 (H) 01/17/2024    BUN 17.2 07/03/2024    BUN 11.5 01/17/2024    CR 0.89 07/03/2024    CR 0.90 01/17/2024    GLC 89 07/03/2024    GLC 91 01/17/2024     COAGS: No results found for: \"PTT\", \"INR\", \"FIBR\"  POC: No results found for: \"BGM\", \"HCG\", \"HCGS\"  HEPATIC:   Lab Results   Component Value Date    ALBUMIN 4.6 01/17/2024    PROTTOTAL 7.6 01/17/2024    ALT 9 01/17/2024    AST 18 01/17/2024    ALKPHOS 69 01/17/2024    BILITOTAL 0.3 01/17/2024     OTHER:   Lab Results   Component Value Date    IOANA 9.2 07/03/2024    LIPASE 34 11/23/2022    AMYLASE 54 06/22/2022    TSH 2.86 05/09/2022    CRP <2.9 05/09/2022    SED 4 12/12/2022       Anesthesia Plan    ASA Status:  2       Anesthesia Type: General.     - Airway: LMA   Induction: " Intravenous, Propofol.   Maintenance: TIVA.        Consents            Postoperative Care    Pain management: Oral pain medications, IV analgesics, Multi-modal analgesia.   PONV prophylaxis: Ondansetron (or other 5HT-3), Dexamethasone or Solumedrol, Background Propofol Infusion     Comments:               Lobito Bowen MD    I have reviewed the pertinent notes and labs in the chart from the past 30 days and (re)examined the patient.  Any updates or changes from those notes are reflected in this note.

## 2024-07-30 NOTE — BRIEF OP NOTE
[facility]    Brief Operative Note    Pre-operative diagnosis: Gender dysphoria in adult [F64.0]  Post-operative diagnosis Same as pre-operative diagnosis    Procedure: Bilateral feminizing breast augmentation with use of anatomic smooth silicone gel implants, Bilateral - Breast    Surgeon: Surgeons and Role:     * River Kim MD - Primary  Anesthesia: General   Estimated Blood Loss: 5 cc    Drains: None  Specimens: * No specimens in log *  Findings:   Allergan  ml implants placed bilaterally.  Complications: None.  Implants:   Implant Name Type Inv. Item Serial No.  Lot No. LRB No. Used Action   Natrelle Inspira Breast Implant Smooth Round Full Profile   [id number] ALLERGAN  Right 1 Implanted   Natrelle Inspira Breast Implant Smooth Round Full Profile   [id number] ALLERGAN  Left 1 Implanted

## 2024-07-30 NOTE — ANESTHESIA CARE TRANSFER NOTE
Patient: Manoj Castellanos    Procedure: Procedure(s):  Bilateral feminizing breast augmentation with use of anatomic smooth silicone gel implants       Diagnosis: Gender dysphoria in adult [F64.0]  Diagnosis Additional Information: No value filed.    Anesthesia Type:   General     Note:    Oropharynx: oropharynx clear of all foreign objects and spontaneously breathing  Level of Consciousness: awake  Oxygen Supplementation: face mask    Independent Airway: airway patency satisfactory and stable  Dentition: dentition unchanged  Vital Signs Stable: post-procedure vital signs reviewed and stable  Report to RN Given: handoff report given  Patient transferred to: PACU    Handoff Report: Identifed the Patient, Identified the Reponsible Provider, Reviewed the pertinent medical history, Discussed the surgical course, Reviewed Intra-OP anesthesia mangement and issues during anesthesia, Set expectations for post-procedure period and Allowed opportunity for questions and acknowledgement of understanding      Vitals:  Vitals Value Taken Time   BP 97/65 07/30/24 1433   Temp 97.0    Pulse 104 07/30/24 1434   Resp 21 07/30/24 1434   SpO2 100 % 07/30/24 1434   Vitals shown include unfiled device data.    Electronically Signed By: KEYANNA Hahn CRNA  July 30, 2024  2:35 PM

## 2024-08-14 ENCOUNTER — TELEPHONE (OUTPATIENT)
Dept: PLASTIC SURGERY | Facility: CLINIC | Age: 29
End: 2024-08-14

## 2024-08-14 ENCOUNTER — OFFICE VISIT (OUTPATIENT)
Dept: PLASTIC SURGERY | Facility: CLINIC | Age: 29
End: 2024-08-14
Payer: COMMERCIAL

## 2024-08-14 VITALS
BODY MASS INDEX: 19.89 KG/M2 | HEIGHT: 69 IN | DIASTOLIC BLOOD PRESSURE: 95 MMHG | TEMPERATURE: 97.9 F | SYSTOLIC BLOOD PRESSURE: 135 MMHG | HEART RATE: 90 BPM | OXYGEN SATURATION: 98 % | WEIGHT: 134.3 LBS

## 2024-08-14 DIAGNOSIS — F64.0 GENDER DYSPHORIA IN ADULT: Primary | ICD-10-CM

## 2024-08-14 PROCEDURE — 99024 POSTOP FOLLOW-UP VISIT: CPT | Performed by: STUDENT IN AN ORGANIZED HEALTH CARE EDUCATION/TRAINING PROGRAM

## 2024-08-14 ASSESSMENT — PAIN SCALES - GENERAL: PAINLEVEL: MODERATE PAIN (4)

## 2024-08-14 NOTE — LETTER
8/14/2024       RE: Manoj Castellanos  [address] [facility] [address]     Dear Colleague,    Thank you for referring your patient, Manoj Castellanos, to the [facility] [place] at [facility]. Please see a copy of my visit note below.    PRS    Doing well.  Very happy with early result.  No issues.    On exam, bilateral breast implants in appropriate position with intact inframammary incisions with no wounds or signs of infection.    A/P: 29-year-old trans feminine patient 2 weeks status post bilateral feminizing breast augmentation with use of smooth round gel implants    -Reiterated activity restrictions, including lifting no greater than 10 pounds and start of light cardio. Can increase weight-lifting to 15-20 pounds during postoperative weeks 5-6. No push-ups for 3 months.   -Encouraged patient to wear the surgical bra at all times, particularly when upright. This supports the breast implants. When lying flat or lounging, patient does not need to wear the surgical bra or can take holidays from wearing the bra.  -When the steri-strips fall off, patient can start gentle scar treatment with silicone-based ointment or Bio-oil  -No soaking or swimming yet  -Reiterated the implant-related maintenance, including recommended surveillance imaging with MR study at 5-6 years after surgery followed by every 2-3 years to evaluate for rupture. If patient begins to experience pain or unusual swelling either in the immediate postoperative time period or later in life, patient should return to clinic for re-evaluation.   -Photography today  -Clinic in 4 weeks for re-evaluation    River Kim MD, PhD       Again, thank you for allowing me to participate in the care of your patient.      Sincerely,    River Kim MD

## 2024-08-14 NOTE — TELEPHONE ENCOUNTER
Called patient and informed them that they may arrive early for their appointment this afternoon with Dr. Kim, per provider's request. Patient stated they will come in as early as 3:30 PM.    Mj Vega EMT

## 2024-08-14 NOTE — PROGRESS NOTES
PRS    Doing well.  Very happy with early result.  No issues.    On exam, bilateral breast implants in appropriate position with intact inframammary incisions with no wounds or signs of infection.    A/P: 29-year-old trans feminine patient 2 weeks status post bilateral feminizing breast augmentation with use of smooth round gel implants    -Reiterated activity restrictions, including lifting no greater than 10 pounds and start of light cardio. Can increase weight-lifting to 15-20 pounds during postoperative weeks 5-6. No push-ups for 3 months.   -Encouraged patient to wear the surgical bra at all times, particularly when upright. This supports the breast implants. When lying flat or lounging, patient does not need to wear the surgical bra or can take holidays from wearing the bra.  -When the steri-strips fall off, patient can start gentle scar treatment with silicone-based ointment or Bio-oil  -No soaking or swimming yet  -Reiterated the implant-related maintenance, including recommended surveillance imaging with MR study at 5-6 years after surgery followed by every 2-3 years to evaluate for rupture. If patient begins to experience pain or unusual swelling either in the immediate postoperative time period or later in life, patient should return to clinic for re-evaluation.   -Photography today  -Clinic in 4 weeks for re-evaluation    River Kim MD, PhD

## 2024-08-14 NOTE — NURSING NOTE
"Chief Complaint   Patient presents with    Surgical Followup     2 week post-op, DOS 7/30/24.       Vitals:    08/14/24 1538   BP: (!) 135/95   BP Location: Left arm   Patient Position: Sitting   Cuff Size: Adult Regular   Pulse: 90   Temp: 97.9  F (36.6  C)   TempSrc: Oral   SpO2: 98%   Weight: 134 lb 4.8 oz   Height: 5' 9\"       Body mass index is 19.83 kg/m .    Patient reports moderate R lower chest pain (4/10).    Mj Vega, EMT    "

## 2024-08-16 NOTE — PROGRESS NOTES
[facility] Neurology Follow Up Visit    Manoj Castellanos MRN# [id number]   Age: 29 year old YOB: 1995     Brief history of symptoms: The patient was initially seen in neurologic consultation on 3/24/2023 for evaluation of numbness/tingling. Please see the comprehensive neurologic consultation note from that date in the Epic records for details.          Assessment and Plan:   Assessment:  Manoj Castellanos is a 29 year old adult who presents today for follow-up of numbness/tingling. Patient reports chronic right facial numbness, which started in childhood. Several years ago the same sensation spread to the rest of his body. Numbness/tingling is largely controlled with combination of gabapentin and oxcarbazepine. MRI brain and cervical spine and EMG previously did not show any etiology for symptoms.      Plan:  - Continue gabapentin 300 mg BID and oxcarbazepine 300 mg BID  - CBC, hepatic panel  - Discuss with primary care provider filling future refills if symptoms are stable    Follow up in Neurology clinic as needed should new concerns arise.    Gopi Espinal MD   of Neurology  [facility]  ---------------------------------------------------------------------------------------------------------------------------           Interval History:   Patient got breast agumentation and he has sensitivity around the nipples since then.     Paresthesias feel overally ok gabapentin and oxcarbazepine. They decrease symptoms by 95%. Episodes of numbness/tingling comes and goes.       Past Medical History:     Patient Active Problem List   Diagnosis    Abnormal sinus finding on MRI    Mild persistent asthma    Allergic rhinitis due to animals    Allergic rhinitis due to dust mite    Seasonal allergic rhinitis due to pollen    Allergic conjunctivitis of both eyes    Palpitations    Seasonal affective disorder (H24)    Numbness and tingling    Vertigo    Vitamin D deficiency    Eosinophilic esophagitis     Gender dysphoria in adult    Nasal obstruction    Deviated nasal septum    Nasal valve stenosis    Hypertrophy of inferior nasal turbinate    Major depressive disorder, recurrent episode, moderate with anxious distress (H)     Past Medical History:   Diagnosis Date    Depressive disorder 2014    it s chill now tho I m doing fine    Uncomplicated asthma early 2000s        Past Surgical History:     Past Surgical History:   Procedure Laterality Date    BIOPSY  feb 2023    As part of upper endoscopy    COMBINED ESOPHAGOSCOPY, GASTROSCOPY, DUODENOSCOPY (EGD) WITH CO2 INSUFFLATION N/A 01/02/2023    Procedure: ESOPHAGOGASTRODUODENOSCOPY, WITH CO2 INSUFFLATION;  Surgeon: Zahraa Robbins DO;  Location:  OR    COMBINED ESOPHAGOSCOPY, GASTROSCOPY, DUODENOSCOPY (EGD) WITH CO2 INSUFFLATION N/A 03/28/2023    Procedure: ESOPHAGOGASTRODUODENOSCOPY, WITH CO2 INSUFFLATION;  Surgeon: Zahraa Robbins DO;  Location:  OR    ESOPHAGOSCOPY, GASTROSCOPY, DUODENOSCOPY (EGD), COMBINED N/A 01/02/2023    Procedure: ESOPHAGOGASTRODUODENOSCOPY, WITH BIOPSY;  Surgeon: Zahraa Robbins DO;  Location:  OR    ESOPHAGOSCOPY, GASTROSCOPY, DUODENOSCOPY (EGD), COMBINED N/A 03/28/2023    Procedure: ESOPHAGOGASTRODUODENOSCOPY, WITH BIOPSY;  Surgeon: Zahraa Robbins DO;  Location:  OR     TOOTH EXTRACTION W/FORCEP  2016    4 teeth     MAMMOPLASTY AUGMENTATION Bilateral 7/30/2024    Procedure: Bilateral feminizing breast augmentation with use of anatomic smooth silicone gel implants;  Surgeon: River Kim MD;  [place] [facility] [place]    RECONSTRUCT NOSE AND SEPTUM (FUNCTIONAL) Bilateral 5/9/2024    Procedure: Complex Open Septoplasty, Repair of Nasal Vestibular Stenosis, Bilateral Inferior Turbinate Reduction;  Surgeon: Renetta Beltran MD;  [place] [facility] [place]        Social History:     Social History     Tobacco Use    Smoking status: Never     Passive exposure: Never    Smokeless tobacco: Never   Vaping Use    Vaping status: Never  Used   Substance Use Topics    Alcohol use: Yes     Comment: infrequent at worst    Drug use: No        Family History:     Family History   Problem Relation Age of Onset    Bipolar Disorder Mother     Psoriasis Mother     Depression Mother     Substance Abuse Father     Asthma Father     Depression Father     Depression Sister     Anxiety Disorder Sister     Attention Deficit Disorder Sister     Fibromyalgia Sister     Pancreatic Cancer Maternal Grandfather     Other Cancer Maternal Grandfather     Depression Maternal Grandfather     Prostate Cancer Paternal Grandfather     Depression Other     Anxiety Disorder Other     Mental Illness Other     Depression Sister     Anxiety Disorder Sister         Medications:     Current Outpatient Medications   Medication Sig Dispense Refill    acetaminophen (TYLENOL) 325 MG tablet Take 2 tablets (650 mg) by mouth every 4 hours as needed for other (mild pain) 100 tablet 0    albuterol (PROAIR HFA/PROVENTIL HFA/VENTOLIN HFA) 108 (90 Base) MCG/ACT inhaler Inhale 2 puffs into the lungs every 4 hours as needed for shortness of breath / dyspnea or wheezing 18 g 1    carbamide peroxide (DEBROX) 6.5 % otic solution Place 5 drops into both ears 2 times daily 15 mL 0    doxycycline hyclate (VIBRAMYCIN) 100 MG capsule Take 1 capsule (100 mg) by mouth 2 times daily 6 capsule 0    emtricitabine-tenofovir (TRUVADA) 200-300 MG per tablet Take 1 tablet by mouth daily 90 tablet 0    finasteride (PROSCAR) 5 MG tablet TAKE 1/4 TABLET BY MOUTH EVERY DAY 23 tablet 3    gabapentin (NEURONTIN) 300 MG capsule Take 1 capsule (300 mg) by mouth 2 times daily PATIENT STATES HE TAKING 300 MG TWICE DAILY 180 capsule 0    methocarbamol (ROBAXIN) 500 MG tablet Take 1 tablet (500 mg) by mouth 4 times daily 12 tablet 0    methylphenidate (RITALIN) 5 MG tablet Take 1 tablet (5 mg) by mouth 2 times daily for 30 days 60 tablet 0    [START ON 9/4/2024] methylphenidate (RITALIN) 5 MG tablet Take 1 tablet (5 mg) by  mouth 2 times daily for 30 days 60 tablet 0    [START ON 10/5/2024] methylphenidate (RITALIN) 5 MG tablet Take 1 tablet (5 mg) by mouth 2 times daily for 30 days 60 tablet 0    methylphenidate (RITALIN) 5 MG tablet Take 1 tablet (5 mg) by mouth 2 times daily 60 tablet 0    mometasone (ELOCON) 0.1 % external cream Apply topically daily To areas of psoriasis. 50 g 3    mometasone (ELOCON) 0.1 % external ointment Apply topically twice daily.  Do not expose skin to sun after applying steroid cream. 454 g 1    omeprazole (PRILOSEC) 40 MG DR capsule Take 1 capsule (40 mg) by mouth every morning On an empty stomach about 30 minutes before breakfast 90 capsule 3    ondansetron (ZOFRAN ODT) 4 MG ODT tab Take 1 tablet (4 mg) by mouth every 8 hours as needed for nausea 12 tablet 0    ondansetron (ZOFRAN) 4 MG tablet Take 1 tablet (4 mg) by mouth every 6 hours as needed for nausea (Patient not taking: Reported on 8/14/2024) 12 tablet 0    OXcarbazepine (TRILEPTAL) 150 MG tablet Take 2 tablets (300 mg) by mouth 2 times daily 120 tablet 11    pimecrolimus (ELIDEL) 1 % external cream APPLY 1 APPLICATION TWICE DAILY TOPICALLY TO GENITALS FOR 2 WEEKS      vitamin D3 25 mcg (1000 units) tablet TAKE 2 TABLETS BY MOUTH DAILY 180 tablet 1     No current facility-administered medications for this visit.        Allergies:     Allergies   Allergen Reactions    Amoxicillin Swelling     Lip swelling     Cefuroxime      Swelling         Review of Systems:   As above     Physical Exam:   Vitals: /78   Pulse 91   Resp 16   SpO2 97%    General: Seated comfortably in no acute distress.  Lungs: breathing comfortably  Neurologic:     Mental Status: Fully alert, attentive. Language normal, speech clear and fluent, no paraphasic errors.      Cranial Nerves: Visual fields intact. PERRL. EOMI with normal smooth pursuit. Facial sensation intact/symmetric. Facial movements symmetric. Hearing not formally tested but intact to conversation. Palate  elevation symmetric, uvula midline. No dysarthria. Shoulder shrug strong bilaterally. Tongue protrusion midline.     Motor: No tremors or other abnormal movements observed. Muscle tone normal throughout. Strength 5/5 throughout upper and lower extremities.      Right Left   Shoulder abduction        5 5   Elbow extension 5 5   Elbow flexion 5 5   Wrist extension         5 5   Finger extension 5 5   ADM 5 5   FDI 5 5   APB 5 5   Hip flexion 5 5   Knee flexion 5 5   Knee extension 5 5   Dorsiflexion 5 5   Plantar flexion 5 5        Deep Tendon Reflexes: 2+/symmetric throughout upper and lower extremities. No clonus. Toes downgoing bilaterally.     Sensory: Intact/symmetric to light touch, pinprick, temperature, vibration and proprioception throughout upper and lower extremities. Negative Romberg.      Coordination: Finger-nose-finger and heel-shin intact without dysmetria.      Gait: Normal steady casual gait. Able to walk on toes, heels and tandem without difficulty.     Data reviewed on previous visits    Imaging:  MRI brain 10/2022  Findings:  No definite mass is noted at the skull base. The flow-voids  of the major upper cervical and intracranial vascular structures at  the skull base appear patent. No definite adenopathy within the  visualized upper cervical soft tissues is noted. There is a normal  appearance of the 5th cranial nerves along their course bilaterally. A  vascular structure, likely a vein abuts the left trigeminal nerve near  the root entry zone. The right anterior-inferior cerebellar artery  approaches and may abut the right trigeminal nerve within the mid  cisternal segment. Findings are not significantly changed since  4/29/2020.  Impression:   1. Unchanged questionable neurovascular conflict bilaterally.  2. No abnormal signal or enhancement along the course of the  trigeminal nerves bilaterally.  3. Normal MRI of the brain.     MRI cervical 2020  Impression:   1. No abnormal enhancement in the  spinal cord, thecal sac or cervical  vertebrae.  2. No significant spinal canal or neural foraminal narrowing.  3. Non-enlarged cervical lymph nodes, likely reactive.     MRI brain 2017  Impression: Complete opacification of right frontal sinus. No abnormal  intracranial finding.     CBC, CMP unremarkable (9/2022)    Pertinent Investigations since last visit:   CMP unremarkable, normal CBC/AChR Abs, Oxcarb level 18 (3/2023)    EMG 6/2023  Normal study.  --There is no electrodiagnostic evidence of median, ulnar or radial mononeuropathy in the left upper extremity.  --There is no electrodiagnostic evidence of a diffuse sensory or motor peripheral neuropathy.  --There is no electrodiagnostic evidence of left lower or left upper extremity radiculopathy.    BMP normal (7/2024)  BMP normal (1/2024)     The mother chose not to exclusively breastfeed upon admission.

## 2024-08-18 ENCOUNTER — PATIENT OUTREACH (OUTPATIENT)
Dept: CARE COORDINATION | Facility: CLINIC | Age: 29
End: 2024-08-18
Payer: COMMERCIAL

## 2024-08-19 ENCOUNTER — OFFICE VISIT (OUTPATIENT)
Dept: NEUROLOGY | Facility: CLINIC | Age: 29
End: 2024-08-19
Payer: COMMERCIAL

## 2024-08-19 ENCOUNTER — LAB (OUTPATIENT)
Dept: LAB | Facility: CLINIC | Age: 29
End: 2024-08-19
Payer: COMMERCIAL

## 2024-08-19 VITALS
SYSTOLIC BLOOD PRESSURE: 118 MMHG | OXYGEN SATURATION: 97 % | HEART RATE: 91 BPM | DIASTOLIC BLOOD PRESSURE: 78 MMHG | RESPIRATION RATE: 16 BRPM

## 2024-08-19 DIAGNOSIS — M79.2 NEUROPATHIC PAIN: ICD-10-CM

## 2024-08-19 LAB
ALBUMIN SERPL BCG-MCNC: 4.6 G/DL (ref 3.5–5.2)
ALP SERPL-CCNC: 61 U/L (ref 40–150)
ALT SERPL W P-5'-P-CCNC: 13 U/L (ref 0–70)
AST SERPL W P-5'-P-CCNC: 18 U/L (ref 0–45)
BILIRUB DIRECT SERPL-MCNC: <0.2 MG/DL (ref 0–0.3)
BILIRUB SERPL-MCNC: 0.3 MG/DL
ERYTHROCYTE [DISTWIDTH] IN BLOOD BY AUTOMATED COUNT: 14.2 % (ref 10–15)
HCT VFR BLD AUTO: 40.9 % (ref 35–53)
HGB BLD-MCNC: 13.1 G/DL (ref 13.3–17.7)
MCH RBC QN AUTO: 25.7 PG (ref 26.5–33)
MCHC RBC AUTO-ENTMCNC: 32 G/DL (ref 31.5–36.5)
MCV RBC AUTO: 80 FL (ref 78–100)
PLATELET # BLD AUTO: 252 10E3/UL (ref 150–450)
PROT SERPL-MCNC: 7.3 G/DL (ref 6.4–8.3)
RBC # BLD AUTO: 5.09 10E6/UL (ref 3.8–5.9)
WBC # BLD AUTO: 5.1 10E3/UL (ref 4–11)

## 2024-08-19 PROCEDURE — 99214 OFFICE O/P EST MOD 30 MIN: CPT | Mod: 24 | Performed by: INTERNAL MEDICINE

## 2024-08-19 PROCEDURE — 36415 COLL VENOUS BLD VENIPUNCTURE: CPT | Performed by: PATHOLOGY

## 2024-08-19 PROCEDURE — 85027 COMPLETE CBC AUTOMATED: CPT | Performed by: PATHOLOGY

## 2024-08-19 PROCEDURE — 80076 HEPATIC FUNCTION PANEL: CPT | Performed by: PATHOLOGY

## 2024-08-19 RX ORDER — GABAPENTIN 300 MG/1
300 CAPSULE ORAL 2 TIMES DAILY
Qty: 180 CAPSULE | Refills: 3 | Status: SHIPPED | OUTPATIENT
Start: 2024-08-19

## 2024-08-19 RX ORDER — OXCARBAZEPINE 300 MG/1
300 TABLET, FILM COATED ORAL 2 TIMES DAILY
Qty: 180 TABLET | Refills: 3 | Status: SHIPPED | OUTPATIENT
Start: 2024-08-19

## 2024-08-19 ASSESSMENT — PAIN SCALES - GENERAL: PAINLEVEL: MILD PAIN (3)

## 2024-08-19 NOTE — LETTER
8/19/2024       RE: Manoj Castellanos  [address]  [place] [facility] [place] [address]     Dear Colleague,    Thank you for referring your patient, Manoj Castellanos, to the [facility] NEUROLOGY [facility]. Please see a copy of my visit note below.    [facility] Neurology Follow Up Visit    Manoj Castellanos MRN# [id number]   Age: 29 year old YOB: 1995     Brief history of symptoms: The patient was initially seen in neurologic consultation on 3/24/2023 for evaluation of numbness/tingling. Please see the comprehensive neurologic consultation note from that date in the Epic records for details.          Assessment and Plan:   Assessment:  Manoj Castellanos is a 29 year old adult who presents today for follow-up of numbness/tingling. Patient reports chronic right facial numbness, which started in childhood. Several years ago the same sensation spread to the rest of his body. Numbness/tingling is largely controlled with combination of gabapentin and oxcarbazepine. MRI brain and cervical spine and EMG previously did not show any etiology for symptoms.      Plan:  - Continue gabapentin 300 mg BID and oxcarbazepine 300 mg BID  - CBC, hepatic panel  - Discuss with primary care provider filling future refills if symptoms are stable    Follow up in Neurology clinic as needed should new concerns arise.    Gopi Espinal MD   of Neurology  [facility]  ---------------------------------------------------------------------------------------------------------------------------           Interval History:   Patient got breast agumentation and he has sensitivity around the nipples since then.     Paresthesias feel overally ok gabapentin and oxcarbazepine. They decrease symptoms by 95%. Episodes of numbness/tingling comes and goes.       Past Medical History:     Patient Active Problem List   Diagnosis     Abnormal sinus finding on MRI     Mild persistent  asthma     Allergic rhinitis due to animals     Allergic rhinitis due to dust mite     Seasonal allergic rhinitis due to pollen     Allergic conjunctivitis of both eyes     Palpitations     Seasonal affective disorder (H24)     Numbness and tingling     Vertigo     Vitamin D deficiency     Eosinophilic esophagitis     Gender dysphoria in adult     Nasal obstruction     Deviated nasal septum     Nasal valve stenosis     Hypertrophy of inferior nasal turbinate     Major depressive disorder, recurrent episode, moderate with anxious distress (H)     Past Medical History:   Diagnosis Date     Depressive disorder 2014    it s chill now tho I m doing fine     Uncomplicated asthma early 2000s        Past Surgical History:     Past Surgical History:   Procedure Laterality Date     BIOPSY  feb 2023    As part of upper endoscopy     COMBINED ESOPHAGOSCOPY, GASTROSCOPY, DUODENOSCOPY (EGD) WITH CO2 INSUFFLATION N/A 01/02/2023    Procedure: ESOPHAGOGASTRODUODENOSCOPY, WITH CO2 INSUFFLATION;  Surgeon: Zahraa Robbins DO;  Location: [facility] OR     COMBINED ESOPHAGOSCOPY, GASTROSCOPY, DUODENOSCOPY (EGD) WITH CO2 INSUFFLATION N/A 03/28/2023    Procedure: ESOPHAGOGASTRODUODENOSCOPY, WITH CO2 INSUFFLATION;  Surgeon: Zahraa Robbins DO;  Location: [facility] OR     ESOPHAGOSCOPY, GASTROSCOPY, DUODENOSCOPY (EGD), COMBINED N/A 01/02/2023    Procedure: ESOPHAGOGASTRODUODENOSCOPY, WITH BIOPSY;  Surgeon: Zahraa Robbins DO;  Location: [facility] OR     ESOPHAGOSCOPY, GASTROSCOPY, DUODENOSCOPY (EGD), COMBINED N/A 03/28/2023    Procedure: ESOPHAGOGASTRODUODENOSCOPY, WITH BIOPSY;  Surgeon: Zahraa Robbins DO;  Location: [facility] OR     HC TOOTH EXTRACTION W/FORCEP  2016    4 teeth      MAMMOPLASTY AUGMENTATION Bilateral 7/30/2024    Procedure: Bilateral feminizing breast augmentation with use of anatomic smooth silicone gel implants;  Surgeon: River Kim MD;  Location: [facility] OR     RECONSTRUCT NOSE AND SEPTUM (FUNCTIONAL) Bilateral 5/9/2024    Procedure:  Complex Open Septoplasty, Repair of Nasal Vestibular Stenosis, Bilateral Inferior Turbinate Reduction;  Surgeon: Renetta Beltran MD;  [place] [facility] [place]        Social History:     Social History     Tobacco Use     Smoking status: Never     Passive exposure: Never     Smokeless tobacco: Never   Vaping Use     Vaping status: Never Used   Substance Use Topics     Alcohol use: Yes     Comment: infrequent at worst     Drug use: No        Family History:     Family History   Problem Relation Age of Onset     Bipolar Disorder Mother      Psoriasis Mother      Depression Mother      Substance Abuse Father      Asthma Father      Depression Father      Depression Sister      Anxiety Disorder Sister      Attention Deficit Disorder Sister      Fibromyalgia Sister      Pancreatic Cancer Maternal Grandfather      Other Cancer Maternal Grandfather      Depression Maternal Grandfather      Prostate Cancer Paternal Grandfather      Depression Other      Anxiety Disorder Other      Mental Illness Other      Depression Sister      Anxiety Disorder Sister         Medications:     Current Outpatient Medications   Medication Sig Dispense Refill     acetaminophen (TYLENOL) 325 MG tablet Take 2 tablets (650 mg) by mouth every 4 hours as needed for other (mild pain) 100 tablet 0     albuterol (PROAIR HFA/PROVENTIL HFA/VENTOLIN HFA) 108 (90 Base) MCG/ACT inhaler Inhale 2 puffs into the lungs every 4 hours as needed for shortness of breath / dyspnea or wheezing 18 g 1     carbamide peroxide (DEBROX) 6.5 % otic solution Place 5 drops into both ears 2 times daily 15 mL 0     doxycycline hyclate (VIBRAMYCIN) 100 MG capsule Take 1 capsule (100 mg) by mouth 2 times daily 6 capsule 0     emtricitabine-tenofovir (TRUVADA) 200-300 MG per tablet Take 1 tablet by mouth daily 90 tablet 0     finasteride (PROSCAR) 5 MG tablet TAKE 1/4 TABLET BY MOUTH EVERY DAY 23 tablet 3     gabapentin (NEURONTIN) 300 MG capsule Take 1 capsule (300 mg) by mouth 2  times daily PATIENT STATES HE TAKING 300 MG TWICE DAILY 180 capsule 0     methocarbamol (ROBAXIN) 500 MG tablet Take 1 tablet (500 mg) by mouth 4 times daily 12 tablet 0     methylphenidate (RITALIN) 5 MG tablet Take 1 tablet (5 mg) by mouth 2 times daily for 30 days 60 tablet 0     [START ON 9/4/2024] methylphenidate (RITALIN) 5 MG tablet Take 1 tablet (5 mg) by mouth 2 times daily for 30 days 60 tablet 0     [START ON 10/5/2024] methylphenidate (RITALIN) 5 MG tablet Take 1 tablet (5 mg) by mouth 2 times daily for 30 days 60 tablet 0     methylphenidate (RITALIN) 5 MG tablet Take 1 tablet (5 mg) by mouth 2 times daily 60 tablet 0     mometasone (ELOCON) 0.1 % external cream Apply topically daily To areas of psoriasis. 50 g 3     mometasone (ELOCON) 0.1 % external ointment Apply topically twice daily.  Do not expose skin to sun after applying steroid cream. 454 g 1     omeprazole (PRILOSEC) 40 MG DR capsule Take 1 capsule (40 mg) by mouth every morning On an empty stomach about 30 minutes before breakfast 90 capsule 3     ondansetron (ZOFRAN ODT) 4 MG ODT tab Take 1 tablet (4 mg) by mouth every 8 hours as needed for nausea 12 tablet 0     ondansetron (ZOFRAN) 4 MG tablet Take 1 tablet (4 mg) by mouth every 6 hours as needed for nausea (Patient not taking: Reported on 8/14/2024) 12 tablet 0     OXcarbazepine (TRILEPTAL) 150 MG tablet Take 2 tablets (300 mg) by mouth 2 times daily 120 tablet 11     pimecrolimus (ELIDEL) 1 % external cream APPLY 1 APPLICATION TWICE DAILY TOPICALLY TO GENITALS FOR 2 WEEKS       vitamin D3 25 mcg (1000 units) tablet TAKE 2 TABLETS BY MOUTH DAILY 180 tablet 1     No current facility-administered medications for this visit.        Allergies:     Allergies   Allergen Reactions     Amoxicillin Swelling     Lip swelling      Cefuroxime      Swelling         Review of Systems:   As above     Physical Exam:   Vitals: /78   Pulse 91   Resp 16   SpO2 97%    General: Seated comfortably in  no acute distress.  Lungs: breathing comfortably  Neurologic:     Mental Status: Fully alert, attentive. Language normal, speech clear and fluent, no paraphasic errors.      Cranial Nerves: Visual fields intact. PERRL. EOMI with normal smooth pursuit. Facial sensation intact/symmetric. Facial movements symmetric. Hearing not formally tested but intact to conversation. Palate elevation symmetric, uvula midline. No dysarthria. Shoulder shrug strong bilaterally. Tongue protrusion midline.     Motor: No tremors or other abnormal movements observed. Muscle tone normal throughout. Strength 5/5 throughout upper and lower extremities.      Right Left   Shoulder abduction        5 5   Elbow extension 5 5   Elbow flexion 5 5   Wrist extension         5 5   Finger extension 5 5   ADM 5 5   FDI 5 5   APB 5 5   Hip flexion 5 5   Knee flexion 5 5   Knee extension 5 5   Dorsiflexion 5 5   Plantar flexion 5 5        Deep Tendon Reflexes: 2+/symmetric throughout upper and lower extremities. No clonus. Toes downgoing bilaterally.     Sensory: Intact/symmetric to light touch, pinprick, temperature, vibration and proprioception throughout upper and lower extremities. Negative Romberg.      Coordination: Finger-nose-finger and heel-shin intact without dysmetria.      Gait: Normal steady casual gait. Able to walk on toes, heels and tandem without difficulty.     Data reviewed on previous visits    Imaging:  MRI brain 10/2022  Findings:  No definite mass is noted at the skull base. The flow-voids  of the major upper cervical and intracranial vascular structures at  the skull base appear patent. No definite adenopathy within the  visualized upper cervical soft tissues is noted. There is a normal  appearance of the 5th cranial nerves along their course bilaterally. A  vascular structure, likely a vein abuts the left trigeminal nerve near  the root entry zone. The right anterior-inferior cerebellar artery  approaches and may abut the right  trigeminal nerve within the mid  cisternal segment. Findings are not significantly changed since  4/29/2020.  Impression:   1. Unchanged questionable neurovascular conflict bilaterally.  2. No abnormal signal or enhancement along the course of the  trigeminal nerves bilaterally.  3. Normal MRI of the brain.     MRI cervical 2020  Impression:   1. No abnormal enhancement in the spinal cord, thecal sac or cervical  vertebrae.  2. No significant spinal canal or neural foraminal narrowing.  3. Non-enlarged cervical lymph nodes, likely reactive.     MRI brain 2017  Impression: Complete opacification of right frontal sinus. No abnormal  intracranial finding.     CBC, CMP unremarkable (9/2022)    Pertinent Investigations since last visit:   CMP unremarkable, normal CBC/AChR Abs, Oxcarb level 18 (3/2023)    EMG 6/2023  Normal study.  --There is no electrodiagnostic evidence of median, ulnar or radial mononeuropathy in the left upper extremity.  --There is no electrodiagnostic evidence of a diffuse sensory or motor peripheral neuropathy.  --There is no electrodiagnostic evidence of left lower or left upper extremity radiculopathy.    BMP normal (7/2024)  BMP normal (1/2024)      Again, thank you for allowing me to participate in the care of your patient.      Sincerely,    Gopi Espinal MD

## 2024-09-11 ENCOUNTER — OFFICE VISIT (OUTPATIENT)
Dept: PLASTIC SURGERY | Facility: CLINIC | Age: 29
End: 2024-09-11
Payer: COMMERCIAL

## 2024-09-11 VITALS
HEART RATE: 89 BPM | DIASTOLIC BLOOD PRESSURE: 92 MMHG | BODY MASS INDEX: 19.83 KG/M2 | HEIGHT: 69 IN | OXYGEN SATURATION: 99 % | SYSTOLIC BLOOD PRESSURE: 128 MMHG

## 2024-09-11 DIAGNOSIS — F64.0 GENDER DYSPHORIA IN ADULT: Primary | ICD-10-CM

## 2024-09-11 PROCEDURE — 99024 POSTOP FOLLOW-UP VISIT: CPT | Performed by: STUDENT IN AN ORGANIZED HEALTH CARE EDUCATION/TRAINING PROGRAM

## 2024-09-11 ASSESSMENT — PAIN SCALES - GENERAL: PAINLEVEL: MILD PAIN (3)

## 2024-09-11 NOTE — LETTER
9/11/2024       RE: Manoj Castellanos  [address] [address]     Dear Colleague,    Thank you for referring your patient, Manoj Castellanos, to the [facility] PLASTIC AND RECONSTRUCTIVE SURGERY CLINIC [place] [facility]. Please see a copy of my visit note below.    PRS    Doing well.  Happy with the result.  Patient notices that the implants have softened.    On exam, bilateral breast implants in appropriate position with intact well remodeling inframammary incisions.    A/P: 29-year-old agender patient 6 weeks status post bilateral feminizing breast augmentation with use of smooth silicone round gel implants    -Patient can continue scar treatment  -No lifting restriction anymore, except no pushups for 3 months  -Patient can swim or soak  -Reiterated risks of implants, including but not limited to: capsular contracture, implant rupture, implant malposition, double bubble deformity, animation deformity, SUKH-ALCL, implant related illness.  Patient understands these risks and is appreciative of the discussion.  -Reiterated importance of implant surveillance, which includes MR imaging study 5-6 years after the original operation, followed by MR imaging every 3 years thereafter  -Photography today  -Return to clinic in 1 year for reevaluation     River Kim MD, PhD       Again, thank you for allowing me to participate in the care of your patient.      Sincerely,    River Kim MD

## 2024-09-11 NOTE — NURSING NOTE
"Chief Complaint   Patient presents with    Surgical Followup     6 week post-op, DOS        Vitals:    09/11/24 1648   BP: (!) 128/92   BP Location: Left arm   Patient Position: Sitting   Cuff Size: Adult Small   Pulse: 89   SpO2: 99%   Height: 5' 9\"       Body mass index is 19.83 kg/m .    Patient reports occasional mild bilateral breast pain (3/10).    Mj Vega, EMT    "

## 2024-09-11 NOTE — PROGRESS NOTES
PRS    Doing well.  Happy with the result.  Patient notices that the implants have softened.    On exam, bilateral breast implants in appropriate position with intact well remodeling inframammary incisions.    A/P: 29-year-old agender patient 6 weeks status post bilateral feminizing breast augmentation with use of smooth silicone round gel implants    -Patient can continue scar treatment  -No lifting restriction anymore, except no pushups for 3 months  -Patient can swim or soak  -Reiterated risks of implants, including but not limited to: capsular contracture, implant rupture, implant malposition, double bubble deformity, animation deformity, SUKH-ALCL, implant related illness.  Patient understands these risks and is appreciative of the discussion.  -Reiterated importance of implant surveillance, which includes MR imaging study 5-6 years after the original operation, followed by MR imaging every 3 years thereafter  -Photography today  -Return to clinic in 1 year for reevaluation     River Kim MD, PhD

## 2024-09-18 ENCOUNTER — MYC MEDICAL ADVICE (OUTPATIENT)
Dept: PLASTIC SURGERY | Facility: CLINIC | Age: 29
End: 2024-09-18
Payer: COMMERCIAL

## 2024-09-18 ENCOUNTER — TELEPHONE (OUTPATIENT)
Dept: PLASTIC SURGERY | Facility: CLINIC | Age: 29
End: 2024-09-18
Payer: COMMERCIAL

## 2024-09-18 NOTE — TELEPHONE ENCOUNTER
[facility] Call Center    Phone Message    May a detailed message be left on voicemail: yes     Reason for Call: Other: Patient called as their scar for the July 2024 surgery with Dr. Kim has split open.  There is a 1/2 inch by 1/4 inch hole at the end of the scar - nothing is coming out.  Patient wants to know weather they should go to ER, Urgent care, or if they need to see someone who specializes in Plastic Surgery.  Please follow up with patient.  (Writer called backline but, was unable to reach anyone.)     Action Taken: Message routed to:  [facility]    Travel Screening: Not Applicable

## 2024-09-18 NOTE — TELEPHONE ENCOUNTER
Spoke with pt today regarding small opening along left breast incision. Pt denies fever, chills or malaise. They have been wearing a sports bra for support and think it's possible the bra edge is rubbing along incision. The area could also be caused by granuloma. Plan made for pt to not wear a bra for the next week, apply Bacitracin to area for a few days and cover with a Bandaid. Pt will keep the area open to air at night as much as possible. Appt scheduled with Dr Kim next week to review wound healing. Pt will reach out if condition worsens. Ferdinand RICE RN

## 2024-09-25 ENCOUNTER — OFFICE VISIT (OUTPATIENT)
Dept: PLASTIC SURGERY | Facility: CLINIC | Age: 29
End: 2024-09-25
Payer: COMMERCIAL

## 2024-09-25 VITALS
SYSTOLIC BLOOD PRESSURE: 128 MMHG | HEART RATE: 86 BPM | OXYGEN SATURATION: 99 % | BODY MASS INDEX: 18.96 KG/M2 | HEIGHT: 69 IN | WEIGHT: 128 LBS | DIASTOLIC BLOOD PRESSURE: 89 MMHG

## 2024-09-25 DIAGNOSIS — F64.0 GENDER DYSPHORIA IN ADULT: Primary | ICD-10-CM

## 2024-09-25 PROCEDURE — 99024 POSTOP FOLLOW-UP VISIT: CPT | Performed by: STUDENT IN AN ORGANIZED HEALTH CARE EDUCATION/TRAINING PROGRAM

## 2024-09-25 NOTE — LETTER
9/25/2024       RE: Manoj Castellanos  [address] [facility] [address]     Dear Colleague,    Thank you for referring your patient, Manoj Castellanos, to the [facility] PLASTIC AND RECONSTRUCTIVE SURGERY CLINIC [place] at [facility]. Please see a copy of my visit note below.    PRS    Doing well, except was returning to exercise activities and developed small scabs underneath the bilateral breast implants.  They stated that they could see little bit of a raw area and wanted to get it checked out.  Patient is approximately 7 weeks after surgery.    On exam, bilateral inframammary incisions remain intact and there are some superficial scabs along the lateral margins of the incisions but no wounds and no signs of infection.  Bilateral breast implants in appropriate position.    A/P: 29-year-old 7+ weeks status post bilateral feminizing breast augmentation with use of smooth silicone round gel implants    -Reassured the patient that these areas of superficial scabbing should heal on their own with local care.  Instructed the patient to apply petroleum based ointment and a Band-Aid to help prevent rubbing on the skin and allow the skin to heal.  Patient should avoid soaking or swimming activities until these areas are fully healed.  Otherwise, patient can return to activities without restrictions.  -Photography today  -Reiterated importance of implant surveillance  -Return to clinic as needed or at 1-2 years    River Kim MD, PhD      Again, thank you for allowing me to participate in the care of your patient.      Sincerely,    River Kim MD

## 2024-09-25 NOTE — NURSING NOTE
"Chief Complaint   Patient presents with    RECHECK     Ty, is being seen today for a post-op left eye 7/30/24 incision opening of the left breast.       Vitals:    09/25/24 1612   BP: 128/89   BP Location: Left arm   Patient Position: Chair   Cuff Size: Adult Regular   Pulse: 86   SpO2: 99%   Weight: 58.1 kg (128 lb)   Height: 1.753 m (5' 9\")       Body mass index is 18.9 kg/m .      Mili Hull LPN    "

## 2024-09-26 NOTE — TELEPHONE ENCOUNTER
----- Message from Keith Sosa sent at 11/11/2020  2:04 PM CST -----  Regarding: RE: telephone follow up in 2 weeks  Left pt 2 messages  ----- Message -----  From: Noe Andrade DO  Sent: 10/8/2020  10:01 AM CST  To: Guerline Terry, RN, America Coe RN, #  Subject: telephone follow up in 2 weeks                   Hi all,    Could you aide this patient in setting up a telephone/virtual follow up visit in two weeks?    Thanks,  Hiram      
No

## 2024-09-26 NOTE — PROGRESS NOTES
PRS    Doing well, except was returning to exercise activities and developed small scabs underneath the bilateral breast implants.  They stated that they could see little bit of a raw area and wanted to get it checked out.  Patient is approximately 7 weeks after surgery.    On exam, bilateral inframammary incisions remain intact and there are some superficial scabs along the lateral margins of the incisions but no wounds and no signs of infection.  Bilateral breast implants in appropriate position.    A/P: 29-year-old 7+ weeks status post bilateral feminizing breast augmentation with use of smooth silicone round gel implants    -Reassured the patient that these areas of superficial scabbing should heal on their own with local care.  Instructed the patient to apply petroleum based ointment and a Band-Aid to help prevent rubbing on the skin and allow the skin to heal.  Patient should avoid soaking or swimming activities until these areas are fully healed.  Otherwise, patient can return to activities without restrictions.  -Photography today  -Reiterated importance of implant surveillance  -Return to clinic as needed or at 1-2 years    River Kim MD, PhD

## 2024-10-08 DIAGNOSIS — E55.9 VITAMIN D DEFICIENCY: ICD-10-CM

## 2024-10-08 RX ORDER — CHOLECALCIFEROL (VITAMIN D3) 25 MCG
TABLET ORAL
Qty: 180 TABLET | Refills: 1 | Status: SHIPPED | OUTPATIENT
Start: 2024-10-08

## 2024-10-15 DIAGNOSIS — K20.0 EOSINOPHILIC ESOPHAGITIS: ICD-10-CM

## 2024-10-15 DIAGNOSIS — K21.9 GASTROESOPHAGEAL REFLUX DISEASE, UNSPECIFIED WHETHER ESOPHAGITIS PRESENT: ICD-10-CM

## 2024-10-21 ENCOUNTER — TELEPHONE (OUTPATIENT)
Dept: FAMILY MEDICINE | Facility: CLINIC | Age: 29
End: 2024-10-21
Payer: COMMERCIAL

## 2024-10-21 DIAGNOSIS — U07.1 INFECTION DUE TO 2019 NOVEL CORONAVIRUS: Primary | ICD-10-CM

## 2024-10-21 NOTE — TELEPHONE ENCOUNTER
RN COVID TREATMENT VISIT  10/21/24      The patient has been triaged and does not require a higher level of care.    Manoj Castellanos  29 year old  Current weight? 127. BMI 18.8    Has the patient been seen by a primary care provider at an [facility] or [facility] within the past two years? Yes.   Have you been in close proximity to/do you have a known exposure to a person with a confirmed case of influenza? No.     General treatment eligibility:  Date of positive COVID test (PCR or at home)?  10/21/24    Are you or have you been hospitalized for this COVID-19 infection? No.   Have you received monoclonal antibodies or antiviral treatment for COVID-19 since this positive test? No.   Do you have any of the following conditions that place you at risk of being very sick from COVID-19?   - Chronic lung diseases such as asthma, bronchiectasis, COPD, interstitial lung disease, pulmonary embolism, pulmonary hypertension   - Mental health disorders including mood disorders, depression, schizophrenia spectrum disorders   Yes, patient has at least one high risk condition as noted above.     Current COVID symptoms:   - muscle or body aches  - sore throat  - congestion or runny nose  Yes. Patient has at least one symptom as selected.     How many days since symptoms started? 5 days or less. Established patient, 12 years or older weighing at least 88.2 lbs, who has symptoms that started in the past 5 days, has not been hospitalized nor received treatment already, and is at risk for being very sick from COVID-19.     Treatment eligibility by RN:  Are you currently pregnant or nursing? No  Do you have a clinically significant hypersensitivity to nirmatrelvir or ritonavir, or toxic epidermal necrolysis (TEN) or Carrillo-Villa Syndrome? No  Do you have a history of hepatitis, any hepatic impairment on the Problem List (such as Child-Kramer Class C, cirrhosis, fatty liver disease, alcoholic liver disease), or was  the last liver lab (hepatic panel, ALT, AST, ALK Phos, bilirubin) elevated in the past 6 months? No  Do you have any history of severe renal impairment (eGFR < 30mL/min)? No    Is patient eligible to continue? Yes, patient meets all eligibility requirements for the RN COVID treatment (as denoted by all no responses above).     Current Outpatient Medications   Medication Sig Dispense Refill    albuterol (PROAIR HFA/PROVENTIL HFA/VENTOLIN HFA) 108 (90 Base) MCG/ACT inhaler Inhale 2 puffs into the lungs every 4 hours as needed for shortness of breath / dyspnea or wheezing 18 g 1    carbamide peroxide (DEBROX) 6.5 % otic solution Place 5 drops into both ears 2 times daily 15 mL 0    cetirizine (ZYRTEC) 10 MG tablet Take 1 tablet (10 mg) by mouth 2 times daily. 180 tablet 1    emtricitabine-tenofovir (TRUVADA) 200-300 MG per tablet Take 1 tablet by mouth daily 90 tablet 0    finasteride (PROSCAR) 5 MG tablet TAKE 1/4 TABLET BY MOUTH EVERY DAY 23 tablet 3    gabapentin (NEURONTIN) 300 MG capsule Take 1 capsule (300 mg) by mouth 2 times daily PATIENT STATES HE TAKING 300 MG TWICE DAILY 180 capsule 3    methylphenidate (RITALIN) 5 MG tablet Take 1 tablet (5 mg) by mouth 2 times daily for 30 days 60 tablet 0    methylphenidate (RITALIN) 5 MG tablet Take 1 tablet (5 mg) by mouth 2 times daily 60 tablet 0    mometasone (ELOCON) 0.1 % external cream Apply topically daily To areas of psoriasis. 50 g 3    mometasone (ELOCON) 0.1 % external ointment Apply topically twice daily.  Do not expose skin to sun after applying steroid cream. 454 g 1    omeprazole (PRILOSEC) 40 MG DR capsule Take 1 capsule (40 mg) by mouth every morning On an empty stomach about 30 minutes before breakfast 90 capsule 3    ondansetron (ZOFRAN ODT) 4 MG ODT tab Take 1 tablet (4 mg) by mouth every 8 hours as needed for nausea (Patient not taking: Reported on 9/11/2024) 12 tablet 0    ondansetron (ZOFRAN) 4 MG tablet Take 1 tablet (4 mg) by mouth every 6 hours  as needed for nausea (Patient not taking: Reported on 8/14/2024) 12 tablet 0    OXcarbazepine (TRILEPTAL) 300 MG tablet Take 1 tablet (300 mg) by mouth 2 times daily 180 tablet 3    pimecrolimus (ELIDEL) 1 % external cream APPLY 1 APPLICATION TWICE DAILY TOPICALLY TO GENITALS FOR 2 WEEKS      VITAMIN D3 25 MCG (1000 UT) tablet TAKE 2 TABLETS BY MOUTH DAILY 180 tablet 1       Medications from List 1 of the standing order (on medications that exclude the use of Paxlovid) that patient is taking: NONE. Is patient taking Tyndall's Wort? No  Is patient taking Nuzhat's Wort or any meds from List 1? No.   Medications from List 2 of the standing order (on meds that provider needs to adjust) that patient is taking: NONE. Is patient on any of the meds from List 2? No.   Medications from List 3 of standing order (on meds that a RN needs to adjust) that patient is taking: NONE. Is patient on any meds from List 3? No.     Paxlovid has an approximate 90% reduction in hospitalization. Paxlovid can possibly cause altered sense of taste, diarrhea (loose, watery stools), high blood pressure, muscle aches.     Would patient like a Paxlovid prescription?   Yes.   Lab Results   Component Value Date    GFRESTIMATED >90 07/03/2024       Was last eGFR reduced? No, eGFR 60 or greater/ No Result on record. Patient can receive the normal renal function dose. Paxlovid Rx sent to [id number] [place], [place] - [address]    Temporary change to home medications: None    All medication adjustments (holds, etc) were discussed with the patient and patient was asked to repeat back (teachback) their med adjustment.  Did patient understand med adjustment? No medication adjustments needed.       Reviewed the following instructions with the patient:    Paxlovid (nimatrelvir and ritonavir)    How it works  Two medicines (nirmatrelvir and ritonavir) are taken together. They stop  the virus from growing. Less amount of virus is easier for your body to fight.    How to take  Medicine comes in a daily container with both medicine tablets. Take by mouth twice daily (once in the morning, once at night) for 5 days.  The number of tablets to take varies by patient.  Don't chew or break capsules. Swallow whole.    When to take  Take as soon as possible after positive COVID-19 test result, and within 5 days of your first symptoms.    Possible side effects  Can cause altered sense of taste, diarrhea (loose, watery stools), high blood pressure, muscle aches.    Matt Real, RN

## 2024-10-22 RX ORDER — OMEPRAZOLE 40 MG/1
CAPSULE, DELAYED RELEASE ORAL
Qty: 90 CAPSULE | Refills: 0 | Status: SHIPPED | OUTPATIENT
Start: 2024-10-22

## 2024-10-22 NOTE — TELEPHONE ENCOUNTER
omeprazole (PRILOSEC) 40 MG DR capsule   Disp-90 capsule, R-3,   Start: 10/26/2023     10/4/2023  [facility] PA[facility]C  Gastroenterology    Nv:none    Scheduling has been notified to contact the pt for appointment.      Routed because: appt due      PPI Protocol Goqyfr77/15/2024 04:10 AM   Protocol Details Recent (12 mo) or future (90 days) visit within th

## 2024-11-13 ENCOUNTER — ALLIED HEALTH/NURSE VISIT (OUTPATIENT)
Dept: PLASTIC SURGERY | Facility: CLINIC | Age: 29
End: 2024-11-13
Payer: COMMERCIAL

## 2024-11-13 DIAGNOSIS — F64.0 GENDER DYSPHORIA IN ADULT: Primary | ICD-10-CM

## 2024-11-13 NOTE — PATIENT INSTRUCTIONS
"Pt came in today in order to view post-op photos of the progression of implant settling. They are concerned about the implants \"bottoming out\" and bilateral nipples pointing upward. They viewed the photos and we did take new photos in order for Dr Kim to see and provide feedback to pt with eventual phone visit if he feels anything could be done. Pt is in agreement with the plan. Ferdinand RICE RN  "

## 2024-11-14 NOTE — PROGRESS NOTES
HPI:  Patient presents for an annual eye exam - wants glasses updated. Esotropia stable.     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 Finishing up in 2025.       Pertinent Medical History:  Numbness and tingling  Asthma  Allergic rhinitis  Palpitations  Gender dysphoria in adult  Neuropathic pain    Ocular History:  High myopia, both eyes.   CHRPE, left eye.     Eye Medications:  Allergic to amoxicillin, minocycline    Assessment and Plan:      #   High Myopia, both eyes.   Educated on signs and symptoms of a retinal detachment (ie. Hundreds of floaters, flashes of light, and shadow/curtain over the vision) to be seen immediately.   Recommend annual dilated eye exam.  Glasses prescription given.      #   Decompensated Alternating Esotropia, both eyes.   Saw Dr. Kel Shah on 02/19/2020. Good candidate for strabismus surgery or prism glasses. Happy with prism glasses. Continue with 12 OLIVER prisms.     #   Dry Eye Syndrome, both eyes.  Symptoms of dry eyes include blurry vision, eye pain, grittiness, burning, redness, eye irritation, light sensitivity, and tearing. The goal is not to eliminate, but to improve symptoms.   Preservative free artificial tears 4 times daily both eyes. Refresh or Systane. Can use up to every 2 hours both eyes as needed.        Patient consented to a dilated eye exam:    Yes. Side effects discussed.  Mood/affect: nice.     Medical History:  Past Medical History:   Diagnosis Date    Depressive disorder 2014    it s chill now tho I m doing fine    Uncomplicated asthma early 2000s       Medications:  Current Outpatient Medications   Medication Sig Dispense Refill    albuterol (PROAIR HFA/PROVENTIL HFA/VENTOLIN HFA) 108 (90 Base) MCG/ACT inhaler Inhale 2 puffs into the lungs every 4 hours as needed for shortness of breath / dyspnea or wheezing 18 g 1    carbamide peroxide (DEBROX) 6.5 % otic solution Place 5 drops into both ears 2 times daily 15 mL 0    cetirizine  (ZYRTEC) 10 MG tablet Take 1 tablet (10 mg) by mouth 2 times daily. 180 tablet 1    emtricitabine-tenofovir (TRUVADA) 200-300 MG per tablet Take 1 tablet by mouth daily 90 tablet 0    finasteride (PROSCAR) 5 MG tablet TAKE 1/4 TABLET BY MOUTH EVERY DAY 23 tablet 3    gabapentin (NEURONTIN) 300 MG capsule Take 1 capsule (300 mg) by mouth 2 times daily PATIENT STATES HE TAKING 300 MG TWICE DAILY 180 capsule 3    methylphenidate (RITALIN) 5 MG tablet Take 1 tablet (5 mg) by mouth 2 times daily 60 tablet 0    mometasone (ELOCON) 0.1 % external cream Apply topically daily To areas of psoriasis. 50 g 3    mometasone (ELOCON) 0.1 % external ointment Apply topically twice daily.  Do not expose skin to sun after applying steroid cream. 454 g 1    omeprazole (PRILOSEC) 40 MG DR capsule TAKE ONE CAPSULE BY MOUTH EVERY MORNING ON AN EMPTY STOMACH ABOUT 30 MINUTES BEFORE BREAKFAST 90 capsule 0    ondansetron (ZOFRAN ODT) 4 MG ODT tab Take 1 tablet (4 mg) by mouth every 8 hours as needed for nausea (Patient not taking: Reported on 9/11/2024) 12 tablet 0    ondansetron (ZOFRAN) 4 MG tablet Take 1 tablet (4 mg) by mouth every 6 hours as needed for nausea (Patient not taking: Reported on 8/14/2024) 12 tablet 0    OXcarbazepine (TRILEPTAL) 300 MG tablet Take 1 tablet (300 mg) by mouth 2 times daily 180 tablet 3    pimecrolimus (ELIDEL) 1 % external cream APPLY 1 APPLICATION TWICE DAILY TOPICALLY TO GENITALS FOR 2 WEEKS      VITAMIN D3 25 MCG (1000 UT) tablet TAKE 2 TABLETS BY MOUTH DAILY 180 tablet 1   Complete documentation of historical and exam elements from today's encounter can be found in the full encounter summary report (not reduplicated in this progress note). I personally obtained the chief complaint(s) and history of present illness.  I confirmed and edited as necessary the review of systems, past medical/surgical history, family history, social history, and examination findings as documented by others; and I examined the  patient myself. I personally reviewed the relevant tests, images, and reports as documented above. I formulated and edited as necessary the assessment and plan and discussed the findings and management plan with the patient and family. - Teddy Khan OD

## 2024-11-16 ENCOUNTER — IMMUNIZATION (OUTPATIENT)
Dept: FAMILY MEDICINE | Facility: CLINIC | Age: 29
End: 2024-11-16
Payer: COMMERCIAL

## 2024-11-16 PROCEDURE — 90656 IIV3 VACC NO PRSV 0.5 ML IM: CPT

## 2024-11-16 PROCEDURE — 90471 IMMUNIZATION ADMIN: CPT

## 2024-11-20 ENCOUNTER — MYC MEDICAL ADVICE (OUTPATIENT)
Dept: FAMILY MEDICINE | Facility: CLINIC | Age: 29
End: 2024-11-20
Payer: COMMERCIAL

## 2024-11-20 DIAGNOSIS — J45.30 MILD PERSISTENT ASTHMA WITHOUT COMPLICATION: ICD-10-CM

## 2024-11-25 ENCOUNTER — OFFICE VISIT (OUTPATIENT)
Dept: OPHTHALMOLOGY | Facility: CLINIC | Age: 29
End: 2024-11-25
Attending: OPTOMETRIST
Payer: COMMERCIAL

## 2024-11-25 DIAGNOSIS — H52.13 HIGH MYOPIA, BOTH EYES: ICD-10-CM

## 2024-11-25 DIAGNOSIS — H50.05 ALTERNATING ESOTROPIA: Primary | ICD-10-CM

## 2024-11-25 DIAGNOSIS — Q14.1 CONGENITAL HYPERTROPHY OF RETINAL PIGMENT EPITHELIUM OF LEFT EYE: ICD-10-CM

## 2024-11-25 DIAGNOSIS — H04.123 DRY EYE SYNDROME OF BOTH EYES: ICD-10-CM

## 2024-11-25 PROCEDURE — G0463 HOSPITAL OUTPT CLINIC VISIT: HCPCS | Performed by: OPTOMETRIST

## 2024-11-25 ASSESSMENT — REFRACTION_MANIFEST
OD_SPHERE: -6.25
OD_CYLINDER: +0.50
OS_SPHERE: -9.00
OS_CYLINDER: +1.50
OS_AXIS: 065
OD_AXIS: 170

## 2024-11-25 ASSESSMENT — REFRACTION_WEARINGRX
OS_HPRISM: 6BO
OD_HPRISM: 6BO
OD_SPHERE: -6.00
OD_AXIS: 135
OS_CYLINDER: +1.50
OS_SPHERE: -8.50
OD_CYLINDER: +0.75
OS_AXIS: 065
SPECS_TYPE: SVL

## 2024-11-25 ASSESSMENT — VISUAL ACUITY
OS_CC: 20/20
OS_CC+: -1
OD_CC+: -3
OD_CC: 20/25
CORRECTION_TYPE: GLASSES
METHOD: SNELLEN - LINEAR

## 2024-11-25 ASSESSMENT — TONOMETRY
OD_IOP_MMHG: 21
IOP_METHOD: APPLANATION
OS_IOP_MMHG: 17
IOP_METHOD: TONOPEN
OS_IOP_MMHG: 23

## 2024-11-25 ASSESSMENT — EXTERNAL EXAM - RIGHT EYE: OD_EXAM: NORMAL

## 2024-11-25 ASSESSMENT — CONF VISUAL FIELD
METHOD: COUNTING FINGERS
OD_INFERIOR_TEMPORAL_RESTRICTION: 0
OS_SUPERIOR_NASAL_RESTRICTION: 0
OD_NORMAL: 1
OD_SUPERIOR_TEMPORAL_RESTRICTION: 0
OS_INFERIOR_TEMPORAL_RESTRICTION: 0
OS_SUPERIOR_TEMPORAL_RESTRICTION: 0
OD_INFERIOR_NASAL_RESTRICTION: 0
OD_SUPERIOR_NASAL_RESTRICTION: 0
OS_NORMAL: 1
OS_INFERIOR_NASAL_RESTRICTION: 0

## 2024-11-25 ASSESSMENT — SLIT LAMP EXAM - LIDS
COMMENTS: NORMAL
COMMENTS: NORMAL

## 2024-11-25 ASSESSMENT — REFRACTION_FINALRX
OS_HPRISM: 6 BO
OD_HPRISM: 6 BO

## 2024-11-25 ASSESSMENT — EXTERNAL EXAM - LEFT EYE: OS_EXAM: NORMAL

## 2024-11-25 NOTE — NURSING NOTE
Chief Complaints and History of Present Illnesses   Patient presents with    Annual Eye Exam     New Patient - CEE     Chief Complaint(s) and History of Present Illness(es)       Annual Eye Exam              Comments: New Patient - CEE              Comments    Pt is here for full cee - wanting updated glasses prescription, feels vision is slightly changed. Denies any eye pain, flashes, or floaters.     Loida Molina 10:04 AM  November 25, 2024

## 2024-11-26 RX ORDER — ALBUTEROL SULFATE 90 UG/1
2 INHALANT RESPIRATORY (INHALATION) EVERY 4 HOURS PRN
Qty: 18 G | Refills: 1 | Status: SHIPPED | OUTPATIENT
Start: 2024-11-26

## 2024-11-27 NOTE — TELEPHONE ENCOUNTER
Pantoprazole (Protonix) 40mg EC tablet. Take 1 tablet (40mg) by mouth twice daily.    Last Written Prescription Date: 4/17/23  Last Fill Quantity: 60,  # refills: 1   Last office visit: Visit date not found ; last virtual visit: 12/7/2022 with prescribing provider:     Future Office Visit: No visits scheduled    MyChart message sent to patient as a reminder that they are due for a follow up appointment.    Refill request approved per [facility] protocol.    Carli Kitchen RN              
Yes

## 2024-11-27 NOTE — TELEPHONE ENCOUNTER
Writer replied to patient via [employer].  KEIRY LynchN, RN (she/her)  [facility] RN

## 2024-12-11 ENCOUNTER — VIRTUAL VISIT (OUTPATIENT)
Dept: PLASTIC SURGERY | Facility: CLINIC | Age: 29
End: 2024-12-11
Payer: COMMERCIAL

## 2024-12-11 DIAGNOSIS — F64.0 GENDER DYSPHORIA IN ADULT: Primary | ICD-10-CM

## 2024-12-11 ASSESSMENT — PAIN SCALES - GENERAL: PAINLEVEL_OUTOF10: NO PAIN (0)

## 2024-12-11 NOTE — LETTER
12/11/2024       RE: Manoj Castellanos  [address] [facility] [address]     Dear Colleague,    Thank you for referring your patient, Manoj Castellanos, to the [facility] PLASTIC AND RECONSTRUCTIVE SURGERY CLINIC [place] at [facility]. Please see a copy of my visit note below.    Manoj is a 29 year old who is being evaluated via a billable telephone visit.      Originating Location (pt. Location): Home    Distant Location (provider location):  On-site          PRS    Telephone start: 2:30 PM  Telephone end: 2:45 PM  Patient is taking phone call from [place]    Patient wanted to discuss they are concerned about the implants bottoming out.  They are generally happy with the result, but have noticed that the implants have come down.  They state that the implants are on the appropriate position on the chest, but have noticed that the relative nipple position has pointed more upward.  They state that they usually have seen breast that the nipple points down and wanted to simply discuss what to expect.    After reviewing the most recent photos done within the last month or so, in my opinion, the implants are in appropriate position on the chest.  Since the patient does not have any breast tissue, and a dual plane augmentation could not be done, the relative nipple position on the breast implant is slightly higher than ideal.  However, on the oblique and lateral views, it can be appreciated that the teardrop shape of the fourth generation gel silicone implants has expanded the lower pole very nicely and the overall shape of the breasts is appropriate.  Explained that if there is bottoming out, it may occur over many years in my experience, and that there are ways to manage this with revision breast surgery with or without the use of reinforcing mesh.  At this point, my recommendation would be to continue to support the weight of the implants using some form of a either  sports bra, bra or conventional bra.  Continue to observe.  Return to clinic 1-2 years for reevaluation.  Patient is agreeable with the plan.  Patient understands and knows to contact us if there are any other concerns.    A total of 20 minutes was devoted to review of chart, direct patient counseling and documentation during and on the day of this encounter, exclusive of any procedure performed.    River Kim MD, PhD, FACS        Again, thank you for allowing me to participate in the care of your patient.      Sincerely,    River Kim MD

## 2024-12-11 NOTE — PROGRESS NOTES
PRS    Telephone start: 2:30 PM  Telephone end: 2:45 PM  Patient is taking phone call from [place]    Patient wanted to discuss they are concerned about the implants bottoming out.  They are generally happy with the result, but have noticed that the implants have come down.  They state that the implants are on the appropriate position on the chest, but have noticed that the relative nipple position has pointed more upward.  They state that they usually have seen breast that the nipple points down and wanted to simply discuss what to expect.    After reviewing the most recent photos done within the last month or so, in my opinion, the implants are in appropriate position on the chest.  Since the patient does not have any breast tissue, and a dual plane augmentation could not be done, the relative nipple position on the breast implant is slightly higher than ideal.  However, on the oblique and lateral views, it can be appreciated that the teardrop shape of the fourth generation gel silicone implants has expanded the lower pole very nicely and the overall shape of the breasts is appropriate.  Explained that if there is bottoming out, it may occur over many years in my experience, and that there are ways to manage this with revision breast surgery with or without the use of reinforcing mesh.  At this point, my recommendation would be to continue to support the weight of the implants using some form of a either sports bra, bra or conventional bra.  Continue to observe.  Return to clinic 1-2 years for reevaluation.  Patient is agreeable with the plan.  Patient understands and knows to contact us if there are any other concerns.    A total of 20 minutes was devoted to review of chart, direct patient counseling and documentation during and on the day of this encounter, exclusive of any procedure performed.    River Kim MD, PhD, FACS

## 2024-12-11 NOTE — PROGRESS NOTES
Ty is a 29 year old who is being evaluated via a billable telephone visit.      Originating Location (pt. Location): Home    Distant Location (provider location):  On-site

## 2024-12-11 NOTE — NURSING NOTE
"Chief Complaint   Patient presents with    RECHECK     Ty, is participating in a telephone visit today for Discuss options for implants \"bottoming out\". Photos taken 11/13 in media tab       There were no vitals filed for this visit.    There is no height or weight on file to calculate BMI.      Mili Hull LPN    "

## 2024-12-25 ASSESSMENT — ASTHMA QUESTIONNAIRES
ACT_TOTALSCORE: 25
QUESTION_2 LAST FOUR WEEKS HOW OFTEN HAVE YOU HAD SHORTNESS OF BREATH: NOT AT ALL
QUESTION_5 LAST FOUR WEEKS HOW WOULD YOU RATE YOUR ASTHMA CONTROL: COMPLETELY CONTROLLED
QUESTION_1 LAST FOUR WEEKS HOW MUCH OF THE TIME DID YOUR ASTHMA KEEP YOU FROM GETTING AS MUCH DONE AT WORK, SCHOOL OR AT HOME: NONE OF THE TIME
QUESTION_4 LAST FOUR WEEKS HOW OFTEN HAVE YOU USED YOUR RESCUE INHALER OR NEBULIZER MEDICATION (SUCH AS ALBUTEROL): NOT AT ALL
QUESTION_3 LAST FOUR WEEKS HOW OFTEN DID YOUR ASTHMA SYMPTOMS (WHEEZING, COUGHING, SHORTNESS OF BREATH, CHEST TIGHTNESS OR PAIN) WAKE YOU UP AT NIGHT OR EARLIER THAN USUAL IN THE MORNING: NOT AT ALL
ACT_TOTALSCORE: 25

## 2024-12-25 ASSESSMENT — PATIENT HEALTH QUESTIONNAIRE - PHQ9
SUM OF ALL RESPONSES TO PHQ QUESTIONS 1-9: 0
10. IF YOU CHECKED OFF ANY PROBLEMS, HOW DIFFICULT HAVE THESE PROBLEMS MADE IT FOR YOU TO DO YOUR WORK, TAKE CARE OF THINGS AT HOME, OR GET ALONG WITH OTHER PEOPLE: NOT DIFFICULT AT ALL
SUM OF ALL RESPONSES TO PHQ QUESTIONS 1-9: 0

## 2024-12-26 ENCOUNTER — OFFICE VISIT (OUTPATIENT)
Dept: INTERNAL MEDICINE | Facility: CLINIC | Age: 29
End: 2024-12-26
Payer: COMMERCIAL

## 2024-12-26 VITALS
WEIGHT: 133.7 LBS | HEART RATE: 109 BPM | HEIGHT: 69 IN | SYSTOLIC BLOOD PRESSURE: 138 MMHG | OXYGEN SATURATION: 97 % | BODY MASS INDEX: 19.8 KG/M2 | DIASTOLIC BLOOD PRESSURE: 91 MMHG | TEMPERATURE: 97.4 F

## 2024-12-26 DIAGNOSIS — K64.4 EXTERNAL HEMORRHOIDS: Primary | ICD-10-CM

## 2024-12-26 PROCEDURE — 99213 OFFICE O/P EST LOW 20 MIN: CPT | Performed by: INTERNAL MEDICINE

## 2024-12-26 RX ORDER — HYDROCORTISONE ACETATE 25 MG/1
25 SUPPOSITORY RECTAL 2 TIMES DAILY
Qty: 24 SUPPOSITORY | Refills: 0 | Status: SHIPPED | OUTPATIENT
Start: 2024-12-26

## 2024-12-26 NOTE — PROGRESS NOTES
"  {PROVIDER CHARTING PREFERENCE:234316}    Subjective   Ty is a 29 year old, presenting for the following health issues:  Rectal Problem  Pt has had hemorrhoid for about a week and a half. Pt reports no pain or blood       Has an external hemorrhoid for the past week. No bleeding. No severe pain. Did try OTC hydrocortisone.    History of Present Illness       Reason for visit:  External hemorrhoid lasting longer than a week  Symptom onset:  1-2 weeks ago  Symptoms include:  I mean, it's an external hemorrhoid. No itching or pain, it's just not going away!  Symptom intensity:  Mild  Symptom progression:  Staying the same  Had these symptoms before:  No   Ty is taking medications regularly.       {MA/LPN/RN Pre-Provider Visit Orders- hCG/UA/Strep (Optional):199357}  {SUPERLIST (Optional):214782}  {additonal problems for provider to add (Optional):597528}    {ROS Picklists (Optional):028266}      Objective    BP (!) 138/91   Pulse 109   Temp 97.4  F (36.3  C) (Temporal)   Ht 1.753 m (5' 9\")   Wt 60.6 kg (133 lb 11.2 oz)   SpO2 97%   BMI 19.74 kg/m    Body mass index is 19.74 kg/m .  Physical Exam   {Exam List (Optional):266522}    {Diagnostic Test Results (Optional):301407}        Signed Electronically by: Shy Cook MD  {Email feedback regarding this note to [email or web address]   :847469}  "

## 2024-12-26 NOTE — PATIENT INSTRUCTIONS
some witchazel wipes to use after bowel movements    2.  some preparation H to use    3. Use the hydrocortisone suppositories.

## 2024-12-27 ENCOUNTER — MYC MEDICAL ADVICE (OUTPATIENT)
Dept: FAMILY MEDICINE | Facility: CLINIC | Age: 29
End: 2024-12-27
Payer: COMMERCIAL

## 2024-12-27 DIAGNOSIS — F90.9 ATTENTION DEFICIT HYPERACTIVITY DISORDER (ADHD), UNSPECIFIED ADHD TYPE: ICD-10-CM

## 2024-12-30 NOTE — TELEPHONE ENCOUNTER
Dr. Burnette-Please review and sign if agree.    Thank you!  KEIRY HolmanN, RN-[facility]

## 2024-12-31 RX ORDER — METHYLPHENIDATE HYDROCHLORIDE 5 MG/1
5 TABLET ORAL 2 TIMES DAILY
Qty: 60 TABLET | Refills: 0 | Status: SHIPPED | OUTPATIENT
Start: 2024-12-31

## 2025-01-08 ENCOUNTER — E-VISIT (OUTPATIENT)
Dept: FAMILY MEDICINE | Facility: CLINIC | Age: 30
End: 2025-01-08
Payer: COMMERCIAL

## 2025-01-08 DIAGNOSIS — F90.9 ATTENTION DEFICIT HYPERACTIVITY DISORDER (ADHD), UNSPECIFIED ADHD TYPE: ICD-10-CM

## 2025-01-08 PROCEDURE — 99207 PR NO BILLABLE SERVICE THIS VISIT: CPT | Performed by: FAMILY MEDICINE

## 2025-01-08 ASSESSMENT — ANXIETY QUESTIONNAIRES
8. IF YOU CHECKED OFF ANY PROBLEMS, HOW DIFFICULT HAVE THESE MADE IT FOR YOU TO DO YOUR WORK, TAKE CARE OF THINGS AT HOME, OR GET ALONG WITH OTHER PEOPLE?: NOT DIFFICULT AT ALL
GAD7 TOTAL SCORE: 0
2. NOT BEING ABLE TO STOP OR CONTROL WORRYING: NOT AT ALL
1. FEELING NERVOUS, ANXIOUS, OR ON EDGE: NOT AT ALL
7. FEELING AFRAID AS IF SOMETHING AWFUL MIGHT HAPPEN: NOT AT ALL
5. BEING SO RESTLESS THAT IT IS HARD TO SIT STILL: NOT AT ALL
4. TROUBLE RELAXING: NOT AT ALL
GAD7 TOTAL SCORE: 0
3. WORRYING TOO MUCH ABOUT DIFFERENT THINGS: NOT AT ALL
7. FEELING AFRAID AS IF SOMETHING AWFUL MIGHT HAPPEN: NOT AT ALL
6. BECOMING EASILY ANNOYED OR IRRITABLE: NOT AT ALL
GAD7 TOTAL SCORE: 0
IF YOU CHECKED OFF ANY PROBLEMS ON THIS QUESTIONNAIRE, HOW DIFFICULT HAVE THESE PROBLEMS MADE IT FOR YOU TO DO YOUR WORK, TAKE CARE OF THINGS AT HOME, OR GET ALONG WITH OTHER PEOPLE: NOT DIFFICULT AT ALL

## 2025-01-10 RX ORDER — METHYLPHENIDATE HYDROCHLORIDE 5 MG/1
5 TABLET ORAL 2 TIMES DAILY
Qty: 60 TABLET | Refills: 0 | Status: SHIPPED | OUTPATIENT
Start: 2025-04-02 | End: 2025-05-02

## 2025-01-10 RX ORDER — METHYLPHENIDATE HYDROCHLORIDE 5 MG/1
5 TABLET ORAL 2 TIMES DAILY
Qty: 60 TABLET | Refills: 0 | Status: SHIPPED | OUTPATIENT
Start: 2025-03-02 | End: 2025-04-01

## 2025-01-10 RX ORDER — METHYLPHENIDATE HYDROCHLORIDE 5 MG/1
5 TABLET ORAL 2 TIMES DAILY
Qty: 60 TABLET | Refills: 0 | Status: SHIPPED | OUTPATIENT
Start: 2025-01-30 | End: 2025-03-01

## 2025-01-15 ENCOUNTER — OFFICE VISIT (OUTPATIENT)
Dept: OTOLARYNGOLOGY | Facility: CLINIC | Age: 30
End: 2025-01-15
Payer: COMMERCIAL

## 2025-01-15 VITALS — BODY MASS INDEX: 19.7 KG/M2 | WEIGHT: 133 LBS | HEIGHT: 69 IN

## 2025-01-15 DIAGNOSIS — Z98.890 POSTOPERATIVE STATE: Primary | ICD-10-CM

## 2025-01-15 NOTE — PROGRESS NOTES
Facial Plastic and Reconstructive Surgery      Manoj Castellanos is doing well and feels that breathing is good.  There have been no concerns with any nasal bleeding or significant nasal secretions.  The movement of air is good.    On examination of the nasal framework looks good.  The septum looks nicely straight.  I do not see any significant concerns.    Assessment plan:  Manoj has done well in the postoperative time course.  They can follow-up as needed in the future.  I recommend continue nasal saline through the winter months.

## 2025-01-15 NOTE — LETTER
1/15/2025       [facility]: [facility]ellanos  [address] [facility] [address]     Dear Colleague,    Thank you for referring your patient, Manoj Castellanos, to the [facility] [place] [facility]. Please see a copy of my visit note below.    Facial Plastic and Reconstructive Surgery      Manoj Castellanos is doing well and feels that breathing is good.  There have been no concerns with any nasal bleeding or significant nasal secretions.  The movement of air is good.    On examination of the nasal framework looks good.  The septum looks nicely straight.  I do not see any significant concerns.    Assessment plan:  Manoj has done well in the postoperative time course.  They can follow-up as needed in the future.  I recommend continue nasal saline through the winter months.      Again, thank you for allowing me to participate in the care of your patient.      Sincerely,    Renetta Beltran MD

## 2025-02-15 DIAGNOSIS — Z29.81 ENCOUNTER FOR HIV PRE-EXPOSURE PROPHYLAXIS: ICD-10-CM

## 2025-02-18 RX ORDER — EMTRICITABINE AND TENOFOVIR DISOPROXIL FUMARATE 200; 300 MG/1; MG/1
1 TABLET, FILM COATED ORAL DAILY
Qty: 30 TABLET | Refills: 0 | Status: SHIPPED | OUTPATIENT
Start: 2025-02-18

## 2025-03-06 ENCOUNTER — TELEPHONE (OUTPATIENT)
Dept: GASTROENTEROLOGY | Facility: CLINIC | Age: 30
End: 2025-03-06
Payer: COMMERCIAL

## 2025-03-06 NOTE — TELEPHONE ENCOUNTER
Pre assessment completed for upcoming procedure.   (Please see previous telephone encounter notes for complete details)    Procedure details:    Arrival time and facility location reviewed.    Pre op exam needed? No.    Designated  policy reviewed. Instructed to have someone stay 24  hours post procedure.       Medication review:    Medications reviewed. Please see supporting documentation below. Holding recommendations discussed (if applicable).       Prep for procedure:     Procedure prep instructions reviewed.        Any additional information needed:  N/A      Patient verbalized understanding and had no questions or concerns at this time.      Charlotte Driver LPN  Endoscopy Procedure Pre Assessment   [phone] option 3   brisk cap refill  Abdomen: Soft, non-tender, non- distended with active bowel sounds  CNS: AF soft and flat, No focal deficit, tone appropriate for ga    Assessment/Plan:     Patient Active Problem List    Diagnosis Date Noted    Prematurity, birth weight 1,750-1,999 grams, with 33 completed weeks of gestation 2020     Imp: Infant delivered at 35 4/7 weeks gestation due to 2/10 BPP and no fetal movements. Intubated initially with blood gas showing metabolic acidosis. Glucose and NS bolus given.   HUS- no IVH, sm cerebellar vermis, prominent posterior fossa or enlarged cisterna magna-suggest to do MRI. In isolette. Taking 100% PO. Plan: Continue NICU care; needs Hep B vaccine on discharge, circ if parents desire - consent signed, CCHD, hearing, car seat test prior to discharge. MRI of brain once weaned to open crib.  Impaired thermoregulation 2020      with reduced brown fat, at risk for impaired thermoregulation. In isolette, tolerating weaning temps, current isolette temp 26.3  PLan: Continue isolette care, wean temp as able. Encourage kangaroo care                  Projected hospital stay of approximately 3-4 more weeks, up to 40 weeks post-menstrual age. The medical necessity for inpatient hospital care is based on the above stated problem list and treatment modalities.      Electronically signed by Beti Renee MD on 2020 at 2:20 PM

## 2025-03-06 NOTE — TELEPHONE ENCOUNTER
Pre visit planning completed.      Procedure details:    Patient scheduled for Upper endoscopy (EGD) on 3/21/25.     Arrival time: 0730. Procedure time 0830    Facility location: [facility]; [address] [place] [address], 5th Floor, [place] [address]. Check in location: 5th Floor.    Sedation type: MAC    Pre op exam needed? No.    Indication for procedure: EoE      Chart review:     Electronic implanted devices? No    Recent diagnosis of diverticulitis within the last 6 weeks? No      Medication review:    Diabetic? No    Anticoagulants? No    Weight loss medication/injectable? No GLP-1 medication per patient's medication list. Nursing to verify with pre-assessment call.    Other medication HOLDING recommendations:  N/A      Prep for procedure:         Procedure information and instructions sent via [employer]         Daphney Santa RN  Endoscopy Procedure Pre Assessment   [phone] option 3

## 2025-03-17 DIAGNOSIS — Z29.81 ENCOUNTER FOR HIV PRE-EXPOSURE PROPHYLAXIS: ICD-10-CM

## 2025-03-17 NOTE — TELEPHONE ENCOUNTER
Pending Prescriptions:                       Disp   Refills    emtricitabine-tenofovir (TRUVADA) 200-300*30 tab*0            Sig: Take 1 tablet by mouth daily.

## 2025-03-19 RX ORDER — EMTRICITABINE AND TENOFOVIR DISOPROXIL FUMARATE 200; 300 MG/1; MG/1
1 TABLET, FILM COATED ORAL DAILY
Qty: 30 TABLET | Refills: 0 | Status: SHIPPED | OUTPATIENT
Start: 2025-03-19

## 2025-03-21 ENCOUNTER — HOSPITAL ENCOUNTER (OUTPATIENT)
Facility: AMBULATORY SURGERY CENTER | Age: 30
Discharge: HOME | End: 2025-03-21
Attending: INTERNAL MEDICINE
Payer: COMMERCIAL

## 2025-03-21 VITALS
BODY MASS INDEX: 18.96 KG/M2 | SYSTOLIC BLOOD PRESSURE: 106 MMHG | OXYGEN SATURATION: 99 % | WEIGHT: 128 LBS | RESPIRATION RATE: 20 BRPM | TEMPERATURE: 97.6 F | HEIGHT: 69 IN | HEART RATE: 100 BPM | DIASTOLIC BLOOD PRESSURE: 72 MMHG

## 2025-03-21 LAB — UPPER GI ENDOSCOPY: NORMAL

## 2025-03-21 PROCEDURE — 43239 EGD BIOPSY SINGLE/MULTIPLE: CPT | Performed by: INTERNAL MEDICINE

## 2025-03-21 PROCEDURE — 88305 TISSUE EXAM BY PATHOLOGIST: CPT | Mod: TC | Performed by: INTERNAL MEDICINE

## 2025-03-21 RX ORDER — NALOXONE HYDROCHLORIDE 0.4 MG/ML
0.4 INJECTION, SOLUTION INTRAMUSCULAR; INTRAVENOUS; SUBCUTANEOUS
Status: DISCONTINUED | OUTPATIENT
Start: 2025-03-21 | End: 2025-03-22 | Stop reason: HOSPADM

## 2025-03-21 RX ORDER — PROCHLORPERAZINE MALEATE 10 MG
10 TABLET ORAL EVERY 6 HOURS PRN
Status: DISCONTINUED | OUTPATIENT
Start: 2025-03-21 | End: 2025-03-22 | Stop reason: HOSPADM

## 2025-03-21 RX ORDER — FLUMAZENIL 0.1 MG/ML
0.2 INJECTION, SOLUTION INTRAVENOUS
Status: ACTIVE | OUTPATIENT
Start: 2025-03-21 | End: 2025-03-21

## 2025-03-21 RX ORDER — ONDANSETRON 4 MG/1
4 TABLET, ORALLY DISINTEGRATING ORAL EVERY 6 HOURS PRN
Status: DISCONTINUED | OUTPATIENT
Start: 2025-03-21 | End: 2025-03-22 | Stop reason: HOSPADM

## 2025-03-21 RX ORDER — ONDANSETRON 2 MG/ML
4 INJECTION INTRAMUSCULAR; INTRAVENOUS
Status: DISCONTINUED | OUTPATIENT
Start: 2025-03-21 | End: 2025-03-21 | Stop reason: HOSPADM

## 2025-03-21 RX ORDER — NALOXONE HYDROCHLORIDE 0.4 MG/ML
0.2 INJECTION, SOLUTION INTRAMUSCULAR; INTRAVENOUS; SUBCUTANEOUS
Status: DISCONTINUED | OUTPATIENT
Start: 2025-03-21 | End: 2025-03-22 | Stop reason: HOSPADM

## 2025-03-21 RX ORDER — LIDOCAINE 40 MG/G
CREAM TOPICAL
Status: DISCONTINUED | OUTPATIENT
Start: 2025-03-21 | End: 2025-03-21 | Stop reason: HOSPADM

## 2025-03-21 RX ORDER — ONDANSETRON 2 MG/ML
4 INJECTION INTRAMUSCULAR; INTRAVENOUS EVERY 6 HOURS PRN
Status: DISCONTINUED | OUTPATIENT
Start: 2025-03-21 | End: 2025-03-22 | Stop reason: HOSPADM

## 2025-03-21 RX ADMIN — ONDANSETRON 4 MG: 2 INJECTION INTRAMUSCULAR; INTRAVENOUS at 09:13

## 2025-03-21 NOTE — H&P
Manoj Castellanos  [id number]  adult  29 year old      Reason for procedure/surgery: Eosinophilic esophagitis follow-up    Patient Active Problem List   Diagnosis    Abnormal sinus finding on MRI    Mild persistent asthma    Allergic rhinitis due to animals    Allergic rhinitis due to dust mite    Seasonal allergic rhinitis due to pollen    Allergic conjunctivitis of both eyes    Palpitations    Seasonal affective disorder    Numbness and tingling    Vertigo    Vitamin D deficiency    Eosinophilic esophagitis    Gender dysphoria in adult    Nasal obstruction    Deviated nasal septum    Nasal valve stenosis    Hypertrophy of inferior nasal turbinate    Major depressive disorder, recurrent episode, moderate with anxious distress (H)       Past Surgical History:    Past Surgical History:   Procedure Laterality Date    BIOPSY  feb 2023    As part of upper endoscopy    COMBINED ESOPHAGOSCOPY, GASTROSCOPY, DUODENOSCOPY (EGD) WITH CO2 INSUFFLATION N/A 01/02/2023    Procedure: ESOPHAGOGASTRODUODENOSCOPY, WITH CO2 INSUFFLATION;  Surgeon: Zahraa Robbins DO;  Location:  OR    COMBINED ESOPHAGOSCOPY, GASTROSCOPY, DUODENOSCOPY (EGD) WITH CO2 INSUFFLATION N/A 03/28/2023    Procedure: ESOPHAGOGASTRODUODENOSCOPY, WITH CO2 INSUFFLATION;  Surgeon: Zahraa Robbins DO;  Location: [facility] OR    ESOPHAGOSCOPY, GASTROSCOPY, DUODENOSCOPY (EGD), COMBINED N/A 01/02/2023    Procedure: ESOPHAGOGASTRODUODENOSCOPY, WITH BIOPSY;  Surgeon: Zahraa Robbins DO;  Location: [facility] OR    ESOPHAGOSCOPY, GASTROSCOPY, DUODENOSCOPY (EGD), COMBINED N/A 03/28/2023    Procedure: ESOPHAGOGASTRODUODENOSCOPY, WITH BIOPSY;  Surgeon: Zahraa Robbins DO;  Location:  OR     TOOTH EXTRACTION W/FORCE  2016    4 teeth     MAMMOPLASTY AUGMENTATION Bilateral 7/30/2024    Procedure: Bilateral feminizing breast augmentation with use of anatomic smooth silicone gel implants;  Surgeon: River Kim MD;  Location: [facility] OR    RECONSTRUCT NOSE AND SEPTUM (FUNCTIONAL)  Bilateral 5/9/2024    Procedure: Complex Open Septoplasty, Repair of Nasal Vestibular Stenosis, Bilateral Inferior Turbinate Reduction;  Surgeon: Renetta Beltran MD;  Location: [facility] OR       Past Medical History:   Past Medical History:   Diagnosis Date    Depressive disorder 2014    it s chill now tho I m doing fine    Uncomplicated asthma early 2000s       Social History:   Social History     Tobacco Use    Smoking status: Never     Passive exposure: Never    Smokeless tobacco: Never   Substance Use Topics    Alcohol use: Yes     Comment: infrequent at worst       Family History:   Family History   Problem Relation Age of Onset    Bipolar Disorder Mother     Psoriasis Mother     Depression Mother     Substance Abuse Father     Asthma Father     Depression Father     Depression Sister     Anxiety Disorder Sister     Attention Deficit Disorder Sister     Fibromyalgia Sister     Pancreatic Cancer Maternal Grandfather     Other Cancer Maternal Grandfather     Depression Maternal Grandfather     Prostate Cancer Paternal Grandfather     Depression Other     Anxiety Disorder Other     Mental Illness Other     Depression Sister     Anxiety Disorder Sister        Allergies:   Allergies   Allergen Reactions    Amoxicillin Swelling     Lip swelling     Cefuroxime      Swelling        Active Medications:   Current Outpatient Medications   Medication Sig Dispense Refill    albuterol (PROAIR HFA/PROVENTIL HFA/VENTOLIN HFA) 108 (90 Base) MCG/ACT inhaler Inhale 2 puffs into the lungs every 4 hours as needed for shortness of breath or wheezing. 18 g 1    carbamide peroxide (DEBROX) 6.5 % otic solution Place 5 drops into both ears 2 times daily 15 mL 0    cetirizine (ZYRTEC) 10 MG tablet Take 1 tablet (10 mg) by mouth 2 times daily. 180 tablet 1    emtricitabine-tenofovir (TRUVADA) 200-300 MG per tablet Take 1 tablet by mouth daily. 30 tablet 0    finasteride (PROSCAR) 5 MG tablet TAKE 1/4 TABLET BY MOUTH EVERY DAY 23 tablet 3  "   gabapentin (NEURONTIN) 300 MG capsule Take 1 capsule (300 mg) by mouth 2 times daily PATIENT STATES HE TAKING 300 MG TWICE DAILY 180 capsule 3    hydrocortisone (ANUSOL-HC) 25 MG suppository Place 1 suppository (25 mg) rectally 2 times daily. 24 suppository 0    [START ON 4/2/2025] methylphenidate (RITALIN) 5 MG tablet Take 1 tablet (5 mg) by mouth 2 times daily. 60 tablet 0    methylphenidate (RITALIN) 5 MG tablet Take 1 tablet (5 mg) by mouth 2 times daily. 60 tablet 0    mometasone (ELOCON) 0.1 % external cream Apply topically daily To areas of psoriasis. 50 g 3    omeprazole (PRILOSEC) 40 MG DR capsule Take 1 capsule (40 mg) by mouth daily. 90 capsule 1    OXcarbazepine (TRILEPTAL) 300 MG tablet Take 1 tablet (300 mg) by mouth 2 times daily 180 tablet 3    pimecrolimus (ELIDEL) 1 % external cream APPLY 1 APPLICATION TWICE DAILY TOPICALLY TO GENITALS FOR 2 WEEKS      VITAMIN D3 25 MCG (1000 UT) tablet TAKE 2 TABLETS BY MOUTH DAILY 180 tablet 1    mometasone (ELOCON) 0.1 % external ointment Apply topically twice daily.  Do not expose skin to sun after applying steroid cream. 454 g 1       Systemic Review:   CONSTITUTIONAL: NEGATIVE for fever, chills, change in weight  ENT/MOUTH: NEGATIVE for ear, mouth and throat problems  RESP: NEGATIVE for significant cough or SOB  CV: NEGATIVE for chest pain, palpitations or peripheral edema    Physical Examination:   Vital Signs: BP (!) 129/97 (BP Location: Right arm)   Pulse 92   Temp 98.5  F (36.9  C) (Temporal)   Resp 18   Ht 1.753 m (5' 9\")   Wt 58.1 kg (128 lb)   SpO2 99%   BMI 18.90 kg/m    GENERAL: healthy, alert and no distress  NECK: no adenopathy, no asymmetry, masses, or scars  RESP: lungs clear to auscultation - no rales, rhonchi or wheezes  CV: regular rate and rhythm, normal S1 S2, no S3 or S4, no murmur, click or rub, no peripheral edema and peripheral pulses strong  ABDOMEN: soft, nontender, no hepatosplenomegaly, no masses and bowel sounds " normal  MS: no gross musculoskeletal defects noted, no edema    Plan: Appropriate to proceed as scheduled.      Paco Pacheco MD  3/21/2025    PCP:  Parrish Burnette Y

## 2025-04-02 NOTE — PROGRESS NOTES
Medication Therapy Management (MTM) Encounter    ASSESSMENT:                            Medication Adherence/Access: No issues identified.    EoE:  Manoj would benefit from initiating therapy with Dupixent 300 mg once weekly for management of EoE. Manoj does not experience any symptoms, but continues to have increased eosinophils shown on biopsy, therefore escalation in therapy is recommended. Ty would like to get off of omeprazole in the future due to adverse effects and risk associated with chronic use, will loop in [facility] for awareness. Ty would prefer to use a Dupixent syringe rather than an auto-injector. Manoj is interested in meeting with a nurse for their first injection to ensure proper administration technique. Prior authorization process for Dupixent started. Referral has been placed for EGD 6 months following Dupixent start.      PLAN:                            Initiate Dupixent 300 mg injection once weekly  We are currently in the process of get this authorized by insurance and will keep you updated as able.  -- update 4/8/25: Dupixent approved through insurance. Prescription for prefilled syringes sent to [facility]. Ty notified via [employer] message.    Repeat endoscopy 6 months following Dupixent start    Follow-up: ~1 month following first Dupixent injection, will send [employer] scheduling link     EDUCATION:     We reviewed  Dupixent  today including an overview of the coverage and pharmacy process, mechanism of action, general dosing/administration, side effects (both common/serious), precautions, monitoring for safety and efficacy, as well as time to efficacy. Reviewed potential need to hold medication in the setting of signs/symptoms of infection. Contact information provided in the event they have questions/concerns about the medication.      SUBJECTIVE/OBJECTIVE:                          Manoj Castellanos is a 29 year old adult seen for an initial visit. Manoj was referred to me from Daniele Hess PA-C.       Reason for visit: EoE, would like to start dupixent, biopsies worsened on omeprazole.    Allergies/ADRs: Reviewed in chart  Tobacco: Manoj reports that Manoj has never smoked. Manoj has never been exposed to tobacco smoke. Manoj has never used smokeless tobacco.  Alcohol: occasionally    Medication Adherence/Access: no issues reported.    EoE:   Omeprazole 40 mg daily    Manoj reports no current symptoms of eosinophilic esophagitis (EoE) but was informed that eosinophils are still present on recent EGD, prompting a plan to escalate therapy to Dupixent. Manoj prefers the syringe formulation over the autoinjector and would like to schedule a visit with a nurse for the first injection to ensure correct administration technique. Manoj is not anxious about the needle itself but wants to ensure the injection is given properly. Manoj's mother is also on Dupixent but uses the autoinjector version.    Manoj is very interested in discontinuing omeprazole when able due to side effects that can be associated with chronic use of this medication. Manoj states there is no rush to discontinue but they would like to eventually.      EGD 3/21/2025  Impression:              - Esophageal mucosal changes consistent with eosinophilic esophagitis. Biopsied.   - Gastroesophageal flap valve classified as Hill Grade I (prominent fold, tight to endoscope).   - Erythematous mucosa in the antrum. Biopsied.   - Normal duodenal bulb and second portion of the duodenum. Biopsied.   - Biopsies were taken with a cold forceps for evaluation of eosinophilic esophagitis.   Pathology  Final Diagnosis   A. DUODENUM, BIOPSIES:  -Small intestinal mucosa with preserved villous architecture, focal foveolar metaplasia is present compatible with peptic injury  -Negative for celiac disease and adenoma formation  -No increase in lamina propria eosinophils     B.  STOMACH, BIOPSIES:  -Gastric antral and oxyntic mucosa with mild chronic inactive gastritis and background features of  reactive gastropathy  -No H. pylori-like organisms identified on routine stain  -Negative for intestinal metaplasia and dysplasia  -No increase in lamina propria eosinophils     C. DISTAL ESOPHAGUS, BIOPSIES:  -Active esophagitis with peak eosinophil count of 67/HPF  -Negative for intestinal metaplasia and dysplasia  -Basal cell hyperplasia with surrounding spongiosis and subepithelial fibrosis are present     D. PROXIMAL ESOPHAGUS, BIOPSIES:  -Active esophagitis with peak eosinophil count of 62/HPF  -Negative for intestinal metaplasia and dysplasia  -Basal cell hyperplasia with surrounding spongiosis and subepithelial fibrosis are present         Today's Vitals: There were no vitals taken for this visit.  ----------------      I spent 22 minutes with this patient today. All changes were made via collaborative practice agreement with Daniele Hess.     A summary of these recommendations was sent via [employer].    Charlotte Goetz, PharmD  MTM Pharmacist  [facility]     Telemedicine Visit Details  The patient's medications can be safely assessed via a telemedicine encounter.  Type of service:  Telephone visit  Originating Location (pt. Location): Home    Distant Location (provider location):  On-site  Start Time:  1227  End Time:  1249     Medication Therapy Recommendations  No medication therapy recommendations to display

## 2025-04-03 ENCOUNTER — VIRTUAL VISIT (OUTPATIENT)
Dept: PHARMACY | Facility: CLINIC | Age: 30
End: 2025-04-03
Attending: PHYSICIAN ASSISTANT
Payer: COMMERCIAL

## 2025-04-03 VITALS — WEIGHT: 125 LBS | HEIGHT: 70 IN | BODY MASS INDEX: 17.9 KG/M2

## 2025-04-03 DIAGNOSIS — E55.9 VITAMIN D DEFICIENCY: ICD-10-CM

## 2025-04-03 DIAGNOSIS — K20.0 EOSINOPHILIC ESOPHAGITIS: Primary | ICD-10-CM

## 2025-04-03 RX ORDER — VITAMIN B COMPLEX
2 TABLET ORAL DAILY
Qty: 180 TABLET | Refills: 0 | Status: SHIPPED | OUTPATIENT
Start: 2025-04-03

## 2025-04-03 ASSESSMENT — PAIN SCALES - GENERAL: PAINLEVEL_OUTOF10: NO PAIN (0)

## 2025-04-03 NOTE — TELEPHONE ENCOUNTER
Pending Prescriptions:                       Disp   Refills    vitamin D3 25 mcg (1000 units) tablet     180 ta*1            Sig: Take 2 tablets (50 mcg) by mouth daily.

## 2025-04-03 NOTE — NURSING NOTE
Patient confirms medications and allergies are accurate via patients echeck in completion, and or denies any changes since last reviewed/verified.   Current patient location: [address] [place] [id number]    Is the patient currently in the state of [place]? YES    Visit mode: VIDEO    If the visit is dropped, the patient can be reconnected by:VIDEO VISIT: Text to cell phone:   Telephone Information:   Mobile [phone]       Will anyone else be joining the visit? NO  (If patient encounters technical issues they should call [phone])    Are changes needed to the allergy or medication list? No    Are refills needed on medications prescribed by this physician? NO    Rooming Documentation:  Questionnaire(s) not done per department protocol    Reason for visit: Consult    Regina CENTENOF

## 2025-04-07 DIAGNOSIS — J30.1 CHRONIC SEASONAL ALLERGIC RHINITIS DUE TO POLLEN: ICD-10-CM

## 2025-04-07 DIAGNOSIS — J30.81 CHRONIC ALLERGIC RHINITIS DUE TO ANIMAL HAIR AND DANDER: ICD-10-CM

## 2025-04-07 DIAGNOSIS — Z29.81 ENCOUNTER FOR HIV PRE-EXPOSURE PROPHYLAXIS: ICD-10-CM

## 2025-04-07 RX ORDER — CETIRIZINE HYDROCHLORIDE 10 MG/1
10 TABLET ORAL 2 TIMES DAILY
Qty: 180 TABLET | Refills: 0 | Status: SHIPPED | OUTPATIENT
Start: 2025-04-07

## 2025-04-07 NOTE — PATIENT INSTRUCTIONS
"Recommendations from today's MTM visit:                                                      Initiate Dupixent 300 mg injection once weekly  We are currently in the process of get this authorized by insurance and will keep you updated as able.    Repeat endoscopy 6 months following Dupixent start    Follow-up: ~1 month following first Dupixent injection, will send [employer] scheduling link     It was great speaking with you today.  I value your experience and would be very thankful for your time in providing feedback in our clinic survey. In the next few days, you may receive an email or text message from [employer] with a link to a survey related to your  clinical pharmacist.\"     To schedule another MTM appointment, please call the clinic directly or you may call the MTM scheduling line at [phone].    My Clinical Pharmacist's contact information:                                                      Please feel free to contact me with any questions or concerns you have.      Charlotte Goetz, PharmD  MTM Pharmacist  [facility]    "

## 2025-04-08 RX ORDER — DUPILUMAB 300 MG/2ML
300 INJECTION, SOLUTION SUBCUTANEOUS
Qty: 8 ML | Refills: 5 | Status: SHIPPED | OUTPATIENT
Start: 2025-04-08

## 2025-04-09 RX ORDER — EMTRICITABINE AND TENOFOVIR DISOPROXIL FUMARATE 200; 300 MG/1; MG/1
1 TABLET, FILM COATED ORAL DAILY
Qty: 30 TABLET | Refills: 0 | Status: SHIPPED | OUTPATIENT
Start: 2025-04-09

## 2025-04-15 ENCOUNTER — TELEPHONE (OUTPATIENT)
Dept: GASTROENTEROLOGY | Facility: CLINIC | Age: 30
End: 2025-04-15
Payer: COMMERCIAL

## 2025-04-15 NOTE — TELEPHONE ENCOUNTER
Sent [employer] message to patient addressing this question. OK to discontinue omeprazole per Daniele Hess PA-C.

## 2025-04-15 NOTE — TELEPHONE ENCOUNTER
"Endoscopy Scheduling Screen    Caller: patient    Have you had any respiratory illness or flu-like symptoms in the last 10 days?  No    What is your communication preference for Instructions and/or Bowel Prep?   MyChart    What insurance is in the chart?  Other:  [facility]    Ordering/Referring Provider:     JAXSON DÍAZ      (If ordering provider performs procedure, schedule with ordering provider unless otherwise instructed. )    BMI: Estimated body mass index is 17.94 kg/m  as calculated from the following:    Height as of 4/3/25: 1.778 m (5' 10\").    Weight as of 4/3/25: 56.7 kg (125 lb).     Sedation Ordered  moderate sedation.   If patient BMI > 50 do not schedule in [facility].    If patient BMI > 45 do not schedule at [facility].    Are you taking methadone or Suboxone?  NO, No RN review required.    Have you been diagnosed and are being treated for severe PTSD or severe anxiety?  NO, No RN review required.    Are you taking any prescription medications for pain 3 or more times per week?   NO, No RN review required.    Do you have a history of malignant hyperthermia?  No    (Females) Are you currently pregnant?        Have you been diagnosed or told you have pulmonary hypertension?   No    Do you have an LVAD?  No    Have you been told you have moderate to severe sleep apnea?  No.    Have you been told you have COPD, asthma, or any other lung disease?  Yes     What breathing problems do you have?  Asthma     Do you use home oxygen?  No    Have your breathing problems required an ED visit or hospitalization in the last year?  No.    Has your doctor ordered any cardiac tests like echo, angiogram, stress test, ablation, or EKG, that you have not completed yet?  No    Do you  have a history of any heart conditions?  No     Have you ever had or are you waiting for an organ transplant?  No. Continue scheduling, no site restrictions.    Have you had a stroke or transient ischemic attack (TIA aka \"mini stroke\") in the last 2 " "years?   No.    Have you been diagnosed with or been told you have cirrhosis of the liver?   No.    Are you currently on dialysis?   No    Do you need assistance transferring?   No    BMI: Estimated body mass index is 17.94 kg/m  as calculated from the following:    Height as of 4/3/25: 1.778 m (5' 10\").    Weight as of 4/3/25: 56.7 kg (125 lb).     Is patients BMI > 40 and scheduling location UP?  No    Do you take an injectable or oral medication for weight loss or diabetes (excluding insulin)?  No    Do you take the medication Naltrexone?  No    Do you take blood thinners?  No       Prep   Are you currently on dialysis or do you have chronic kidney disease?  No    Do you have a diagnosis of diabetes?  No    Do you have a diagnosis of cystic fibrosis (CF)?  No    On a regular basis do you go 3 -5 days between bowel movements?  No    BMI > 40?  No    Preferred Pharmacy:    [facility] [address] [address]  Phone: [phone] Fax: [phone]    Final Scheduling Details     Procedure scheduled  Upper endoscopy (EGD)    Surgeon:  VALENTIN     Date of procedure:  9/15     Pre-OP / PAC:   No - Not required for this site.    Location  [facility] - [facility] - Per order.    Sedation   MAC/Deep Sedation - Per RN assessment.      Patient Reminders:   You will receive a call from a Nurse to review instructions and health history.  This assessment must be completed prior to your procedure.  Failure to complete the Nurse assessment may result in the procedure being cancelled.      On the day of your procedure, please designate an adult(s) who can drive you home stay with you for the next 24 hours. The medicines used in the exam will make you sleepy. You will not be able to drive.      You cannot take public transportation, ride share services, or non-medical taxi service without a responsible caregiver.  Medical transport " services are allowed with the requirement that a responsible caregiver will receive you at your destination.  We require that drivers and caregivers are confirmed prior to your procedure.

## 2025-04-15 NOTE — TELEPHONE ENCOUNTER
[facility] Call Center    Phone Message    May a detailed message be left on voicemail: yes     Reason for Call: Other: Pt calling to inform that pt starting Dupixent on Monday 21, wants to ask about cessation of omeprazole. Pt preffers communication via [employer]     Action Taken: Other: mg gi     Travel Screening: Not Applicable     Date of Service:

## 2025-04-23 ENCOUNTER — OFFICE VISIT (OUTPATIENT)
Dept: PHARMACY | Facility: CLINIC | Age: 30
End: 2025-04-23
Attending: PHYSICIAN ASSISTANT
Payer: COMMERCIAL

## 2025-04-23 VITALS — WEIGHT: 128.8 LBS | BODY MASS INDEX: 18.48 KG/M2

## 2025-04-23 DIAGNOSIS — K20.0 EOSINOPHILIC ESOPHAGITIS: Primary | ICD-10-CM

## 2025-04-23 PROCEDURE — G0463 HOSPITAL OUTPT CLINIC VISIT: HCPCS | Performed by: PHARMACIST

## 2025-04-23 NOTE — PROGRESS NOTES
Medication Therapy Management (MTM) Encounter    ASSESSMENT:                            Medication Adherence/Access: No issues identified.    EoE:  Ty would benefit from starting treatment with Dupixent 300 mg weekly for EoE. They were able to successfully administer the pre-filled syringe on their own. No reaction noted. Their next EGD is scheduled about 5 months from now.    PLAN:                            Continue Dupixent 300 mg weekly.    Follow-up: with Syed GarveyD     SUBJECTIVE/OBJECTIVE:                          Manoj Castellanos is a 29 year old adult seen for a follow-up visit.       Reason for visit: Dupixent first injection training.    Allergies/ADRs: Reviewed in chart  Past Medical History: Reviewed in chart  Tobacco: Manoj reports that Manoj has been smoking cigarettes. Ty has never been exposed to tobacco smoke. Ty has never used smokeless tobacco.  Alcohol: Less than 1 beverages / week      Medication Adherence/Access: no issues reported.    EoE:   Dupixent 300 mg once weekly    Last clinic visit 3/28/2025  Last EGD: 3/21/2025  Next EGD: 9/15/2025    Clinic visit today for first subcutaneous Dupixent injection. The patient brought their own supply of medication to this appointment.    Education provided on medication/pen inspection, temperature control, travel guidelines, administration of medication, sharps disposal, and refill protocol.      Provided demonstration of injection with training pen. Patient successfully self-administered Dupixent dose under supervision.     Reviewed symptoms of a reaction and monitored patient for 15 minutes after injection. No reaction noted.     All questions answered at this time. Patient feels comfortable administering their injections independently at home for future doses. Next dose due in 1 week.    Wt 128 lb 12.8 oz (58.4 kg)   BMI 18.48 kg/m    ----------------      I spent 30 minutes with this patient today. All changes were made via collaborative practice  agreement with Daniele Hess PA-C.     A summary of these recommendations was given to the patient.    Syed MoncadaD, BCPS  MT Pharmacist   [facility]  Phone: [phone]         Medication Therapy Recommendations  No medication therapy recommendations to display

## 2025-04-28 NOTE — PATIENT INSTRUCTIONS
"Recommendations from today's MTM visit:                                                      Continue Dupixent 300 mg weekly.    Follow-up: with Charlotte Goetz PharmD     It was great speaking with you today.  I value your experience and would be very thankful for your time in providing feedback in our clinic survey. In the next few days, you may receive an email or text message from [facility] [employer] with a link to a survey related to your  clinical pharmacist.\"     To schedule another MTM appointment, please call the clinic directly or you may call the MTM scheduling line at [phone] or toll-free at [phone].     My Clinical Pharmacist's contact information:                                                      Please feel free to contact me with any questions or concerns you have.      Miguel Angel Low, PharmD, [facility]  MTM Pharmacist   [facility]  Phone: [phone]   "

## 2025-05-01 ENCOUNTER — OFFICE VISIT (OUTPATIENT)
Dept: FAMILY MEDICINE | Facility: CLINIC | Age: 30
End: 2025-05-01
Payer: COMMERCIAL

## 2025-05-01 VITALS
SYSTOLIC BLOOD PRESSURE: 118 MMHG | RESPIRATION RATE: 20 BRPM | DIASTOLIC BLOOD PRESSURE: 66 MMHG | OXYGEN SATURATION: 96 % | WEIGHT: 129.4 LBS | HEART RATE: 110 BPM | BODY MASS INDEX: 18.52 KG/M2 | HEIGHT: 70 IN | TEMPERATURE: 97.9 F

## 2025-05-01 DIAGNOSIS — L40.4 GUTTATE PSORIASIS: ICD-10-CM

## 2025-05-01 DIAGNOSIS — F33.0 MILD RECURRENT MAJOR DEPRESSION: ICD-10-CM

## 2025-05-01 DIAGNOSIS — L64.9 ANDROGENIC ALOPECIA: ICD-10-CM

## 2025-05-01 DIAGNOSIS — J30.81 CHRONIC ALLERGIC RHINITIS DUE TO ANIMAL HAIR AND DANDER: ICD-10-CM

## 2025-05-01 DIAGNOSIS — F90.9 ATTENTION DEFICIT HYPERACTIVITY DISORDER (ADHD), UNSPECIFIED ADHD TYPE: ICD-10-CM

## 2025-05-01 DIAGNOSIS — J45.50 SEVERE PERSISTENT ASTHMA WITHOUT COMPLICATION (H): ICD-10-CM

## 2025-05-01 DIAGNOSIS — Z76.89 ENCOUNTER TO ESTABLISH CARE: Primary | ICD-10-CM

## 2025-05-01 DIAGNOSIS — Z29.81 ENCOUNTER FOR HIV PRE-EXPOSURE PROPHYLAXIS: ICD-10-CM

## 2025-05-01 DIAGNOSIS — J30.1 CHRONIC SEASONAL ALLERGIC RHINITIS DUE TO POLLEN: ICD-10-CM

## 2025-05-01 DIAGNOSIS — E55.9 VITAMIN D DEFICIENCY: ICD-10-CM

## 2025-05-01 LAB — HIV 1+2 AB+HIV1 P24 AG SERPL QL IA: NONREACTIVE

## 2025-05-01 RX ORDER — METHYLPHENIDATE HYDROCHLORIDE 5 MG/1
5 TABLET ORAL 2 TIMES DAILY
Qty: 60 TABLET | Refills: 0 | Status: SHIPPED | OUTPATIENT
Start: 2025-06-01 | End: 2025-07-01

## 2025-05-01 RX ORDER — FINASTERIDE 1 MG/1
1 TABLET, FILM COATED ORAL DAILY
Qty: 90 TABLET | Refills: 1 | Status: SHIPPED | OUTPATIENT
Start: 2025-05-01 | End: 2025-10-28

## 2025-05-01 RX ORDER — VITAMIN B COMPLEX
2 TABLET ORAL DAILY
Qty: 180 TABLET | Refills: 1 | Status: SHIPPED | OUTPATIENT
Start: 2025-05-01 | End: 2025-10-28

## 2025-05-01 RX ORDER — METHYLPHENIDATE HYDROCHLORIDE 5 MG/1
5 TABLET ORAL 2 TIMES DAILY
Qty: 60 TABLET | Refills: 0 | Status: SHIPPED | OUTPATIENT
Start: 2025-07-02 | End: 2025-08-01

## 2025-05-01 RX ORDER — MOMETASONE FUROATE 1 MG/G
OINTMENT TOPICAL
Qty: 454 G | Refills: 1 | Status: SHIPPED | OUTPATIENT
Start: 2025-05-01

## 2025-05-01 RX ORDER — METHYLPHENIDATE HYDROCHLORIDE 5 MG/1
5 TABLET ORAL 2 TIMES DAILY
Qty: 60 TABLET | Refills: 0 | Status: SHIPPED | OUTPATIENT
Start: 2025-05-01 | End: 2025-05-31

## 2025-05-01 RX ORDER — CETIRIZINE HYDROCHLORIDE 10 MG/1
10 TABLET ORAL 2 TIMES DAILY
Qty: 180 TABLET | Refills: 1 | Status: SHIPPED | OUTPATIENT
Start: 2025-05-01 | End: 2025-10-28

## 2025-05-01 RX ORDER — EMTRICITABINE AND TENOFOVIR DISOPROXIL FUMARATE 200; 300 MG/1; MG/1
1 TABLET, FILM COATED ORAL DAILY
Qty: 90 TABLET | Refills: 0 | Status: SHIPPED | OUTPATIENT
Start: 2025-05-01

## 2025-05-01 ASSESSMENT — ANXIETY QUESTIONNAIRES
7. FEELING AFRAID AS IF SOMETHING AWFUL MIGHT HAPPEN: NOT AT ALL
6. BECOMING EASILY ANNOYED OR IRRITABLE: NOT AT ALL
2. NOT BEING ABLE TO STOP OR CONTROL WORRYING: NOT AT ALL
4. TROUBLE RELAXING: NOT AT ALL
8. IF YOU CHECKED OFF ANY PROBLEMS, HOW DIFFICULT HAVE THESE MADE IT FOR YOU TO DO YOUR WORK, TAKE CARE OF THINGS AT HOME, OR GET ALONG WITH OTHER PEOPLE?: NOT DIFFICULT AT ALL
3. WORRYING TOO MUCH ABOUT DIFFERENT THINGS: NOT AT ALL
GAD7 TOTAL SCORE: 0
7. FEELING AFRAID AS IF SOMETHING AWFUL MIGHT HAPPEN: NOT AT ALL
5. BEING SO RESTLESS THAT IT IS HARD TO SIT STILL: NOT AT ALL
1. FEELING NERVOUS, ANXIOUS, OR ON EDGE: NOT AT ALL
IF YOU CHECKED OFF ANY PROBLEMS ON THIS QUESTIONNAIRE, HOW DIFFICULT HAVE THESE PROBLEMS MADE IT FOR YOU TO DO YOUR WORK, TAKE CARE OF THINGS AT HOME, OR GET ALONG WITH OTHER PEOPLE: NOT DIFFICULT AT ALL

## 2025-05-01 ASSESSMENT — PATIENT HEALTH QUESTIONNAIRE - PHQ9
SUM OF ALL RESPONSES TO PHQ QUESTIONS 1-9: 1
SUM OF ALL RESPONSES TO PHQ QUESTIONS 1-9: 1
10. IF YOU CHECKED OFF ANY PROBLEMS, HOW DIFFICULT HAVE THESE PROBLEMS MADE IT FOR YOU TO DO YOUR WORK, TAKE CARE OF THINGS AT HOME, OR GET ALONG WITH OTHER PEOPLE: NOT DIFFICULT AT ALL

## 2025-05-01 ASSESSMENT — PAIN SCALES - GENERAL: PAINLEVEL_OUTOF10: NO PAIN (0)

## 2025-05-01 NOTE — PATIENT INSTRUCTIONS
Info About My Practice:   Thank you for coming to see me today! Here are a couple of pieces of information about my schedule and communication practices.     I am not in the clinic on Tuesdays. Non-urgent calls and messages received on Tuesdays will be addressed as soon as I am able when I am back in the office on the next business day. Urgent calls will be addressed by a covering clinician.       If lab work was done today as part of your evaluation you will generally be contacted via [employer], mail, or phone with the results within 7 days. I encourage you to sign up for [employer] ([email or web address]). If there is an alarming/concerning result we will contact you by phone. Lab results come back at varying times, I generally wait until all labs are resulted before making comments on results, but if any labs look concerning, I will reach out sooner. Please note, labs are automatically released to [employer] once available, but it may take a couple of days for my interpretation note to appear.      I try very hard to respond to medical messages with 2 business days of receiving them. Occasionally it takes me longer if I am trying to figure out the best way to respond and need to seek guidance, do some research or dig deeper into your medical history to come up with a helpful response.      If you need refills please contact your pharmacist. They will send a refill request to me to review. Please allow 3-5 business days for us to respond to all refill requests.      Please call or send a medical message with any questions or concerns. I do ask that all patients who are taking chronic medications for conditions that I am managing schedule an in-person visit with me at least once a year. Otherwise, I am happy to see you for acute concerns or other questions via in-person or virtual visit. If you are not able to get an appointment with me in the timeframe you desire, please ask to speak with one of the  [place] nurses who may be able to access a sooner appointment.      Thank you for trusting me to be part of your healthcare team!    Dex Sinclair PA-C  [facility]

## 2025-05-01 NOTE — PROGRESS NOTES
Assessment & Plan   Here to establish care with me.     Attention deficit hyperactivity disorder (ADHD), unspecified ADHD type  Major mild depressive disorder   History of ADHD and mild depression. Currently on Ritalin 5mg BID. Overall inattention and depression symptoms are well controlled. Denies any side effects like chest pain/pressure, mood or sleep issue, appetite decrease, or weight loss.     Plan: Continue Ritalin 5mg BID (send 30 day supply with 2 refills). PDMP reviewed. CSA completed. Follow-up in 3 months via e-visit.     - methylphenidate (RITALIN) 5 MG tablet; Take 1 tablet (5 mg) by mouth 2 times daily.  - methylphenidate (RITALIN) 5 MG tablet; Take 1 tablet (5 mg) by mouth 2 times daily.  - methylphenidate (RITALIN) 5 MG tablet; Take 1 tablet (5 mg) by mouth 2 times daily.    Vitamin D deficiency  History of Vitamin D deficiency, taking vitamin D 1000 units daily.     Plan: Continue Vitamin D 1000 units daily. Check Vitamin D level with next full set of labs.     - vitamin D3 25 mcg (1000 units) tablet; Take 2 tablets (50 mcg) by mouth daily.    Chronic allergic rhinitis due to animal hair and dander  Chronic seasonal allergic rhinitis due to pollen  History of chronic allergies due to pollen and animal hair/dander well controlled with zyrtec 10mg BID.     Plan: Continue zyrtec 10mg BID - refill provided.     - cetirizine (ZYRTEC) 10 MG tablet; Take 1 tablet (10 mg) by mouth 2 times daily.    Encounter for HIV pre-exposure prophylaxis  Patient is on Truvada for HIV pre-exposure prophylaxis. They have not been sexually active since last STI screen and do not request additional STI testing. Last BMP - July 2024. No side effects and taking every day without missing any doses.     Plan: Continue Truvada - provided 3 month refill. HIV test ordered. Follow-up in 3 months via e-visit and will plan to order full panel of labs.     - emtricitabine-tenofovir (TRUVADA) 200-300 MG per tablet; Take 1 tablet by  mouth daily.  - HIV Antigen Antibody Combo; Future  - HIV Antigen Antibody Combo    Androgenic alopecia  Family history of androgenic alopecia, which they take finasteride 1.25 mg daily for prevention.  They inquire about if there is a 1 mg dose so they do not have to split a 5 mg tablet moving forward. No side effects.     Plan: Finasteride 1 mg daily avoid splitting pills. Refill sent.     - finasteride (PROPECIA) 1 MG tablet; Take 1 tablet (1 mg) by mouth daily.    Guttate psoriasis  Patient has history of gout Moon psoriasis that is well-controlled with as needed mometasone cream. They were also recently started on Dupixent for eosinophilic esophagitis which will likely offer some benefit for their asthma and psoriasis.  Previously seen in dermatology, but symptoms have been much more controlled lately and would prefer to just have medicine refilled by me today.    Plan: Can continue PRN use of mometasone - refill provided. Otherwise good skin hygiene with regular moisturization. Follow-up as needed.     - mometasone (ELOCON) 0.1 % external ointment; Apply topically twice daily.  Do not expose skin to sun after applying steroid cream.    Mild persistent asthma  Patient has history of mild persistent asthma treated with albuterol as needed.  They were also recently started on Dupixent for eosinophilic esophagitis which will likely offer some benefit for their asthma and psoriasis as noted above.  They state that asthma is fairly well-controlled.    Plan: Encouraged smoking cessation. Continue current regimen. No refills needed at this time.       Nicotine/Tobacco Cessation  Ty reports that Ty has been smoking cigarettes. Ty has never been exposed to tobacco smoke. Ty has never used smokeless tobacco.  Nicotine/Tobacco Cessation Plan  Information offered: Patient not interested at this time      The longitudinal plan of care for the diagnosis(es)/condition(s) as documented were addressed during this visit. Due to  "the added complexity in care, I will continue to support Ty in the subsequent management and with ongoing continuity of care.    Subjective   Ty is a 29 year old, presenting for the following health issues:  Establish Care        5/1/2025    12:57 PM   Additional Questions   Roomed by Jose LOPEZ MA   Accompanied by Self         5/1/2025    12:57 PM   Patient Reported Additional Medications   Patient reports taking the following new medications None     History of Present Illness       Reason for visit:  Establishing care with new PCP   Ty is taking medications regularly.        Here to establish care with me and to get prescriptions revealed.                   Objective    /66 (BP Location: Right arm, Patient Position: Sitting, Cuff Size: Adult Regular)   Pulse 110   Temp 97.9  F (36.6  C) (Temporal)   Resp 20   Ht 1.778 m (5' 10\")   Wt 58.7 kg (129 lb 6.4 oz)   SpO2 96%   BMI 18.57 kg/m    Body mass index is 18.57 kg/m .  Physical Exam   GENERAL: healthy, alert and no distress  EYES: Eyes grossly normal to inspection, EOM intact and conjunctivae normal  RESP: breathing comfortably on room air  PSYCH: mentation appears normal, affect normal/bright              Signed Electronically by: Dex Sinclair PA-C    "

## 2025-06-09 ENCOUNTER — TELEPHONE (OUTPATIENT)
Dept: FAMILY MEDICINE | Facility: CLINIC | Age: 30
End: 2025-06-09
Payer: COMMERCIAL

## 2025-06-09 NOTE — TELEPHONE ENCOUNTER
Prior Authorization Retail Medication Request    Medication/Dose: finasteride (PROPECIA) 1 MG tablet   Diagnosis and ICD code (if different than what is on RX):  Androgenic alopecia [L64.9]     ALBERTO Stiles, BSN, PHN, AMB-BC (she/her)  [facility] RN

## 2025-06-10 NOTE — TELEPHONE ENCOUNTER
PA Initiation    Medication: FINASTERIDE 1 MG PO TABS  Insurance Company: [employer] - Phone [phone] Fax [phone]  [id number] [address]  Filling Pharmacy Phone: [phone]  Filling Pharmacy Fax:    Start Date: 6/10/2025  Retail Pharmacy Prior Authorization Team   Phone: [phone]

## 2025-06-12 NOTE — TELEPHONE ENCOUNTER
Relayed PA denial to patient via MyChart encounter.    KEIRY LynchN, RN (she/her)  [facility] RN

## 2025-07-16 DIAGNOSIS — Z29.81 ENCOUNTER FOR HIV PRE-EXPOSURE PROPHYLAXIS: ICD-10-CM

## 2025-07-17 RX ORDER — EMTRICITABINE AND TENOFOVIR DISOPROXIL FUMARATE 200; 300 MG/1; MG/1
1 TABLET, FILM COATED ORAL DAILY
Qty: 90 TABLET | Refills: 0 | OUTPATIENT
Start: 2025-07-17

## 2025-07-19 ENCOUNTER — E-VISIT (OUTPATIENT)
Dept: FAMILY MEDICINE | Facility: CLINIC | Age: 30
End: 2025-07-19
Payer: COMMERCIAL

## 2025-07-19 DIAGNOSIS — F90.9 ATTENTION DEFICIT HYPERACTIVITY DISORDER (ADHD), UNSPECIFIED ADHD TYPE: Primary | ICD-10-CM

## 2025-07-19 PROCEDURE — 99207 PR NON-BILLABLE SERV PER CHARTING: CPT | Performed by: STUDENT IN AN ORGANIZED HEALTH CARE EDUCATION/TRAINING PROGRAM

## 2025-07-19 ASSESSMENT — ANXIETY QUESTIONNAIRES
2. NOT BEING ABLE TO STOP OR CONTROL WORRYING: NOT AT ALL
GAD7 TOTAL SCORE: 0
8. IF YOU CHECKED OFF ANY PROBLEMS, HOW DIFFICULT HAVE THESE MADE IT FOR YOU TO DO YOUR WORK, TAKE CARE OF THINGS AT HOME, OR GET ALONG WITH OTHER PEOPLE?: NOT DIFFICULT AT ALL
7. FEELING AFRAID AS IF SOMETHING AWFUL MIGHT HAPPEN: NOT AT ALL
7. FEELING AFRAID AS IF SOMETHING AWFUL MIGHT HAPPEN: NOT AT ALL
IF YOU CHECKED OFF ANY PROBLEMS ON THIS QUESTIONNAIRE, HOW DIFFICULT HAVE THESE PROBLEMS MADE IT FOR YOU TO DO YOUR WORK, TAKE CARE OF THINGS AT HOME, OR GET ALONG WITH OTHER PEOPLE: NOT DIFFICULT AT ALL
3. WORRYING TOO MUCH ABOUT DIFFERENT THINGS: NOT AT ALL
1. FEELING NERVOUS, ANXIOUS, OR ON EDGE: NOT AT ALL
4. TROUBLE RELAXING: NOT AT ALL
GAD7 TOTAL SCORE: 0
GAD7 TOTAL SCORE: 0
6. BECOMING EASILY ANNOYED OR IRRITABLE: NOT AT ALL
5. BEING SO RESTLESS THAT IT IS HARD TO SIT STILL: NOT AT ALL

## 2025-07-19 ASSESSMENT — PATIENT HEALTH QUESTIONNAIRE - PHQ9
10. IF YOU CHECKED OFF ANY PROBLEMS, HOW DIFFICULT HAVE THESE PROBLEMS MADE IT FOR YOU TO DO YOUR WORK, TAKE CARE OF THINGS AT HOME, OR GET ALONG WITH OTHER PEOPLE: SOMEWHAT DIFFICULT
SUM OF ALL RESPONSES TO PHQ QUESTIONS 1-9: 3
SUM OF ALL RESPONSES TO PHQ QUESTIONS 1-9: 3

## 2025-07-22 RX ORDER — METHYLPHENIDATE HYDROCHLORIDE 5 MG/1
5 TABLET ORAL 2 TIMES DAILY
Qty: 60 TABLET | Refills: 0 | Status: SHIPPED | OUTPATIENT
Start: 2025-08-21 | End: 2025-09-20

## 2025-07-22 RX ORDER — METHYLPHENIDATE HYDROCHLORIDE 5 MG/1
5 TABLET ORAL 2 TIMES DAILY
Qty: 60 TABLET | Refills: 0 | Status: SHIPPED | OUTPATIENT
Start: 2025-07-22 | End: 2025-08-21

## 2025-07-22 RX ORDER — METHYLPHENIDATE HYDROCHLORIDE 5 MG/1
5 TABLET ORAL 2 TIMES DAILY
Qty: 60 TABLET | Refills: 0 | Status: SHIPPED | OUTPATIENT
Start: 2025-09-20 | End: 2025-10-20

## 2025-07-22 NOTE — PATIENT INSTRUCTIONS
I am glad you are doing well. I have refilled your medication, sent 30 day supply with 2 refills for the a total of 3 months of prescription:  Orders Placed This Encounter   Medications     methylphenidate (RITALIN) 5 MG tablet     Sig: Take 1 tablet (5 mg) by mouth 2 times daily.     Dispense:  60 tablet     Refill:  0     methylphenidate (RITALIN) 5 MG tablet     Sig: Take 1 tablet (5 mg) by mouth 2 times daily.     Dispense:  60 tablet     Refill:  0     methylphenidate (RITALIN) 5 MG tablet     Sig: Take 1 tablet (5 mg) by mouth 2 times daily.     Dispense:  60 tablet     Refill:  0        View your full visit summary for details by clicking on the link below. Your pharmacist will be able to address any questions you may have about the medication.      If you need a refill for your Truvada as well, please respond and let me know.    Thank you for choosing us for your care.    Best Regards,   Dex

## 2025-07-28 ENCOUNTER — PATIENT OUTREACH (OUTPATIENT)
Dept: CARE COORDINATION | Facility: CLINIC | Age: 30
End: 2025-07-28
Payer: COMMERCIAL

## 2025-08-19 DIAGNOSIS — M79.2 NEUROPATHIC PAIN: ICD-10-CM

## 2025-08-19 RX ORDER — OXCARBAZEPINE 300 MG/1
300 TABLET, FILM COATED ORAL 2 TIMES DAILY
Qty: 180 TABLET | Refills: 1 | Status: SHIPPED | OUTPATIENT
Start: 2025-08-19

## 2025-08-25 ENCOUNTER — TELEPHONE (OUTPATIENT)
Dept: NEUROLOGY | Facility: CLINIC | Age: 30
End: 2025-08-25

## 2025-08-26 ENCOUNTER — MYC MEDICAL ADVICE (OUTPATIENT)
Dept: FAMILY MEDICINE | Facility: CLINIC | Age: 30
End: 2025-08-26
Payer: COMMERCIAL

## 2025-08-26 ENCOUNTER — TELEPHONE (OUTPATIENT)
Dept: GASTROENTEROLOGY | Facility: CLINIC | Age: 30
End: 2025-08-26
Payer: COMMERCIAL

## 2025-08-26 DIAGNOSIS — M79.2 NEUROPATHIC PAIN: ICD-10-CM

## 2025-08-28 RX ORDER — GABAPENTIN 300 MG/1
300 CAPSULE ORAL 2 TIMES DAILY
Qty: 180 CAPSULE | Refills: 0 | Status: SHIPPED | OUTPATIENT
Start: 2025-08-28

## (undated) DEVICE — ESU GROUND PAD ADULT W/CORD E7507

## (undated) DEVICE — ADH LIQUID MASTISOL TOPICAL VIAL 2-3ML 0523-48

## (undated) DEVICE — DRSG STERI STRIP 1/4X4" R1546

## (undated) DEVICE — SOL NACL 0.9% IRRIG 500ML BOTTLE 2F7123

## (undated) DEVICE — CUP AND LID 2PK 2OZ STERILE  SSK9006A

## (undated) DEVICE — DRSG GAUZE 4X4" TRAY 6939

## (undated) DEVICE — GOWN IMPERVIOUS 2XL BLUE

## (undated) DEVICE — GOWN IMPERVIOUS BREATHABLE SMART XLG 89045

## (undated) DEVICE — PHOTON GUIDE INVUITY WIDE FLAT 104015

## (undated) DEVICE — SUCTION MANIFOLD NEPTUNE 2 SYS 4 PORT 0702-020-000

## (undated) DEVICE — SU ETHILON 3-0 PS-1 18" 1663H

## (undated) DEVICE — SUCTION TIP YANKAUER W/O VENT K86

## (undated) DEVICE — ENDO FORCEP BX CAPTURA PRO SPIKE G50696

## (undated) DEVICE — SPLINT AQUAPLAST 6X9"

## (undated) DEVICE — SU VICRYL 2-0 CT-2 27" UND J269H

## (undated) DEVICE — SYR 50ML LL W/O NDL 309653

## (undated) DEVICE — DRSG STERI STRIP 1/2X4" R1547

## (undated) DEVICE — GLOVE EXAM NITRILE LG PF LATEX FREE 5064

## (undated) DEVICE — UNIVERSAL DRAPE PACK 29118

## (undated) DEVICE — SPLINT NASAL DOYLE BREATHEASY 20-10500

## (undated) DEVICE — SPONGE COTTONOID 2X1/2" 80-1406

## (undated) DEVICE — SYR 50ML SLIP TIP W/O NDL 309654

## (undated) DEVICE — KIT ENDO FIRST STEP DISINFECTANT 200ML W/POUCH EP-4

## (undated) DEVICE — SU CHROMIC 4-0 PS-2 18" 1637G

## (undated) DEVICE — BOWL 32OZ STERILE 01232

## (undated) DEVICE — LINEN TOWEL PACK X5 5464

## (undated) DEVICE — KIT ENDO TURNOVER/PROCEDURE CARRY-ON 101822

## (undated) DEVICE — SUCTION SLEEVE NEPTUNE 2 165MM 0703-005-165

## (undated) DEVICE — DRAPE SHEET REV FOLD 3/4 9349

## (undated) DEVICE — NDL 25GA 1.5" 305127

## (undated) DEVICE — CATH BALLOON MERIT ESOPH FIVE-STAGE 17X21MMX180CM EX18

## (undated) DEVICE — NDL 27GA 1.25" 305136

## (undated) DEVICE — JELLY LUBRICATING SURGILUBE 2OZ TUBE

## (undated) DEVICE — SU PDS II 2-0 CT-1 27" Z339H

## (undated) DEVICE — ESU ELEC BLADE 6" COATED/INSULATED E1455-6

## (undated) DEVICE — SU PDS II 5-0 RB-2DA 30" Z148H

## (undated) DEVICE — DRSG TEGADERM 4X4 3/4" 1626W

## (undated) DEVICE — SOL WATER IRRIG 500ML BOTTLE 2F7113

## (undated) DEVICE — BASIN SET SINGLE STERILE 13752-624

## (undated) DEVICE — PREP POVIDONE IODINE SOLUTION 10% 120ML

## (undated) DEVICE — DRAPE IOBAN INCISE 23X17" 6650EZ

## (undated) DEVICE — SU PDS II 4-0 RB-1 27" Z304H

## (undated) DEVICE — TAPE MEDIPORE 2"X2YD 2962S

## (undated) DEVICE — BLADE KNIFE SURG 10 371110

## (undated) DEVICE — PACK ENT MINOR CUSTOM ASC

## (undated) DEVICE — GLOVE BIOGEL PI MICRO SZ 7.0 48570

## (undated) DEVICE — LABEL MEDICATION SYSTEM 3303-P

## (undated) DEVICE — STRAP KNEE/BODY 31143004

## (undated) DEVICE — TAPE MICROFOAM 3" 1528-3

## (undated) DEVICE — SPECIMEN CONTAINER 3OZ W/FORMALIN 59901

## (undated) DEVICE — PEN MARKING SKIN W/PAPER RULER 31145785

## (undated) DEVICE — SU MONOCRYL 3-0 PS-1 27" Y936H

## (undated) DEVICE — ENDO BITE BLOCK ADULT OMNI-BLOC

## (undated) DEVICE — TUBING SUCTION 10'X3/16" N510

## (undated) DEVICE — SU CHROMIC 4-0 P-3 18" 1654G

## (undated) DEVICE — ESU NDL COLORADO MICRO 3CM STR N103A

## (undated) DEVICE — DEVICE RETRIEVAL ROTH NET PLATINUM UNIV 2.5MMX230CM 00715050

## (undated) DEVICE — NDL SCLEROTHERAPY 25GA CARR-LOCK  00711811

## (undated) DEVICE — SU PDS II 0 CT-1 27" Z340H

## (undated) DEVICE — GLOVE BIOGEL PI MICRO SZ 7.5 48575

## (undated) DEVICE — STPL SKIN 35W 059037

## (undated) DEVICE — GLOVE BIOGEL PI MICRO INDICATOR UNDERGLOVE SZ 7.5 48975

## (undated) DEVICE — SYR BULB IRRIG DOVER 60 ML LATEX FREE 67000

## (undated) DEVICE — ESU PENCIL SMOKE EVAC W/ROCKER SWITCH 0703-047-000

## (undated) DEVICE — SYR 10ML FINGER CONTROL W/O NDL 309695

## (undated) DEVICE — SUCTION MANIFOLD NEPTUNE 2 SYS 1 PORT 702-025-000

## (undated) DEVICE — EYE PREP BETADINE 5% SOLUTION 30ML 0065-0411-30

## (undated) DEVICE — BASIN SET MINOR DISP

## (undated) DEVICE — SPONGE LAP 18X18" X8435

## (undated) DEVICE — ESU ELEC BLADE 2.75" COATED/INSULATED E1455

## (undated) DEVICE — BLADE KNIFE SURG 11 371111

## (undated) DEVICE — SU PLAIN 4-0 SC-1 18" 1824H

## (undated) DEVICE — SU DERMABOND ADVANCED .7ML DNX12

## (undated) DEVICE — DRSG TEGADERM 2 3/8X2 3/4" 1624W

## (undated) DEVICE — PREP CHLORAPREP 26ML TINTED HI-LITE ORANGE 930815

## (undated) DEVICE — DRSG KERLIX FLUFFS X5

## (undated) DEVICE — PACK MINOR CUSTOM ASC

## (undated) DEVICE — BLADE KNIFE SURG 15 371115

## (undated) RX ORDER — OXYCODONE HYDROCHLORIDE 5 MG/1
TABLET ORAL
Status: DISPENSED
Start: 2024-05-09

## (undated) RX ORDER — FENTANYL CITRATE 50 UG/ML
INJECTION, SOLUTION INTRAMUSCULAR; INTRAVENOUS
Status: DISPENSED
Start: 2024-07-30

## (undated) RX ORDER — HYDROMORPHONE HYDROCHLORIDE 1 MG/ML
INJECTION, SOLUTION INTRAMUSCULAR; INTRAVENOUS; SUBCUTANEOUS
Status: DISPENSED
Start: 2024-07-30

## (undated) RX ORDER — LIDOCAINE HYDROCHLORIDE AND EPINEPHRINE 10; 10 MG/ML; UG/ML
INJECTION, SOLUTION INFILTRATION; PERINEURAL
Status: DISPENSED
Start: 2024-07-30

## (undated) RX ORDER — ONDANSETRON 2 MG/ML
INJECTION INTRAMUSCULAR; INTRAVENOUS
Status: DISPENSED
Start: 2024-07-30

## (undated) RX ORDER — DEXMEDETOMIDINE HYDROCHLORIDE 4 UG/ML
INJECTION, SOLUTION INTRAVENOUS
Status: DISPENSED
Start: 2024-07-30

## (undated) RX ORDER — PROPOFOL 10 MG/ML
INJECTION, EMULSION INTRAVENOUS
Status: DISPENSED
Start: 2024-07-30

## (undated) RX ORDER — PROPOFOL 10 MG/ML
INJECTION, EMULSION INTRAVENOUS
Status: DISPENSED
Start: 2024-05-09

## (undated) RX ORDER — GENTAMICIN 40 MG/ML
INJECTION, SOLUTION INTRAMUSCULAR; INTRAVENOUS
Status: DISPENSED
Start: 2024-07-30

## (undated) RX ORDER — VANCOMYCIN HYDROCHLORIDE 1 G/20ML
INJECTION, POWDER, LYOPHILIZED, FOR SOLUTION INTRAVENOUS
Status: DISPENSED
Start: 2024-07-30

## (undated) RX ORDER — OXYMETAZOLINE HYDROCHLORIDE 0.05 G/100ML
SPRAY NASAL
Status: DISPENSED
Start: 2024-05-09

## (undated) RX ORDER — FENTANYL CITRATE 50 UG/ML
INJECTION, SOLUTION INTRAMUSCULAR; INTRAVENOUS
Status: DISPENSED
Start: 2024-05-09

## (undated) RX ORDER — ONDANSETRON 2 MG/ML
INJECTION INTRAMUSCULAR; INTRAVENOUS
Status: DISPENSED
Start: 2025-03-21

## (undated) RX ORDER — LIDOCAINE HYDROCHLORIDE AND EPINEPHRINE 10; 10 MG/ML; UG/ML
INJECTION, SOLUTION INFILTRATION; PERINEURAL
Status: DISPENSED
Start: 2024-05-09

## (undated) RX ORDER — CEFAZOLIN SODIUM 1 G/3ML
INJECTION, POWDER, FOR SOLUTION INTRAMUSCULAR; INTRAVENOUS
Status: DISPENSED
Start: 2024-07-30

## (undated) RX ORDER — OXYCODONE HYDROCHLORIDE 5 MG/1
TABLET ORAL
Status: DISPENSED
Start: 2024-07-30

## (undated) RX ORDER — GLYCOPYRROLATE 0.2 MG/ML
INJECTION INTRAMUSCULAR; INTRAVENOUS
Status: DISPENSED
Start: 2024-07-30

## (undated) RX ORDER — CEFAZOLIN SODIUM 2 G/50ML
SOLUTION INTRAVENOUS
Status: DISPENSED
Start: 2024-07-30

## (undated) RX ORDER — BUPIVACAINE HYDROCHLORIDE 2.5 MG/ML
INJECTION, SOLUTION EPIDURAL; INFILTRATION; INTRACAUDAL
Status: DISPENSED
Start: 2024-07-30

## (undated) RX ORDER — MUPIROCIN 20 MG/G
OINTMENT TOPICAL
Status: DISPENSED
Start: 2024-05-09

## (undated) RX ORDER — DEXAMETHASONE SODIUM PHOSPHATE 10 MG/ML
INJECTION, SOLUTION INTRAMUSCULAR; INTRAVENOUS
Status: DISPENSED
Start: 2024-05-09

## (undated) RX ORDER — ACETAMINOPHEN 325 MG/1
TABLET ORAL
Status: DISPENSED
Start: 2024-07-30

## (undated) RX ORDER — ONDANSETRON 2 MG/ML
INJECTION INTRAMUSCULAR; INTRAVENOUS
Status: DISPENSED
Start: 2024-05-09

## (undated) RX ORDER — CLINDAMYCIN PHOSPHATE 900 MG/50ML
INJECTION, SOLUTION INTRAVENOUS
Status: DISPENSED
Start: 2024-05-09

## (undated) RX ORDER — DEXAMETHASONE SODIUM PHOSPHATE 4 MG/ML
INJECTION, SOLUTION INTRA-ARTICULAR; INTRALESIONAL; INTRAMUSCULAR; INTRAVENOUS; SOFT TISSUE
Status: DISPENSED
Start: 2024-07-30